# Patient Record
Sex: FEMALE | Race: WHITE | NOT HISPANIC OR LATINO | Employment: OTHER | URBAN - METROPOLITAN AREA
[De-identification: names, ages, dates, MRNs, and addresses within clinical notes are randomized per-mention and may not be internally consistent; named-entity substitution may affect disease eponyms.]

---

## 2017-01-03 ENCOUNTER — TELEPHONE (OUTPATIENT)
Dept: RHEUMATOLOGY | Facility: CLINIC | Age: 67
End: 2017-01-03

## 2017-01-03 NOTE — TELEPHONE ENCOUNTER
Spoke with Mrs. Oliver and rescheduled her appointment with Sonya Martinez PA-C for 02/1/2017 at 11:30am. Reminder letter mailed.

## 2017-01-03 NOTE — TELEPHONE ENCOUNTER
----- Message from Latonia Mckay sent at 1/3/2017 12:12 PM CST -----  Pt at [phone]//states she has appt with Dr Moreno this afternoon at 2:30pm but is needing to reschedule//she has an upset stomach and not able to come in today//would like to reschedule to a later date//please call//lona/[facility]

## 2017-02-01 ENCOUNTER — OFFICE VISIT (OUTPATIENT)
Dept: RHEUMATOLOGY | Facility: CLINIC | Age: 67
End: 2017-02-01
Payer: MEDICARE

## 2017-02-01 VITALS
HEIGHT: 63 IN | SYSTOLIC BLOOD PRESSURE: 159 MMHG | BODY MASS INDEX: 37.15 KG/M2 | WEIGHT: 209.69 LBS | HEART RATE: 65 BPM | DIASTOLIC BLOOD PRESSURE: 75 MMHG

## 2017-02-01 DIAGNOSIS — M47.816 SPONDYLOSIS OF LUMBAR REGION WITHOUT MYELOPATHY OR RADICULOPATHY: ICD-10-CM

## 2017-02-01 DIAGNOSIS — M79.7 FIBROMYALGIA: ICD-10-CM

## 2017-02-01 DIAGNOSIS — M35.00 SJOGREN'S SYNDROME: ICD-10-CM

## 2017-02-01 DIAGNOSIS — M62.830 LUMBAR PARASPINAL MUSCLE SPASM: ICD-10-CM

## 2017-02-01 DIAGNOSIS — Z51.81 MEDICATION MONITORING ENCOUNTER: ICD-10-CM

## 2017-02-01 DIAGNOSIS — M35.01 KERATOCONJUNCTIVITIS, SICCA SJOGREN'S SYNDROME: Primary | ICD-10-CM

## 2017-02-01 PROCEDURE — 99213 OFFICE O/P EST LOW 20 MIN: CPT | Mod: PBBFAC,PO | Performed by: PHYSICIAN ASSISTANT

## 2017-02-01 PROCEDURE — 99999 PR PBB SHADOW E&M-EST. PATIENT-LVL III: CPT | Mod: PBBFAC,,, | Performed by: PHYSICIAN ASSISTANT

## 2017-02-01 PROCEDURE — 99214 OFFICE O/P EST MOD 30 MIN: CPT | Mod: S$PBB,,, | Performed by: PHYSICIAN ASSISTANT

## 2017-02-01 RX ORDER — HYDROXYCHLOROQUINE SULFATE 200 MG/1
200 TABLET, FILM COATED ORAL 2 TIMES DAILY
Qty: 180 TABLET | Refills: 3 | Status: SHIPPED | OUTPATIENT
Start: 2017-02-01 | End: 2017-08-21 | Stop reason: SDUPTHER

## 2017-02-01 RX ORDER — ZOLPIDEM TARTRATE 5 MG/1
5 TABLET ORAL NIGHTLY PRN
Qty: 30 TABLET | Refills: 2 | Status: SHIPPED | OUTPATIENT
Start: 2017-02-01 | End: 2017-04-11 | Stop reason: ALTCHOICE

## 2017-02-01 NOTE — MR AVS SNAPSHOT
[facility]  [address] [facility] [address] [place] [address]  Phone: [phone]  Fax: [phone]                  Latonia Lomaxkey   2017 11:30 AM   Office Visit    Description:  Female : 1950   Provider:  Sonya Martinez PA-C   Department:  [facility] - Rheumatology           Reason for Visit     Osteopenia     Osteoarthritis     Fibromyalgia     sjogren's syndrome           Diagnoses this Visit        Comments    Keratoconjunctivitis, sicca Sjogren's syndrome    -  Primary     Fibromyalgia         Medication monitoring encounter         Spondylosis of lumbar region without myelopathy or radiculopathy         Lumbar paraspinal muscle spasm         Sjogren's syndrome                To Do List           Future Appointments        Provider Department Dept Phone    2017 1:00 PM MD Antoni[facility] Bangal - Pulmonary Services [phone]    2017 10:20 AM SPECIMEN, [facility] [phone]    2017 10:35 AM LABORATORY, [facility] [phone]    2017 10:30 AM Jorge Moreno MD [facility] [phone]      Goals (5 Years of Data)     None      Follow-Up and Disposition     Follow-up and Disposition History       These Medications        Disp Refills Start End    hydroxychloroquine (PLAQUENIL) 200 mg tablet 180 tablet 3 2017     Take 1 tablet (200 mg total) by mouth 2 (two) times daily. - Oral    Pharmacy: [facility] [address] [place], [place] [address]AUD[place] - [address]  Ph #: [phone]       zolpidem (AMBIEN) 5 MG Tab 30 tablet 2 2017    Take 1 tablet (5 mg total) by mouth nightly as needed. - Oral    Pharmacy: [facility] - [address]  Ph #: [phone]         [facility] On Call     [facility] Care Line -  Assistance  Registered nurses in the [facility] provide clinical advisement, health education, appointment booking, and other advisory  "services.  Call for this free service at [phone].             Medications           Message regarding Medications     Verify the changes and/or additions to your medication regime listed below are the same as discussed with your clinician today.  If any of these changes or additions are incorrect, please notify your healthcare provider.        START taking these NEW medications        Refills    zolpidem (AMBIEN) 5 MG Tab 2    Sig: Take 1 tablet (5 mg total) by mouth nightly as needed.    Class: Print    Route: Oral           Verify that the below list of medications is an accurate representation of the medications you are currently taking.  If none reported, the list may be blank. If incorrect, please contact your healthcare provider. Carry this list with you in case of emergency.           Current Medications     benazepril-hydrochlorthiazide (LOTENSIN HCT) 20-25 mg Tab TAKE ONE TABLET BY MOUTH ONCE DAILY    citalopram (CELEXA) 40 MG tablet TAKE ONE TABLET BY MOUTH ONCE DAILY    gabapentin (NEURONTIN) 400 MG capsule Take 1 capsules in morning and after lunch and take 2 capsules at bedtime    hydroxychloroquine (PLAQUENIL) 200 mg tablet Take 1 tablet (200 mg total) by mouth 2 (two) times daily.    methocarbamol (ROBAXIN) 750 MG Tab TAKE ONE TABLET BY MOUTH THREE TIMES DAILY    multivitamin (ONE DAILY MULTIVITAMIN) per tablet Take 1 tablet by mouth once daily.    omeprazole (PRILOSEC) 40 MG capsule TAKE ONE CAPSULE BY MOUTH ONCE DAILY    promethazine (PHENERGAN) 25 MG tablet TAKE ONE TABLET BY MOUTH EVERY 6 HOURS AS NEEDED FOR NAUSEA    simvastatin (ZOCOR) 40 MG tablet Take 1 tablet (40 mg total) by mouth once daily.    zolpidem (AMBIEN) 5 MG Tab Take 1 tablet (5 mg total) by mouth nightly as needed.           Clinical Reference Information           Vital Signs - Last Recorded  Most recent update: 2/1/2017 11:22 AM by Vanesa Haddad LPN    BP Pulse Ht Wt BMI    (!) 159/75 65 5' 3" (1.6 m) 95.1 kg (209 lb " 10.5 oz) 37.14 kg/m2      Blood Pressure          Most Recent Value    BP  (!)  159/75      Allergies as of 2/1/2017     Aspirin    Codeine    Meloxicam    Penicillins      Immunizations Administered on Date of Encounter - 2/1/2017     None      Orders Placed During Today's Visit      Normal Orders This Visit    Ambulatory Referral to Physical/Occupational Therapy     Recurring Lab Work Interval Expires    C-reactive protein   2/1/2018    CBC auto differential   2/1/2018    Comprehensive metabolic panel   2/1/2018    Sedimentation rate, manual   2/1/2018    PAUL   4/2/2018    Urinalysis   4/2/2018
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
## 2017-02-02 ENCOUNTER — PATIENT MESSAGE (OUTPATIENT)
Dept: RHEUMATOLOGY | Facility: CLINIC | Age: 67
End: 2017-02-02

## 2017-02-02 RX ORDER — GABAPENTIN 400 MG/1
CAPSULE ORAL
Qty: 120 CAPSULE | Refills: 3 | Status: SHIPPED | OUTPATIENT
Start: 2017-02-02 | End: 2017-07-03

## 2017-04-11 ENCOUNTER — OFFICE VISIT (OUTPATIENT)
Dept: PULMONOLOGY | Facility: CLINIC | Age: 67
End: 2017-04-11
Payer: MEDICARE

## 2017-04-11 VITALS
OXYGEN SATURATION: 98 % | BODY MASS INDEX: 38.09 KG/M2 | WEIGHT: 214.94 LBS | SYSTOLIC BLOOD PRESSURE: 130 MMHG | RESPIRATION RATE: 18 BRPM | HEIGHT: 63 IN | HEART RATE: 65 BPM | DIASTOLIC BLOOD PRESSURE: 83 MMHG

## 2017-04-11 DIAGNOSIS — G47.61 PERIODIC LIMB MOVEMENT SLEEP DISORDER: ICD-10-CM

## 2017-04-11 DIAGNOSIS — G47.01 INSOMNIA DUE TO MEDICAL CONDITION: ICD-10-CM

## 2017-04-11 DIAGNOSIS — G47.33 OSA ON CPAP: Primary | ICD-10-CM

## 2017-04-11 PROCEDURE — 99214 OFFICE O/P EST MOD 30 MIN: CPT | Mod: PBBFAC | Performed by: INTERNAL MEDICINE

## 2017-04-11 PROCEDURE — 99215 OFFICE O/P EST HI 40 MIN: CPT | Mod: S$PBB,,, | Performed by: INTERNAL MEDICINE

## 2017-04-11 PROCEDURE — 99999 PR PBB SHADOW E&M-EST. PATIENT-LVL IV: CPT | Mod: PBBFAC,,, | Performed by: INTERNAL MEDICINE

## 2017-04-11 RX ORDER — CLONAZEPAM 0.5 MG/1
0.5 TABLET ORAL NIGHTLY
Qty: 30 TABLET | Refills: 5 | Status: SHIPPED | OUTPATIENT
Start: 2017-04-11 | End: 2017-05-04 | Stop reason: SDUPTHER

## 2017-04-11 NOTE — PROGRESS NOTES
Subjective:       Patient ID: Latonia Oliver is a 66 y.o. female.    Chief Complaint: She       Sleep Apnea    HPI     Still with insomnia  Periodic Limb Movement of Sleep  Has peripheral neuropathy     presents for review of CPAP compliance. Information from CPAP machine downloaded and reviewed. Summary of compliance report is as follows:    Compliance Summary  3/12/2017 - 4/10/2017 (30 days)  Days with Device Usage 30 days  Days without Device Usage 0 days  Percent Days with Device Usage 100.0%  Cumulative Usage 9 days 21 hrs. 49 mins. 23 secs.  Maximum Usage (1 Day) 11 hrs. 20 mins. 26 secs.  Average Usage (All Days) 7 hrs. 55 mins. 38 secs.  Average Usage (Days Used) 7 hrs. 55 mins. 38 secs.  Minimum Usage (1 Day) 1 hrs. 23 mins. 10 secs.  Percent of Days with Usage >= 4 Hours 93.3%  Percent of Days with Usage < 4 Hours 6.7%  Date Range  Total Blower Time 10 days 41 mins. 37 secs.  Sleep Therapy Statistics ([facility])  Average Time in Large Leak Per Day 40 mins. 24 secs.  Average AHI 2.1  CPAP 12.0 cmH2O    Patient has complaints of anxiety and not enough  Patient is using CPAP as prescribed and benefiting from therapy.          Past Medical History:   Diagnosis Date    Breast cancer     Diabetes mellitus type 2, controlled     Fibromyalgia     GERD (gastroesophageal reflux disease)     HTN (hypertension)     Mixed hyperlipidemia     Osteoporosis     Sjogren - Flo's syndrome      Past Surgical History:   Procedure Laterality Date    APPENDECTOMY      BELT ABDOMINOPLASTY      BREAST LUMPECTOMY      BREAST SURGERY      breast augmentation     SECTION, CLASSIC       Social History     Social History    Marital status:      Spouse name: N/A    Number of children: 1    Years of education: N/A     Occupational History     [employer]      Social History Main Topics    Smoking status: Never Smoker    Smokeless tobacco: Never Used    Alcohol use Yes      Comment: Socially     Drug use: No    Sexual activity: Not on file     Other Topics Concern    Not on file     Social History Narrative     Review of Systems   Constitutional: Negative for fever and fatigue.   HENT: Negative for postnasal drip and rhinorrhea.    Eyes: Negative for redness and itching.   Respiratory: Positive for apnea. Negative for cough, shortness of breath, wheezing, dyspnea on extertion and Paroxysmal Nocturnal Dyspnea.    Cardiovascular: Negative for chest pain.   Genitourinary: Negative for difficulty urinating and hematuria.   Endocrine: Negative for polyphagia, cold intolerance and heat intolerance.    Musculoskeletal: Negative for arthralgias.   Skin: Negative for rash.   Gastrointestinal: Negative for nausea, vomiting, abdominal pain and abdominal distention.   Neurological: Negative for dizziness and headaches.   Hematological: Negative for adenopathy. Does not bruise/bleed easily and no excessive bruising.   Psychiatric/Behavioral: Positive for sleep disturbance. The patient is nervous/anxious.        Objective:      Physical Exam   Constitutional: She is oriented to person, place, and time. She appears well-developed and well-nourished.   HENT:   Head: Normocephalic and atraumatic.   Eyes: Conjunctivae are normal. Pupils are equal, round, and reactive to light.   Neck: Neck supple. No JVD present. No tracheal deviation present. No thyromegaly present.   Cardiovascular: Normal rate, regular rhythm and normal heart sounds.    Pulmonary/Chest: Effort normal and breath sounds normal. No respiratory distress. She has no wheezes. She has no rales. She exhibits no tenderness.   Abdominal: Soft. Bowel sounds are normal.   Musculoskeletal: Normal range of motion. She exhibits no edema.   Lymphadenopathy:     She has no cervical adenopathy.   Neurological: She is alert and oriented to person, place, and time.   Skin: Skin is warm and dry.   Nursing note and vitals reviewed.    Personal Diagnostic Review  PSG:  "significant for ROX    No flowsheet data found.      Assessment:       1. ROX on CPAP    2. Periodic limb movement sleep disorder    3. Insomnia due to medical condition        Outpatient Encounter Prescriptions as of 4/11/2017   Medication Sig Dispense Refill    benazepril-hydrochlorthiazide (LOTENSIN HCT) 20-25 mg Tab TAKE ONE TABLET BY MOUTH ONCE DAILY 30 tablet 5    citalopram (CELEXA) 40 MG tablet TAKE ONE TABLET BY MOUTH ONCE DAILY 30 tablet 5    clonazePAM (KLONOPIN) 0.5 MG tablet Take 1 tablet (0.5 mg total) by mouth every evening. 30 tablet 5    gabapentin (NEURONTIN) 400 MG capsule TAKE 1 CAPSULE BY MOUTH IN THE MORNING AND AFTER LUNCH AND TAKE 2 CAPSULES AT BEDTIME. 120 capsule 3    hydroxychloroquine (PLAQUENIL) 200 mg tablet Take 1 tablet (200 mg total) by mouth 2 (two) times daily. 180 tablet 3    methocarbamol (ROBAXIN) 750 MG Tab TAKE ONE TABLET BY MOUTH THREE TIMES DAILY 90 tablet 2    multivitamin (ONE DAILY MULTIVITAMIN) per tablet Take 1 tablet by mouth once daily.      omeprazole (PRILOSEC) 40 MG capsule TAKE ONE CAPSULE BY MOUTH ONCE DAILY 30 capsule 6    promethazine (PHENERGAN) 25 MG tablet TAKE ONE TABLET BY MOUTH EVERY 6 HOURS AS NEEDED FOR NAUSEA 30 tablet 2    simvastatin (ZOCOR) 40 MG tablet Take 1 tablet (40 mg total) by mouth once daily. 90 tablet 0    [DISCONTINUED] zolpidem (AMBIEN) 5 MG Tab Take 1 tablet (5 mg total) by mouth nightly as needed. 30 tablet 2     No facility-administered encounter medications on file as of 4/11/2017.      Orders Placed This Encounter   Procedures    CPAP FOR HOME USE     Stop renting machine. Set ramp time at zero and pressure at 13 cm     Order Specific Question:   Type:     Answer:   CPAP     Order Specific Question:   CPAP setting (cmH20):     Answer:   13     Order Specific Question:   Height:     Answer:   5' 3" (1.6 m)     Order Specific Question:   Weight:     Answer:   97.5 kg (214 lb 15.2 oz)     Order Specific Question:   Length " of need (1-99 months):     Answer:   99     Order Specific Question:   Humidification:     Answer:   Heated     Order Specific Question:   Setting (cmH2O)  (Enter Auto Pressure in comment field):     Answer:   13     Order Specific Question:   Type of mask:     Answer:   Nasal     Order Specific Question:   Tubing?     Answer:   Yes     Order Specific Question:   Humidifier chamber?     Answer:   Yes     Order Specific Question:   Chin strap?     Answer:   Yes     Order Specific Question:   Filters?     Answer:   Yes     Order Specific Question:   Vendor:     Answer:   Other (use comments)     Comments:   [facility] [place]     [place] Specific Question:   Expected Date of Delivery:     Answer:   4/11/2017    CPAP/BIPAP SUPPLIES     90 day supply, 4 refills: [facility]erred to [facility] for DURABLE MEDICAL EQUIPMENT  [facility]  Address: [address] [place] [address] [address], [place] [address]  [phone]  Fax [phone]  [facility]     Order Specific Question:   Type of mask:     Answer:   FFM     Comments:   patient prefernce     Order Specific Question:   Headgear?     Answer:   Yes     Order Specific Question:   Tubing?     Answer:   Yes     Order Specific Question:   Humidifier chamber?     Answer:   Yes     Order Specific Question:   Chin strap?     Answer:   Yes     Order Specific Question:   Filters?     Answer:   Yes     Order Specific Question:   Length of need (1-99 months):     Answer:   99     Order Specific Question:   Vendor:     Answer:   Other (use comments)     Comments:   [facility] [place]     Order Specific Question:   Expected Date of Delivery:     Answer:   4/11/2017     Plan:       Requested Prescriptions     Signed Prescriptions Disp Refills    clonazePAM (KLONOPIN) 0.5 MG tablet 30 tablet 5     Sig: Take 1 tablet (0.5 mg total) by mouth every evening.     ROX on CPAP  -     CPAP FOR HOME USE  -     CPAP/BIPAP SUPPLIES    Periodic limb movement sleep disorder  -     clonazePAM  (KLONOPIN) 0.5 MG tablet; Take 1 tablet (0.5 mg total) by mouth every evening.  Dispense: 30 tablet; Refill: 5    Insomnia due to medical condition  -     clonazePAM (KLONOPIN) 0.5 MG tablet; Take 1 tablet (0.5 mg total) by mouth every evening.  Dispense: 30 tablet; Refill: 5         Return in about 1 year (around 4/11/2018) for CPAP download.    MEDICAL DECISION MAKING: Moderate to high complexity.  Overall, the multiple problems listed are of moderate to high severity that may impact quality of life and activities of daily living. Side effects of medications, treatment plan as well as options and alternatives reviewed and discussed with patient. There was counseling of patient concerning these issues.    Total time spent in face to face counseling and coordination of care - 45  minutes over 50% of time was used in discussion of prognosis, risks, benefits of treatment, instructions and compliance with regimen . Discussion with other physicians or health care providers (DME, NP, pharmacy, respiratory therapy) occurred.

## 2017-04-11 NOTE — MR AVS SNAPSHOT
[facility]  [address] [facility] [address]  Phone: [phone]  Fax: [phone]                  Latonia Oliver   2017 1:00 PM   Office Visit    Description:  Female : 1950   Provider:  Nirav Rahman MD   Department:  [facility]           Reason for Visit     Sleep Apnea           Diagnoses this Visit        Comments    ROX on CPAP    -  Primary     Periodic limb movement sleep disorder         Insomnia due to medical condition                To Do List           Future Appointments        Provider Department Dept Phone    2017 10:20 AM SPECIMEN, [facility] [phone]    2017 10:35 AM LABORATORY, [facility] [phone]    2017 10:30 AM Jorge Moreno MD [facility] - Rheumatology [phone]    2018 1:00 PM Nirav Rahman MD [facility] [phone]      Goals (5 Years of Data)     None      Follow-Up and Disposition     Return in about 1 year (around 2018) for CPAP download.       These Medications        Disp Refills Start End    clonazePAM (KLONOPIN) 0.5 MG tablet 30 tablet 5 2017    Take 1 tablet (0.5 mg total) by mouth every evening. - Oral    Pharmacy: [facility] [address] [place] [address] PAULO  Ph #: [phone]         [facility] On Call     [facility] Nurse Care Line - 24/7 Assistance  Unless otherwise directed by your provider, please contact [facility], our nurse care line that is available for 24/7 assistance.     Registered nurses in the [facility] provide: appointment scheduling, clinical advisement, health education, and other advisory services.  Call: [phone] (toll free)               Medications           Message regarding Medications     Verify the changes and/or additions to your medication regime listed below are the same as discussed with your clinician today.   "If any of these changes or additions are incorrect, please notify your healthcare provider.        START taking these NEW medications        Refills    clonazePAM (KLONOPIN) 0.5 MG tablet 5    Sig: Take 1 tablet (0.5 mg total) by mouth every evening.    Class: Normal    Route: Oral      STOP taking these medications     zolpidem (AMBIEN) 5 MG Tab Take 1 tablet (5 mg total) by mouth nightly as needed.           Verify that the below list of medications is an accurate representation of the medications you are currently taking.  If none reported, the list may be blank. If incorrect, please contact your healthcare provider. Carry this list with you in case of emergency.           Current Medications     benazepril-hydrochlorthiazide (LOTENSIN HCT) 20-25 mg Tab TAKE ONE TABLET BY MOUTH ONCE DAILY    citalopram (CELEXA) 40 MG tablet TAKE ONE TABLET BY MOUTH ONCE DAILY    clonazePAM (KLONOPIN) 0.5 MG tablet Take 1 tablet (0.5 mg total) by mouth every evening.    gabapentin (NEURONTIN) 400 MG capsule TAKE 1 CAPSULE BY MOUTH IN THE MORNING AND AFTER LUNCH AND TAKE 2 CAPSULES AT BEDTIME.    hydroxychloroquine (PLAQUENIL) 200 mg tablet Take 1 tablet (200 mg total) by mouth 2 (two) times daily.    methocarbamol (ROBAXIN) 750 MG Tab TAKE ONE TABLET BY MOUTH THREE TIMES DAILY    multivitamin (ONE DAILY MULTIVITAMIN) per tablet Take 1 tablet by mouth once daily.    omeprazole (PRILOSEC) 40 MG capsule TAKE ONE CAPSULE BY MOUTH ONCE DAILY    promethazine (PHENERGAN) 25 MG tablet TAKE ONE TABLET BY MOUTH EVERY 6 HOURS AS NEEDED FOR NAUSEA    simvastatin (ZOCOR) 40 MG tablet Take 1 tablet (40 mg total) by mouth once daily.           Clinical Reference Information           Your Vitals Were     BP Pulse Resp Height Weight SpO2    130/83 65 18 5' 3" (1.6 m) 97.5 kg (214 lb 15.2 oz) 98%    BMI                38.08 kg/m2          Blood Pressure          Most Recent Value    BP  130/83      Allergies as of 4/11/2017     Aspirin    Codeine "    Meloxicam    Penicillins      Immunizations Administered on Date of Encounter - 4/11/2017     None      Orders Placed During Today's Visit      Normal Orders This Visit    CPAP FOR HOME USE     CPAP/BIPAP SUPPLIES       Language Assistance Services     ATTENTION: Language assistance services are available, free of charge. Please call [phone].      ATENCIÓN: Si habla giles, tiene a yusuf disposición servicios gratuitos de asistencia lingüística. Llame al [phone].     CHÚ Ý: N?u b?n nói Ti?ng Vi?t, có các d?ch v? h? tr? ngôn ng? mi?n phí dành cho b?n. G?i s? [phone].         [facility] complies with applicable Federal civil rights laws and does not discriminate on the basis of race, color, national origin, age, disability, or sex.

## 2017-04-24 RX ORDER — SIMVASTATIN 40 MG/1
TABLET, FILM COATED ORAL
Qty: 90 TABLET | Refills: 0 | Status: SHIPPED | OUTPATIENT
Start: 2017-04-24 | End: 2017-08-28 | Stop reason: SDUPTHER

## 2017-04-25 RX ORDER — OMEPRAZOLE 40 MG/1
CAPSULE, DELAYED RELEASE ORAL
Qty: 30 CAPSULE | Refills: 6 | Status: SHIPPED | OUTPATIENT
Start: 2017-04-25 | End: 2017-11-19 | Stop reason: SDUPTHER

## 2017-05-02 DIAGNOSIS — G47.01 INSOMNIA DUE TO MEDICAL CONDITION: ICD-10-CM

## 2017-05-02 DIAGNOSIS — G47.61 PERIODIC LIMB MOVEMENT SLEEP DISORDER: ICD-10-CM

## 2017-05-02 RX ORDER — CLONAZEPAM 0.5 MG/1
1 TABLET ORAL NIGHTLY
Qty: 30 TABLET | Refills: 5 | Status: CANCELLED | OUTPATIENT
Start: 2017-05-02 | End: 2018-05-02

## 2017-05-02 NOTE — TELEPHONE ENCOUNTER
----- Message from Stephany Hoffman sent at 5/2/2017  9:35 AM CDT -----  Contact: pt   Call pt regarding giving a update on how med is working.    ..[phone] (home)

## 2017-05-02 NOTE — TELEPHONE ENCOUNTER
Pt wanted to give report on Clonazepam,  states she started off taking 1/2 pill no grogginess then up to 1 pill it did nothing and not grogginess, she was told she could take 2 pills. Reports when she takes 2 pills it helps with RLS and she does not experience grogginess.  She would like Dr. Rahman to increase dose so that she will not run out before its due.  Please advise.

## 2017-05-04 DIAGNOSIS — G47.01 INSOMNIA DUE TO MEDICAL CONDITION: ICD-10-CM

## 2017-05-04 DIAGNOSIS — G47.61 PERIODIC LIMB MOVEMENT SLEEP DISORDER: ICD-10-CM

## 2017-05-04 RX ORDER — CLONAZEPAM 0.5 MG/1
1 TABLET ORAL NIGHTLY
Qty: 30 TABLET | Refills: 5 | Status: CANCELLED | OUTPATIENT
Start: 2017-05-04 | End: 2018-05-04

## 2017-05-04 RX ORDER — CLONAZEPAM 0.5 MG/1
1 TABLET ORAL NIGHTLY
Qty: 60 TABLET | Refills: 5 | Status: SHIPPED | OUTPATIENT
Start: 2017-05-04 | End: 2017-10-18 | Stop reason: SDUPTHER

## 2017-05-04 NOTE — TELEPHONE ENCOUNTER
----- Message from Lakeshia Mixon sent at 5/4/2017 12:53 PM CDT -----  Contact: Patient  Patient returned call. She stated she is at [facility] and no medication was called in for her.    [facility] - [place] - [address]  [address] [facility] [place] [address]  Phone: [phone] Fax: [phone]    She can be contacted at [phone].    Thanks,  Lakeshia

## 2017-05-05 ENCOUNTER — PATIENT OUTREACH (OUTPATIENT)
Dept: ADMINISTRATIVE | Facility: HOSPITAL | Age: 67
End: 2017-05-05
Payer: MEDICARE

## 2017-05-05 DIAGNOSIS — Z12.31 ENCOUNTER FOR SCREENING MAMMOGRAM FOR MALIGNANT NEOPLASM OF BREAST: Primary | ICD-10-CM

## 2017-05-17 ENCOUNTER — LAB VISIT (OUTPATIENT)
Dept: LAB | Facility: HOSPITAL | Age: 67
End: 2017-05-17
Attending: FAMILY MEDICINE
Payer: MEDICARE

## 2017-05-17 ENCOUNTER — OFFICE VISIT (OUTPATIENT)
Dept: INTERNAL MEDICINE | Facility: CLINIC | Age: 67
End: 2017-05-17
Payer: MEDICARE

## 2017-05-17 VITALS
HEART RATE: 69 BPM | HEIGHT: 63 IN | BODY MASS INDEX: 37.26 KG/M2 | TEMPERATURE: 97 F | DIASTOLIC BLOOD PRESSURE: 70 MMHG | SYSTOLIC BLOOD PRESSURE: 118 MMHG | WEIGHT: 210.31 LBS | OXYGEN SATURATION: 98 %

## 2017-05-17 DIAGNOSIS — C50.919 MALIGNANT NEOPLASM OF FEMALE BREAST, UNSPECIFIED LATERALITY, UNSPECIFIED SITE OF BREAST: ICD-10-CM

## 2017-05-17 DIAGNOSIS — I10 ESSENTIAL HYPERTENSION: ICD-10-CM

## 2017-05-17 DIAGNOSIS — K21.9 GASTROESOPHAGEAL REFLUX DISEASE, ESOPHAGITIS PRESENCE NOT SPECIFIED: ICD-10-CM

## 2017-05-17 DIAGNOSIS — E11.9 CONTROLLED TYPE 2 DIABETES MELLITUS WITHOUT COMPLICATION, WITHOUT LONG-TERM CURRENT USE OF INSULIN: ICD-10-CM

## 2017-05-17 DIAGNOSIS — I10 ESSENTIAL HYPERTENSION: Primary | ICD-10-CM

## 2017-05-17 LAB
CHOLEST/HDLC SERPL: 2.4 {RATIO}
HDL/CHOLESTEROL RATIO: 42.3 %
HDLC SERPL-MCNC: 149 MG/DL
HDLC SERPL-MCNC: 63 MG/DL
LDLC SERPL CALC-MCNC: 68.4 MG/DL
NONHDLC SERPL-MCNC: 86 MG/DL
TRIGL SERPL-MCNC: 88 MG/DL

## 2017-05-17 PROCEDURE — 80061 LIPID PANEL: CPT

## 2017-05-17 PROCEDURE — 83036 HEMOGLOBIN GLYCOSYLATED A1C: CPT

## 2017-05-17 PROCEDURE — 36415 COLL VENOUS BLD VENIPUNCTURE: CPT | Mod: PO

## 2017-05-17 PROCEDURE — 99214 OFFICE O/P EST MOD 30 MIN: CPT | Mod: S$PBB,,, | Performed by: FAMILY MEDICINE

## 2017-05-17 PROCEDURE — 99999 PR PBB SHADOW E&M-EST. PATIENT-LVL III: CPT | Mod: PBBFAC,,, | Performed by: FAMILY MEDICINE

## 2017-05-17 RX ORDER — ZOLPIDEM TARTRATE 5 MG/1
10 TABLET ORAL NIGHTLY PRN
COMMUNITY
Start: 2017-04-17 | End: 2018-04-02 | Stop reason: ALTCHOICE

## 2017-05-17 NOTE — PROGRESS NOTES
Subjective:       Patient ID: Latonia Oliver is a 66 y.o. female.    Chief Complaint: Follow-up    HPI Comments: Follow-up:       Pt is a 66 year old with DM-2 that is controlled. Pt HTN is well controlled. Pt has depression and anxiety. Pt is on Klonopin and Celexa 40 mg. Discussed with pt PAP, Mammogram and colonoscopy. Pt is doing well on her medication.     Review of Systems   Constitutional: Negative.    Respiratory: Negative.    Cardiovascular: Negative.    Genitourinary: Negative.    Musculoskeletal: Negative.    Neurological: Negative.    Psychiatric/Behavioral: Negative.        Objective:      Physical Exam   Constitutional: She appears well-developed and well-nourished.   Cardiovascular: Normal rate and regular rhythm.    Pulmonary/Chest: Effort normal and breath sounds normal.   Abdominal: Soft. Bowel sounds are normal.   Skin: Skin is warm and dry.       Assessment:       1. Essential hypertension    2. Controlled type 2 diabetes mellitus without complication, without long-term current use of insulin    3. Malignant neoplasm of female breast, unspecified laterality, unspecified site of breast    4. Gastroesophageal reflux disease, esophagitis presence not specified        Plan:       Essential hypertension  Comments:  Pt BP is well controlled and will continue on the benazepril  Orders:  -     Lipid panel; Future; Expected date: 5/17/17    Controlled type 2 diabetes mellitus without complication, without long-term current use of insulin  Comments:  Will do HgA1C and continue to monitor  Orders:  -     Hemoglobin A1c; Future; Expected date: 5/17/17  -     Microalbumin/creatinine urine ratio; Future; Expected date: 5/17/17    Malignant neoplasm of female breast, unspecified laterality, unspecified site of breast  Comments:  Discussed with pt follow-up mammogram    Gastroesophageal reflux disease, esophagitis presence not specified  Comments:  Will continue with the Omeprazole

## 2017-05-17 NOTE — MR AVS SNAPSHOT
[facility]  [address] [facility] [place] [address]  Phone: [phone]  Fax: [phone]                  Latonia Oliver   2017 9:00 AM   Office Visit    Description:  Female : 1950   Provider:  Elias Acevedo MD   Department:  [facility] - Internal Medicine           Reason for Visit     Follow-up           Diagnoses this Visit        Comments    Essential hypertension    -  Primary Pt BP is well controlled and will continue on the benazepril    Controlled type 2 diabetes mellitus without complication, without long-term current use of insulin     Will do HgA1C and continue to monitor    Malignant neoplasm of female breast, unspecified laterality, unspecified site of breast     Discussed with pt follow-up mammogram    Gastroesophageal reflux disease, esophagitis presence not specified     Will continue with the Omeprazole           To Do List           Future Appointments        Provider Department Dept Phone    2017 12:15 PM LAB[facility]ATORY, [facility] [phone]    2017 12:20 PM SPECIMEN, [facility] [phone]    2017 10:20 AM SPECIMEN, [facility] [phone]    2017 10:35 AM LAB[facility]ATORY, [facility] [phone]    2017 10:30 AM Jorge Moreno MD [facility] Rheumatology [phone]      Goals (5 Years of Data)     None      [facility]     [facility] Care [facility] - / Assistance  Unless otherwise directed by your provider, please contact [facility], our nurse care line that is available for  assistance.     Registered nurses in the [facility] provide: appointment scheduling, clinical advisement, health education, and other advisory services.  Call: [phone] (toll free)               Medications           Message regarding Medications     Verify the changes and/or additions to your medication regime listed below  "are the same as discussed with your clinician today.  If any of these changes or additions are incorrect, please notify your healthcare provider.             Verify that the below list of medications is an accurate representation of the medications you are currently taking.  If none reported, the list may be blank. If incorrect, please contact your healthcare provider. Carry this list with you in case of emergency.           Current Medications     benazepril-hydrochlorthiazide (LOTENSIN HCT) 20-25 mg Tab TAKE ONE TABLET BY MOUTH ONCE DAILY    citalopram (CELEXA) 40 MG tablet TAKE ONE TABLET BY MOUTH ONCE DAILY    clonazePAM (KLONOPIN) 0.5 MG tablet Take 2 tablets (1 mg total) by mouth every evening.    gabapentin (NEURONTIN) 400 MG capsule TAKE 1 CAPSULE BY MOUTH IN THE MORNING AND AFTER LUNCH AND TAKE 2 CAPSULES AT BEDTIME.    hydroxychloroquine (PLAQUENIL) 200 mg tablet Take 1 tablet (200 mg total) by mouth 2 (two) times daily.    methocarbamol (ROBAXIN) 750 MG Tab TAKE ONE TABLET BY MOUTH THREE TIMES DAILY    multivitamin (ONE DAILY MULTIVITAMIN) per tablet Take 1 tablet by mouth once daily.    omeprazole (PRILOSEC) 40 MG capsule TAKE ONE CAPSULE BY MOUTH ONCE DAILY    promethazine (PHENERGAN) 25 MG tablet TAKE ONE TABLET BY MOUTH EVERY 6 HOURS AS NEEDED FOR NAUSEA    simvastatin (ZOCOR) 40 MG tablet TAKE ONE TABLET BY MOUTH ONCE DAILY    zolpidem (AMBIEN) 5 MG Tab            Clinical Reference Information           Your Vitals Were     BP Pulse Temp Height    118/70 (BP Location: Left arm, Patient Position: Sitting, BP Method: Manual) 69 97.4 °F (36.3 °C) (Tympanic) 5' 3" (1.6 m)    Weight SpO2 BMI    95.4 kg (210 lb 5.1 oz) 98% 37.26 kg/m2      Blood Pressure          Most Recent Value    BP  118/70      Allergies as of 5/17/2017     Aspirin    Codeine    Meloxicam    Penicillins      Immunizations Administered on Date of Encounter - 5/17/2017     None      Orders Placed During Today's Visit     Future " Labs/Procedures Expected by Expires    Hemoglobin A1c  5/17/2017 7/16/2018    Lipid panel  5/17/2017 5/17/2018    Microalbumin/creatinine urine ratio  5/17/2017 7/16/2018      Language Assistance Services     ATTENTION: Language assistance services are available, free of charge. Please call [phone].      ATENCIÓN: Si habla español, tiene a yusuf disposición servicios gratuitos de asistencia lingüística. Llame al [phone].     CHÚ Ý: N?u b?n nói Ti?ng Vi?t, có các d?ch v? h? tr? ngôn ng? mi?n phí dành cho b?n. G?i s? [phone].         [facility] complies with applicable Federal civil rights laws and does not discriminate on the basis of race, color, national origin, age, disability, or sex.

## 2017-05-18 LAB
ESTIMATED AVG GLUCOSE: 140 MG/DL
HBA1C MFR BLD HPLC: 6.5 %

## 2017-05-22 RX ORDER — CITALOPRAM 40 MG/1
TABLET, FILM COATED ORAL
Qty: 90 TABLET | Refills: 1 | Status: SHIPPED | OUTPATIENT
Start: 2017-05-22 | End: 2017-11-19 | Stop reason: SDUPTHER

## 2017-06-26 ENCOUNTER — OFFICE VISIT (OUTPATIENT)
Dept: URGENT CARE | Facility: CLINIC | Age: 67
End: 2017-06-26
Payer: MEDICARE

## 2017-06-26 ENCOUNTER — HOSPITAL ENCOUNTER (OUTPATIENT)
Dept: RADIOLOGY | Facility: HOSPITAL | Age: 67
Discharge: HOME | End: 2017-06-26
Attending: NURSE PRACTITIONER
Payer: MEDICARE

## 2017-06-26 VITALS
OXYGEN SATURATION: 98 % | HEIGHT: 63 IN | BODY MASS INDEX: 37.54 KG/M2 | TEMPERATURE: 99 F | WEIGHT: 211.88 LBS | SYSTOLIC BLOOD PRESSURE: 138 MMHG | DIASTOLIC BLOOD PRESSURE: 80 MMHG | HEART RATE: 98 BPM

## 2017-06-26 DIAGNOSIS — L53.9 REDNESS OF SKIN: ICD-10-CM

## 2017-06-26 DIAGNOSIS — M25.474 SWELLING OF FOOT JOINT, RIGHT: ICD-10-CM

## 2017-06-26 DIAGNOSIS — M79.604 PAIN AND SWELLING OF LOWER EXTREMITY, RIGHT: Primary | ICD-10-CM

## 2017-06-26 DIAGNOSIS — T50.905D MEDICATION SIDE EFFECT, SUBSEQUENT ENCOUNTER: ICD-10-CM

## 2017-06-26 DIAGNOSIS — M79.89 PAIN AND SWELLING OF LOWER EXTREMITY, RIGHT: ICD-10-CM

## 2017-06-26 DIAGNOSIS — M79.604 PAIN AND SWELLING OF LOWER EXTREMITY, RIGHT: ICD-10-CM

## 2017-06-26 DIAGNOSIS — M79.89 PAIN AND SWELLING OF LOWER EXTREMITY, RIGHT: Primary | ICD-10-CM

## 2017-06-26 PROCEDURE — 73630 X-RAY EXAM OF FOOT: CPT | Mod: 26,RT,, | Performed by: RADIOLOGY

## 2017-06-26 PROCEDURE — 1159F MED LIST DOCD IN RCRD: CPT | Mod: ,,, | Performed by: NURSE PRACTITIONER

## 2017-06-26 PROCEDURE — 1125F AMNT PAIN NOTED PAIN PRSNT: CPT | Mod: ,,, | Performed by: NURSE PRACTITIONER

## 2017-06-26 PROCEDURE — 99214 OFFICE O/P EST MOD 30 MIN: CPT | Mod: S$PBB,,, | Performed by: NURSE PRACTITIONER

## 2017-06-26 PROCEDURE — 73610 X-RAY EXAM OF ANKLE: CPT | Mod: 26,RT,, | Performed by: RADIOLOGY

## 2017-06-26 PROCEDURE — 73610 X-RAY EXAM OF ANKLE: CPT | Mod: TC,PO,RT

## 2017-06-26 PROCEDURE — 99999 PR PBB SHADOW E&M-EST. PATIENT-LVL V: CPT | Mod: PBBFAC,,, | Performed by: NURSE PRACTITIONER

## 2017-06-26 PROCEDURE — 73630 X-RAY EXAM OF FOOT: CPT | Mod: TC,PO,RT

## 2017-06-26 RX ORDER — DOXYCYCLINE 100 MG/1
100 CAPSULE ORAL EVERY 12 HOURS
Qty: 20 CAPSULE | Refills: 0 | Status: SHIPPED | OUTPATIENT
Start: 2017-06-26 | End: 2017-07-06

## 2017-06-26 RX ORDER — PROMETHAZINE HYDROCHLORIDE 25 MG/1
25 TABLET ORAL EVERY 6 HOURS PRN
Qty: 30 TABLET | Refills: 0 | Status: SHIPPED | OUTPATIENT
Start: 2017-06-26 | End: 2022-08-17

## 2017-06-26 NOTE — PATIENT INSTRUCTIONS
Leg Swelling in a Single Leg  Swelling of the arms, feet, ankles, and legs is called edema. It is caused by extra fluid collecting in the tissues. Because of gravity, extra fluid in the body settles to the lowest part. That is why the legs and feet are most affected. You have swelling in a single leg.  Some of the causes for swelling in only a single leg include:  · Infection in the foot or leg  · Long-term problem with a vein not working well (venous insufficiency)  · Swollen, twisted vein in the leg (varicose veins)  · Insect bite or sting on the foot or leg  · Injury or recent surgery on the foot or leg  · Blood clot in a deep vein of the leg (deep vein thrombosis or DVT)  · Inflammation of the joints of the lower leg  Medical treatment will depend on what is causing your swelling.  Home care  Follow these guidelines when caring for yourself at home:  · Dont wear tight clothing.  · Keep your legs up while lying or sitting.  · Take any medicines as directed.  · If infection, injury, or recent surgery is the cause of your swelling, stay off your legs as much as possible until your symptoms get better.  · If you have venous insufficiency or varicose veins, dont sit or  one place for long periods of time. Take breaks and walk around every few hours. Talk with your healthcare provider about wearing support stockings to help lessen swelling during the day.  · Wear compression stockings with your doctor's approval  Follow-up care  Follow up with your healthcare provider as advised.  Call 911  Call 911 if any of these occur:  · Shortness of breath or trouble breathing  · Chest pain  · Coughing up blood  · Fainting or loss of consciousness   When to seek medical advice  Call your healthcare provider right away if any of these occur:  · Increased pain, swelling, warmth, or redness of the leg, ankle, or foot  · Fever of 100.4°F (38ºC) or higher, or as directed by your healthcare provider  · Weakness or  dizziness  · Shaking chills  · Drenching sweats  Date Last Reviewed: 4/11/2016  © 5445-0244 [employer]. [address] [place] [address], [place] [address]. All rights reserved. This information is not intended as a substitute for professional medical care. Always follow your healthcare professional's instructions.

## 2017-06-27 ENCOUNTER — HOSPITAL ENCOUNTER (OUTPATIENT)
Dept: RADIOLOGY | Facility: HOSPITAL | Age: 67
Discharge: HOME | End: 2017-06-27
Attending: NURSE PRACTITIONER
Payer: MEDICARE

## 2017-06-27 DIAGNOSIS — M79.89 PAIN AND SWELLING OF LOWER EXTREMITY, RIGHT: ICD-10-CM

## 2017-06-27 DIAGNOSIS — M79.604 PAIN AND SWELLING OF LOWER EXTREMITY, RIGHT: ICD-10-CM

## 2017-06-27 PROCEDURE — 93971 EXTREMITY STUDY: CPT | Mod: 26,,, | Performed by: RADIOLOGY

## 2017-06-27 PROCEDURE — 93971 EXTREMITY STUDY: CPT | Mod: TC,PO

## 2017-07-03 ENCOUNTER — OFFICE VISIT (OUTPATIENT)
Dept: INTERNAL MEDICINE | Facility: CLINIC | Age: 67
End: 2017-07-03
Payer: MEDICARE

## 2017-07-03 VITALS
DIASTOLIC BLOOD PRESSURE: 72 MMHG | BODY MASS INDEX: 36.95 KG/M2 | WEIGHT: 208.56 LBS | TEMPERATURE: 97 F | HEIGHT: 63 IN | HEART RATE: 74 BPM | SYSTOLIC BLOOD PRESSURE: 122 MMHG

## 2017-07-03 DIAGNOSIS — L03.115 CELLULITIS OF FOOT, RIGHT: Primary | ICD-10-CM

## 2017-07-03 PROCEDURE — 99214 OFFICE O/P EST MOD 30 MIN: CPT | Mod: PBBFAC,PO | Performed by: NURSE PRACTITIONER

## 2017-07-03 PROCEDURE — 1125F AMNT PAIN NOTED PAIN PRSNT: CPT | Mod: ,,, | Performed by: NURSE PRACTITIONER

## 2017-07-03 PROCEDURE — 99213 OFFICE O/P EST LOW 20 MIN: CPT | Mod: S$PBB,,, | Performed by: NURSE PRACTITIONER

## 2017-07-03 PROCEDURE — 1159F MED LIST DOCD IN RCRD: CPT | Mod: ,,, | Performed by: NURSE PRACTITIONER

## 2017-07-03 PROCEDURE — 99999 PR PBB SHADOW E&M-EST. PATIENT-LVL IV: CPT | Mod: PBBFAC,,, | Performed by: NURSE PRACTITIONER

## 2017-07-03 NOTE — PROGRESS NOTES
"Subjective:       Patient ID: Latonia Oliver is a 66 y.o. female.    Chief Complaint: Follow-up    Pt seen in  for right foot cellulitis     She had US, xray- normal  Given doxy x 10 days- 3 days remain   She has not been elevating the foot  The redness has improved, but swelling remains and feels "tight" when she walks  She denies any systemic symptoms       Review of Systems   Constitutional: Negative for activity change, chills, fatigue, fever and unexpected weight change.   HENT: Negative for congestion, ear pain, hearing loss, postnasal drip, rhinorrhea, sinus pressure, sneezing, sore throat and trouble swallowing.    Eyes: Negative for photophobia, pain, discharge and visual disturbance.   Respiratory: Negative for cough, chest tightness, shortness of breath and wheezing.    Cardiovascular: Positive for leg swelling (right foot). Negative for chest pain and palpitations.   Gastrointestinal: Negative for abdominal pain, blood in stool, constipation, diarrhea, nausea and vomiting.   Endocrine: Negative for polydipsia and polyuria.   Genitourinary: Negative for difficulty urinating, dysuria, frequency, hematuria and menstrual problem.   Musculoskeletal: Negative for arthralgias and neck pain.   Skin: Negative.    Neurological: Negative for dizziness, weakness, light-headedness and headaches.   Psychiatric/Behavioral: Negative for confusion, dysphoric mood and sleep disturbance.       Objective:      Physical Exam   Constitutional: She is oriented to person, place, and time. She appears well-developed and well-nourished.   HENT:   Head: Normocephalic and atraumatic.   Neck: Normal range of motion. Neck supple.   Cardiovascular: Intact distal pulses.    Musculoskeletal: Normal range of motion.        Right ankle: She exhibits swelling.        Right foot: There is swelling.   Neurological: She is alert and oriented to person, place, and time.   Skin: Skin is warm and dry.   Psychiatric: She has a normal mood and " affect.       Assessment:       1. Cellulitis of foot, right        Plan:   Cellulitis of foot, right  -     COMPRESSION STOCKINGS      Cellulitis appears to be resolved- swelling remains  Elevate leg above heart   Support stocking for right leg- wear for 6-8 hours a day  Avoiding excessive salt intake   Take naproxen (take 2) twice a day with meals for 5-7 days   Continue doxycycline until complete   F/u with PCP if symptoms worsen or fail to improve

## 2017-07-03 NOTE — PATIENT INSTRUCTIONS
Elevate leg above heart   Support stocking for right leg- wear for 6-8 hours a day  Avoiding excessive salt intake   Take naproxen (take 2) twice a day with meals for 5-7 days   Continue doxycycline until complete

## 2017-08-16 ENCOUNTER — LAB VISIT (OUTPATIENT)
Dept: LAB | Facility: HOSPITAL | Age: 67
End: 2017-08-16
Attending: INTERNAL MEDICINE
Payer: MEDICARE

## 2017-08-16 DIAGNOSIS — M35.01 SJOGREN'S SYNDROME WITH KERATOCONJUNCTIVITIS SICCA: ICD-10-CM

## 2017-08-16 DIAGNOSIS — Z51.81 MEDICATION MONITORING ENCOUNTER: ICD-10-CM

## 2017-08-16 DIAGNOSIS — M35.00 SJOGREN'S SYNDROME: ICD-10-CM

## 2017-08-16 LAB
ALBUMIN SERPL BCP-MCNC: 3.9 G/DL
ALP SERPL-CCNC: 81 U/L
ALT SERPL W/O P-5'-P-CCNC: 28 U/L
ANION GAP SERPL CALC-SCNC: 8 MMOL/L
AST SERPL-CCNC: 27 U/L
BASOPHILS # BLD AUTO: 0.04 K/UL
BASOPHILS NFR BLD: 0.9 %
BILIRUB SERPL-MCNC: 0.4 MG/DL
BUN SERPL-MCNC: 13 MG/DL
CALCIUM SERPL-MCNC: 9 MG/DL
CHLORIDE SERPL-SCNC: 106 MMOL/L
CO2 SERPL-SCNC: 27 MMOL/L
CREAT SERPL-MCNC: 0.8 MG/DL
CRP SERPL-MCNC: 5.9 MG/L
DIFFERENTIAL METHOD: NORMAL
EOSINOPHIL # BLD AUTO: 0.1 K/UL
EOSINOPHIL NFR BLD: 2.6 %
ERYTHROCYTE [DISTWIDTH] IN BLOOD BY AUTOMATED COUNT: 14.4 %
ERYTHROCYTE [SEDIMENTATION RATE] IN BLOOD BY WESTERGREN METHOD: 15 MM/HR
EST. GFR  (AFRICAN AMERICAN): >60 ML/MIN/1.73 M^2
EST. GFR  (NON AFRICAN AMERICAN): >60 ML/MIN/1.73 M^2
GLUCOSE SERPL-MCNC: 97 MG/DL
HCT VFR BLD AUTO: 37.3 %
HGB BLD-MCNC: 12.1 G/DL
LYMPHOCYTES # BLD AUTO: 1.2 K/UL
LYMPHOCYTES NFR BLD: 26.2 %
MCH RBC QN AUTO: 30.1 PG
MCHC RBC AUTO-ENTMCNC: 32.4 G/DL
MCV RBC AUTO: 93 FL
MONOCYTES # BLD AUTO: 0.4 K/UL
MONOCYTES NFR BLD: 9.3 %
NEUTROPHILS # BLD AUTO: 2.8 K/UL
NEUTROPHILS NFR BLD: 61 %
PLATELET # BLD AUTO: 164 K/UL
PMV BLD AUTO: 10.1 FL
POTASSIUM SERPL-SCNC: 4.4 MMOL/L
PROT SERPL-MCNC: 7.1 G/DL
RBC # BLD AUTO: 4.02 M/UL
SODIUM SERPL-SCNC: 141 MMOL/L
WBC # BLD AUTO: 4.54 K/UL

## 2017-08-16 PROCEDURE — 80053 COMPREHEN METABOLIC PANEL: CPT | Mod: PO

## 2017-08-16 PROCEDURE — 86235 NUCLEAR ANTIGEN ANTIBODY: CPT | Mod: 59

## 2017-08-16 PROCEDURE — 86039 ANTINUCLEAR ANTIBODIES (ANA): CPT

## 2017-08-16 PROCEDURE — 86140 C-REACTIVE PROTEIN: CPT

## 2017-08-16 PROCEDURE — 86038 ANTINUCLEAR ANTIBODIES: CPT

## 2017-08-16 PROCEDURE — 85651 RBC SED RATE NONAUTOMATED: CPT | Mod: PO

## 2017-08-16 PROCEDURE — 36415 COLL VENOUS BLD VENIPUNCTURE: CPT | Mod: PO

## 2017-08-16 PROCEDURE — 85025 COMPLETE CBC W/AUTO DIFF WBC: CPT | Mod: PO

## 2017-08-17 LAB
ANA SER QL IF: POSITIVE
ANA TITR SER IF: NORMAL {TITER}

## 2017-08-18 LAB
ANTI SM ANTIBODY: 1.12 EU
ANTI SM/RNP ANTIBODY: 1.67 EU
ANTI-SM INTERPRETATION: NEGATIVE
ANTI-SM/RNP INTERPRETATION: NEGATIVE
ANTI-SSA ANTIBODY: 185.4 EU
ANTI-SSA INTERPRETATION: POSITIVE
ANTI-SSB ANTIBODY: 61.95 EU
ANTI-SSB INTERPRETATION: POSITIVE
DSDNA AB SER-ACNC: ABNORMAL [IU]/ML

## 2017-08-21 ENCOUNTER — OFFICE VISIT (OUTPATIENT)
Dept: RHEUMATOLOGY | Facility: CLINIC | Age: 67
End: 2017-08-21
Payer: MEDICARE

## 2017-08-21 VITALS
BODY MASS INDEX: 37.65 KG/M2 | DIASTOLIC BLOOD PRESSURE: 81 MMHG | HEIGHT: 63 IN | WEIGHT: 212.5 LBS | HEART RATE: 66 BPM | SYSTOLIC BLOOD PRESSURE: 137 MMHG

## 2017-08-21 DIAGNOSIS — M35.01 SJOGREN'S SYNDROME WITH KERATOCONJUNCTIVITIS SICCA: Primary | ICD-10-CM

## 2017-08-21 DIAGNOSIS — M89.9 DISORDER OF BONE AND CARTILAGE: ICD-10-CM

## 2017-08-21 DIAGNOSIS — M94.9 DISORDER OF BONE AND CARTILAGE: ICD-10-CM

## 2017-08-21 DIAGNOSIS — M47.816 LUMBAR SPONDYLOSIS: ICD-10-CM

## 2017-08-21 DIAGNOSIS — M85.80 OSTEOPENIA, UNSPECIFIED LOCATION: ICD-10-CM

## 2017-08-21 DIAGNOSIS — M79.7 FIBROMYALGIA: ICD-10-CM

## 2017-08-21 DIAGNOSIS — G47.33 OSA ON CPAP: ICD-10-CM

## 2017-08-21 DIAGNOSIS — M62.830 LUMBAR PARASPINAL MUSCLE SPASM: ICD-10-CM

## 2017-08-21 PROCEDURE — 99999 PR PBB SHADOW E&M-EST. PATIENT-LVL III: CPT | Mod: PBBFAC,,, | Performed by: INTERNAL MEDICINE

## 2017-08-21 PROCEDURE — 3075F SYST BP GE 130 - 139MM HG: CPT | Mod: ,,, | Performed by: INTERNAL MEDICINE

## 2017-08-21 PROCEDURE — 3079F DIAST BP 80-89 MM HG: CPT | Mod: ,,, | Performed by: INTERNAL MEDICINE

## 2017-08-21 PROCEDURE — 1126F AMNT PAIN NOTED NONE PRSNT: CPT | Mod: ,,, | Performed by: INTERNAL MEDICINE

## 2017-08-21 PROCEDURE — 1159F MED LIST DOCD IN RCRD: CPT | Mod: ,,, | Performed by: INTERNAL MEDICINE

## 2017-08-21 PROCEDURE — 99214 OFFICE O/P EST MOD 30 MIN: CPT | Mod: S$PBB,,, | Performed by: INTERNAL MEDICINE

## 2017-08-21 PROCEDURE — 99213 OFFICE O/P EST LOW 20 MIN: CPT | Mod: PBBFAC,PO | Performed by: INTERNAL MEDICINE

## 2017-08-21 RX ORDER — BENAZEPRIL/HYDROCHLOROTHIAZIDE 20 MG-25MG
TABLET ORAL
Qty: 30 TABLET | Refills: 3 | Status: SHIPPED | OUTPATIENT
Start: 2017-08-21 | End: 2017-12-17 | Stop reason: SDUPTHER

## 2017-08-21 RX ORDER — HYDROXYCHLOROQUINE SULFATE 200 MG/1
200 TABLET, FILM COATED ORAL 2 TIMES DAILY
Qty: 180 TABLET | Refills: 3 | Status: SHIPPED | OUTPATIENT
Start: 2017-08-21 | End: 2018-05-17 | Stop reason: SDUPTHER
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
## 2017-08-28 RX ORDER — SIMVASTATIN 40 MG/1
TABLET, FILM COATED ORAL
Qty: 90 TABLET | Refills: 0 | Status: SHIPPED | OUTPATIENT
Start: 2017-08-28 | End: 2018-04-22 | Stop reason: SDUPTHER

## 2017-10-18 DIAGNOSIS — G47.61 PERIODIC LIMB MOVEMENT SLEEP DISORDER: ICD-10-CM

## 2017-10-18 DIAGNOSIS — G47.01 INSOMNIA DUE TO MEDICAL CONDITION: ICD-10-CM

## 2017-10-19 RX ORDER — CLONAZEPAM 0.5 MG/1
TABLET ORAL
Qty: 60 TABLET | Refills: 5 | Status: SHIPPED | OUTPATIENT
Start: 2017-10-19 | End: 2018-04-02 | Stop reason: SDUPTHER

## 2017-11-17 ENCOUNTER — OFFICE VISIT (OUTPATIENT)
Dept: INTERNAL MEDICINE | Facility: CLINIC | Age: 67
End: 2017-11-17
Payer: MEDICARE

## 2017-11-17 VITALS
TEMPERATURE: 98 F | HEIGHT: 63 IN | BODY MASS INDEX: 35.66 KG/M2 | DIASTOLIC BLOOD PRESSURE: 82 MMHG | SYSTOLIC BLOOD PRESSURE: 136 MMHG | HEART RATE: 74 BPM | WEIGHT: 201.25 LBS

## 2017-11-17 DIAGNOSIS — J32.1 FRONTAL SINUSITIS, UNSPECIFIED CHRONICITY: Primary | ICD-10-CM

## 2017-11-17 DIAGNOSIS — Z12.12 ENCOUNTER FOR COLORECTAL CANCER SCREENING: ICD-10-CM

## 2017-11-17 DIAGNOSIS — Z12.31 VISIT FOR SCREENING MAMMOGRAM: ICD-10-CM

## 2017-11-17 DIAGNOSIS — E11.9 CONTROLLED TYPE 2 DIABETES MELLITUS WITHOUT COMPLICATION, WITHOUT LONG-TERM CURRENT USE OF INSULIN: ICD-10-CM

## 2017-11-17 DIAGNOSIS — Z12.11 ENCOUNTER FOR COLORECTAL CANCER SCREENING: ICD-10-CM

## 2017-11-17 PROCEDURE — 99213 OFFICE O/P EST LOW 20 MIN: CPT | Mod: PBBFAC,PO | Performed by: FAMILY MEDICINE

## 2017-11-17 PROCEDURE — 99214 OFFICE O/P EST MOD 30 MIN: CPT | Mod: S$PBB,,, | Performed by: FAMILY MEDICINE

## 2017-11-17 PROCEDURE — 99999 PR PBB SHADOW E&M-EST. PATIENT-LVL III: CPT | Mod: PBBFAC,,, | Performed by: FAMILY MEDICINE

## 2017-11-17 RX ORDER — METHOCARBAMOL 750 MG/1
TABLET, FILM COATED ORAL
Qty: 90 TABLET | Refills: 2 | Status: SHIPPED | OUTPATIENT
Start: 2017-11-17 | End: 2018-09-21 | Stop reason: SDUPTHER

## 2017-11-17 RX ORDER — PROMETHAZINE HYDROCHLORIDE AND DEXTROMETHORPHAN HYDROBROMIDE 6.25; 15 MG/5ML; MG/5ML
5 SYRUP ORAL NIGHTLY PRN
Qty: 50 ML | Refills: 0 | Status: SHIPPED | OUTPATIENT
Start: 2017-11-17 | End: 2017-11-27

## 2017-11-17 RX ORDER — AZITHROMYCIN 250 MG/1
TABLET, FILM COATED ORAL
Qty: 6 TABLET | Refills: 0 | Status: SHIPPED | OUTPATIENT
Start: 2017-11-17 | End: 2017-11-22

## 2017-11-17 RX ORDER — METHYLPREDNISOLONE 4 MG/1
TABLET ORAL
Qty: 1 PACKAGE | Refills: 0 | Status: SHIPPED | OUTPATIENT
Start: 2017-11-17 | End: 2017-12-08

## 2017-11-17 NOTE — PROGRESS NOTES
Subjective:       Patient ID: Latonia Oliver is a 66 y.o. female.    Chief Complaint: A1C check and Cough    Cough   This is a new problem. The current episode started in the past 7 days. The problem has been unchanged. The problem occurs constantly. The cough is non-productive. Pertinent negatives include no chest pain, headaches, myalgias, nasal congestion, postnasal drip, sweats or weight loss. Nothing aggravates the symptoms. The treatment provided moderate relief. There is no history of COPD or emphysema.   Diabetes   She presents for her follow-up diabetic visit. She has type 2 diabetes mellitus. No MedicAlert identification noted. The initial diagnosis of diabetes was made 3 years ago. Hypoglycemia symptoms include hunger. Pertinent negatives for hypoglycemia include no confusion, dizziness, headaches, mood changes, nervousness/anxiousness, pallor, seizures, sleepiness, speech difficulty, sweats or tremors. Associated symptoms include fatigue and polyphagia. Pertinent negatives for diabetes include no blurred vision, no chest pain, no foot paresthesias, no foot ulcerations, no polydipsia, no polyuria, no visual change, no weakness and no weight loss. Pertinent negatives for hypoglycemia complications include no blackouts, no hospitalization, no nocturnal hypoglycemia, no required assistance and no required glucagon injection. Symptoms are stable. Pertinent negatives for diabetic complications include no autonomic neuropathy, CVA, heart disease, impotence, nephropathy, peripheral neuropathy, PVD or retinopathy. Risk factors for coronary artery disease include dyslipidemia, family history, hypertension, obesity, post-menopausal, sedentary lifestyle and diabetes mellitus. When asked about current treatments, none were reported. She is compliant with treatment most of the time. Her weight is increasing steadily. She is following a high fat/cholesterol diet. When asked about meal planning, she reported none. She has  not had a previous visit with a dietitian. There is no compliance with monitoring of blood glucose. There is no change in her home blood glucose trend. She does not see a podiatrist.Eye exam is current.     Review of Systems   Constitutional: Positive for fatigue. Negative for weight loss.   HENT: Negative for postnasal drip.    Eyes: Negative for blurred vision.   Respiratory: Positive for cough.    Cardiovascular: Negative for chest pain.   Endocrine: Positive for polyphagia. Negative for polydipsia and polyuria.   Genitourinary: Negative for impotence.   Musculoskeletal: Negative for myalgias.   Skin: Negative for pallor.   Neurological: Negative for dizziness, tremors, seizures, speech difficulty, weakness and headaches.   Psychiatric/Behavioral: Negative for confusion. The patient is not nervous/anxious.        Objective:      Physical Exam   Constitutional: She is oriented to person, place, and time. She appears well-developed and well-nourished.   HENT:   Right Ear: Tympanic membrane is erythematous. A middle ear effusion is present.   Left Ear: Tympanic membrane is erythematous. A middle ear effusion is present.   Nose: Mucosal edema and rhinorrhea present. Right sinus exhibits maxillary sinus tenderness. Left sinus exhibits maxillary sinus tenderness.   Mouth/Throat: Posterior oropharyngeal erythema present.   Cardiovascular: Normal rate and regular rhythm.    Pulmonary/Chest: Effort normal and breath sounds normal.   Abdominal: Soft. Bowel sounds are normal.   Neurological: She is alert and oriented to person, place, and time.   Skin: Skin is warm and dry.   Psychiatric: She has a normal mood and affect. Her behavior is normal.       Assessment:       1. Frontal sinusitis, unspecified chronicity    2. Controlled type 2 diabetes mellitus without complication, without long-term current use of insulin    3. Visit for screening mammogram    4. Encounter for colorectal cancer screening        Plan:        Frontal sinusitis, unspecified chronicity  Comments:  Will do medrol dose pack. Azithromycin if not better in 2 days    Controlled type 2 diabetes mellitus without complication, without long-term current use of insulin  Comments:  Will check Hga1C  Orders:  -     Hemoglobin A1c; Future; Expected date: 11/17/2017    Visit for screening mammogram  Comments:  Will schedule for external mammogram  Orders:  -     Mammo Digital Screening Bilateral With CAD; Future; Expected date: 11/17/2017    Encounter for colorectal cancer screening  Comments:  Will do Ifobt   Orders:  -     Fecal Immunochemical Test (iFOBT); Future; Expected date: 12/01/2017    Other orders  -     methylPREDNISolone (MEDROL DOSEPACK) 4 mg tablet; use as directed  Dispense: 1 Package; Refill: 0  -     azithromycin (Z-BRI) 250 MG tablet; Take 2 tablets by mouth on day 1; Take 1 tablet by mouth on days 2-5  Dispense: 6 tablet; Refill: 0  -     promethazine-dextromethorphan (PROMETHAZINE-DM) 6.25-15 mg/5 mL Syrp; Take 5 mLs by mouth nightly as needed.  Dispense: 50 mL; Refill: 0

## 2017-11-20 RX ORDER — OMEPRAZOLE 40 MG/1
CAPSULE, DELAYED RELEASE ORAL
Qty: 30 CAPSULE | Refills: 11 | Status: SHIPPED | OUTPATIENT
Start: 2017-11-20 | End: 2018-11-08 | Stop reason: SDUPTHER

## 2017-11-20 RX ORDER — CITALOPRAM 40 MG/1
TABLET, FILM COATED ORAL
Qty: 90 TABLET | Refills: 1 | Status: SHIPPED | OUTPATIENT
Start: 2017-11-20 | End: 2018-05-21 | Stop reason: SDUPTHER

## 2017-12-07 ENCOUNTER — PATIENT OUTREACH (OUTPATIENT)
Dept: ADMINISTRATIVE | Facility: HOSPITAL | Age: 67
End: 2017-12-07

## 2017-12-07 NOTE — PROGRESS NOTES
Pt was informed once sample is collected it can be brought to an [facility] lab, pt voiced understanding

## 2017-12-17 RX ORDER — BENAZEPRIL/HYDROCHLOROTHIAZIDE 20 MG-25MG
TABLET ORAL
Qty: 90 TABLET | Refills: 3 | Status: SHIPPED | OUTPATIENT
Start: 2017-12-17 | End: 2018-11-08 | Stop reason: SDUPTHER

## 2018-04-02 ENCOUNTER — OFFICE VISIT (OUTPATIENT)
Dept: PULMONOLOGY | Facility: CLINIC | Age: 68
End: 2018-04-02
Payer: MEDICARE

## 2018-04-02 VITALS
DIASTOLIC BLOOD PRESSURE: 84 MMHG | HEART RATE: 68 BPM | WEIGHT: 207.25 LBS | HEIGHT: 63 IN | SYSTOLIC BLOOD PRESSURE: 130 MMHG | OXYGEN SATURATION: 96 % | BODY MASS INDEX: 36.72 KG/M2 | RESPIRATION RATE: 18 BRPM

## 2018-04-02 DIAGNOSIS — G47.61 PERIODIC LIMB MOVEMENT SLEEP DISORDER: ICD-10-CM

## 2018-04-02 DIAGNOSIS — G47.33 OSA ON CPAP: Primary | ICD-10-CM

## 2018-04-02 DIAGNOSIS — G47.01 INSOMNIA DUE TO MEDICAL CONDITION: ICD-10-CM

## 2018-04-02 PROCEDURE — 99999 PR PBB SHADOW E&M-EST. PATIENT-LVL III: CPT | Mod: PBBFAC,,, | Performed by: INTERNAL MEDICINE

## 2018-04-02 PROCEDURE — 99213 OFFICE O/P EST LOW 20 MIN: CPT | Mod: PBBFAC | Performed by: INTERNAL MEDICINE

## 2018-04-02 PROCEDURE — 99214 OFFICE O/P EST MOD 30 MIN: CPT | Mod: S$PBB,,, | Performed by: INTERNAL MEDICINE

## 2018-04-02 RX ORDER — CLONAZEPAM 0.5 MG/1
0.5 TABLET ORAL NIGHTLY
Qty: 60 TABLET | Refills: 5 | Status: SHIPPED | OUTPATIENT
Start: 2018-04-02 | End: 2018-04-12 | Stop reason: SDUPTHER

## 2018-04-02 NOTE — PATIENT INSTRUCTIONS
Debbie View Full Face CPAP Mask with Headgear - Fit Pack    [employer]' Debbie View Full Face CPAP Mask with Headgear - Fit Pack is a less invasive full face mask constructed to seal under the nose and around the mouth. The traditional forehead support has been eliminated from the design to provide comfort. Enjoy a wide open view, read a book, or watch television while wearing the Debbie View. All cushion sizes are included with the Fit Pack to help the user obtain the optimal fit.    Manufactured by [employer].      [email or web address]

## 2018-04-02 NOTE — PROGRESS NOTES
Subjective:       Patient ID: Latonia Oliver is a 67 y.o. female.    Chief Complaint: She       crow on cpap    HPI   Still with insomnia  Periodic Limb Movement of Sleep  Has peripheral neuropathy     Has not used Continuous Positive Airway Pressure for a number of months due to problems with eyeswelling  Makes eyes swell     presents for review of CPAP compliance. Information from CPAP machine downloaded and reviewed. Summary of lastcompliance report is as follows    Compliance Summary  3/12/2017 - 4/10/2017 (30 days)  Days with Device Usage 30 days  Days without Device Usage 0 days  Percent Days with Device Usage 100.0%  Cumulative Usage 9 days 21 hrs. 49 mins. 23 secs.  Maximum Usage (1 Day) 11 hrs. 20 mins. 26 secs.  Average Usage (All Days) 7 hrs. 55 mins. 38 secs.  Average Usage (Days Used) 7 hrs. 55 mins. 38 secs.  Minimum Usage (1 Day) 1 hrs. 23 mins. 10 secs.  Percent of Days with Usage >= 4 Hours 93.3%  Percent of Days with Usage < 4 Hours 6.7%  Date Range  Total Blower Time 10 days 41 mins. 37 secs.  Sleep Therapy Statistics ([facility])  Average Time in Large Leak Per Day 40 mins. 24 secs.  Average AHI 2.1  CPAP 12.0 cmH2O  Printed    Past Medical History:   Diagnosis Date    Breast cancer     Diabetes mellitus type 2, controlled     Fibromyalgia     GERD (gastroesophageal reflux disease)     HTN (hypertension)     Mixed hyperlipidemia     Osteoporosis     Sjogren - Flo's syndrome      Past Surgical History:   Procedure Laterality Date    APPENDECTOMY      BELT ABDOMINOPLASTY      BREAST LUMPECTOMY      BREAST SURGERY      breast augmentation     SECTION, CLASSIC       Social History     Social History    Marital status:      Spouse name: N/A    Number of children: 1    Years of education: N/A     Occupational History     [employer]      Social History Main Topics    Smoking status: Never Smoker    Smokeless tobacco: Never Used    Alcohol use Yes      Comment:  Socially    Drug use: No    Sexual activity: Not on file     Other Topics Concern    Not on file     Social History Narrative    No narrative on file     Review of Systems   Constitutional: Negative for fever and fatigue.   HENT: Negative for postnasal drip and rhinorrhea.    Eyes: Negative for redness and itching.   Respiratory: Positive for dyspnea on extertion. Negative for cough, shortness of breath, wheezing and Paroxysmal Nocturnal Dyspnea.    Cardiovascular: Negative for chest pain.   Genitourinary: Negative for difficulty urinating and hematuria.   Endocrine: Negative for polyphagia, cold intolerance and heat intolerance.    Musculoskeletal: Positive for arthralgias and myalgias.   Skin: Negative for rash.   Gastrointestinal: Negative for nausea, vomiting, abdominal pain and abdominal distention.   Neurological: Negative for dizziness and headaches.   Hematological: Negative for adenopathy. Does not bruise/bleed easily and no excessive bruising.   Psychiatric/Behavioral: Positive for sleep disturbance. The patient is not nervous/anxious.        Objective:      Physical Exam   Constitutional: She is oriented to person, place, and time. She appears well-developed and well-nourished.   HENT:   Head: Normocephalic and atraumatic.   Eyes: Conjunctivae are normal. Pupils are equal, round, and reactive to light.   Neck: Neck supple. No JVD present. No tracheal deviation present. No thyromegaly present.   Cardiovascular: Normal rate, regular rhythm and normal heart sounds.    Pulmonary/Chest: Effort normal and breath sounds normal. No respiratory distress. She has no wheezes. She has no rales. She exhibits no tenderness.   Abdominal: Soft. Bowel sounds are normal.   Musculoskeletal: Normal range of motion. She exhibits no edema.   Lymphadenopathy:     She has no cervical adenopathy.   Neurological: She is alert and oriented to person, place, and time.   Skin: Skin is warm and dry.   Nursing note and vitals  reviewed.    Personal Diagnostic Review  PSG: significant for ROX  Office Spirometry Results:     No flowsheet data found.  Pulmonary Studies Review 4/2/2018   SpO2 96   Height 63.000   Weight 3315.72   BMI (Calculated) 36.8   Predicted Distance 251.93   Predicted Distance Meters (Calculated) 396.3         Assessment:       1. ROX on CPAP    2. Periodic limb movement sleep disorder    3. Insomnia due to medical condition        Outpatient Encounter Prescriptions as of 4/2/2018   Medication Sig Dispense Refill    benazepril-hydrochlorthiazide (LOTENSIN HCT) 20-25 mg Tab TAKE ONE TABLET BY MOUTH ONCE DAILY 90 tablet 3    citalopram (CELEXA) 40 MG tablet TAKE ONE TABLET BY MOUTH ONCE DAILY 90 tablet 1    clonazePAM (KLONOPIN) 0.5 MG tablet Take 1 tablet (0.5 mg total) by mouth every evening. 60 tablet 5    hydroxychloroquine (PLAQUENIL) 200 mg tablet Take 1 tablet (200 mg total) by mouth 2 (two) times daily. 180 tablet 3    methocarbamol (ROBAXIN) 750 MG Tab TAKE ONE TABLET BY MOUTH THREE TIMES DAILY 90 tablet 2    multivitamin (ONE DAILY MULTIVITAMIN) per tablet Take 1 tablet by mouth once daily.      omeprazole (PRILOSEC) 40 MG capsule TAKE ONE CAPSULE BY MOUTH ONCE DAILY 30 capsule 11    promethazine (PHENERGAN) 25 MG tablet Take 1 tablet (25 mg total) by mouth every 6 (six) hours as needed. 30 tablet 0    simvastatin (ZOCOR) 40 MG tablet TAKE ONE TABLET BY MOUTH ONCE DAILY 90 tablet 0    [DISCONTINUED] clonazePAM (KLONOPIN) 0.5 MG tablet TAKE TWO TABLETS BY MOUTH ONCE DAILY IN THE EVENING 60 tablet 5    [DISCONTINUED] zolpidem (AMBIEN) 5 MG Tab 10 mg nightly as needed.        No facility-administered encounter medications on file as of 4/2/2018.      Orders Placed This Encounter   Procedures    CPAP/BIPAP SUPPLIES     [facility].  Customer Service: [phone]  Call: [phone]  Fax: [phone]  Billing Inquiries: [phone] or [phone]       Order  "Specific Question:   Type of mask:     Answer:   FFM     Comments:   patient prefernce     Order Specific Question:   Headgear?     Answer:   Yes     Order Specific Question:   Tubing?     Answer:   Yes     Order Specific Question:   Humidifier chamber?     Answer:   Yes     Order Specific Question:   Chin strap?     Answer:   Yes     Order Specific Question:   Filters?     Answer:   Yes     Order Specific Question:   Length of need (1-99 months):     Answer:   99     Order Specific Question:   Vendor:     Answer:   Other (use comments)     Order Specific Question:   Expected Date of Delivery:     Answer:   4/2/2018    CPAP FOR HOME USE     Order Specific Question:   Type:     Answer:   CPAP     Order Specific Question:   CPAP setting (cmH20):     Answer:   12     Order Specific Question:   Height:     Answer:   5' 3" (1.6 m)     Order Specific Question:   Weight:     Answer:   94 kg (207 lb 3.7 oz)     Order Specific Question:   Length of need (1-99 months):     Answer:   99     Order Specific Question:   Humidification:     Answer:   Heated     Order Specific Question:   Setting (cmH2O)  (Enter Auto Pressure in comment field):     Answer:   13     Order Specific Question:   Type of mask:     Answer:   FFM     Order Specific Question:   Tubing?     Answer:   Yes     Order Specific Question:   Humidifier chamber?     Answer:   Yes     Order Specific Question:   Chin strap?     Answer:   Yes     Order Specific Question:   Filters?     Answer:   Yes     Order Specific Question:   Vendor:     Answer:   Other (use comments)     Order Specific Question:   Expected Date of Delivery:     Answer:   4/3/2018     Comments:   HMS     Plan:       Requested Prescriptions     Signed Prescriptions Disp Refills    clonazePAM (KLONOPIN) 0.5 MG tablet 60 tablet 5     Sig: Take 1 tablet (0.5 mg total) by mouth every evening.     ROX on CPAP  -     CPAP/BIPAP SUPPLIES  -     CPAP FOR HOME USE    Periodic limb movement sleep " disorder  -     clonazePAM (KLONOPIN) 0.5 MG tablet; Take 1 tablet (0.5 mg total) by mouth every evening.  Dispense: 60 tablet; Refill: 5    Insomnia due to medical condition  -     clonazePAM (KLONOPIN) 0.5 MG tablet; Take 1 tablet (0.5 mg total) by mouth every evening.  Dispense: 60 tablet; Refill: 5           Follow-up in about 6 weeks (around 5/14/2018) for CPAP downlaod.    MEDICAL DECISION MAKING: Moderate to high complexity.  Overall, the multiple problems listed are of moderate to high severity that may impact quality of life and activities of daily living. Side effects of medications, treatment plan as well as options and alternatives reviewed and discussed with patient. There was counseling of patient concerning these issues.    Total time spent in face to face counseling and coordination of care - 30  minutes over 50% of time was used in discussion of prognosis, risks, benefits of treatment, instructions and compliance with regimen . Discussion with other physicians or health care providers (DME, NP, pharmacy, respiratory therapy) occurred.

## 2018-04-12 DIAGNOSIS — G47.01 INSOMNIA DUE TO MEDICAL CONDITION: ICD-10-CM

## 2018-04-12 DIAGNOSIS — G47.61 PERIODIC LIMB MOVEMENT SLEEP DISORDER: ICD-10-CM

## 2018-04-12 RX ORDER — CLONAZEPAM 0.5 MG/1
1 TABLET ORAL NIGHTLY
Qty: 60 TABLET | Refills: 5 | Status: SHIPPED | OUTPATIENT
Start: 2018-04-12 | End: 2018-10-22 | Stop reason: SDUPTHER

## 2018-04-22 RX ORDER — SIMVASTATIN 40 MG/1
TABLET, FILM COATED ORAL
Qty: 90 TABLET | Refills: 0 | Status: SHIPPED | OUTPATIENT
Start: 2018-04-22 | End: 2018-12-02 | Stop reason: SDUPTHER

## 2018-05-03 ENCOUNTER — PATIENT OUTREACH (OUTPATIENT)
Dept: ADMINISTRATIVE | Facility: HOSPITAL | Age: 68
End: 2018-05-03

## 2018-05-08 ENCOUNTER — PATIENT MESSAGE (OUTPATIENT)
Dept: PULMONOLOGY | Facility: CLINIC | Age: 68
End: 2018-05-08

## 2018-05-17 ENCOUNTER — LAB VISIT (OUTPATIENT)
Dept: LAB | Facility: HOSPITAL | Age: 68
End: 2018-05-17
Attending: FAMILY MEDICINE
Payer: MEDICARE

## 2018-05-17 ENCOUNTER — OFFICE VISIT (OUTPATIENT)
Dept: INTERNAL MEDICINE | Facility: CLINIC | Age: 68
End: 2018-05-17
Payer: MEDICARE

## 2018-05-17 VITALS
SYSTOLIC BLOOD PRESSURE: 118 MMHG | WEIGHT: 202.81 LBS | HEART RATE: 62 BPM | DIASTOLIC BLOOD PRESSURE: 74 MMHG | TEMPERATURE: 98 F | BODY MASS INDEX: 37.32 KG/M2 | HEIGHT: 62 IN

## 2018-05-17 DIAGNOSIS — E78.2 MIXED HYPERLIPIDEMIA: ICD-10-CM

## 2018-05-17 DIAGNOSIS — C50.919 MALIGNANT NEOPLASM OF FEMALE BREAST, UNSPECIFIED ESTROGEN RECEPTOR STATUS, UNSPECIFIED LATERALITY, UNSPECIFIED SITE OF BREAST: ICD-10-CM

## 2018-05-17 DIAGNOSIS — I10 ESSENTIAL HYPERTENSION: ICD-10-CM

## 2018-05-17 DIAGNOSIS — M35.01 SJOGREN'S SYNDROME WITH KERATOCONJUNCTIVITIS SICCA: ICD-10-CM

## 2018-05-17 DIAGNOSIS — E11.9 CONTROLLED TYPE 2 DIABETES MELLITUS WITHOUT COMPLICATION, WITHOUT LONG-TERM CURRENT USE OF INSULIN: ICD-10-CM

## 2018-05-17 DIAGNOSIS — E11.9 CONTROLLED TYPE 2 DIABETES MELLITUS WITHOUT COMPLICATION, WITHOUT LONG-TERM CURRENT USE OF INSULIN: Primary | ICD-10-CM

## 2018-05-17 DIAGNOSIS — Z12.31 VISIT FOR SCREENING MAMMOGRAM: ICD-10-CM

## 2018-05-17 LAB
CREAT UR-MCNC: 181 MG/DL
MICROALBUMIN UR DL<=1MG/L-MCNC: 16 UG/ML
MICROALBUMIN/CREATININE RATIO: 8.8 UG/MG

## 2018-05-17 PROCEDURE — 99999 PR PBB SHADOW E&M-EST. PATIENT-LVL III: CPT | Mod: PBBFAC,,, | Performed by: FAMILY MEDICINE

## 2018-05-17 PROCEDURE — 99214 OFFICE O/P EST MOD 30 MIN: CPT | Mod: S$PBB,,, | Performed by: FAMILY MEDICINE

## 2018-05-17 PROCEDURE — 82043 UR ALBUMIN QUANTITATIVE: CPT

## 2018-05-17 PROCEDURE — 99213 OFFICE O/P EST LOW 20 MIN: CPT | Mod: PBBFAC,PO | Performed by: FAMILY MEDICINE

## 2018-05-17 RX ORDER — HYDROXYCHLOROQUINE SULFATE 200 MG/1
200 TABLET, FILM COATED ORAL 2 TIMES DAILY
Qty: 180 TABLET | Refills: 3 | Status: SHIPPED | OUTPATIENT
Start: 2018-05-17 | End: 2018-11-19 | Stop reason: SDUPTHER

## 2018-05-17 NOTE — PROGRESS NOTES
Subjective:       Patient ID: Latonia Oliver is a 67 y.o. female.    Chief Complaint: Follow-up    F/U:       Pt is a 67 year old who is in generally good health. Pt A1C has been well controlled.       Review of Systems   Constitutional: Negative.    Respiratory: Negative.    Cardiovascular: Negative.    Genitourinary: Negative.    Musculoskeletal: Negative.    Neurological: Negative.    Hematological: Negative.    Psychiatric/Behavioral: Negative.        Objective:      Physical Exam   Constitutional: She is oriented to person, place, and time. She appears well-developed and well-nourished.   Cardiovascular: Normal rate and regular rhythm.    Pulmonary/Chest: Effort normal and breath sounds normal.   Abdominal: Soft. Bowel sounds are normal.   Neurological: She is alert and oriented to person, place, and time.   Skin: Skin is warm and dry.   Psychiatric: She has a normal mood and affect. Her behavior is normal.       Assessment:       1. Controlled type 2 diabetes mellitus without complication, without long-term current use of insulin    2. Essential hypertension    3. Mixed hyperlipidemia    4. Malignant neoplasm of female breast, unspecified estrogen receptor status, unspecified laterality, unspecified site of breast    5. Sjogren's syndrome with keratoconjunctivitis sicca    6. Visit for screening mammogram        Plan:       Controlled type 2 diabetes mellitus without complication, without long-term current use of insulin  Comments:  Will do HgA1C and microalbumin  Orders:  -     Hemoglobin A1c; Future; Expected date: 05/17/2018  -     Microalbumin/creatinine urine ratio; Future; Expected date: 05/17/2018    Essential hypertension  Comments:  Will continue to follow-up   Orders:  -     Comprehensive metabolic panel; Future; Expected date: 05/17/2018    Mixed hyperlipidemia  Comments:  Will do cholesterol  Orders:  -     Lipid panel; Future; Expected date: 05/17/2018    Malignant neoplasm of female breast,  unspecified estrogen receptor status, unspecified laterality, unspecified site of breast  Comments:  Will do mammogram    Sjogren's syndrome with keratoconjunctivitis sicca  Comments:  Pt continues to follow-up with Rheumatology    Visit for screening mammogram    Other orders  -     Cancel: Case request GI: COLONOSCOPY

## 2018-05-17 NOTE — TELEPHONE ENCOUNTER
Dr MEZA. I have sat this script to print so I can give it to the pt. Sonya always gives her a 1 year refill because pt mails it to the pharmacy

## 2018-05-22 RX ORDER — CITALOPRAM 40 MG/1
TABLET, FILM COATED ORAL
Qty: 90 TABLET | Refills: 1 | Status: SHIPPED | OUTPATIENT
Start: 2018-05-22 | End: 2018-11-08 | Stop reason: SDUPTHER

## 2018-06-28 ENCOUNTER — APPOINTMENT (OUTPATIENT)
Dept: RADIOLOGY | Facility: CLINIC | Age: 68
End: 2018-06-28
Attending: INTERNAL MEDICINE
Payer: MEDICARE

## 2018-06-28 DIAGNOSIS — M89.9 DISORDER OF BONE AND CARTILAGE: ICD-10-CM

## 2018-06-28 DIAGNOSIS — M94.9 DISORDER OF BONE AND CARTILAGE: ICD-10-CM

## 2018-06-28 PROCEDURE — 77080 DXA BONE DENSITY AXIAL: CPT | Mod: 26,,, | Performed by: RADIOLOGY

## 2018-06-28 PROCEDURE — 77080 DXA BONE DENSITY AXIAL: CPT | Mod: TC,PO

## 2018-09-22 RX ORDER — METHOCARBAMOL 750 MG/1
TABLET, FILM COATED ORAL
Qty: 90 TABLET | Refills: 2 | Status: SHIPPED | OUTPATIENT
Start: 2018-09-22 | End: 2019-01-09 | Stop reason: SDUPTHER

## 2018-10-17 DIAGNOSIS — G47.01 INSOMNIA DUE TO MEDICAL CONDITION: ICD-10-CM

## 2018-10-17 DIAGNOSIS — G47.61 PERIODIC LIMB MOVEMENT SLEEP DISORDER: ICD-10-CM

## 2018-10-22 ENCOUNTER — PATIENT MESSAGE (OUTPATIENT)
Dept: RHEUMATOLOGY | Facility: CLINIC | Age: 68
End: 2018-10-22

## 2018-10-22 ENCOUNTER — PATIENT MESSAGE (OUTPATIENT)
Dept: ADMINISTRATIVE | Facility: OTHER | Age: 68
End: 2018-10-22

## 2018-10-22 ENCOUNTER — PATIENT MESSAGE (OUTPATIENT)
Dept: PULMONOLOGY | Facility: CLINIC | Age: 68
End: 2018-10-22

## 2018-10-22 DIAGNOSIS — G47.01 INSOMNIA DUE TO MEDICAL CONDITION: ICD-10-CM

## 2018-10-22 DIAGNOSIS — G47.61 PERIODIC LIMB MOVEMENT SLEEP DISORDER: ICD-10-CM

## 2018-10-22 RX ORDER — CLONAZEPAM 0.5 MG/1
1 TABLET ORAL NIGHTLY
Qty: 30 TABLET | Refills: 0 | Status: SHIPPED | OUTPATIENT
Start: 2018-10-22 | End: 2018-11-19 | Stop reason: SDUPTHER

## 2018-10-22 RX ORDER — CLONAZEPAM 0.5 MG/1
TABLET ORAL
Qty: 60 TABLET | Refills: 5 | OUTPATIENT
Start: 2018-10-22

## 2018-10-22 NOTE — TELEPHONE ENCOUNTER
Please place lab orders for patient. Appointment scheduled for 11/12/2018. Patient notified via [employer] about lab appointment and appointment with Dr. Moreno for 11/19/2018.

## 2018-11-07 DIAGNOSIS — G47.01 INSOMNIA DUE TO MEDICAL CONDITION: ICD-10-CM

## 2018-11-07 DIAGNOSIS — G47.61 PERIODIC LIMB MOVEMENT SLEEP DISORDER: ICD-10-CM

## 2018-11-08 ENCOUNTER — PATIENT MESSAGE (OUTPATIENT)
Dept: RHEUMATOLOGY | Facility: CLINIC | Age: 68
End: 2018-11-08

## 2018-11-08 ENCOUNTER — PATIENT MESSAGE (OUTPATIENT)
Dept: PULMONOLOGY | Facility: CLINIC | Age: 68
End: 2018-11-08

## 2018-11-08 DIAGNOSIS — G47.01 INSOMNIA DUE TO MEDICAL CONDITION: ICD-10-CM

## 2018-11-08 DIAGNOSIS — G47.61 PERIODIC LIMB MOVEMENT SLEEP DISORDER: ICD-10-CM

## 2018-11-08 RX ORDER — BENAZEPRIL/HYDROCHLOROTHIAZIDE 20 MG-25MG
TABLET ORAL
Qty: 90 TABLET | Refills: 1 | Status: SHIPPED | OUTPATIENT
Start: 2018-11-08 | End: 2019-05-01 | Stop reason: SDUPTHER

## 2018-11-08 RX ORDER — CITALOPRAM 40 MG/1
TABLET, FILM COATED ORAL
Qty: 90 TABLET | Refills: 1 | Status: SHIPPED | OUTPATIENT
Start: 2018-11-08 | End: 2019-05-01 | Stop reason: SDUPTHER

## 2018-11-08 RX ORDER — CLONAZEPAM 0.5 MG/1
TABLET ORAL
Qty: 60 TABLET | Refills: 5 | Status: SHIPPED | OUTPATIENT
Start: 2018-11-08 | End: 2019-05-01 | Stop reason: SDUPTHER

## 2018-11-08 RX ORDER — OMEPRAZOLE 40 MG/1
CAPSULE, DELAYED RELEASE ORAL
Qty: 30 CAPSULE | Refills: 11 | Status: SHIPPED | OUTPATIENT
Start: 2018-11-08 | End: 2019-10-10 | Stop reason: SDUPTHER

## 2018-11-12 ENCOUNTER — LAB VISIT (OUTPATIENT)
Dept: LAB | Facility: HOSPITAL | Age: 68
End: 2018-11-12
Attending: INTERNAL MEDICINE
Payer: MEDICARE

## 2018-11-12 DIAGNOSIS — M35.01 SJOGREN'S SYNDROME WITH KERATOCONJUNCTIVITIS SICCA: ICD-10-CM

## 2018-11-12 DIAGNOSIS — M35.01 SJOGREN'S SYNDROME WITH KERATOCONJUNCTIVITIS SICCA: Primary | ICD-10-CM

## 2018-11-12 LAB
ALBUMIN SERPL BCP-MCNC: 4 G/DL
ALP SERPL-CCNC: 60 U/L
ALT SERPL W/O P-5'-P-CCNC: 28 U/L
ANION GAP SERPL CALC-SCNC: 10 MMOL/L
AST SERPL-CCNC: 29 U/L
BASOPHILS # BLD AUTO: 0.08 K/UL
BASOPHILS NFR BLD: 1.6 %
BILIRUB SERPL-MCNC: 0.6 MG/DL
BUN SERPL-MCNC: 14 MG/DL
CALCIUM SERPL-MCNC: 9.6 MG/DL
CHLORIDE SERPL-SCNC: 103 MMOL/L
CO2 SERPL-SCNC: 28 MMOL/L
CREAT SERPL-MCNC: 0.7 MG/DL
CRP SERPL-MCNC: 4 MG/L
DIFFERENTIAL METHOD: ABNORMAL
EOSINOPHIL # BLD AUTO: 0.1 K/UL
EOSINOPHIL NFR BLD: 2.8 %
ERYTHROCYTE [DISTWIDTH] IN BLOOD BY AUTOMATED COUNT: 13.4 %
EST. GFR  (AFRICAN AMERICAN): >60 ML/MIN/1.73 M^2
EST. GFR  (NON AFRICAN AMERICAN): >60 ML/MIN/1.73 M^2
GLUCOSE SERPL-MCNC: 109 MG/DL
HCT VFR BLD AUTO: 40 %
HGB BLD-MCNC: 12.7 G/DL
IMM GRANULOCYTES # BLD AUTO: 0.01 K/UL
IMM GRANULOCYTES NFR BLD AUTO: 0.2 %
LYMPHOCYTES # BLD AUTO: 1.3 K/UL
LYMPHOCYTES NFR BLD: 25.3 %
MCH RBC QN AUTO: 31.4 PG
MCHC RBC AUTO-ENTMCNC: 31.8 G/DL
MCV RBC AUTO: 99 FL
MONOCYTES # BLD AUTO: 0.5 K/UL
MONOCYTES NFR BLD: 9.3 %
NEUTROPHILS # BLD AUTO: 3.1 K/UL
NEUTROPHILS NFR BLD: 60.8 %
NRBC BLD-RTO: 0 /100 WBC
PLATELET # BLD AUTO: 178 K/UL
PMV BLD AUTO: 10.4 FL
POTASSIUM SERPL-SCNC: 4 MMOL/L
PROT SERPL-MCNC: 7.4 G/DL
RBC # BLD AUTO: 4.04 M/UL
SODIUM SERPL-SCNC: 141 MMOL/L
WBC # BLD AUTO: 5.05 K/UL

## 2018-11-12 PROCEDURE — 80053 COMPREHEN METABOLIC PANEL: CPT

## 2018-11-12 PROCEDURE — 86140 C-REACTIVE PROTEIN: CPT

## 2018-11-12 PROCEDURE — 36415 COLL VENOUS BLD VENIPUNCTURE: CPT

## 2018-11-12 PROCEDURE — 85025 COMPLETE CBC W/AUTO DIFF WBC: CPT

## 2018-11-12 RX ORDER — CLONAZEPAM 0.5 MG/1
1 TABLET, ORALLY DISINTEGRATING ORAL NIGHTLY
Qty: 60 TABLET | Refills: 5 | Status: SHIPPED | OUTPATIENT
Start: 2018-11-12 | End: 2018-11-19 | Stop reason: SDUPTHER

## 2018-11-19 ENCOUNTER — OFFICE VISIT (OUTPATIENT)
Dept: RHEUMATOLOGY | Facility: CLINIC | Age: 68
End: 2018-11-19
Payer: MEDICARE

## 2018-11-19 VITALS
DIASTOLIC BLOOD PRESSURE: 82 MMHG | WEIGHT: 217.13 LBS | SYSTOLIC BLOOD PRESSURE: 167 MMHG | HEIGHT: 62 IN | BODY MASS INDEX: 39.96 KG/M2 | HEART RATE: 64 BPM

## 2018-11-19 DIAGNOSIS — G25.81 RESTLESS LEG SYNDROME: Chronic | ICD-10-CM

## 2018-11-19 DIAGNOSIS — M79.7 FIBROMYALGIA: Primary | ICD-10-CM

## 2018-11-19 DIAGNOSIS — G47.01 INSOMNIA DUE TO MEDICAL CONDITION: ICD-10-CM

## 2018-11-19 DIAGNOSIS — M35.01 SJOGREN'S SYNDROME WITH KERATOCONJUNCTIVITIS SICCA: ICD-10-CM

## 2018-11-19 DIAGNOSIS — G47.33 OSA ON CPAP: ICD-10-CM

## 2018-11-19 PROCEDURE — 99999 PR PBB SHADOW E&M-EST. PATIENT-LVL III: CPT | Mod: PBBFAC,,, | Performed by: INTERNAL MEDICINE

## 2018-11-19 PROCEDURE — 99213 OFFICE O/P EST LOW 20 MIN: CPT | Mod: PBBFAC,PO | Performed by: INTERNAL MEDICINE

## 2018-11-19 PROCEDURE — 99214 OFFICE O/P EST MOD 30 MIN: CPT | Mod: S$PBB,,, | Performed by: INTERNAL MEDICINE

## 2018-11-19 RX ORDER — HYDROXYCHLOROQUINE SULFATE 200 MG/1
200 TABLET, FILM COATED ORAL 2 TIMES DAILY
Qty: 180 TABLET | Refills: 3 | Status: SHIPPED | OUTPATIENT
Start: 2018-11-19 | End: 2019-11-04 | Stop reason: SDUPTHER

## 2018-11-19 NOTE — PATIENT INSTRUCTIONS
Oasis products for dryness    Xylimelts    Systane eye drops- night     Continue plaquenil- Eye checks yearly    Try and cut sown on Klonopin some since better restless legs    Ok for weighted blanket    Exercise helpful      Add B vitamin- like all b or just b12/folate

## 2018-11-19 NOTE — PROGRESS NOTES
CHIEF COMPLAINT:  Fibromyalgia, Sjogren's syndrome, and osteopenia.    PERTINENT HISTORY:  Latonia is here for a regular follow-up regarding all the   conditions listed above.  She was last seen by myself in August of 2017.  We maintained her Plaquenil 200 mg twice daily.  She was doing   fair at that time, but still had done some symptoms of dryness.  She has had a   history of having osteopenia.  She had a DEXA done in June 2016.  She had been   placed on a holiday.  Last bone density was improved.  She has been seeing the   pulmonologist regarding some of her insomnia issues.  She was found to have a   variant of obstructive sleep apnea.  In the treatment besides CPAP, she was   given Klonopin.  That dose was increased to 1 mg in the evening.  With that, she   has had a dramatic improvement in all of her symptoms.  She really has no pain   anymore.  She rates her pain today is 0/10  She has no signs of significant anxiety or depression now.  She says   her fibro symptoms are all better.  She does take Celexa 40 mg a day as well.    She is trying a weighted blanket as well-20 lb  to help her symptoms at night for restless legs    REVIEW OF SYSTEMS:  GENERAL:  Her weight stayed stable, may have put on a couple of pounds since   February.  No fevers or infections.  No chills, fatigability, change in   appetite, weight loss.  SKIN:  No rashes, itching or changes in color or texture of skin, no Raynaud's,   no malar rash.  HEAD:  No headache or recent head trauma.  EYES:  There was no change in her dry eyes issues.  Visual acuity fine.  No   ocular pain or redness.  EARS:  Denies ear pain, discharge or vertigo.  NOSE:  No loss of smell.  No epistaxis or postnasal drip.  MOUTH AND THROAT:  No hoarseness or change in voice or oral ulcers.  No   difficulty swallowing or dryness.  NODES:  Denies swollen glands.  CHEST:  Lungs, see HPI.   Seeing Dr. Pemberton.  Insomnia and ROX is noted.    Markedly improved with Klonopin and  CPAP.  Denies shortness of breath, DECKER,   cyanosis, wheezing, cough and sputum production.  CARDIOVASCULAR:  Cardiac, history of hypertension.  She is on Lotensin.  Note,   she is on Zocor.  Denies chest pain, PND, orthopnea or reduced exercise   tolerance.  ABDOMEN:  Diabetes is present by history.  Reflux has been present in the past.    She is not on any particular medicines for that presently other than Prilosec.    No weight loss.  Denies nausea, vomiting, diarrhea, abdominal pain, hematemesis   or blood in stool.  URINARY:  No flank pain, dysuria or hematuria.  PERIPHERAL VASCULAR:  No claudication or cyanosis.  NEUROLOGIC:  No numbness, weakness or tingling.  MUSCULOSKELETAL:  Joints including fibromyalgia, degenerative disease of her   spine, OA at the knee have been present, though those are all pretty   asymptomatic presently.  Low bone strength noted in the past with her last   density showing worse score -2.2 at the spine, -1.9 at the hip and those were   improved.     Past Medical History:   Diagnosis Date    Breast cancer     Diabetes mellitus type 2, controlled     Fibromyalgia     GERD (gastroesophageal reflux disease)     HTN (hypertension)     Mixed hyperlipidemia     Osteoporosis     Sjogren - Flo's syndrome      Past Surgical History:   Procedure Laterality Date    APPENDECTOMY      BELT ABDOMINOPLASTY      BREAST LUMPECTOMY      BREAST SURGERY      breast augmentation     SECTION, CLASSIC       No family history on file.  Social History     Socioeconomic History    Marital status:      Spouse name: Not on file    Number of children: 1    Years of education: Not on file    Highest education level: Not on file   Social Needs    Financial resource strain: Not on file    Food insecurity - worry: Not on file    Food insecurity - inability: Not on file    Transportation needs - medical: Not on file    Transportation needs - non-medical: Not on file    Occupational History     Employer: [employer]    Tobacco Use    Smoking status: Never Smoker    Smokeless tobacco: Never Used   Substance and Sexual Activity    Alcohol use: Yes     Comment: Socially    Drug use: No    Sexual activity: Not on file   Other Topics Concern    Not on file   Social History Narrative    Not on file     Review of patient's allergies indicates:   Allergen Reactions    Penicillins      Other reaction(s): Rash  Other reaction(s): Difficulty breathing     Results for orders placed or performed in visit on 11/12/18   CBC W/ AUTO DIFFERENTIAL   Result Value Ref Range    WBC 5.05 3.90 - 12.70 K/uL    RBC 4.04 4.00 - 5.40 M/uL    Hemoglobin 12.7 12.0 - 16.0 g/dL    Hematocrit 40.0 37.0 - 48.5 %    MCV 99 (H) 82 - 98 fL    MCH 31.4 (H) 27.0 - 31.0 pg    MCHC 31.8 (L) 32.0 - 36.0 g/dL    RDW 13.4 11.5 - 14.5 %    Platelets 178 150 - 350 K/uL    MPV 10.4 9.2 - 12.9 fL    Immature Granulocytes 0.2 0.0 - 0.5 %    Gran # (ANC) 3.1 1.8 - 7.7 K/uL    Immature Grans (Abs) 0.01 0.00 - 0.04 K/uL    Lymph # 1.3 1.0 - 4.8 K/uL    Mono # 0.5 0.3 - 1.0 K/uL    Eos # 0.1 0.0 - 0.5 K/uL    Baso # 0.08 0.00 - 0.20 K/uL    nRBC 0 0 /100 WBC    Gran% 60.8 38.0 - 73.0 %    Lymph% 25.3 18.0 - 48.0 %    Mono% 9.3 4.0 - 15.0 %    Eosinophil% 2.8 0.0 - 8.0 %    Basophil% 1.6 0.0 - 1.9 %    Differential Method Automated    COMPREHENSIVE METABOLIC PANEL   Result Value Ref Range    Sodium 141 136 - 145 mmol/L    Potassium 4.0 3.5 - 5.1 mmol/L    Chloride 103 95 - 110 mmol/L    CO2 28 23 - 29 mmol/L    Glucose 109 70 - 110 mg/dL    BUN, Bld 14 8 - 23 mg/dL    Creatinine 0.7 0.5 - 1.4 mg/dL    Calcium 9.6 8.7 - 10.5 mg/dL    Total Protein 7.4 6.0 - 8.4 g/dL    Albumin 4.0 3.5 - 5.2 g/dL    Total Bilirubin 0.6 0.1 - 1.0 mg/dL    Alkaline Phosphatase 60 55 - 135 U/L    AST 29 10 - 40 U/L    ALT 28 10 - 44 U/L    Anion Gap 10 8 - 16 mmol/L    eGFR if African American >60.0 >60 mL/min/1.73 m^2    eGFR if non African  American >60.0 >60 mL/min/1.73 m^2   C-REACTIVE PROTEIN   Result Value Ref Range    CRP 4.0 0.0 - 8.2 mg/L         The L1 to L4 vertebral bone mineral density is equal to 1.028 g/cm squared with a T score of -1.3.  There has been a 2.6% increase relative to the prior study.    The left femoral neck bone mineral density is equal to 0.866 g/cm squared with a T score of -1.2.  There has been  a 1.1% increase relative to the prior study.    There is a 8.3% risk of a major osteoporotic fracture and a 0.8% risk of hip fracture in the next 10 years (FRAX).        IMPRESSIONS:  1.  Sjogren's syndrome with positive PAUL and SSA minimally symptomatic.  No   signs of extra glandular spread.  2.  Osteopenia, stable osteopenia, is on   drug holiday now, DXA June 2018 shows low FRAX level and stable BMD femoral neck T-score -1.2  3.  Fibromyalgia, restless leg syndrome - markedly improved with the addition of   Klonopin and the Celexa.    4.  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  Mild symptoms in the knees at times  5.  Macrocytosis-mild    PLAN:  1.  Continue with vitamin D 1000 units a day.    2.  We would maintain Plaquenil 200 mg twice daily presently-continue regular ophthalmology checks    3.Blaine products for dryness    Xylimelts    Systane eye drops- night     Try and cut sown on Klonopin some since better restless legs    Ok for weighted blanket    Exercise helpful    Add B vitamin- like all b or just b12/folate-note mild changes with her CBC-MCV level    Prolonged visit: Over35 min spent in patient care and evaluation. Ove 20 min of time used in reviewing recent labs and symptoms, discussing diagnostic possibilities, counseling on medication options, reviewing side effects of therapies, and coordination of care with  Family Dr   Patient's questions were all addressed and she understands our plans and risks.    REGINA  dd: 08/21/2017 11:47:21 (CDT)  td: 08/21/2017 16:59:44 (CDT)  Doc ID   #[id number]  Job ID  #[id number]    CC: Nirav Acevedo M.D.

## 2018-12-03 RX ORDER — SIMVASTATIN 40 MG/1
TABLET, FILM COATED ORAL
Qty: 90 TABLET | Refills: 0 | Status: SHIPPED | OUTPATIENT
Start: 2018-12-03 | End: 2019-03-30 | Stop reason: SDUPTHER

## 2019-01-09 RX ORDER — METHOCARBAMOL 750 MG/1
TABLET, FILM COATED ORAL
Qty: 90 TABLET | Refills: 2 | Status: SHIPPED | OUTPATIENT
Start: 2019-01-09 | End: 2019-01-11 | Stop reason: SDUPTHER

## 2019-01-14 RX ORDER — METHOCARBAMOL 750 MG/1
750 TABLET, FILM COATED ORAL 3 TIMES DAILY
Qty: 90 TABLET | Refills: 2 | Status: SHIPPED | OUTPATIENT
Start: 2019-01-14 | End: 2022-03-02

## 2019-01-25 DIAGNOSIS — E11.9 TYPE 2 DIABETES MELLITUS WITHOUT COMPLICATION: ICD-10-CM

## 2019-03-31 RX ORDER — SIMVASTATIN 40 MG/1
TABLET, FILM COATED ORAL
Qty: 90 TABLET | Refills: 1 | Status: SHIPPED | OUTPATIENT
Start: 2019-03-31 | End: 2019-09-21 | Stop reason: SDUPTHER

## 2019-04-02 ENCOUNTER — TELEPHONE (OUTPATIENT)
Dept: RHEUMATOLOGY | Facility: CLINIC | Age: 69
End: 2019-04-02

## 2019-04-05 DIAGNOSIS — Z12.11 COLON CANCER SCREENING: ICD-10-CM

## 2019-05-01 DIAGNOSIS — G47.01 INSOMNIA DUE TO MEDICAL CONDITION: ICD-10-CM

## 2019-05-01 DIAGNOSIS — G47.61 PERIODIC LIMB MOVEMENT SLEEP DISORDER: ICD-10-CM

## 2019-05-02 ENCOUNTER — PATIENT MESSAGE (OUTPATIENT)
Dept: PULMONOLOGY | Facility: CLINIC | Age: 69
End: 2019-05-02

## 2019-05-02 RX ORDER — CLONAZEPAM 0.5 MG/1
TABLET ORAL
Qty: 60 TABLET | Refills: 5 | Status: SHIPPED | OUTPATIENT
Start: 2019-05-02 | End: 2019-05-28 | Stop reason: SDUPTHER

## 2019-05-02 RX ORDER — BENAZEPRIL/HYDROCHLOROTHIAZIDE 20 MG-25MG
TABLET ORAL
Qty: 90 TABLET | Refills: 0 | Status: SHIPPED | OUTPATIENT
Start: 2019-05-02 | End: 2019-08-20 | Stop reason: SDUPTHER

## 2019-05-02 RX ORDER — CITALOPRAM 40 MG/1
TABLET, FILM COATED ORAL
Qty: 90 TABLET | Refills: 0 | Status: SHIPPED | OUTPATIENT
Start: 2019-05-02 | End: 2020-06-10 | Stop reason: SDUPTHER

## 2019-05-07 DIAGNOSIS — Z12.11 COLON CANCER SCREENING: ICD-10-CM

## 2019-05-07 RX ORDER — CITALOPRAM 40 MG/1
TABLET, FILM COATED ORAL
Qty: 90 TABLET | Refills: 0 | Status: SHIPPED | OUTPATIENT
Start: 2019-05-07 | End: 2019-05-28 | Stop reason: SDUPTHER

## 2019-05-28 ENCOUNTER — OFFICE VISIT (OUTPATIENT)
Dept: PULMONOLOGY | Facility: CLINIC | Age: 69
End: 2019-05-28
Payer: MEDICARE

## 2019-05-28 VITALS
RESPIRATION RATE: 20 BRPM | SYSTOLIC BLOOD PRESSURE: 110 MMHG | HEIGHT: 62 IN | BODY MASS INDEX: 40.83 KG/M2 | WEIGHT: 221.88 LBS | DIASTOLIC BLOOD PRESSURE: 72 MMHG

## 2019-05-28 DIAGNOSIS — G47.01 INSOMNIA DUE TO MEDICAL CONDITION: ICD-10-CM

## 2019-05-28 DIAGNOSIS — G47.61 PERIODIC LIMB MOVEMENT SLEEP DISORDER: ICD-10-CM

## 2019-05-28 DIAGNOSIS — G47.33 OSA ON CPAP: Primary | ICD-10-CM

## 2019-05-28 PROCEDURE — 99214 OFFICE O/P EST MOD 30 MIN: CPT | Mod: S$PBB,,, | Performed by: INTERNAL MEDICINE

## 2019-05-28 PROCEDURE — 99214 PR OFFICE/OUTPT VISIT, EST, LEVL IV, 30-39 MIN: ICD-10-PCS | Mod: S$PBB,,, | Performed by: INTERNAL MEDICINE

## 2019-05-28 PROCEDURE — 99999 PR PBB SHADOW E&M-EST. PATIENT-LVL III: CPT | Mod: PBBFAC,,, | Performed by: INTERNAL MEDICINE

## 2019-05-28 PROCEDURE — 99999 PR PBB SHADOW E&M-EST. PATIENT-LVL III: ICD-10-PCS | Mod: PBBFAC,,, | Performed by: INTERNAL MEDICINE

## 2019-05-28 PROCEDURE — 99213 OFFICE O/P EST LOW 20 MIN: CPT | Mod: PBBFAC | Performed by: INTERNAL MEDICINE

## 2019-05-28 RX ORDER — CLONAZEPAM 1 MG/1
1 TABLET ORAL NIGHTLY
Qty: 30 TABLET | Refills: 5 | Status: SHIPPED | OUTPATIENT
Start: 2019-05-28 | End: 2019-11-24 | Stop reason: SDUPTHER

## 2019-05-28 NOTE — PROGRESS NOTES
Subjective:       Patient ID: Latonia Oliver is a 68 y.o. female.    Chief Complaint: Sleep Apnea and Restless leg sydrome    HPI   Latonia Oliver presents for review of CPAP compliance. Information from CPAP machine downloaded and reviewed. Summary of compliance report is as follows:  Doing well  Percent days with usage: 100 %    Patient has complaints of none  Patient is using CPAP as prescribed and benefiting from therapy.    Latonia Oliver is a 68 y.o. female who complains of insomnia. Onset was several years ago. Patient describes symptoms as difficulty falling asleep. Patient has found complete relief with prescription sleep aid, klolnazepam. Associated symptoms include: anxiety. Patient denies depression. Symptoms have been well-controlled.      Past Medical History:   Diagnosis Date    Breast cancer     Diabetes mellitus type 2, controlled     Fibromyalgia     GERD (gastroesophageal reflux disease)     HTN (hypertension)     Mixed hyperlipidemia     Osteoporosis     Sjogren - Flo's syndrome      Past Surgical History:   Procedure Laterality Date    APPENDECTOMY      BELT ABDOMINOPLASTY      BREAST LUMPECTOMY      BREAST SURGERY      breast augmentation     SECTION, CLASSIC       Review of Systems   Constitutional: Positive for fatigue.   HENT: Negative.    Respiratory: Positive for apnea and asthma nighttime symptoms.    Cardiovascular: Negative.    Genitourinary: Negative.    Endocrine: endocrine negative   Musculoskeletal: Negative.    Skin: Negative.    Gastrointestinal: Negative.    Neurological: Negative.    Psychiatric/Behavioral: Positive for sleep disturbance. The patient is nervous/anxious.          Objective:      Physical Exam   Constitutional: She is oriented to person, place, and time. She appears well-developed and well-nourished.   HENT:   Head: Normocephalic and atraumatic.   Eyes: Pupils are equal, round, and reactive to light. Conjunctivae are normal.   Neck: Neck  supple. No JVD present. No tracheal deviation present. No thyromegaly present.   Cardiovascular: Normal rate, regular rhythm and normal heart sounds.   Pulmonary/Chest: Effort normal and breath sounds normal. No respiratory distress. She has no wheezes. She has no rales. She exhibits no tenderness.   Abdominal: Soft. Bowel sounds are normal.   Musculoskeletal: Normal range of motion. She exhibits no edema.   Lymphadenopathy:     She has no cervical adenopathy.   Neurological: She is alert and oriented to person, place, and time.   Skin: Skin is warm and dry.   Nursing note and vitals reviewed.    Personal Diagnostic Review  PSG: significant for ROX  No flowsheet data found.      Assessment:       1. ROX on CPAP    2. Periodic limb movement sleep disorder    3. Insomnia due to medical condition        Outpatient Encounter Medications as of 5/28/2019   Medication Sig Dispense Refill    benazepril-hydrochlorthiazide (LOTENSIN HCT) 20-25 mg Tab TAKE 1 TABLET BY MOUTH ONCE DAILY 90 tablet 0    citalopram (CELEXA) 40 MG tablet TAKE 1 TABLET BY MOUTH ONCE DAILY 90 tablet 0    clonazePAM (KLONOPIN) 1 MG tablet Take 1 tablet (1 mg total) by mouth every evening. 30 tablet 5    hydroxychloroquine (PLAQUENIL) 200 mg tablet Take 1 tablet (200 mg total) by mouth 2 (two) times daily. 180 tablet 3    methocarbamol (ROBAXIN) 750 MG Tab Take 1 tablet (750 mg total) by mouth 3 (three) times daily. 90 tablet 2    multivitamin (ONE DAILY MULTIVITAMIN) per tablet Take 1 tablet by mouth once daily.      omeprazole (PRILOSEC) 40 MG capsule TAKE ONE CAPSULE BY MOUTH ONCE DAILY 30 capsule 11    promethazine (PHENERGAN) 25 MG tablet Take 1 tablet (25 mg total) by mouth every 6 (six) hours as needed. 30 tablet 0    simvastatin (ZOCOR) 40 MG tablet TAKE 1 TABLET BY MOUTH ONCE DAILY 90 tablet 1    [DISCONTINUED] clonazePAM (KLONOPIN) 0.5 MG tablet TAKE 2 TABLETS BY MOUTH ONCE DAILY IN THE EVENING 60 tablet 5    [DISCONTINUED]  citalopram (CELEXA) 40 MG tablet TAKE 1 TABLET BY MOUTH ONCE DAILY 90 tablet 0     No facility-administered encounter medications on file as of 5/28/2019.      Orders Placed This Encounter   Procedures    CPAP/BIPAP SUPPLIES     Referred to [facility] for DURABLE MEDICAL EQUIPMENT  [facility]  Address: [address], [address], [place] [address]  [phone]  Fax [phone]  [facility]     Order Specific Question:   Type of mask:     Answer:   Nasal     Order Specific Question:   Headgear?     Answer:   Yes     Order Specific Question:   Tubing?     Answer:   Yes     Order Specific Question:   Humidifier chamber?     Answer:   Yes     Order Specific Question:   Chin strap?     Answer:   Yes     Order Specific Question:   Filters?     Answer:   Yes     Order Specific Question:   Cushions?     Answer:   Yes     Order Specific Question:   Length of need (1-99 months):     Answer:   99     Plan:     Follow-up in 1 years, sooner should new symptoms or problems arise.    Follow up in about 1 year (around 5/28/2020).

## 2019-07-19 ENCOUNTER — PATIENT OUTREACH (OUTPATIENT)
Dept: ADMINISTRATIVE | Facility: HOSPITAL | Age: 69
End: 2019-07-19

## 2019-07-19 ENCOUNTER — TELEPHONE (OUTPATIENT)
Dept: INTERNAL MEDICINE | Facility: CLINIC | Age: 69
End: 2019-07-19

## 2019-07-19 NOTE — TELEPHONE ENCOUNTER
----- Message from Bette Ramsey MA sent at 7/19/2019  2:01 PM CDT -----  Pt would like her labs sent to [facility] for Monday morning so she can do her labs early, does not want to wait all day to eat... Please let her know through My Chart....thanks

## 2019-07-22 ENCOUNTER — TELEPHONE (OUTPATIENT)
Dept: RHEUMATOLOGY | Facility: CLINIC | Age: 69
End: 2019-07-22

## 2019-07-22 ENCOUNTER — HOSPITAL ENCOUNTER (OUTPATIENT)
Dept: RADIOLOGY | Facility: HOSPITAL | Age: 69
Discharge: HOME | End: 2019-07-22
Attending: FAMILY MEDICINE
Payer: MEDICARE

## 2019-07-22 ENCOUNTER — OFFICE VISIT (OUTPATIENT)
Dept: INTERNAL MEDICINE | Facility: CLINIC | Age: 69
End: 2019-07-22
Payer: MEDICARE

## 2019-07-22 ENCOUNTER — TELEPHONE (OUTPATIENT)
Dept: INTERNAL MEDICINE | Facility: CLINIC | Age: 69
End: 2019-07-22

## 2019-07-22 ENCOUNTER — TELEPHONE (OUTPATIENT)
Dept: ORTHOPEDICS | Facility: CLINIC | Age: 69
End: 2019-07-22

## 2019-07-22 VITALS
HEART RATE: 68 BPM | WEIGHT: 218.5 LBS | DIASTOLIC BLOOD PRESSURE: 76 MMHG | OXYGEN SATURATION: 97 % | HEIGHT: 62 IN | TEMPERATURE: 98 F | BODY MASS INDEX: 40.21 KG/M2 | SYSTOLIC BLOOD PRESSURE: 138 MMHG

## 2019-07-22 VITALS
OXYGEN SATURATION: 95 % | BODY MASS INDEX: 40.25 KG/M2 | HEIGHT: 62 IN | HEART RATE: 64 BPM | WEIGHT: 218.69 LBS | SYSTOLIC BLOOD PRESSURE: 138 MMHG | TEMPERATURE: 98 F | DIASTOLIC BLOOD PRESSURE: 74 MMHG

## 2019-07-22 DIAGNOSIS — I10 ESSENTIAL HYPERTENSION: ICD-10-CM

## 2019-07-22 DIAGNOSIS — M25.571 ACUTE RIGHT ANKLE PAIN: ICD-10-CM

## 2019-07-22 DIAGNOSIS — M54.50 CHRONIC LEFT-SIDED LOW BACK PAIN WITHOUT SCIATICA: ICD-10-CM

## 2019-07-22 DIAGNOSIS — E78.2 MIXED HYPERLIPIDEMIA: Primary | ICD-10-CM

## 2019-07-22 DIAGNOSIS — I10 ESSENTIAL HYPERTENSION: Primary | ICD-10-CM

## 2019-07-22 DIAGNOSIS — M47.816 LUMBAR SPONDYLOSIS: ICD-10-CM

## 2019-07-22 DIAGNOSIS — F32.4 MAJOR DEPRESSIVE DISORDER IN PARTIAL REMISSION, UNSPECIFIED WHETHER RECURRENT: ICD-10-CM

## 2019-07-22 DIAGNOSIS — M17.11 PRIMARY OSTEOARTHRITIS OF RIGHT KNEE: ICD-10-CM

## 2019-07-22 DIAGNOSIS — E11.9 CONTROLLED TYPE 2 DIABETES MELLITUS WITHOUT COMPLICATION, WITHOUT LONG-TERM CURRENT USE OF INSULIN: ICD-10-CM

## 2019-07-22 DIAGNOSIS — G25.81 RESTLESS LEG SYNDROME: Chronic | ICD-10-CM

## 2019-07-22 DIAGNOSIS — G47.33 OSA ON CPAP: ICD-10-CM

## 2019-07-22 DIAGNOSIS — M35.01 SJOGREN'S SYNDROME WITH KERATOCONJUNCTIVITIS SICCA: ICD-10-CM

## 2019-07-22 DIAGNOSIS — Z85.3 PERSONAL HISTORY OF BREAST CANCER: ICD-10-CM

## 2019-07-22 DIAGNOSIS — C50.919 MALIGNANT NEOPLASM OF FEMALE BREAST, UNSPECIFIED ESTROGEN RECEPTOR STATUS, UNSPECIFIED LATERALITY, UNSPECIFIED SITE OF BREAST: ICD-10-CM

## 2019-07-22 DIAGNOSIS — M62.830 LUMBAR PARASPINAL MUSCLE SPASM: ICD-10-CM

## 2019-07-22 DIAGNOSIS — M79.7 FIBROMYALGIA: ICD-10-CM

## 2019-07-22 DIAGNOSIS — S82.892A CLOSED FRACTURE OF MALLEOLUS OF LEFT ANKLE, INITIAL ENCOUNTER: Primary | ICD-10-CM

## 2019-07-22 DIAGNOSIS — E78.2 MIXED HYPERLIPIDEMIA: ICD-10-CM

## 2019-07-22 DIAGNOSIS — G89.29 CHRONIC LEFT-SIDED LOW BACK PAIN WITHOUT SCIATICA: ICD-10-CM

## 2019-07-22 DIAGNOSIS — Z51.81 MEDICATION MONITORING ENCOUNTER: ICD-10-CM

## 2019-07-22 DIAGNOSIS — K21.9 GASTROESOPHAGEAL REFLUX DISEASE, ESOPHAGITIS PRESENCE NOT SPECIFIED: ICD-10-CM

## 2019-07-22 DIAGNOSIS — R53.82 CHRONIC FATIGUE: ICD-10-CM

## 2019-07-22 DIAGNOSIS — M25.50 PAIN IN JOINT, MULTIPLE SITES: ICD-10-CM

## 2019-07-22 DIAGNOSIS — Q87.19 SJOGREN-LARSSON SYNDROME: ICD-10-CM

## 2019-07-22 DIAGNOSIS — Z00.00 ENCOUNTER FOR PREVENTIVE HEALTH EXAMINATION: Primary | ICD-10-CM

## 2019-07-22 DIAGNOSIS — G47.01 INSOMNIA DUE TO MEDICAL CONDITION: ICD-10-CM

## 2019-07-22 DIAGNOSIS — M85.89 OSTEOPENIA OF MULTIPLE SITES: ICD-10-CM

## 2019-07-22 PROBLEM — J32.1 FRONTAL SINUSITIS: Status: RESOLVED | Noted: 2017-11-17 | Resolved: 2019-07-22

## 2019-07-22 PROCEDURE — 99214 PR OFFICE/OUTPT VISIT, EST, LEVL IV, 30-39 MIN: ICD-10-PCS | Mod: S$PBB,,, | Performed by: FAMILY MEDICINE

## 2019-07-22 PROCEDURE — G0439 PR MEDICARE ANNUAL WELLNESS SUBSEQUENT VISIT: ICD-10-PCS | Mod: ,,, | Performed by: PHYSICIAN ASSISTANT

## 2019-07-22 PROCEDURE — 99999 PR PBB SHADOW E&M-EST. PATIENT-LVL IV: CPT | Mod: PBBFAC,,, | Performed by: PHYSICIAN ASSISTANT

## 2019-07-22 PROCEDURE — 96372 THER/PROPH/DIAG INJ SC/IM: CPT | Mod: PBBFAC

## 2019-07-22 PROCEDURE — 99214 OFFICE O/P EST MOD 30 MIN: CPT | Mod: PBBFAC,25 | Performed by: FAMILY MEDICINE

## 2019-07-22 PROCEDURE — 73610 X-RAY EXAM OF ANKLE: CPT | Mod: 26,RT,, | Performed by: RADIOLOGY

## 2019-07-22 PROCEDURE — 99999 PR PBB SHADOW E&M-EST. PATIENT-LVL IV: ICD-10-PCS | Mod: PBBFAC,,, | Performed by: FAMILY MEDICINE

## 2019-07-22 PROCEDURE — G0439 PPPS, SUBSEQ VISIT: HCPCS | Mod: ,,, | Performed by: PHYSICIAN ASSISTANT

## 2019-07-22 PROCEDURE — 73610 X-RAY EXAM OF ANKLE: CPT | Mod: TC,RT

## 2019-07-22 PROCEDURE — 99214 OFFICE O/P EST MOD 30 MIN: CPT | Mod: S$PBB,,, | Performed by: FAMILY MEDICINE

## 2019-07-22 PROCEDURE — 99999 PR PBB SHADOW E&M-EST. PATIENT-LVL IV: CPT | Mod: PBBFAC,,, | Performed by: FAMILY MEDICINE

## 2019-07-22 PROCEDURE — 99999 PR PBB SHADOW E&M-EST. PATIENT-LVL IV: ICD-10-PCS | Mod: PBBFAC,,, | Performed by: PHYSICIAN ASSISTANT

## 2019-07-22 PROCEDURE — 99214 OFFICE O/P EST MOD 30 MIN: CPT | Mod: PBBFAC,25,27 | Performed by: PHYSICIAN ASSISTANT

## 2019-07-22 PROCEDURE — 73610 XR ANKLE COMPLETE 3 VIEW RIGHT: ICD-10-PCS | Mod: 26,RT,, | Performed by: RADIOLOGY

## 2019-07-22 RX ORDER — TRIAMCINOLONE ACETONIDE 40 MG/ML
40 INJECTION, SUSPENSION INTRA-ARTICULAR; INTRAMUSCULAR ONCE
Status: COMPLETED | OUTPATIENT
Start: 2019-07-22 | End: 2019-07-22

## 2019-07-22 RX ADMIN — TRIAMCINOLONE ACETONIDE 40 MG: 40 INJECTION, SUSPENSION INTRA-ARTICULAR; INTRAMUSCULAR at 02:07

## 2019-07-22 NOTE — TELEPHONE ENCOUNTER
I called pt to inform her that Dr. Acevedo added lipid panel to her lab, I informed pt I s/w the lab at [facility] and they stated that there was not enough blood drawn to fulfill the order. Pt states she did not want to do it, because she doesn't want to be stuck again. I thanked her for taking my call and ended call. //BJ

## 2019-07-22 NOTE — TELEPHONE ENCOUNTER
----- Message from Elias Acevedo MD sent at 7/22/2019  3:52 AM CDT -----   Lab are placed for pt  ----- Message -----  From: Bette Ramsey MA  Sent: 7/19/2019   2:01 PM  To: Elias Acevedo MD, [facility] Staff    Pt would like her labs sent to [facility] [place] [facility] for Monday morning so she can do her labs early, does not want to wait all day to eat... Please let her know through My Chart....thanks

## 2019-07-22 NOTE — PROGRESS NOTES
Subjective:       Patient ID: Latonia Oliver is a 68 y.o. female.    Chief Complaint: Annual Exam    F/U:      Pt is a 68 year old who is here for follow-up. Pt has controlled DM. Pt has anxiety that is well controlled. Pt also rolled on her right ankle about 3 weeks ago and is still hurting    Review of Systems   Constitutional: Negative for activity change and unexpected weight change.   HENT: Negative for hearing loss, rhinorrhea and trouble swallowing.    Eyes: Negative for discharge and visual disturbance.   Respiratory: Negative for chest tightness and wheezing.    Cardiovascular: Negative for chest pain and palpitations.   Gastrointestinal: Negative for blood in stool, constipation, diarrhea and vomiting.   Endocrine: Negative for polydipsia and polyuria.   Genitourinary: Negative for difficulty urinating, dysuria, hematuria and menstrual problem.   Musculoskeletal: Positive for arthralgias and joint swelling. Negative for neck pain.   Neurological: Negative for weakness and headaches.   Psychiatric/Behavioral: Negative for confusion and dysphoric mood.       Objective:      Physical Exam   Constitutional: She is oriented to person, place, and time. She appears well-developed and well-nourished.   Cardiovascular: Normal rate and regular rhythm. Exam reveals no friction rub.   No murmur heard.  Pulmonary/Chest: Effort normal and breath sounds normal. No stridor. She has no wheezes.   Abdominal: Soft. Bowel sounds are normal. She exhibits no mass. There is no tenderness.   Feet:   Right Foot:   Protective Sensation: 10 sites tested. 9 sites sensed.   Skin Integrity: Negative for callus or dry skin.   Left Foot:   Protective Sensation: 10 sites tested. 9 sites sensed.   Skin Integrity: Negative for warmth, callus or dry skin.   Neurological: She is alert and oriented to person, place, and time.   Skin: Skin is warm and dry.   Psychiatric: She has a normal mood and affect. Her behavior is normal.       Assessment:        1. Essential hypertension    2. Mixed hyperlipidemia    3. Acute right ankle pain    4. Controlled type 2 diabetes mellitus without complication, without long-term current use of insulin    5. Malignant neoplasm of female breast, unspecified estrogen receptor status, unspecified laterality, unspecified site of breast    6. Sjogren-Flo syndrome    7. Sjogren's syndrome with keratoconjunctivitis sicca        Plan:       Essential hypertension  Comments:  Pt BP is well controlled    Mixed hyperlipidemia  Comments:  Will get lipid panel next follow-up    Acute right ankle pain  Comments:  will do xray  Orders:  -     X-Ray Ankle 2 View Right; Future; Expected date: 07/22/2019  -     triamcinolone acetonide injection 40 mg    Controlled type 2 diabetes mellitus without complication, without long-term current use of insulin  Comments:  Will do hga1c    Malignant neoplasm of female breast, unspecified estrogen receptor status, unspecified laterality, unspecified site of breast  Comments:  stable    Sjogren-Flo syndrome  Comments:  Will continue with rheumatology    Sjogren's syndrome with keratoconjunctivitis sicca  Comments:  will continue to see rheumatology

## 2019-07-22 NOTE — PROGRESS NOTES
"Latonia Oliver presented for a  Medicare AWV and comprehensive Health Risk Assessment today. The following components were reviewed and updated:    · Medical history  · Family History  · Social history  · Allergies and Current Medications  · Health Risk Assessment  · Health Maintenance  · Care Team     ** See Completed Assessments for Annual Wellness Visit within the encounter summary.**       The following assessments were completed:  · Living Situation  · CAGE  · Depression Screening  · Timed Get Up and Go  · Whisper Test  · Cognitive Function Screening  · Nutrition Screening  · ADL Screening  · PAQ Screening    Vitals:    07/22/19 1441   BP: 138/76   BP Location: Left arm   Patient Position: Sitting   BP Method: Medium (Manual)   Pulse: 68   Temp: 97.6 °F (36.4 °C)   TempSrc: Tympanic   SpO2: 97%   Weight: 99.1 kg (218 lb 7.6 oz)   Height: 5' 2" (1.575 m)     Body mass index is 39.96 kg/m².  Physical Exam   Constitutional: She is oriented to person, place, and time. She appears well-developed and well-nourished. No distress.   HENT:   Head: Normocephalic and atraumatic.   Cardiovascular: Normal rate, regular rhythm, normal heart sounds and intact distal pulses. Exam reveals no gallop and no friction rub.   No murmur heard.  Pulmonary/Chest: Effort normal and breath sounds normal. No respiratory distress. She has no wheezes. She has no rales.   Musculoskeletal: Normal range of motion.   Neurological: She is alert and oriented to person, place, and time.   Skin: Skin is warm. Capillary refill takes less than 2 seconds. No rash noted. She is not diaphoretic.   Psychiatric: She has a normal mood and affect. Her behavior is normal. Judgment and thought content normal.   Nursing note and vitals reviewed.        Diagnoses and health risks identified today and associated recommendations/orders:    1. Encounter for preventive health examination  -completed today.   -Due for eye exam and mammogram. See's breast specialist at [facility] and prefers to have her mammograms done at [facility].     2. Restless leg syndrome  --Stable and controlled on klonopin. Continue current treatment plan as previously prescribed with your PCP and pulmonologist.     3. Sjogren-Flo syndrome  -Stable and controlled on plaquenil. Continue current treatment plan as previously prescribed with your rheumatology.    4. Personal history of breast cancer  -Stable and controlled. Continue current treatment plan as previously prescribed with your PCP.   -Will schedule mammogram at [facility]. Due.     5. Pain in joint, multiple sites  -Due to her Sjogrens. Stable and controlled. Improved with the klonopin (prescribed for her restless leg).  Continue current treatment plan as previously prescribed with your PCP.     6. Sjogren's syndrome with keratoconjunctivitis sicca  -Stable and controlled on plaquenil. Continue current treatment plan as previously prescribed with your rheumatology.    7. Fibromyalgia  -Stable. Most of her symptoms improved with the klonopin. Continue current treatment plan as previously prescribed with your rheumatologist.    8. Controlled type 2 diabetes mellitus without complication, without long-term current use of insulin  Lab Results   Component Value Date    HGBA1C 6.4 (H) 07/22/2019   -Stable and controlled with diet. Continue current treatment plan as previously prescribed with your PCP.     9. Essential hypertension  -Stable and controlled on lotensin HCT. Continue current treatment plan as previously prescribed with your PCP.     10. Mixed hyperlipidemia  Lab Results   Component Value Date    CHOL 184 05/17/2018    CHOL 149 05/17/2017    CHOL 174 06/27/2016     Lab Results   Component Value Date    HDL 73 05/17/2018    HDL 63 05/17/2017    HDL 51 06/27/2016     Lab Results   Component Value Date    LDLCALC 96.8 05/17/2018    LDLCALC 68.4 05/17/2017    LDLCALC 104.0 06/27/2016     Lab Results   Component Value Date    TRIG 71  05/17/2018    TRIG 88 05/17/2017    TRIG 95 06/27/2016     Lab Results   Component Value Date    CHOLHDL 39.7 05/17/2018    CHOLHDL 42.3 05/17/2017    CHOLHDL 29.3 06/27/2016   -Stable and controlled on simvastatin. Continue current treatment plan as previously prescribed with your PCP.     11. Gastroesophageal reflux disease, esophagitis presence not specified  -Stable and controlled on omeprazole. Continue current treatment plan as previously prescribed with your PCP.     12. Insomnia due to medical condition  -Stable and controlled on klonopin. Continue current treatment plan as previously prescribed with your PCP.     13. Primary osteoarthritis of right knee  -Stable and controlled. Continue current treatment plan as previously prescribed with your PCP.     14. Chronic left-sided low back pain without sciatica  -Stable and controlled. Continue current treatment plan as previously prescribed with your PCP.     15. Lumbar spondylosis  -Stable and controlled. Continue current treatment plan as previously prescribed with your PCP.   -MRI lumbar spine 2/16/2016    16. ROX on CPAP  -uses CPAP nightly  -Stable and controlled. Continue current treatment plan as previously prescribed with your pulmonologist.  -sleep study 2/2016    17. Osteopenia of multiple sites  -on calcium and vitamin D    18. Major depressive disorder in partial remission, unspecified whether recurrent  -Stable and controlled. Continue current treatment plan as previously prescribed with your PCP.     19. Chronic fatigue  -Stable and controlled. Continue current treatment plan as previously prescribed with your rheumatologist.    20. Lumbar paraspinal muscle spasm  -Stable and controlled on aleve as needed. Uses sciatica belt, which helps. Continue current treatment plan as previously prescribed with your PCP.     21. Acute right ankle pain  -had x ray done today. Awaiting results.       Provided Latonia with a 5-10 year written screening schedule and  personal prevention plan. Recommendations were developed using the USPSTF age appropriate recommendations. Education, counseling, and referrals were provided as needed. After Visit Summary printed and given to patient which includes a list of additional screenings\tests needed.    Follow up if symptoms worsen or fail to improve.    Trini Jane PA-C  I offered to discuss end of life issues, including information on how to make advance directives that the patient could use to name someone who would make medical decisions on their behalf if they became too ill to make themselves.    ___Patient declined  _X_Patient is interested, I provided paper work and offered to discuss.

## 2019-07-22 NOTE — TELEPHONE ENCOUNTER
I am off every other Friday so 7/19 I wasout of clinic  I just reviewed this message and saw pt already checked in and had labs at 7:10 am    I left her a message to call me back or send portal message so we can arrange labs    In the future please send to my pool as they will intercept and either call me or have another provider order the labs     Neli Hassan

## 2019-07-22 NOTE — PATIENT INSTRUCTIONS
Counseling and Referral of Other Preventative  (Italic type indicates deductible and co-insurance are waived)    Patient Name: Latonia Oliver  Today's Date: 7/22/2019    Health Maintenance       Date Due Completion Date    Eye Exam 11/29/1960 ---    Shingles Vaccine (2 of 3) 04/13/2009 2/16/2009    Mammogram 03/25/2015 3/25/2013    Hemoglobin A1c 11/17/2018 5/17/2018    Lipid Panel 05/17/2019 5/17/2018    Influenza Vaccine 08/01/2019 10/23/2018    Foot Exam 07/22/2020 7/22/2019 (Done)    Override on 7/22/2019: Done    Override on 5/17/2017: Done    DEXA SCAN 06/28/2021 6/28/2018    TETANUS VACCINE 04/25/2027 4/25/2017    Colonoscopy 05/17/2028 5/17/2018 (Declined)    Override on 5/17/2018: Declined        No orders of the defined types were placed in this encounter.    The following information is provided to all patients.  This information is to help you find resources for any of the problems found today that may be affecting your health:                Living healthy guide: www.[facility].[place].gov      Understanding Diabetes: www.diabetes.org      Eating healthy: www.cdc.gov/healthyweight      CDC home safety checklist: www.cdc.gov/steadi/patient.html      Agency on Aging: www.goea.[place].[facility]      Alcoholics anonymous (AA): www.aa.org      Physical Activity: www.presley.nih.gov/dw5pkha      Tobacco use: www.quitwithusla.org

## 2019-07-22 NOTE — TELEPHONE ENCOUNTER
----- Message from Bette Ramsey MA sent at 7/19/2019  2:04 PM CDT -----  Pt would like to schedule blood work early Monday morning on [facility] and to notify her through My Chart...thanks

## 2019-07-23 ENCOUNTER — TELEPHONE (OUTPATIENT)
Dept: ORTHOPEDICS | Facility: CLINIC | Age: 69
End: 2019-07-23

## 2019-07-25 ENCOUNTER — OFFICE VISIT (OUTPATIENT)
Dept: PODIATRY | Facility: CLINIC | Age: 69
End: 2019-07-25
Payer: MEDICARE

## 2019-07-25 VITALS
DIASTOLIC BLOOD PRESSURE: 77 MMHG | SYSTOLIC BLOOD PRESSURE: 136 MMHG | HEIGHT: 62 IN | HEART RATE: 59 BPM | BODY MASS INDEX: 40.05 KG/M2 | WEIGHT: 217.63 LBS

## 2019-07-25 DIAGNOSIS — R60.0 LOCALIZED EDEMA: ICD-10-CM

## 2019-07-25 DIAGNOSIS — S82.61XA DISPLACED FRACTURE OF LATERAL MALLEOLUS OF RIGHT FIBULA, INITIAL ENCOUNTER FOR CLOSED FRACTURE: Primary | ICD-10-CM

## 2019-07-25 DIAGNOSIS — I87.2 VENOUS INSUFFICIENCY OF BOTH LOWER EXTREMITIES: ICD-10-CM

## 2019-07-25 DIAGNOSIS — M25.571 PAIN IN RIGHT ANKLE AND JOINTS OF RIGHT FOOT: ICD-10-CM

## 2019-07-25 PROCEDURE — 99203 OFFICE O/P NEW LOW 30 MIN: CPT | Mod: S$PBB,,, | Performed by: PODIATRIST

## 2019-07-25 PROCEDURE — 99203 PR OFFICE/OUTPT VISIT, NEW, LEVL III, 30-44 MIN: ICD-10-PCS | Mod: S$PBB,,, | Performed by: PODIATRIST

## 2019-07-25 PROCEDURE — 99213 OFFICE O/P EST LOW 20 MIN: CPT | Mod: PBBFAC | Performed by: PODIATRIST

## 2019-07-25 PROCEDURE — 99999 PR PBB SHADOW E&M-EST. PATIENT-LVL III: ICD-10-PCS | Mod: PBBFAC,,, | Performed by: PODIATRIST

## 2019-07-25 PROCEDURE — 99999 PR PBB SHADOW E&M-EST. PATIENT-LVL III: CPT | Mod: PBBFAC,,, | Performed by: PODIATRIST

## 2019-07-25 NOTE — PROGRESS NOTES
Subjective:       Patient ID: Latonia Oliver is a 68 y.o. female.    Chief Complaint: Ankle Pain (fx of the right ankle, pain 8/10 when walking, diabetic, wearing slippers, PCP Dr Acevedo)      HPI: Latonia Oliver presents to the clinic today with the complaint of moderate pains to the right ankle, lateral aspect. Patient does state recent trauma, approximately 4 weeks ago. Pains are rated at approx. 8/10. Pains are described as sharp and achy in nature. Walking, standing and prolonged weight bearing activities do exacerbate the symptoms. Patient states prior radiographic evaluation. The patient has been evaluated prior by a medical profession. NSAIDs have not been taken for the symptomology. Pains have been present several weeks. Patient's Primary Care Provider is Elias Acevedo MD.  Patient states that approximately 4 weeks ago, she did fall and roll her right ankle joint. Patient thought nothing of the pain until it was persistent for duration of 3 weeks.  She did present to her primary care provider several days ago where x-ray did reveal avulsion fracture of the fibula.  Patient is a well controlled diabetic.      Hemoglobin A1C   Date Value Ref Range Status   07/22/2019 6.4 (H) 4.0 - 5.6 % Final     Comment:     ADA Screening Guidelines:  5.7-6.4%  Consistent with prediabetes  >or=6.5%  Consistent with diabetes  High levels of fetal hemoglobin interfere with the HbA1C  assay. Heterozygous hemoglobin variants (HbS, HgC, etc)do  not significantly interfere with this assay.   However, presence of multiple variants may affect accuracy.     05/17/2018 5.8 (H) 4.0 - 5.6 % Final     Comment:     According to ADA guidelines, hemoglobin A1c <7.0% represents  optimal control in non-pregnant diabetic patients. Different  metrics may apply to specific patient populations.   Standards of Medical Care in Diabetes-2016.  For the purpose of screening for the presence of diabetes:  <5.7%     Consistent with the absence of  diabetes  5.7-6.4%  Consistent with increasing risk for diabetes   (prediabetes)  >or=6.5%  Consistent with diabetes  Currently, no consensus exists for use of hemoglobin A1c  for diagnosis of diabetes for children.  This Hemoglobin A1c assay has significant interference with fetal   hemoglobin   (HbF). The results are invalid for patients with abnormal amounts of   HbF,   including those with known Hereditary Persistence   of Fetal Hemoglobin. Heterozygous hemoglobin variants (HbAS, HbAC,   HbAD, HbAE, HbA2) do not significantly interfere with this assay;   however, presence of multiple variants in a sample may impact the %   interference.     05/17/2017 6.5 (H) 4.5 - 6.2 % Final     Comment:     According to ADA guidelines, hemoglobin A1C <7.0% represents  optimal control in non-pregnant diabetic patients.  Different  metrics may apply to specific populations.   Standards of Medical Care in Diabetes - 2016.  For the purpose of screening for the presence of diabetes:  <5.7%     Consistent with the absence of diabetes  5.7-6.4%  Consistent with increasing risk for diabetes   (prediabetes)  >or=6.5%  Consistent with diabetes  Currently no consensus exists for use of hemoglobin A1C  for diagnosis of diabetes for children.           Review of patient's allergies indicates:   Allergen Reactions    Penicillins      Other reaction(s): Rash  Other reaction(s): Difficulty breathing       Past Medical History:   Diagnosis Date    Anxiety     Arthritis     Back pain     Basal cell carcinoma (BCC)     Breast cancer     Breast cancer     Depression     Diabetes mellitus type 2, controlled     Enthesopathy of hip 12/11/2014    Fibromyalgia     GERD (gastroesophageal reflux disease)     HTN (hypertension)     Mixed hyperlipidemia     Obesity     Osteoporosis     Rash and nonspecific skin eruption 6/15/2015    Sjogren - Flo's syndrome     Sleep apnea     Trouble in sleeping     Type 2 diabetes mellitus         Family History   Problem Relation Age of Onset    Heart disease Mother     Hypertension Mother     COPD Mother     Diabetes Father     Stroke Father     Kidney disease Father        Social History     Socioeconomic History    Marital status:      Spouse name: Not on file    Number of children: 1    Years of education: Not on file    Highest education level: Not on file   Occupational History     Employer: [employer]    Social Needs    Financial resource strain: Not very hard    Food insecurity:     Worry: Never true     Inability: Never true    Transportation needs:     Medical: No     Non-medical: No   Tobacco Use    Smoking status: Never Smoker    Smokeless tobacco: Never Used   Substance and Sexual Activity    Alcohol use: Yes     Frequency: 4 or more times a week     Drinks per session: 3 or 4     Binge frequency: Never     Comment: Socially    Drug use: No    Sexual activity: Not on file   Lifestyle    Physical activity:     Days per week: 0 days     Minutes per session: Not on file    Stress: Only a little   Relationships    Social connections:     Talks on phone: More than three times a week     Gets together: Once a week     Attends Confucianism service: Not on file     Active member of club or organization: No     Attends meetings of clubs or organizations: Never     Relationship status:    Other Topics Concern    Not on file   Social History Narrative    Not on file       Past Surgical History:   Procedure Laterality Date    APPENDECTOMY      BELT ABDOMINOPLASTY      BREAST LUMPECTOMY      BREAST SURGERY      breast augmentation     SECTION, CLASSIC         Review of Systems   Constitutional: Negative for chills, fatigue and fever.   HENT: Negative for hearing loss.    Eyes: Negative for photophobia and visual disturbance.   Respiratory: Negative for cough, chest tightness, shortness of breath and wheezing.    Cardiovascular: Positive for leg swelling.  "Negative for chest pain and palpitations.   Gastrointestinal: Negative for constipation, diarrhea, nausea and vomiting.   Endocrine: Negative for cold intolerance and heat intolerance.   Genitourinary: Negative for flank pain.   Musculoskeletal: Positive for gait problem. Negative for neck pain and neck stiffness.   Skin: Positive for wound.   Neurological: Negative for light-headedness and headaches.   Psychiatric/Behavioral: Negative for sleep disturbance.         Objective:   /77 (BP Location: Right arm, Patient Position: Sitting, BP Method: Large (Automatic))   Pulse (!) 59   Ht 5' 2" (1.575 m)   Wt 98.7 kg (217 lb 9.5 oz)   BMI 39.80 kg/m²     X-Ray Ankle Complete 3 View Right  Narrative: EXAMINATION:  XR ANKLE COMPLETE 3 VIEW RIGHT    CLINICAL HISTORY:  Pain in right ankle and joints of right foot    TECHNIQUE:  AP, lateral, and oblique images of the right ankle were performed.    COMPARISON:  06/26/2017    FINDINGS:  There is a minimally distracted obliquely oriented fracture involving the distal tip of the lateral malleolus which may be mildly comminuted.  Fracture plane does not appear to extend to the articular surface.  There is no abnormal joint space widening demonstrated at the ankle mortise.  Plantar surface calcaneal enthesophyte noted.  Impression: Fracture of the lateral malleolus as above.    Electronically signed by: Alfredo Begum MD  Date:    07/22/2019  Time:    14:26      LOWER EXTREMITY PHYSICAL EXAMINATION    DERMATOLOGY: Skin is supple, dry and intact. No ecchymosis is noted. No hypertrophic skin formation. No erythema or cellulitis is noted.     ORTHOPEDIC:  Discomfort to palpation of the distal aspect the lateral malleolus.  Slight discomfort to palpation of the anterolateral ankle gutter.  Mild discomfort along the course of the peroneal tendon. Manual muscle testing with forced eversion is painful, but is 5/5.  Gait pattern is slightly antalgic.    NEUROLOGY: Sensation to " light touch is intact. Proprioception is intact. Sensation to pin prick is intact.     VASCULAR: On the right foot, the dorsalis pedis pulse is 1/4 and the posterior tibial pulse is 1/4. Capillary refill time is less than 3 seconds. Hair growth is absent on the dorsum of the foot and at the digits. Proximal to distal temperature is warm to warm. Edema is noted, 1+ pitting, bilateral.    Assessment:     1. Displaced fracture of lateral malleolus of right fibula, initial encounter for closed fracture    2. Pain in right ankle and joints of right foot    3. Localized edema    4. Venous insufficiency of both lower extremities          Plan:     Displaced fracture of lateral malleolus of right fibula, initial encounter for closed fracture  Pain in right ankle and joints of right foot  Localized edema  Thorough discussion is had with the patient today, concerning the diagnosis, its etiology, and the treatment algorithm at present.  XRAYS are reviewed in detail with the patient. All questions and concerns regarding findings and its/their implications are outlined and discussed.  Medially displaced fracture of the distal aspect of the right lateral malleolus; avulsion variant.  Patient will need immobilization therapy with ASO brace.  ASO Brace is dispensed to the patient. The ASO Brace is appropriately fitted and customized to the patient's lower extremity physique by the LPN/MA. Patient to ambulate with the ASO Brace at all times. The patient should not sleep with the device or shower with the device. The patient should exercise caution when driving with the device, if dispensed for right ankle/foot pathology. Please ambulate with the ASO brace for duration 3 weeks, then wean to tolerance.    Venous insufficiency of both lower extremities  Did discuss possible initiation and/or continuation of lower extremity compression therapy (stockings).  Recommend continue w/ limb elevation.  Consider initiation oral diuretic if no  overt contraindication.  I encouraged the patient to follow-up and discuss with his/her PCP.           Future Appointments   Date Time Provider Department Center   1/22/2020  1:40 PM Elias Acevedo MD [facility] [place]   5/27/2020 11:00 AM Nirav Rahman MD [facility] [place]

## 2019-07-25 NOTE — LETTER
July 25, 2019      Elias Acevedo MD  [address]  [place] [address]           [facility]  [address] [place] [id number]  Phone: [phone]  Fax: [phone]          Patient: Latonia Oliver   MR Number: [id number]   YOB: 1950   Date of Visit: 7/25/2019       Dear Dr. Elias Acevedo:    Thank you for referring Latonia Oliver to me for evaluation. Attached you will find relevant portions of my assessment and plan of care.    If you have questions, please do not hesitate to call me. I look forward to following Latonia Oliver along with you.    Sincerely,    Lamberto Franklin DPM    Enclosure  CC:  No Recipients    If you would like to receive this communication electronically, please contact [email or web address] or [phone] to request more information on [facility] access.    For providers and/or their staff who would like to refer a patient to [facility], please contact us through our one-stop-shop provider referral line, [facility], at [phone].    If you feel you have received this communication in error or would no longer like to receive these types of communications, please e-mail [email or web address]

## 2019-08-09 ENCOUNTER — PATIENT OUTREACH (OUTPATIENT)
Dept: ADMINISTRATIVE | Facility: HOSPITAL | Age: 69
End: 2019-08-09

## 2019-08-20 RX ORDER — BENAZEPRIL/HYDROCHLOROTHIAZIDE 20 MG-25MG
TABLET ORAL
Qty: 90 TABLET | Refills: 0 | Status: SHIPPED | OUTPATIENT
Start: 2019-08-20 | End: 2019-11-23 | Stop reason: SDUPTHER

## 2019-08-29 ENCOUNTER — PATIENT OUTREACH (OUTPATIENT)
Dept: ADMINISTRATIVE | Facility: HOSPITAL | Age: 69
End: 2019-08-29

## 2019-09-23 RX ORDER — SIMVASTATIN 40 MG/1
TABLET, FILM COATED ORAL
Qty: 90 TABLET | Refills: 1 | Status: SHIPPED | OUTPATIENT
Start: 2019-09-23 | End: 2020-03-16

## 2019-09-29 RX ORDER — OMEPRAZOLE 40 MG/1
40 CAPSULE, DELAYED RELEASE ORAL DAILY
Qty: 30 CAPSULE | Refills: 11 | OUTPATIENT
Start: 2019-09-29

## 2019-10-08 ENCOUNTER — PATIENT OUTREACH (OUTPATIENT)
Dept: ADMINISTRATIVE | Facility: HOSPITAL | Age: 69
End: 2019-10-08

## 2019-10-08 NOTE — PROGRESS NOTES
I have contacted patient to schedule dm eye exam. patient stated that she will be scheduling at [facility] in the next few weeks.

## 2019-10-11 RX ORDER — OMEPRAZOLE 40 MG/1
CAPSULE, DELAYED RELEASE ORAL
Qty: 90 CAPSULE | Refills: 0 | Status: SHIPPED | OUTPATIENT
Start: 2019-10-11 | End: 2019-11-23 | Stop reason: SDUPTHER

## 2019-10-11 NOTE — TELEPHONE ENCOUNTER
Pt last seen 1 yr ago with dr thrasher or she can est with one of the other MDs     Requesting med refilled    Please schedule her a follow up apt with dr thrasher     She can get her labs done same day as his visit  Reg 4     Will give 30 day supply of her refill

## 2019-10-22 RX ORDER — CITALOPRAM 40 MG/1
TABLET, FILM COATED ORAL
Qty: 90 TABLET | Refills: 1 | Status: SHIPPED | OUTPATIENT
Start: 2019-10-22 | End: 2020-02-17

## 2019-10-28 ENCOUNTER — LAB VISIT (OUTPATIENT)
Dept: LAB | Facility: HOSPITAL | Age: 69
End: 2019-10-28
Attending: FAMILY MEDICINE
Payer: MEDICARE

## 2019-10-28 DIAGNOSIS — M35.01 SJOGREN'S SYNDROME WITH KERATOCONJUNCTIVITIS SICCA: ICD-10-CM

## 2019-10-28 LAB
ALBUMIN SERPL BCP-MCNC: 4 G/DL (ref 3.5–5.2)
ALP SERPL-CCNC: 62 U/L (ref 55–135)
ALT SERPL W/O P-5'-P-CCNC: 26 U/L (ref 10–44)
ANION GAP SERPL CALC-SCNC: 11 MMOL/L (ref 8–16)
AST SERPL-CCNC: 24 U/L (ref 10–40)
BASOPHILS # BLD AUTO: 0.09 K/UL (ref 0–0.2)
BASOPHILS NFR BLD: 1.6 % (ref 0–1.9)
BILIRUB SERPL-MCNC: 0.6 MG/DL (ref 0.1–1)
BUN SERPL-MCNC: 16 MG/DL (ref 8–23)
CALCIUM SERPL-MCNC: 10 MG/DL (ref 8.7–10.5)
CHLORIDE SERPL-SCNC: 101 MMOL/L (ref 95–110)
CO2 SERPL-SCNC: 28 MMOL/L (ref 23–29)
CREAT SERPL-MCNC: 0.8 MG/DL (ref 0.5–1.4)
CRP SERPL-MCNC: 4.9 MG/L (ref 0–8.2)
DIFFERENTIAL METHOD: ABNORMAL
EOSINOPHIL # BLD AUTO: 0.1 K/UL (ref 0–0.5)
EOSINOPHIL NFR BLD: 2.4 % (ref 0–8)
ERYTHROCYTE [DISTWIDTH] IN BLOOD BY AUTOMATED COUNT: 14 % (ref 11.5–14.5)
ERYTHROCYTE [SEDIMENTATION RATE] IN BLOOD BY WESTERGREN METHOD: 14 MM/HR (ref 0–20)
EST. GFR  (AFRICAN AMERICAN): >60 ML/MIN/1.73 M^2
EST. GFR  (NON AFRICAN AMERICAN): >60 ML/MIN/1.73 M^2
GLUCOSE SERPL-MCNC: 127 MG/DL (ref 70–110)
HCT VFR BLD AUTO: 40.5 % (ref 37–48.5)
HGB BLD-MCNC: 13.1 G/DL (ref 12–16)
IMM GRANULOCYTES # BLD AUTO: 0.01 K/UL (ref 0–0.04)
IMM GRANULOCYTES NFR BLD AUTO: 0.2 % (ref 0–0.5)
LYMPHOCYTES # BLD AUTO: 1.3 K/UL (ref 1–4.8)
LYMPHOCYTES NFR BLD: 23 % (ref 18–48)
MCH RBC QN AUTO: 32.1 PG (ref 27–31)
MCHC RBC AUTO-ENTMCNC: 32.3 G/DL (ref 32–36)
MCV RBC AUTO: 99 FL (ref 82–98)
MONOCYTES # BLD AUTO: 0.5 K/UL (ref 0.3–1)
MONOCYTES NFR BLD: 8.5 % (ref 4–15)
NEUTROPHILS # BLD AUTO: 3.7 K/UL (ref 1.8–7.7)
NEUTROPHILS NFR BLD: 64.3 % (ref 38–73)
NRBC BLD-RTO: 0 /100 WBC
PLATELET # BLD AUTO: 187 K/UL (ref 150–350)
PMV BLD AUTO: 10 FL (ref 9.2–12.9)
POTASSIUM SERPL-SCNC: 4 MMOL/L (ref 3.5–5.1)
PROT SERPL-MCNC: 7.2 G/DL (ref 6–8.4)
RBC # BLD AUTO: 4.08 M/UL (ref 4–5.4)
SODIUM SERPL-SCNC: 140 MMOL/L (ref 136–145)
WBC # BLD AUTO: 5.79 K/UL (ref 3.9–12.7)

## 2019-10-28 PROCEDURE — 36415 COLL VENOUS BLD VENIPUNCTURE: CPT

## 2019-10-28 PROCEDURE — 85025 COMPLETE CBC W/AUTO DIFF WBC: CPT

## 2019-10-28 PROCEDURE — 85651 RBC SED RATE NONAUTOMATED: CPT

## 2019-10-28 PROCEDURE — 80053 COMPREHEN METABOLIC PANEL: CPT

## 2019-10-28 PROCEDURE — 86140 C-REACTIVE PROTEIN: CPT

## 2019-11-04 ENCOUNTER — OFFICE VISIT (OUTPATIENT)
Dept: RHEUMATOLOGY | Facility: CLINIC | Age: 69
End: 2019-11-04
Payer: MEDICARE

## 2019-11-04 VITALS
DIASTOLIC BLOOD PRESSURE: 74 MMHG | HEART RATE: 71 BPM | SYSTOLIC BLOOD PRESSURE: 128 MMHG | BODY MASS INDEX: 39.92 KG/M2 | WEIGHT: 216.94 LBS | HEIGHT: 62 IN

## 2019-11-04 DIAGNOSIS — M89.9 DISORDER OF BONE AND CARTILAGE: Primary | ICD-10-CM

## 2019-11-04 DIAGNOSIS — Z51.81 MEDICATION MONITORING ENCOUNTER: ICD-10-CM

## 2019-11-04 DIAGNOSIS — M35.01 SJOGREN'S SYNDROME WITH KERATOCONJUNCTIVITIS SICCA: ICD-10-CM

## 2019-11-04 DIAGNOSIS — M94.9 DISORDER OF BONE AND CARTILAGE: Primary | ICD-10-CM

## 2019-11-04 PROCEDURE — 99213 OFFICE O/P EST LOW 20 MIN: CPT | Mod: PBBFAC | Performed by: INTERNAL MEDICINE

## 2019-11-04 PROCEDURE — 99999 PR PBB SHADOW E&M-EST. PATIENT-LVL III: ICD-10-PCS | Mod: PBBFAC,,, | Performed by: INTERNAL MEDICINE

## 2019-11-04 PROCEDURE — 99214 OFFICE O/P EST MOD 30 MIN: CPT | Mod: S$PBB,,, | Performed by: INTERNAL MEDICINE

## 2019-11-04 PROCEDURE — 99214 PR OFFICE/OUTPT VISIT, EST, LEVL IV, 30-39 MIN: ICD-10-PCS | Mod: S$PBB,,, | Performed by: INTERNAL MEDICINE

## 2019-11-04 PROCEDURE — 99999 PR PBB SHADOW E&M-EST. PATIENT-LVL III: CPT | Mod: PBBFAC,,, | Performed by: INTERNAL MEDICINE

## 2019-11-04 RX ORDER — HYDROXYCHLOROQUINE SULFATE 200 MG/1
200 TABLET, FILM COATED ORAL 2 TIMES DAILY
Qty: 180 TABLET | Refills: 3 | Status: SHIPPED | OUTPATIENT
Start: 2019-11-04 | End: 2020-07-28 | Stop reason: SDUPTHER

## 2019-11-04 NOTE — PATIENT INSTRUCTIONS
Maintain Plaquenil    Get Eyes checked yearly    Add Vit D3- 1000 units in winter months    Omega 3 fatty acid daily will help Sjogren's diet- anti inflam. Diet- Mediterranean diet     Exercises - Core type    Get Shingrix vaccine    Preventing Falls  Each year about one-third of all persons over age 65 will fall. Many of these falls result in broken bones. Some common causes of falls include outdoor and indoor hazards. Certain lifestyle behaviors can also increase your chances of falling.  Outdoor Safety Tips  Try the following tips to help prevent falls when you are outside:  Wear low-heeled shoes with rubber soles for more solid footing (traction), and wear warm boots in winter.   Use hand rails as you go up and down steps and on escalators.   If sidewalks look slippery, walk in the grass for more solid footing.   In winter, carry a small bag of rock salt or kosher salt in your pocket or car. You can then sprinkle the salt or jasper litter on sidewalks or streets that are slippery.   Look carefully at floor surfaces in public buildings. Floors made of highly polished marble or tile can be very slippery. When these surfaces are wet, they may become dangerous. When floors have plastic or carpet runners in place, stay on them whenever possible.   Keep your porch, deck, walkways and driveway free of leaves, snow, trash or clutter. Also keep them in good repair. Cover porch steps with a gritty, weather-proof paint and install handrails on both sides.   Turn on the light outside your front door before leaving your home in the early evening so that you have outdoor light when you return after dark.   Use a shoulder bag, shira pack or a backpack purse to leave your hands free.   Use a walker or cane as needed.   Find out about community services that can provide help, such as 24-hour pharmacies and grocery stores that take orders by phone or internet and deliver, especially in poor weather.   Stop at curbs and check the  height before stepping up or down. Be careful at curbs that have been cut away to allow access for bikes or wheelchairs. The incline may lead to a fall.   Consider wearing hip protectors or hip pads for added protection should you fall.   Indoor Safety Tips: Fall-Proofing Your Home  Try the following tips to help prevent falls when you are inside your home:  Around the House  Place items you use most often within easy reach. This keeps you from having to do a lot of bending and stooping.   Use assistive devices to help avoid strain or injury. For example, use a long-handled grasping device to  items without bending or reaching. Use a pushcart to move heavy or hot items from the stove or countertop to the table.   If you must use a stepstool, use a sturdy one with a handrail and wide steps.   If you live alone, consider wearing a personal emergency response system (PERS). Also consider having a cordless telephone or cell phone to take from room to room so you can call for help if you fall.   Floors  Remove all loose wires, cords and throw rugs.   Keep floors free of clutter.   Be sure all carpets and area rugs have skid-proof backing or are tacked to the floor.   Do not use slippery wax on bare floors.   Keep furniture in its usual place.   Bathrooms  Install grab bars on the bathroom walls beside the tub, shower and toilet.   Use a non-skid rubber mat in the shower or tub.   If you are unsteady on your feet, you may want to use a plastic chair with a back and non-skid legs in the shower or tub and use a handheld showerhead to bathe.   Kitchen  Use non-skid mats or rugs on the floor near the stove and sink.   Clean up spills as soon as they happen (in the kitchen and anywhere in the home).   Bedroom  Place light switches within reach of your bed and a night light between the bedroom and bathroom.   Get up slowly from sitting or lying down since this may cause dizziness.   Keep a flashlight with fresh batteries  beside your bed.   Stairs  Keep stairwells well lit, with light switches at the top and the bottom.   Install sturdy handrails on both sides.   Mason the top and bottom steps with bright tape.   Make sure carpeting is secure.   In addition to indoor and outdoor hazards, certain lifestyle behaviors can make a person more likely to fall. Here are some lifestyle tips to help you:   Be careful about drinking alcohol. Alcohol slows reflexes and may cause confusion, dizziness or disorientation. Too much alcohol can also cause bone loss.   If you are in a hurry, slow down. Accidents are more likely to happen when you rush.   Stay alert and focused when in public places.   Remember to wear appropriate shoes both indoors and out.   Exercise and eat healthy at every age. A healthy diet includes having a well-balanced diet that contains the recommended amounts of calcium and vitamin D.   If you have osteoporosis, you can take steps inside and outside your home and in your daily routine to prevent falls. Taking these steps can help you enjoy an active and healthy life.

## 2019-11-04 NOTE — PROGRESS NOTES
CHIEF COMPLAINT:  Fibromyalgia, Sjogren's syndrome, and osteopenia-disease management.    PERTINENT HISTORY:  Latonia is here for a regular follow-up regarding all the   conditions listed above.  She was last seen by myself in November 2019.  She was continued on Plaquenil 200 b.i.d. for her Sjogren's syndrome and that is states stable as for the dryness goes.  She has had no weight loss fevers or signs of systemic involvement.  There has been no parotid or submandibular swelling or lymphadenopathy.  She is using her regular eye drops.  She rates her pain at 2/10 with improvement in her general condition since she has been on Klonopin 1 mg at night.  This helps her rest a lot and dimishes her restless legs.  She feels much better the next day with energy and much less overall pain.  She still gets some pain at times from the back into the right buttocks.  I reviewed her chart- she does have degenerative disc disease at L3-4 - 5.  She has seen Pain Management 2016 with plus-minus help.  She has seen physical therapy with some needling techniques -again not much help.  She occasionally gets sharp pain in her left ribcage with a deep breath or certain movements-she thinks she may have injured her ribs in the past and certainly could be the case.  This pain is not persistent though.  She is not broken any bones.  She had less DEXA in June 2018 with FRAX levels at 0.8% at the hip and 8 percent at the major sites.  She has not taken vitamin-D recently.  He has had 2 falls in the last 8 months; she did break her right ankle-avulsion fracture.  She also fell in the bathroom but did not break anything though she did hit her head.  She has been seeing the   pulmonologist regarding some of her insomnia issues.  She was found to have a   variant of obstructive sleep apnea.  In the treatment besides CPAP, she was   given Klonopin.  That dose was increased to 1 mg in the evening.  With that, she   has had a dramatic improvement in  all of her symptoms.  She has no signs of significant anxiety or depression now.  She says   her fibro symptoms are all better.  She does take Celexa 40 mg a day as well.    She continues using a weighted blanket as well-20 lb  to help her symptoms at night for restless legs as well    REVIEW OF SYSTEMS:  GENERAL:  Her weight stayed stable.  No fevers or infections.  No chills, fatigability, change in   appetite, weight loss.  SKIN:  No rashes, itching or changes in color or texture of skin, no Raynaud's,   no malar rash.  HEAD:  No headache or recent head trauma.  EYES:  There was no change in her dry eyes issues.  Visual acuity fine.  No   ocular pain or redness.  Uses Systane drops regularly  EARS:  Denies ear pain, discharge or vertigo.  NOSE:  No loss of smell.  No epistaxis or postnasal drip.  MOUTH AND THROAT:  No hoarseness or change in voice or oral ulcers.  No   difficulty swallowing or dryness.  NODES:  Denies swollen glands.  No parotid swelling  CHEST:  Lungs, see HPI.   Seeing Dr. Pemberton.  Insomnia and ROX is noted.    Markedly improved with Klonopin and CPAP.  Denies shortness of breath, DECKER,   cyanosis, wheezing, cough and sputum production.  CARDIOVASCULAR:  Cardiac, history of hypertension.  She is on Lotensin.  Note,   she is on Zocor.  Denies chest pain, PND, orthopnea or reduced exercise   tolerance.  ABDOMEN:  Diabetes is present by history.  Reflux has been present in the past.    She is not on any particular medicines for that presently other than Prilosec.    No weight loss.  Denies nausea, vomiting, diarrhea, abdominal pain, hematemesis   or blood in stool.  URINARY:  No flank pain, dysuria or hematuria.  PERIPHERAL VASCULAR:  No claudication or cyanosis.  NEUROLOGIC:  No numbness, weakness or tingling.  MUSCULOSKELETAL:  Joints including fibromyalgia, degenerative disease of her   spine, OA at the knee have been present, though those are all pretty   asymptomatic presently.  Low bone  strength noted in the past with her last   density showing worse score -2.2 at the spine, -1.9 at the hip and those were   improved.  FRAX scores were low risk     Past Medical History:   Diagnosis Date    Anxiety     Arthritis     Back pain     Basal cell carcinoma (BCC)     Breast cancer     Breast cancer     Depression     Diabetes mellitus type 2, controlled     Enthesopathy of hip 2014    Fibromyalgia     GERD (gastroesophageal reflux disease)     HTN (hypertension)     Mixed hyperlipidemia     Obesity     Osteoporosis     Rash and nonspecific skin eruption 6/15/2015    Sjogren - Flo's syndrome     Sleep apnea     Trouble in sleeping     Type 2 diabetes mellitus      Past Surgical History:   Procedure Laterality Date    APPENDECTOMY      BELT ABDOMINOPLASTY      BREAST LUMPECTOMY      BREAST SURGERY      breast augmentation     SECTION, CLASSIC       Family History   Problem Relation Age of Onset    Heart disease Mother     Hypertension Mother     COPD Mother     Diabetes Father     Stroke Father     Kidney disease Father      Social History     Socioeconomic History    Marital status:      Spouse name: Not on file    Number of children: 1    Years of education: Not on file    Highest education level: Not on file   Occupational History     Employer: [employer]    Social Needs    Financial resource strain: Not very hard    Food insecurity:     Worry: Never true     Inability: Never true    Transportation needs:     Medical: No     Non-medical: No   Tobacco Use    Smoking status: Never Smoker    Smokeless tobacco: Never Used   Substance and Sexual Activity    Alcohol use: Yes     Frequency: 4 or more times a week     Drinks per session: 3 or 4     Binge frequency: Never     Comment: Socially    Drug use: No    Sexual activity: Not on file   Lifestyle    Physical activity:     Days per week: 0 days     Minutes per session: Not on file    Stress:  "Only a little   Relationships    Social connections:     Talks on phone: More than three times a week     Gets together: Once a week     Attends [employer]ism service: Not on file     Active member of club or organization: No     Attends meetings of clubs or organizations: Never     Relationship status:    Other Topics Concern    Not on file   Social History Narrative    Not on file     Review of patient's allergies indicates:   Allergen Reactions    Penicillins      Other reaction(s): Rash  Other reaction(s): Difficulty breathing       PHYSICAL EXAM  /74 (BP Location: Left arm, Patient Position: Sitting, BP Method: Large (Automatic))   Pulse 71   Ht 5' 2" (1.575 m)   Wt 98.4 kg (216 lb 14.9 oz)   BMI 39.68 kg/m²    -GENERAL-well-developed well-nourished and no distress  HEENT-mild mouth dryness and eye dryness without eye redness.  No adenopathy and no parotid swelling  CHEST-CLEAR TO AUSCULTATION PERCUSSION WITHOUT WHEEZING  Cardiac-NORMAL SINUS RHYTHM WITHOUT GALLOPS OR MURMURS  GI-no abdominal pain or tenderness, no rebound  MUSCULOSKELETAL-mild tenderness in the lower back and right gluteal area.  Normal hip motion.  Minimal tenderness in the trochanteric bursa on the right.  Lumbar flexion slightly decreased.  X-rays reviewed and degenerative disc disease at L3-4 145 particularly and showed the patient.  Knees have mild crepitus but good motion not tender.  Hands with normal range of motion PIP is MCPs and wrist without synovitis.  Elbows and shoulders move well without tenderness.  C rotation is mildly limited but not tender.  Ankles with minimal swelling but not tender and no MTP tenderness  Skin-no malar rashes discoid lesions or other types of cutaneous abnormalities  VASCULAR-NO RAYNAUD'S.  PERIPHERAL PULSES GOOD  NEUROLOGIC NORMAL MUSCLE STRENGTH WITH NO ATROPHY; NEGATIVE STRAIGHT LEG RAISE    Results for orders placed or performed in visit on 10/28/19   CBC auto differential   Result " Value Ref Range    WBC 5.79 3.90 - 12.70 K/uL    RBC 4.08 4.00 - 5.40 M/uL    Hemoglobin 13.1 12.0 - 16.0 g/dL    Hematocrit 40.5 37.0 - 48.5 %    Mean Corpuscular Volume 99 (H) 82 - 98 fL    Mean Corpuscular Hemoglobin 32.1 (H) 27.0 - 31.0 pg    Mean Corpuscular Hemoglobin Conc 32.3 32.0 - 36.0 g/dL    RDW 14.0 11.5 - 14.5 %    Platelets 187 150 - 350 K/uL    MPV 10.0 9.2 - 12.9 fL    Immature Granulocytes 0.2 0.0 - 0.5 %    Gran # (ANC) 3.7 1.8 - 7.7 K/uL    Immature Grans (Abs) 0.01 0.00 - 0.04 K/uL    Lymph # 1.3 1.0 - 4.8 K/uL    Mono # 0.5 0.3 - 1.0 K/uL    Eos # 0.1 0.0 - 0.5 K/uL    Baso # 0.09 0.00 - 0.20 K/uL    nRBC 0 0 /100 WBC    Gran% 64.3 38.0 - 73.0 %    Lymph% 23.0 18.0 - 48.0 %    Mono% 8.5 4.0 - 15.0 %    Eosinophil% 2.4 0.0 - 8.0 %    Basophil% 1.6 0.0 - 1.9 %    Differential Method Automated    Comprehensive metabolic panel   Result Value Ref Range    Sodium 140 136 - 145 mmol/L    Potassium 4.0 3.5 - 5.1 mmol/L    Chloride 101 95 - 110 mmol/L    CO2 28 23 - 29 mmol/L    Glucose 127 (H) 70 - 110 mg/dL    BUN, Bld 16 8 - 23 mg/dL    Creatinine 0.8 0.5 - 1.4 mg/dL    Calcium 10.0 8.7 - 10.5 mg/dL    Total Protein 7.2 6.0 - 8.4 g/dL    Albumin 4.0 3.5 - 5.2 g/dL    Total Bilirubin 0.6 0.1 - 1.0 mg/dL    Alkaline Phosphatase 62 55 - 135 U/L    AST 24 10 - 40 U/L    ALT 26 10 - 44 U/L    Anion Gap 11 8 - 16 mmol/L    eGFR if African American >60 >60 mL/min/1.73 m^2    eGFR if non African American >60 >60 mL/min/1.73 m^2   C-reactive protein   Result Value Ref Range    CRP 4.9 0.0 - 8.2 mg/L   Sedimentation rate   Result Value Ref Range    Sed Rate 14 0 - 20 mm/Hr       DXA- JUNE 2018   The L1 to L4 vertebral bone mineral density is equal to 1.028 g/cm squared with a T score of -1.3.  There has been a 2.6% increase relative to the prior study.    The left femoral neck bone mineral density is equal to 0.866 g/cm squared with a T score of -1.2.  There has been  a 1.1% increase relative to the prior  study.    There is a 8.3% risk of a major osteoporotic fracture and a 0.8% risk of hip fracture in the next 10 years (FRAX).        IMPRESSIONS:  1.  Sjogren's syndrome with positive PAUL and SSA minimally symptomatic.  No   signs of extra glandular spread.   2.  Osteopenia, stable osteopenia, is on   drug holiday now, DXA June 2018 shows low FRAX level and stable BMD femoral neck T-score -1.2-OFF VITAMIN-D PRESENTLY  3.  Fibromyalgia, restless leg syndrome - markedly improved with the addition of   Klonopin and the Celexa.    4.  Degenerative disease of lumbar sacral spine with intermittent pain referral to the right buttocks.  No evidence of hip disease by her exam  5.  Osteoarthritis-knees-stable  6.  Vaccination status-not up-to-date    PLAN:    Maintain Plaquenil -200 mg twice daily-appropriate for her weight    Get Eyes checked yearly    Add Vit D3- 1000 units in winter months    Omega 3 fatty acid daily will help Sjogren's diet- anti inflam. Diet- Mediterranean diet     Exercises - Core type for her back    Get Shingrix vaccine    Xylimelts-p.r.n.    Systane eye drops- night -try to use every day    Reviewed her Klonopin use again-encouraged her to try lower doses at times.  Worry little bit about its affect on her at night if she gets up to go to the bathroom and note she did fall recently doing that     Exercise will help her fibromyalgia component    Continue B vitamins-MCV is stable    Prolonged visit: Over35 min spent in patient care and evaluation. Ove 20 min of time used in reviewing current and past labs and ongoing symptoms, particularly the new symptoms in her back and right hip, discussing diagnostic possibilities, counseling on vaccination, exercise, vitamins, supplements, and medication options, reviewing side effects of therapies, and coordination of care with  Family Dr & pulmonology  Patient's questions were all addressed and she understands our plans and risks.    JAILENE/RUPERTO  dd: 08/21/2017  11:47:21 (CDT)  td: 08/21/2017 16:59:44 (CDT)  Doc ID   #[id number]  Job ID #[id number]    CC: Nirav Acevedo M.D.

## 2019-11-13 ENCOUNTER — PATIENT OUTREACH (OUTPATIENT)
Dept: ADMINISTRATIVE | Facility: HOSPITAL | Age: 69
End: 2019-11-13

## 2019-11-24 DIAGNOSIS — G47.01 INSOMNIA DUE TO MEDICAL CONDITION: ICD-10-CM

## 2019-11-24 DIAGNOSIS — G47.61 PERIODIC LIMB MOVEMENT SLEEP DISORDER: ICD-10-CM

## 2019-11-24 RX ORDER — BENAZEPRIL/HYDROCHLOROTHIAZIDE 20 MG-25MG
TABLET ORAL
Qty: 90 TABLET | Refills: 0 | Status: SHIPPED | OUTPATIENT
Start: 2019-11-24 | End: 2020-03-31

## 2019-11-24 RX ORDER — BENAZEPRIL/HYDROCHLOROTHIAZIDE 20 MG-25MG
TABLET ORAL
Qty: 90 TABLET | Refills: 0 | Status: SHIPPED | OUTPATIENT
Start: 2019-11-24 | End: 2020-06-10 | Stop reason: SDUPTHER

## 2019-11-25 RX ORDER — OMEPRAZOLE 40 MG/1
CAPSULE, DELAYED RELEASE ORAL
Qty: 90 CAPSULE | Refills: 0 | Status: SHIPPED | OUTPATIENT
Start: 2019-11-25 | End: 2020-04-06

## 2019-11-26 RX ORDER — CLONAZEPAM 1 MG/1
TABLET ORAL
Qty: 30 TABLET | Refills: 5 | Status: SHIPPED | OUTPATIENT
Start: 2019-11-26 | End: 2020-05-12

## 2020-01-15 ENCOUNTER — PATIENT OUTREACH (OUTPATIENT)
Dept: ADMINISTRATIVE | Facility: HOSPITAL | Age: 70
End: 2020-01-15

## 2020-01-22 ENCOUNTER — OFFICE VISIT (OUTPATIENT)
Dept: INTERNAL MEDICINE | Facility: CLINIC | Age: 70
End: 2020-01-22
Payer: MEDICARE

## 2020-01-22 ENCOUNTER — LAB VISIT (OUTPATIENT)
Dept: LAB | Facility: HOSPITAL | Age: 70
End: 2020-01-22
Attending: FAMILY MEDICINE
Payer: MEDICARE

## 2020-01-22 VITALS
HEART RATE: 64 BPM | OXYGEN SATURATION: 97 % | HEIGHT: 62 IN | SYSTOLIC BLOOD PRESSURE: 132 MMHG | BODY MASS INDEX: 39.11 KG/M2 | DIASTOLIC BLOOD PRESSURE: 82 MMHG | WEIGHT: 212.5 LBS | TEMPERATURE: 98 F

## 2020-01-22 DIAGNOSIS — M54.50 LUMBAR BACK PAIN: Primary | ICD-10-CM

## 2020-01-22 DIAGNOSIS — E11.9 TYPE 2 DIABETES MELLITUS WITHOUT COMPLICATION, WITHOUT LONG-TERM CURRENT USE OF INSULIN: ICD-10-CM

## 2020-01-22 DIAGNOSIS — F32.4 MAJOR DEPRESSIVE DISORDER IN PARTIAL REMISSION, UNSPECIFIED WHETHER RECURRENT: ICD-10-CM

## 2020-01-22 DIAGNOSIS — M54.50 LOW BACK PAIN, NON-SPECIFIC: ICD-10-CM

## 2020-01-22 DIAGNOSIS — M35.01 SJOGREN'S SYNDROME WITH KERATOCONJUNCTIVITIS SICCA: ICD-10-CM

## 2020-01-22 LAB
ESTIMATED AVG GLUCOSE: 120 MG/DL (ref 68–131)
HBA1C MFR BLD HPLC: 5.8 % (ref 4–5.6)

## 2020-01-22 PROCEDURE — 36415 COLL VENOUS BLD VENIPUNCTURE: CPT

## 2020-01-22 PROCEDURE — 1125F AMNT PAIN NOTED PAIN PRSNT: CPT | Mod: ,,, | Performed by: FAMILY MEDICINE

## 2020-01-22 PROCEDURE — 1159F MED LIST DOCD IN RCRD: CPT | Mod: ,,, | Performed by: FAMILY MEDICINE

## 2020-01-22 PROCEDURE — 1159F PR MEDICATION LIST DOCUMENTED IN MEDICAL RECORD: ICD-10-PCS | Mod: ,,, | Performed by: FAMILY MEDICINE

## 2020-01-22 PROCEDURE — 96372 THER/PROPH/DIAG INJ SC/IM: CPT | Mod: PBBFAC

## 2020-01-22 PROCEDURE — 99999 PR PBB SHADOW E&M-EST. PATIENT-LVL IV: CPT | Mod: PBBFAC,,, | Performed by: FAMILY MEDICINE

## 2020-01-22 PROCEDURE — 1125F PR PAIN SEVERITY QUANTIFIED, PAIN PRESENT: ICD-10-PCS | Mod: ,,, | Performed by: FAMILY MEDICINE

## 2020-01-22 PROCEDURE — 99214 OFFICE O/P EST MOD 30 MIN: CPT | Mod: S$PBB,,, | Performed by: FAMILY MEDICINE

## 2020-01-22 PROCEDURE — 99999 PR PBB SHADOW E&M-EST. PATIENT-LVL IV: ICD-10-PCS | Mod: PBBFAC,,, | Performed by: FAMILY MEDICINE

## 2020-01-22 PROCEDURE — 83036 HEMOGLOBIN GLYCOSYLATED A1C: CPT

## 2020-01-22 PROCEDURE — 99214 PR OFFICE/OUTPT VISIT, EST, LEVL IV, 30-39 MIN: ICD-10-PCS | Mod: S$PBB,,, | Performed by: FAMILY MEDICINE

## 2020-01-22 PROCEDURE — 99214 OFFICE O/P EST MOD 30 MIN: CPT | Mod: PBBFAC | Performed by: FAMILY MEDICINE

## 2020-01-22 RX ORDER — TRIAMCINOLONE ACETONIDE 40 MG/ML
40 INJECTION, SUSPENSION INTRA-ARTICULAR; INTRAMUSCULAR
Status: COMPLETED | OUTPATIENT
Start: 2020-01-22 | End: 2020-01-22

## 2020-01-22 RX ADMIN — TRIAMCINOLONE ACETONIDE 40 MG: 40 INJECTION, SUSPENSION INTRA-ARTICULAR; INTRAMUSCULAR at 02:01

## 2020-01-22 NOTE — PROGRESS NOTES
Subjective:       Patient ID: Latonia Oliver is a 69 y.o. female.    Chief Complaint: Follow-up (6 months)    Pt is a 69 year old who is here for 6 month follow-up. Pt is having some sciatic back pain. MRI have shown disc bulge with no narrowing 4 years ago. Pt DM is well controlled.    Review of Systems   Constitutional: Negative.    Respiratory: Negative.    Cardiovascular: Negative.    Genitourinary: Negative.        Objective:      Physical Exam   Constitutional: She appears well-developed and well-nourished.   Cardiovascular: Normal rate and regular rhythm. Exam reveals no friction rub.   No murmur heard.  Pulmonary/Chest: Effort normal and breath sounds normal. No stridor. She has no wheezes.   Abdominal: Soft. Bowel sounds are normal. She exhibits no distension. There is no tenderness.   Skin: Skin is warm and dry.       Assessment:       1. Lumbar back pain    2. Type 2 diabetes mellitus without complication, without long-term current use of insulin    3. Sjogren's syndrome with keratoconjunctivitis sicca    4. Major depressive disorder in partial remission, unspecified whether recurrent    5. Low back pain, non-specific        Plan:       Lumbar back pain  Comments:  Pt has disk bulge L5 S1  Orders:  -     triamcinolone acetonide injection 40 mg    Type 2 diabetes mellitus without complication, without long-term current use of insulin  Comments:  Pt DM is well controlled. Will repeat hga1c and micro  Orders:  -     Urinalysis; Future; Expected date: 01/22/2020  -     Microalbumin/creatinine urine ratio; Future; Expected date: 01/22/2020  -     Hemoglobin A1c; Future; Expected date: 01/22/2020    Sjogren's syndrome with keratoconjunctivitis sicca  Comments:  Pt continue to see rheumatology    Major depressive disorder in partial remission, unspecified whether recurrent  Comments:  Pt is still on celexa 40 mg    Low back pain, non-specific  Comments:  Will do MRI  Orders:  -     MRI Lumbar Spine Without  Contrast; Future; Expected date: 01/22/2020

## 2020-01-27 ENCOUNTER — PATIENT MESSAGE (OUTPATIENT)
Dept: INTERNAL MEDICINE | Facility: CLINIC | Age: 70
End: 2020-01-27

## 2020-02-04 ENCOUNTER — TELEPHONE (OUTPATIENT)
Dept: RADIOLOGY | Facility: HOSPITAL | Age: 70
End: 2020-02-04

## 2020-02-05 ENCOUNTER — HOSPITAL ENCOUNTER (OUTPATIENT)
Dept: RADIOLOGY | Facility: HOSPITAL | Age: 70
Discharge: HOME | End: 2020-02-05
Attending: FAMILY MEDICINE
Payer: MEDICARE

## 2020-02-05 DIAGNOSIS — M54.50 LOW BACK PAIN, NON-SPECIFIC: ICD-10-CM

## 2020-02-05 PROCEDURE — 72148 MRI LUMBAR SPINE WITHOUT CONTRAST: ICD-10-PCS | Mod: 26,,, | Performed by: RADIOLOGY

## 2020-02-05 PROCEDURE — 72148 MRI LUMBAR SPINE W/O DYE: CPT | Mod: TC

## 2020-02-05 PROCEDURE — 72148 MRI LUMBAR SPINE W/O DYE: CPT | Mod: 26,,, | Performed by: RADIOLOGY

## 2020-02-17 RX ORDER — CITALOPRAM 40 MG/1
TABLET, FILM COATED ORAL
Qty: 90 TABLET | Refills: 1 | Status: SHIPPED | OUTPATIENT
Start: 2020-02-17 | End: 2020-10-02

## 2020-03-05 ENCOUNTER — TELEPHONE (OUTPATIENT)
Dept: RHEUMATOLOGY | Facility: CLINIC | Age: 70
End: 2020-03-05

## 2020-03-05 DIAGNOSIS — Z23 NEED FOR SHINGLES VACCINE: Primary | ICD-10-CM

## 2020-03-05 NOTE — TELEPHONE ENCOUNTER
----- Message from Marnie Ibrahim sent at 3/5/2020  2:39 PM CST -----  Contact: [facility][phone]  Type:  Pharmacy Calling to Clarify an RX    Name of Caller:Rere  [facility]  Prescription Name:Shingrix  What do they need to clarify?:diagnosis code  Best Call Back Number:[phone]  Additional Information:

## 2020-03-05 NOTE — TELEPHONE ENCOUNTER
Spoke with [facility] and in order for them to administer the shingrix vaccine they need a new RX with the ICD 10 written on it.     Please Advise while Dr. Moreno is out.

## 2020-03-06 RX ORDER — ZOSTER VACCINE RECOMBINANT, ADJUVANTED 50 MCG/0.5
0.5 KIT INTRAMUSCULAR ONCE
Qty: 0.5 ML | Refills: 1 | Status: SHIPPED | OUTPATIENT
Start: 2020-03-06 | End: 2020-03-06

## 2020-03-16 RX ORDER — SIMVASTATIN 40 MG/1
TABLET, FILM COATED ORAL
Qty: 90 TABLET | Refills: 1 | Status: SHIPPED | OUTPATIENT
Start: 2020-03-16 | End: 2020-09-08

## 2020-03-31 RX ORDER — BENAZEPRIL/HYDROCHLOROTHIAZIDE 20 MG-25MG
TABLET ORAL
Qty: 90 TABLET | Refills: 1 | Status: SHIPPED | OUTPATIENT
Start: 2020-03-31 | End: 2020-09-21

## 2020-04-06 DIAGNOSIS — K21.00 REFLUX ESOPHAGITIS: Primary | ICD-10-CM

## 2020-04-06 RX ORDER — OMEPRAZOLE 40 MG/1
CAPSULE, DELAYED RELEASE ORAL
Qty: 90 CAPSULE | Refills: 3 | Status: SHIPPED | OUTPATIENT
Start: 2020-04-06 | End: 2021-03-24

## 2020-04-06 NOTE — TELEPHONE ENCOUNTER
Please review your chart and instructions    There is no follow up scheduled and noting in your note/pt instructions on next apt, labs etc    Please send to staff to schedule

## 2020-05-05 ENCOUNTER — TELEPHONE (OUTPATIENT)
Dept: PULMONOLOGY | Facility: CLINIC | Age: 70
End: 2020-05-05

## 2020-05-05 ENCOUNTER — PATIENT MESSAGE (OUTPATIENT)
Dept: ADMINISTRATIVE | Facility: OTHER | Age: 70
End: 2020-05-05

## 2020-05-09 DIAGNOSIS — G47.01 INSOMNIA DUE TO MEDICAL CONDITION: ICD-10-CM

## 2020-05-09 DIAGNOSIS — G47.61 PERIODIC LIMB MOVEMENT SLEEP DISORDER: ICD-10-CM

## 2020-05-12 RX ORDER — CLONAZEPAM 1 MG/1
TABLET ORAL
Qty: 30 TABLET | Refills: 5 | Status: SHIPPED | OUTPATIENT
Start: 2020-05-12 | End: 2020-10-22

## 2020-05-20 ENCOUNTER — PATIENT OUTREACH (OUTPATIENT)
Dept: OTHER | Facility: OTHER | Age: 70
End: 2020-05-20

## 2020-05-29 ENCOUNTER — PATIENT MESSAGE (OUTPATIENT)
Dept: ADMINISTRATIVE | Facility: OTHER | Age: 70
End: 2020-05-29

## 2020-05-29 NOTE — PROGRESS NOTES
Digital Medicine: Health  Introduction    Introduced Latonia Oliver to Digital Medicine. Discussed health  role and recommended lifestyle modifications.    Patient has been doing Keto - talked to PCP about her doing it and got permission.   Patient reports losing 26lbs out of her 50lb goal and appears determined to hit her goal. Discussed healthy weight loss rate of 1-2lbs/week along with sustainable weight loss and agreed to discuss ways to keep weight off once she hits her weight loss goal and transitions out of Keto.   Patient does not eat red meat, only chicken and fish.    The history is provided by the patient.     HYPERTENSION  Our goal is to get BP to consistently below 130/80mmHg and make the process convenient so patient can avoid extra trips to the office. Getting your blood pressure below 130/80mmHg (definition of control) will reduce your risk for heart attack, kidney failure, stroke and death (as well as kidney failure, eye disease, & dementia)      Reviewed that the Digital Medicine care team - consisting of a clinician and a health  - will follow the most current evidence-based national guidelines for treating your condition.  The health  will focus on lifestyle modifications and motivation while the clinician will focus on medication therapy.  The care team will review all data on a regular basis and reach out as needed.      Explained that one of the key parts of the program is communication with the care team.  Asked patient to respond to outreach attempts and complete questionnaires.  Stressed importance of medication adherence.    Reviewed non-pharmacologic therapies and impact on BP.      Explained that we expect patient to obtain several blood pressures per week at random times of day.  Instructed patient not to allow anyone else to use phone and monitoring device.  Confirmed appropriate BP monitoring technique.      Explained to patient that the digital medicine team is not  available for emergencies.  Patient will call [facility] on-call ([phone] or [phone]) or [phone] if needed.      Patient's BP goal is 130/80.Patient's BP average is 142/77 mmHg, which is above goal, per 2017 ACC/AHA Hypertension Guidelines.          Last 5 Patient Entered Readings                                      Current 30 Day Average: 142/77     Recent Readings 5/28/2020 5/27/2020 5/25/2020 5/24/2020 5/22/2020    SBP (mmHg) 138 137 138 152 135    DBP (mmHg) 77 72 80 74 72    Pulse 58 62 73 68 65            Topic    Eye Exam     Lipid (Cholesterol) Test        Reviewed the importance of self-monitoring, medication adherence, and that the health  can be used as a resource for lifestyle modifications to help reduce or maintain a healthy lifestyle.    Sent link to [facility] webpages and my contact information via [employer] for future questions. Follow up scheduled.

## 2020-06-01 DIAGNOSIS — E11.9 TYPE 2 DIABETES MELLITUS: ICD-10-CM

## 2020-06-04 ENCOUNTER — PATIENT OUTREACH (OUTPATIENT)
Dept: OTHER | Facility: OTHER | Age: 70
End: 2020-06-04

## 2020-06-04 DIAGNOSIS — I10 ESSENTIAL HYPERTENSION: Primary | ICD-10-CM

## 2020-06-10 ENCOUNTER — TELEPHONE (OUTPATIENT)
Dept: INTERNAL MEDICINE | Facility: CLINIC | Age: 70
End: 2020-06-10

## 2020-06-10 RX ORDER — AMLODIPINE BESYLATE 2.5 MG/1
2.5 TABLET ORAL DAILY
Qty: 90 TABLET | Refills: 1 | Status: SHIPPED | OUTPATIENT
Start: 2020-06-10 | End: 2020-07-27 | Stop reason: DRUGHIGH

## 2020-06-10 NOTE — PROGRESS NOTES
Digital Medicine: Clinician Introduction    Latonia Oliver is a 69 y.o. female who is newly enrolled in the Digital Medicine Clinic.    The following information was reviewed and updated:  Preferred pharmacy   [id number] - [place] - [address]  [address]  [place] [address]  Phone: [phone] Fax: [phone]      Patient prefers a 90 days supply.     Review of patient's allergies indicates:   Allergen Reactions    Penicillins      Other reaction(s): Rash  Other reaction(s): Difficulty breathing       Called patient to follow up. Patient endorses adherence to medication regimen. Patient denies hypotensive s/sx (lightheadedness, dizziness, nausea, fatigue); patient denies hypertensive s/sx (SOB, CP, severe headaches, changes in vision). Instructed patient to seek medical care if BP > 180/110 and is accompanied by hypertensive s/sx associated, patient confirms understanding.     Pt was pleasant and very willing to get BP under control. We discussed physical activity, she is somewhat limited d/t arthritis and activity mostly consists of cleaning house and gardening. She is currently on the keto diet since Jan, she has lost 26 lbs and has future goals of 30 lbs and then 50 lbs. I commended her for making positive changes. She does not eat red meat, mostly chicken or fish. She does cook with olive oil and follow keto recommendations for increasing fats in her diet.     Reviewed BP monitoring technique, pt verbalized understanding. She takes her meds in the AM. Of note, she does use a CPAP machine and finds this gives her more energy. She has less issues with sleep apnea, but she had shallow breathing at night which is now controlled.    Reviewed past BP meds, she previously was taking current regimen + additional HCTZ 25 mg and found her BP was more controlled. However, this did leave her more dehydrated and she was unable to consistently get enough water intake.    Pt would like to  schedule an annual visit and labs with PCP.      Patient has my contact information and knows to call with any concerns or clinical changes.        The history is provided by the patient. No  was used.     HYPERTENSION  Our goal is to get BP to consistently below 130/80mmHg and make the process convenient so patient can avoid extra trips to the office. Getting your blood pressure below 130/80mmHg (definition of control) will reduce your risk for heart attack, kidney failure, stroke and death (as well as kidney failure, eye disease, & dementia)      Reviewed non-pharmacologic therapies and impact on BP      Explained that we expect patient to obtain several blood pressures per week at random times of day.  Instructed patient not to allow anyone else to use phone and monitoring device.  Confirmed appropriate BP monitoring technique.      Explained to patient that the digital medicine team is not available for emergencies.  Patient will call [facility] on-call ([phone] or [phone]) or [phone] if needed.    Patient's BP goal is 130/80. Patients BP average is 143/77 mmHg, which is above goal, per 2017 ACC/AHA Hypertension Guidelines.      Medication Change: new medications      Assessment:  Reviewed recent readings. Per 2017 ACC/ AHA HTN guidelines (goal of BP < 130/80), current 30-day average needs to be addressed more thoroughly today. CMP is needed before increasing current therapy, in addition to past dehydration which would not make an increase in ACEi/thiazide therapy the most appropriate choice. Low dose CCB is appropriate at this time.      Med Review complete.    Allergies reviewed.      Last 5 Patient Entered Readings                                      Current 30 Day Average: 143/77     Recent Readings 6/10/2020 6/9/2020 6/4/2020 6/3/2020 6/2/2020    SBP (mmHg) 146 137 137 155 148    DBP (mmHg) 83 68 73 82 79    Pulse 64 60 57 58 63            INTERVENTION(S)  reviewed appropriate  dose schedule, reviewed monitoring technique, recommended med change, encouragement/support and goal setting    PLAN  patient verbalizes understanding, patient amenable to changes and continue monitoring    Initiate amlodipine 2.5 mg QD, continue other HTN meds as prescribed. Pt aware this is low dose and may need to be titrated. I will continue to monitor regularly and will follow-up in 2 to 3 weeks, sooner if blood pressure begins to trend upward or downward. Will also msg PCP office to set up appt.          Topic    Eye Exam     Lipid (Cholesterol) Test        Current Medication Regimen:  Hypertension Medications             benazepril-hydrochlorthiazide (LOTENSIN HCT) 20-25 mg Tab TAKE 1 TABLET BY MOUTH ONCE DAILY    benazepril-hydrochlorthiazide (LOTENSIN HCT) 20-25 mg Tab Take 1 tablet by mouth once daily            Reviewed the importance of self-monitoring, medication adherence, and that the health  can be used as a resource for lifestyle modifications to help reduce or maintain a healthy lifestyle.    Sent link to [facility] webpages and my contact information via [employer] for future questions. Follow up scheduled.                     Medication Adherence Screening   She did not miss a dose this month.  Patient knows purpose of medications.      Patient identified the following reasons for non-compliance: None

## 2020-06-10 NOTE — TELEPHONE ENCOUNTER
I s/w pt in regards to scheduling an appointment for an annual. Pt schedule appointment for July 22nd, pt stated that she would also get labs done on that day. Pharmacist with digital medicine requesting CMP and lipid panel. Pt is also due for A1C. I informed pt we would get those labs in once she come in for appointment. //BJ

## 2020-06-10 NOTE — TELEPHONE ENCOUNTER
----- Message from Marie Miles PharmD sent at 6/10/2020  1:39 PM CDT -----  Patient would like to set up annual visit, as well as lab work (CMP and lipid panel).    Marie Miles, Anastasiya  Digital Medicine Clinical Pharmacist  [phone]

## 2020-06-29 PROCEDURE — 99454 REM MNTR PHYSIOL PARAM 16-30: CPT | Mod: PBBFAC | Performed by: FAMILY MEDICINE

## 2020-07-01 ENCOUNTER — PATIENT OUTREACH (OUTPATIENT)
Dept: OTHER | Facility: OTHER | Age: 70
End: 2020-07-01

## 2020-07-01 NOTE — PROGRESS NOTES
Digital Medicine: Health  Follow-Up    Called to introduce Ms. Lincoln to [facility] program. Patient admits to drinking coffee while taking BG/BP readings. Discussed fasting BG reading and why it is important along w/ the need for resting BP readings - She now confirms understanding.         INTERVENTION(S)  recommended diet modifications, reviewed monitoring technique, encouragement/support and goal setting    PLAN  patient verbalizes understanding, Clinician follow-up and continue monitoring    Patient plan  -Work on taking fasting BG readings and resting BP readings  -Continue low CHO dietary choices / fruits+veggies      Last 5 Patient Entered Readings                                      Current 30 Day Average: 141/77     Recent Readings 7/1/2020 6/30/2020 6/29/2020 6/26/2020 6/26/2020    SBP (mmHg) 146 135 148 136 156    DBP (mmHg) 80 74 78 77 84    Pulse 69 57 72 66 63        Last 6 Patient Entered Readings                                          Most Recent A1c:      Recent Readings 7/1/2020 6/30/2020 6/29/2020    Blood Glucose (mg/dL) 139 161 130             Diet Screening   Patient reports eating or drinking the following: caffeine    Still working on weight loss - states she has very few CHO in her diet. Eats mainly from her garden. Discussed how dinner choices influence morning/fasting BG readings.     Medication Adherence Screening       Reports Pharm, D recently adjusted Amlodipine 2.5mg - does not feel like it has helped and is wondering if she needs a higher dose. Discussed that it can take up to 6 wks to see full effect but will send message to Pharm, D per patient request. Patient also wrote down Pharm, D's phone number in case she has further questions.

## 2020-07-02 DIAGNOSIS — E11.9 TYPE 2 DIABETES MELLITUS: ICD-10-CM

## 2020-07-06 ENCOUNTER — PATIENT OUTREACH (OUTPATIENT)
Dept: OTHER | Facility: OTHER | Age: 70
End: 2020-07-06

## 2020-07-06 DIAGNOSIS — I10 ESSENTIAL HYPERTENSION: ICD-10-CM

## 2020-07-06 DIAGNOSIS — E11.9 TYPE 2 DIABETES MELLITUS WITHOUT COMPLICATION, WITHOUT LONG-TERM CURRENT USE OF INSULIN: Primary | ICD-10-CM

## 2020-07-06 RX ORDER — AMLODIPINE BESYLATE 2.5 MG/1
5 TABLET ORAL DAILY
Qty: 60 TABLET | Refills: 0
Start: 2020-07-06 | End: 2020-07-27

## 2020-07-06 NOTE — PROGRESS NOTES
Digital Medicine: Clinician Follow-Up    Called patient to follow up. Patient endorses adherence to medication regimen. Patient denies hypotensive s/sx (lightheadedness, dizziness, nausea, fatigue); patient denies hypertensive s/sx (SOB, CP, severe headaches, changes in vision).    Pt doing well but concerned that meds are not working. I explained dose titration and how we can increase meds as appropriate. Pt verbalized understanding. Pt is tolerating amlodipine. She reports that she takes BP prior to taking coffee. I also encouraged pt to take BP reading at least 1 hour after taking medicine, pt verbalized understanding.     Patient denies having questions or concerns. Patient has my contact information and knows to call with any concerns or clinical changes.        The history is provided by the patient. No  was used.   Follow Up  Follow-up reason(s): reading review, medication change and medication change follow-up      Medication Change: dose increase    Patient started new medication.    Is patient tolerating med change?:  Yes    Assessment:  Reviewed recent readings. Per 2017 ACC/ AHA HTN guidelines (goal of BP < 130/80), current 30-day average needs to be addressed more thoroughly today. Can increase CCB dose for additional therapy.        INTERVENTION(S)  reviewed appropriate dose schedule, reviewed monitoring technique, recommended med change, encouragement/support and goal setting    PLAN  patient verbalizes understanding, patient amenable to changes and continue monitoring    Increase amlodipine to 5 mg QD, pt to use home supply at this time. Continue other meds as prescribed. Med list updated in [facility] to reflect changes. I will continue to monitor regularly and will follow-up in 2 to 3 weeks, sooner if blood pressure begins to trend upward or downward.             Topic    Eye Exam     Lipid (Cholesterol) Test        Last 5 Patient Entered Readings                                       Current 30 Day Average: 140/78     Recent Readings 7/4/2020 7/2/2020 7/1/2020 6/30/2020 6/29/2020    SBP (mmHg) 139 144 146 135 148    DBP (mmHg) 80 79 80 74 78    Pulse 68 67 69 57 72        Last 6 Patient Entered Readings                                          Most Recent A1c: 5.8% on 1/22/2020  (Goal: 7%)     Recent Readings 7/6/2020 7/4/2020 7/3/2020 7/2/2020 7/1/2020    Blood Glucose (mg/dL) 141 158 127 123 139           Hypertension Medications             amLODIPine (NORVASC) 2.5 MG tablet Take 1 tablet (2.5 mg total) by mouth once daily.    benazepril-hydrochlorthiazide (LOTENSIN HCT) 20-25 mg Tab Take 1 tablet by mouth once daily                 Screenings

## 2020-07-08 ENCOUNTER — PATIENT OUTREACH (OUTPATIENT)
Dept: ADMINISTRATIVE | Facility: HOSPITAL | Age: 70
End: 2020-07-08

## 2020-07-08 DIAGNOSIS — E11.9 TYPE 2 DIABETES MELLITUS WITHOUT COMPLICATION, WITHOUT LONG-TERM CURRENT USE OF INSULIN: Primary | ICD-10-CM

## 2020-07-08 DIAGNOSIS — E78.2 MIXED HYPERLIPIDEMIA: ICD-10-CM

## 2020-07-08 NOTE — PROGRESS NOTES
Pre Visit Chart Audit Complete: Spoke with pt regarding overdue HM. Pt declined Mammo & fitkit. She stated she had her last eye exam with [facility].     Health Maintenance: Updated  Immunizations:Reconciled  Care Everywhere: Abstracted  [facility] Search:Pt Not Found    Health Maintenance Due   Topic    Eye Exam -RAPHAEL sent    Mammogram Declined    Colorectal Cancer Screening -Declined Fitkit    Lipid Panel -Scheduled    Shingles Vaccine (3 of 3)    Foot Exam -DUE      RAPHAEL: Sent to [facility]    Care Team: Updated   Digital Medicine: Enrolled in HTN & DM   OPCM: Not indicated   AWV: Declined   DM Labs linked and rescheduled for [facility] as requested

## 2020-07-08 NOTE — LETTER
AUTHORIZATION FOR RELEASE OF   CONFIDENTIAL INFORMATION    Dear [facility],    We are seeing Latonia Oliver, date of birth 1950, in the clinic at [facility]. Elias Acevedo MD is the patient's PCP. Latonia Oliver has an outstanding lab/procedure at the time we reviewed her chart. In order to help keep her health information updated, she has authorized us to request the following medical record(s):                                                (x)Eye Exam: Most Recent     Please fax records to [facility], [facility] T Duke, MD, Attn: Madelaine Daley LPN        Fax: [phone]     If you have any questions, please contact Madelaine Daley, Phone: [phone].           Patient Name: Latonia Oliver  : 1950  Patient Phone #: [phone]

## 2020-07-10 ENCOUNTER — LAB VISIT (OUTPATIENT)
Dept: LAB | Facility: HOSPITAL | Age: 70
End: 2020-07-10
Payer: MEDICARE

## 2020-07-10 DIAGNOSIS — E11.9 TYPE 2 DIABETES MELLITUS WITHOUT COMPLICATION, WITHOUT LONG-TERM CURRENT USE OF INSULIN: ICD-10-CM

## 2020-07-10 DIAGNOSIS — Z51.81 MEDICATION MONITORING ENCOUNTER: ICD-10-CM

## 2020-07-10 DIAGNOSIS — M35.01 SJOGREN'S SYNDROME WITH KERATOCONJUNCTIVITIS SICCA: ICD-10-CM

## 2020-07-10 LAB
BACTERIA #/AREA URNS HPF: ABNORMAL /HPF
BILIRUB UR QL STRIP: NEGATIVE
CAOX CRY URNS QL MICRO: ABNORMAL
CLARITY UR: CLEAR
COLOR UR: YELLOW
GLUCOSE UR QL STRIP: NEGATIVE
HGB UR QL STRIP: NEGATIVE
KETONES UR QL STRIP: ABNORMAL
LEUKOCYTE ESTERASE UR QL STRIP: ABNORMAL
MICROSCOPIC COMMENT: ABNORMAL
NITRITE UR QL STRIP: NEGATIVE
NON-SQ EPI CELLS #/AREA URNS HPF: 4 /HPF
PH UR STRIP: 5 [PH] (ref 5–8)
PROT UR QL STRIP: NEGATIVE
RBC #/AREA URNS HPF: 3 /HPF (ref 0–4)
SP GR UR STRIP: >1.03 (ref 1–1.03)
SQUAMOUS #/AREA URNS HPF: 4 /HPF
URN SPEC COLLECT METH UR: ABNORMAL
WBC #/AREA URNS HPF: 15 /HPF (ref 0–5)

## 2020-07-10 PROCEDURE — 81000 URINALYSIS NONAUTO W/SCOPE: CPT

## 2020-07-10 PROCEDURE — 82043 UR ALBUMIN QUANTITATIVE: CPT

## 2020-07-11 LAB
ALBUMIN/CREAT UR: 7.9 UG/MG (ref 0–30)
CREAT UR-MCNC: 101 MG/DL (ref 15–325)
MICROALBUMIN UR DL<=1MG/L-MCNC: 8 UG/ML

## 2020-07-13 ENCOUNTER — TELEPHONE (OUTPATIENT)
Dept: RHEUMATOLOGY | Facility: CLINIC | Age: 70
End: 2020-07-13

## 2020-07-13 NOTE — TELEPHONE ENCOUNTER
----- Message from Jorge Moreno MD sent at 7/13/2020  4:16 PM CDT -----  Tell her urine has borderline infection- drink more water and check with primary if any UTI sympts

## 2020-07-14 ENCOUNTER — TELEPHONE (OUTPATIENT)
Dept: RHEUMATOLOGY | Facility: CLINIC | Age: 70
End: 2020-07-14

## 2020-07-22 ENCOUNTER — APPOINTMENT (OUTPATIENT)
Dept: RADIOLOGY | Facility: HOSPITAL | Age: 70
End: 2020-07-22
Attending: INTERNAL MEDICINE
Payer: MEDICARE

## 2020-07-22 ENCOUNTER — OFFICE VISIT (OUTPATIENT)
Dept: INTERNAL MEDICINE | Facility: CLINIC | Age: 70
End: 2020-07-22
Payer: MEDICARE

## 2020-07-22 VITALS
DIASTOLIC BLOOD PRESSURE: 74 MMHG | WEIGHT: 191.13 LBS | OXYGEN SATURATION: 98 % | SYSTOLIC BLOOD PRESSURE: 122 MMHG | HEART RATE: 65 BPM | HEIGHT: 62 IN | TEMPERATURE: 98 F | BODY MASS INDEX: 35.17 KG/M2

## 2020-07-22 DIAGNOSIS — M94.9 DISORDER OF BONE AND CARTILAGE: ICD-10-CM

## 2020-07-22 DIAGNOSIS — M54.50 LUMBAR BACK PAIN: ICD-10-CM

## 2020-07-22 DIAGNOSIS — M89.9 DISORDER OF BONE AND CARTILAGE: ICD-10-CM

## 2020-07-22 DIAGNOSIS — Z00.00 ANNUAL PHYSICAL EXAM: Primary | ICD-10-CM

## 2020-07-22 PROCEDURE — 77080 DEXA BONE DENSITY SPINE HIP: ICD-10-PCS | Mod: 26,,, | Performed by: RADIOLOGY

## 2020-07-22 PROCEDURE — 99397 PER PM REEVAL EST PAT 65+ YR: CPT | Mod: S$PBB,,, | Performed by: FAMILY MEDICINE

## 2020-07-22 PROCEDURE — 99214 OFFICE O/P EST MOD 30 MIN: CPT | Mod: PBBFAC,25 | Performed by: FAMILY MEDICINE

## 2020-07-22 PROCEDURE — 99397 PR PREVENTIVE VISIT,EST,65 & OVER: ICD-10-PCS | Mod: S$PBB,,, | Performed by: FAMILY MEDICINE

## 2020-07-22 PROCEDURE — 77080 DXA BONE DENSITY AXIAL: CPT | Mod: 26,,, | Performed by: RADIOLOGY

## 2020-07-22 PROCEDURE — 77080 DXA BONE DENSITY AXIAL: CPT | Mod: TC

## 2020-07-22 PROCEDURE — 99999 PR PBB SHADOW E&M-EST. PATIENT-LVL IV: CPT | Mod: PBBFAC,,, | Performed by: FAMILY MEDICINE

## 2020-07-22 PROCEDURE — 99999 PR PBB SHADOW E&M-EST. PATIENT-LVL IV: ICD-10-PCS | Mod: PBBFAC,,, | Performed by: FAMILY MEDICINE

## 2020-07-22 NOTE — PROGRESS NOTES
Subjective:       Patient ID: Latonia Oliver is a 69 y.o. female.    Chief Complaint: Annual Exam    Pt is a 69 year old who is here for annual exam. Pt is over all healthy.    Review of Systems   Constitutional: Negative.    Respiratory: Negative.    Cardiovascular: Negative.    Genitourinary: Negative.    Neurological: Negative.    Psychiatric/Behavioral: Negative.          Objective:      Physical Exam  Constitutional:       Appearance: Normal appearance.   Neck:      Musculoskeletal: Normal range of motion and neck supple.   Cardiovascular:      Rate and Rhythm: Normal rate and regular rhythm.      Pulses: Normal pulses.      Heart sounds: Normal heart sounds.   Pulmonary:      Effort: Pulmonary effort is normal.      Breath sounds: Normal breath sounds.   Neurological:      General: No focal deficit present.      Mental Status: She is alert and oriented to person, place, and time.         Assessment:       1. Annual physical exam    2. Lumbar back pain        Plan:       Annual physical exam  Comments:  pt is up-to-date on wellness    Lumbar back pain  Comments:  Pt got a kenalog 40 mg IV.

## 2020-07-27 ENCOUNTER — PATIENT OUTREACH (OUTPATIENT)
Dept: OTHER | Facility: OTHER | Age: 70
End: 2020-07-27

## 2020-07-27 DIAGNOSIS — I10 ESSENTIAL HYPERTENSION: ICD-10-CM

## 2020-07-27 RX ORDER — AMLODIPINE BESYLATE 5 MG/1
5 TABLET ORAL DAILY
Qty: 90 TABLET | Refills: 1 | Status: SHIPPED | OUTPATIENT
Start: 2020-07-27 | End: 2020-12-14

## 2020-07-27 NOTE — PROGRESS NOTES
Digital Medicine: Clinician Follow-Up    Pt is still concerned medication is not working since readings are still elevated, despite recent at goal reading in clinic. Pt has a rheumatology appt tomorrow and will bring digital cuff in for a comparison. Denies hypertensive sx. Otherwise feeling fine.     She is requesting new rx of amlodipine.     The history is provided by the patient.   Follow-up reason(s): routine follow up.     Patient readings are stable     Patient is not experiencing signs/symptoms of hypotension.  Patient is not experiencing signs/symptoms of hypertension.    Additional Follow-up details: I educated pt that her readings have been trending down overall. I encouraged her to charge cuff and maintain appropriate BP monitoring technique.       Last 5 Patient Entered Readings                                      Current 30 Day Average: 139/74     Recent Readings 7/26/2020 7/25/2020 7/23/2020 7/20/2020 7/19/2020    SBP (mmHg) 138 137 140 145 137    DBP (mmHg) 71 73 77 75 77    Pulse 63 68 65 62 60        Last 6 Patient Entered Readings                                          Most Recent A1c: 5.7% on 7/10/2020  (Goal: 7%)     Recent Readings 7/26/2020 7/25/2020 7/23/2020 7/20/2020 7/19/2020    Blood Glucose (mg/dL) 118 129 93 140 124             Depression Screening  Did not address depression screening.    Sleep Apnea Screening    Did not address sleep apnea screening.     Medication Affordability Screening  Did not address medication affordability screening.     Medication Adherence-Medication adherence was assessed.          ASSESSMENT(S)  Patients BP average is 139/74 mmHg, which is above goal. Patient's BP goal is less than or equal to 130/80 per 2017 ACC/AHA Hypertension Guidelines.   HTN uncontrolled, but this may be d/t cuff error. Further assessment needed.     PLAN  Additional monitoring needed: Pt to take digital cuff into office tomorrow for comparison.   Continue current therapy:  Continue amlodipine and benazepril-HCTZ as prescribed. Sent new rx for amlodipine 5 mg to preferred pharmacy. f/u in 3 weeks.   Provided patient education: BP monitoring technique     Patient verbalizes understanding. Patient did not express questions or concerns and patient has contact information if needed.          There are no preventive care reminders to display for this patient.      Hypertension Medications             amLODIPine (NORVASC) 2.5 MG tablet Take 2 tablets (5 mg total) by mouth once daily.    benazepril-hydrochlorthiazide (LOTENSIN HCT) 20-25 mg Tab Take 1 tablet by mouth once daily

## 2020-07-28 ENCOUNTER — OFFICE VISIT (OUTPATIENT)
Dept: RHEUMATOLOGY | Facility: CLINIC | Age: 70
End: 2020-07-28
Payer: MEDICARE

## 2020-07-28 VITALS
BODY MASS INDEX: 35.04 KG/M2 | HEART RATE: 69 BPM | SYSTOLIC BLOOD PRESSURE: 132 MMHG | WEIGHT: 191.56 LBS | DIASTOLIC BLOOD PRESSURE: 74 MMHG

## 2020-07-28 DIAGNOSIS — M79.7 FIBROMYALGIA: ICD-10-CM

## 2020-07-28 DIAGNOSIS — M47.816 LUMBAR SPONDYLOSIS: ICD-10-CM

## 2020-07-28 DIAGNOSIS — Z51.81 MEDICATION MONITORING ENCOUNTER: ICD-10-CM

## 2020-07-28 DIAGNOSIS — R26.89 IMBALANCE: ICD-10-CM

## 2020-07-28 DIAGNOSIS — M35.01 SJOGREN'S SYNDROME WITH KERATOCONJUNCTIVITIS SICCA: Primary | ICD-10-CM

## 2020-07-28 DIAGNOSIS — M17.11 PRIMARY OSTEOARTHRITIS OF RIGHT KNEE: ICD-10-CM

## 2020-07-28 PROCEDURE — 99999 PR PBB SHADOW E&M-EST. PATIENT-LVL IV: ICD-10-PCS | Mod: PBBFAC,,, | Performed by: INTERNAL MEDICINE

## 2020-07-28 PROCEDURE — 99214 PR OFFICE/OUTPT VISIT, EST, LEVL IV, 30-39 MIN: ICD-10-PCS | Mod: S$PBB,,, | Performed by: INTERNAL MEDICINE

## 2020-07-28 PROCEDURE — 99214 OFFICE O/P EST MOD 30 MIN: CPT | Mod: S$PBB,,, | Performed by: INTERNAL MEDICINE

## 2020-07-28 PROCEDURE — 99214 OFFICE O/P EST MOD 30 MIN: CPT | Mod: PBBFAC | Performed by: INTERNAL MEDICINE

## 2020-07-28 PROCEDURE — 99999 PR PBB SHADOW E&M-EST. PATIENT-LVL IV: CPT | Mod: PBBFAC,,, | Performed by: INTERNAL MEDICINE

## 2020-07-28 RX ORDER — HYDROXYCHLOROQUINE SULFATE 200 MG/1
200 TABLET, FILM COATED ORAL 2 TIMES DAILY
Qty: 180 TABLET | Refills: 3 | Status: SHIPPED | OUTPATIENT
Start: 2020-07-28 | End: 2021-02-02

## 2020-07-28 RX ORDER — ZOSTER VACCINE RECOMBINANT, ADJUVANTED 50 MCG/0.5
0.5 KIT INTRAMUSCULAR ONCE
Qty: 0.5 ML | Refills: 0 | OUTPATIENT
Start: 2020-07-28 | End: 2020-07-28 | Stop reason: SDUPTHER

## 2020-07-28 RX ORDER — HYDROXYCHLOROQUINE SULFATE 200 MG/1
200 TABLET, FILM COATED ORAL 2 TIMES DAILY
Qty: 180 TABLET | Refills: 3 | OUTPATIENT
Start: 2020-07-28 | End: 2020-07-28 | Stop reason: SDUPTHER

## 2020-07-28 RX ORDER — ZOSTER VACCINE RECOMBINANT, ADJUVANTED 50 MCG/0.5
0.5 KIT INTRAMUSCULAR ONCE
Qty: 0.5 ML | Refills: 0 | Status: SHIPPED | OUTPATIENT
Start: 2020-07-28 | End: 2020-07-28

## 2020-07-28 NOTE — PROGRESS NOTES
CHIEF COMPLAINT:  Fibromyalgia, Sjogren's syndrome, and osteopenia-disease management.    PERTINENT HISTORY:  Latonia is here for a regular follow-up regarding chronic Sjogren's syndrome and degenerative arthritis.  .  She has had no weight loss fevers or signs of systemic involvement.  There has been no parotid or submandibular swelling or lymphadenopathy.  She is using her regular eye drops.  She rates her pain at 0/10 with improvement in her general condition since being on a keto diet and has lost 31 lbs .  She still has some skin dryness.  She does use CPAP at night and does increase her mouth dryness.  .  Her cholesterol is improved.  With the weight loss she feels in general much better.  She still gets occasional pain in her knees.  She does have degenerative arthritis in lower spine and had an MRI done in the past year.  She was given shot steroids in the area  ofabnormality and it has helped the last several months.  She describes good control of her diabetes.  She never did get Shingrix done-not approved by her insurance.  An eye exam did not show any Plaquenil toxicity.  She is off vitamin-D presently.  She remains on Klonopin at night in mid definitely helps her sleep.  she does feel she has poor balance.  She did try to walk some last year but broke a bone in her foot.   She still sees Pain Management .  She has seen physical therapy with some needling techniques -again not much help.  She occasionally gets sharp pain in her left ribcage with a deep breath or certain movements-she thinks she may have injured her ribs in the past and certainly could be the case.  This pain is not persistent though.  She has had a stress fracture in her foot.  She had moves recent DEXA in June 2018 with FRAX levels at 0.8% at the hip and 8 percent at the major sites.  .  He has had 2 falls in the last year; she did break her right ankle-avulsion fracture.  She also fell in the bathroom but did not break anything though she  did hit her head.  She has been seeing the pulmonologist regarding some of her insomnia issues.  She was found to have a   variant of obstructive sleep apnea.  In the treatment besides CPAP, she was   given Klonopin.  That dose was increased to 1 mg in the evening.  With that, she   has had a dramatic improvement in all of her symptoms.  She has no signs of significant anxiety or depression now.  She says   her fibro symptoms are all better.  She continues to take Celexa 40 mg a day as well.        REVIEW OF SYSTEMS:  GENERAL:  Her weight improved see above.  No fevers or infections.  No chills, fatigability, change in   appetite, weight loss.  SKIN:  No rashes, itching or changes in color or texture of skin, no Raynaud's,   no malar rash.  HEAD:  No headache or recent head trauma.  EYES:  There was no change in her dry eyes issues.  Visual acuity fine.  No   ocular pain or redness.  Uses Systane drops regularly  EARS:  Denies ear pain, discharge or vertigo.  NOSE:  No loss of smell.  No epistaxis or postnasal drip.  MOUTH AND THROAT:  No hoarseness or change in voice or oral ulcers.  No   difficulty swallowing or dryness.  Still gets dry mouth-aggravated by CPAP  NODES:  Denies swollen glands.  No parotid swelling  CHEST:  Lungs, see HPI.   Seeing Dr. Pemberton.  Insomnia and ROX is noted.    Markedly improved with Klonopin and CPAP.  Denies shortness of breath, DECKER,   cyanosis, wheezing, cough and sputum production.  CARDIOVASCULAR:  Cardiac, history of hypertension.  She is on Lotensin.  Note,   she is on Zocor.  Denies chest pain, PND, orthopnea or reduced exercise   tolerance.  ABDOMEN:  Diabetes is present by history-improved with weight loss.  Reflux has been present in the past.    She is not on any particular medicines for that presently other than Prilosec.    Denies nausea, vomiting, diarrhea, abdominal pain, hematemesis   or blood in stool.  URINARY:  No flank pain, dysuria or hematuria.  PERIPHERAL  VASCULAR:  No claudication or cyanosis.  NEUROLOGIC:  No numbness, weakness or tingling.  MUSCULOSKELETAL:  Joints including fibromyalgia, degenerative disease of her   spine, OA at the knee have been present, though those are all pretty   asymptomatic presently.  Low bone strength noted in the past with her last   density showing worse score -2.2 at the spine, -1.9 at the hip and those were   improved.  FRAX scores were low risk     Past Medical History:   Diagnosis Date    Anxiety     Arthritis     Back pain     Basal cell carcinoma (BCC)     Breast cancer     Breast cancer     Depression     Diabetes mellitus type 2, controlled     Enthesopathy of hip 2014    Fibromyalgia     GERD (gastroesophageal reflux disease)     HTN (hypertension)     Mixed hyperlipidemia     Obesity     Osteoporosis     Rash and nonspecific skin eruption 6/15/2015    Sjogren - Flo's syndrome     Sleep apnea     Trouble in sleeping     Type 2 diabetes mellitus      Past Surgical History:   Procedure Laterality Date    APPENDECTOMY      BELT ABDOMINOPLASTY      BREAST LUMPECTOMY      BREAST SURGERY      breast augmentation     SECTION, CLASSIC       Family History   Problem Relation Age of Onset    Heart disease Mother     Hypertension Mother     COPD Mother     Diabetes Father     Stroke Father     Kidney disease Father      Social History     Socioeconomic History    Marital status:      Spouse name: Not on file    Number of children: 1    Years of education: Not on file    Highest education level: Not on file   Occupational History     Employer: [employer]    Social Needs    Financial resource strain: Not very hard    Food insecurity     Worry: Never true     Inability: Never true    Transportation needs     Medical: No     Non-medical: No   Tobacco Use    Smoking status: Never Smoker    Smokeless tobacco: Never Used   Substance and Sexual Activity    Alcohol use: Yes      Frequency: 4 or more times a week     Drinks per session: 3 or 4     Binge frequency: Never     Comment: Socially    Drug use: No    Sexual activity: Not Currently     Partners: Male   Lifestyle    Physical activity     Days per week: 0 days     Minutes per session: Not on file    Stress: Only a little   Relationships    Social connections     Talks on phone: More than three times a week     Gets together: Once a week     Attends Anabaptist service: Never     Active member of club or organization: No     Attends meetings of clubs or organizations: Never     Relationship status:    Other Topics Concern    Not on file   Social History Narrative    Not on file     Review of patient's allergies indicates:   Allergen Reactions    Penicillins      Other reaction(s): Rash  Other reaction(s): Difficulty breathing       PHYSICAL EXAM  /74   Pulse 69   Wt 86.9 kg (191 lb 9.3 oz)   BMI 35.04 kg/m²    -GENERAL-well-developed well-nourished and no distress  HEENT-mild mouth dryness and eye dryness without eye redness.  No adenopathy and no parotid swelling  CHEST-CLEAR TO AUSCULTATION PERCUSSION WITHOUT WHEEZING  Cardiac-NORMAL SINUS RHYTHM WITHOUT GALLOPS OR MURMURS  GI-no abdominal pain or tenderness, no rebound  MUSCULOSKELETAL-hands with no significant swelling or tenderness in the PIP MCP or D IP joints.  Wrist elbows shoulders have good mobility and not painful.  Neck rotation minimally limited.  There is mild tenderness in the paraspinous muscles mid and lower back  Normal hip motion.  Minimal tenderness in the trochanteric bursa on the right.  Lumbar flexion slightly decreased.  X-rays reviewed and degenerative disc disease at L3-4 145 particularly and showed the patient.  Knees have mild crepitus but good motion not tender.    Ankles with minimal swelling but not tender and no MTP tenderness  Skin-no malar rashes discoid lesions or other types of cutaneous abnormalities  VASCULAR-NO RAYNAUD'S.   PERIPHERAL PULSES GOOD  NEUROLOGIC NORMAL MUSCLE STRENGTH WITH NO ATROPHY; NEGATIVE STRAIGHT LEG RAISE  Results for VINAY SANTIZO (MRN [id number]) as of 8/3/2020 21:22   Ref. Range 7/10/2020 08:21   WBC Latest Ref Range: 3.90 - 12.70 K/uL 4.35   RBC Latest Ref Range: 4.00 - 5.40 M/uL 3.89 (L)   Hemoglobin Latest Ref Range: 12.0 - 16.0 g/dL 12.8   Hematocrit Latest Ref Range: 37.0 - 48.5 % 38.6   MCV Latest Ref Range: 82 - 98 fL 99 (H)   MCH Latest Ref Range: 27.0 - 31.0 pg 32.9 (H)   MCHC Latest Ref Range: 32.0 - 36.0 g/dL 33.2   RDW Latest Ref Range: 11.5 - 14.5 % 13.2   Platelets Latest Ref Range: 150 - 350 K/uL 178   MPV Latest Ref Range: 9.2 - 12.9 fL 9.7   Gran% Latest Ref Range: 38.0 - 73.0 % 64.2   Gran # (ANC) Latest Ref Range: 1.8 - 7.7 K/uL 2.8   Lymph% Latest Ref Range: 18.0 - 48.0 % 22.1   Lymph # Latest Ref Range: 1.0 - 4.8 K/uL 1.0   Mono% Latest Ref Range: 4.0 - 15.0 % 8.5   Mono # Latest Ref Range: 0.3 - 1.0 K/uL 0.4   Eosinophil% Latest Ref Range: 0.0 - 8.0 % 3.0   Eos # Latest Ref Range: 0.0 - 0.5 K/uL 0.1   Basophil% Latest Ref Range: 0.0 - 1.9 % 1.1   Results for VINAY SANTIZO (MRN [id number]) as of 8/3/2020 21:22   Ref. Range 7/10/2020 08:21   Sodium Latest Ref Range: 136 - 145 mmol/L 143   Potassium Latest Ref Range: 3.5 - 5.1 mmol/L 4.2   Chloride Latest Ref Range: 95 - 110 mmol/L 103   CO2 Latest Ref Range: 23 - 29 mmol/L 27   Anion Gap Latest Ref Range: 8 - 16 mmol/L 13   BUN, Bld Latest Ref Range: 8 - 23 mg/dL 21   Creatinine Latest Ref Range: 0.5 - 1.4 mg/dL 0.7   eGFR if non African American Latest Ref Range: >60 mL/min/1.73 m^2 >60   eGFR if  Latest Ref Range: >60 mL/min/1.73 m^2 >60   Glucose Latest Ref Range: 70 - 110 mg/dL 119 (H)   Calcium Latest Ref Range: 8.7 - 10.5 mg/dL 9.9   Alkaline Phosphatase Latest Ref Range: 55 - 135 U/L 58   PROTEIN TOTAL Latest Ref Range: 6.0 - 8.4 g/dL 7.0   Albumin Latest Ref Range: 3.5 - 5.2 g/dL 4.0   BILIRUBIN TOTAL Latest Ref Range: 0.1 -  1.0 mg/dL 0.2   AST Latest Ref Range: 10 - 40 U/L 28   ALT Latest Ref Range: 10 - 44 U/L 29   CRP Latest Ref Range: 0.0 - 8.2 mg/L 7.1   Triglycerides Latest Ref Range: 30 - 150 mg/dL 64   Cholesterol Latest Ref Range: 120 - 199 mg/dL 188   HDL Latest Ref Range: 40 - 75 mg/dL 84 (H)   Hdl/Cholesterol Ratio Latest Ref Range: 20.0 - 50.0 % 44.7   LDL Cholesterol External Latest Ref Range: 63.0 - 159.0 mg/dL 91.2   Non-HDL Cholesterol Latest Units: mg/dL 104   Total Cholesterol/HDL Ratio Latest Ref Range: 2.0 - 5.0  2.2   Results for VINAY SANTIZO (MRN [id number]) as of 8/3/2020 21:22   Ref. Range 12/27/2016 10:04   PAUL Screen Latest Ref Range: Negative <1:160  Positive (A)   PAUL HEP-2 Titer Unknown Positive 1:1280 Speckled   ds DNA Ab Latest Ref Range: Negative 1:10  Negative 1:10   Anti-SSA Antibody Latest Ref Range: 0.00 - 19.99 .50 (H)   Anti-SSA Interpretation Latest Ref Range: Negative  Positive (A)   Anti-SSB Antibody Latest Ref Range: 0.00 - 19.99 EU 69.02 (H)   Anti-SSB Interpretation Latest Ref Range: Negative  Positive (A)   Anti Sm Antibody Latest Ref Range: 0.00 - 19.99 EU 1.77   Anti-Sm Interpretation Latest Ref Range: Negative  Negative   Anti Sm/RNP Antibody Latest Ref Range: 0.00 - 19.99 EU 1.87   Anti-Sm/RNP Interpretation Latest Ref Range: Negative  Negative     Results for orders placed or performed in visit on 07/10/20   Urinalysis   Result Value Ref Range    Specimen UA Urine, Clean Catch     Color, UA Yellow Yellow, Straw, Cleo    Appearance, UA Clear Clear    pH, UA 5.0 5.0 - 8.0    Specific Gravity, UA >1.030 (A) 1.005 - 1.030    Protein, UA Negative Negative    Glucose, UA Negative Negative    Ketones, UA Trace (A) Negative    Bilirubin (UA) Negative Negative    Occult Blood UA Negative Negative    Nitrite, UA Negative Negative    Leukocytes, UA 1+ (A) Negative   Microalbumin/creatinine urine ratio   Result Value Ref Range    Microalbum.,U,Random 8.0 ug/mL    Creatinine, Random Ur 101.0  15.0 - 325.0 mg/dL    Microalb Creat Ratio 7.9 0.0 - 30.0 ug/mg   Urinalysis Microscopic   Result Value Ref Range    RBC, UA 3 0 - 4 /hpf    WBC, UA 15 (H) 0 - 5 /hpf    Bacteria Few (A) None-Occ /hpf    Squam Epithel, UA 4 /hpf    Non-Squam Epith 4 (A) <1/hpf /hpf    Ca Oxalate Leti, UA Few None-Moderate    Microscopic Comment SEE COMMENT      Details    Reading Physician Reading Date Result Priority   Huan Valderrama MD  [phone] 7/22/2020 Routine      Narrative & Impression     EXAMINATION:  DEXA BONE DENSITY SPINE HIP     CLINICAL HISTORY:  OP; Disorder of bone, unspecified     TECHNIQUE:  DXA scanning was performed over the left hip and lumbar spine.  Review of the images confirms satisfactory positioning and technique.     COMPARISON:  06/28/2018     FINDINGS:  The L1 to L4 vertebral bone mineral density is equal to 1.03 g/cm squared with a T score of -1.3.  There has been a 0.2% increase relative to the prior study.     The left femoral neck bone mineral density is equal to 0.962 g/cm squared with a T score of -0.5.  There has been  an 11.1% increase relative to the prior study.     There is a 12% risk of a major osteoporotic fracture and a 0.9% risk of hip fracture in the next 10 years (FRAX).     Impression:     Osteopenia       DXA- JUNE 2018   The L1 to L4 vertebral bone mineral density is equal to 1.028 g/cm squared with a T score of -1.3.  There has been a 2.6% increase relative to the prior study.    The left femoral neck bone mineral density is equal to 0.866 g/cm squared with a T score of -1.2.  There has been  a 1.1% increase relative to the prior study.    There is a 8.3% risk of a major osteoporotic fracture and a 0.8% risk of hip fracture in the next 10 years (FRAX).        IMPRESSIONS:  1.  Sjogren's syndrome with positive PAUL and high titer SSA- minimally symptomatic.  No   signs of extra glandular spread.  No signs of lymphoma  2.  Osteopenia, stable osteopenia, is on   drug holiday now, DXA  June 2018 shows low FRAX level and stable BMD femoral neck T-score -1.2-OFF VITAMIN-D PRESENTLY  3.  Fibromyalgia, restless leg syndrome - markedly improved with the addition of   Klonopin and the Celexa.    4.  Degenerative disease of lumbar sacral spine with intermittent pain referral to the right buttocks.  No evidence of hip disease by her exam  5.  Osteoarthritis-knees-stable  6.  Vaccination status-not up-to-date  7.  Weight loss-occurring with the use of her keto diet and markedly improving her overall symptoms    PLAN:  Maintain Plaquenil 200 mg twice daily-script given    Reviewed all labs with her-good results    Yearly eye exams recommended    Use vitamin-D 1000 units a day in the winter    Continue on the Fosamax holiday-bone density is stable    Continue your exercise and diet program.    Retry get the Shingrix vaccine-script given-try [facility]    Try stay hydrated    Will send  to physical therapy to teach  a balance program    Omega 3 fatty acid daily will help Sjogren's diet- anti inflam. Diet- Mediterranean diet     Exercises - Core type for her back    Xylimelts-p.r.n.  And stay hydrated    Systane eye drops- night -try to use every day    Reviewed her Klonopin use again-encouraged her to try lower dose at times to half a tablet.  Worry little bit about its affect on her at night if she gets up to go to the bathroom and note she did fall recently doing that     Back in 6 months with regular lab 1 week pre Visit    Prolonged visit: Over40 min spent in patient care and evaluation. Ove 20 min of time used in reviewing current and past labs and ongoing symptoms, looking carefully for any signs of extra glandular spread, discussing diagnostic possibilities, especially her stable osteopenia, counseling on vaccination, exercise, vitamins, supplements, and medication options, reviewing side effects of therapies, and coordination of care with  Family Dr & pulmonology  Patient's questions were all addressed and  she understands our plans and risks.    JAILENE/RUPERTO  dd: 08/21/2017 11:47:21 (CDT)  td: 08/21/2017 16:59:44 (CDT)  Doc ID   #[id number]  Job ID #[id number]    CC: Nirav Acevedo M.D.

## 2020-07-28 NOTE — PATIENT INSTRUCTIONS
Maintain Plaquenil 200 mg twice daily    Use vitamin-D 1000 units a day in the winter    Continue on the Fosamax holiday-bone density is stable    Continue your exercise and diet program.    Retry get the Shingrix vaccine    Try stay hydrated    Will send  to physical therapy to teach to a balance program    Back in 6 months with regular lab 1 week pre Visit

## 2020-08-03 ENCOUNTER — CLINICAL SUPPORT (OUTPATIENT)
Dept: REHABILITATION | Facility: HOSPITAL | Age: 70
End: 2020-08-03
Attending: INTERNAL MEDICINE
Payer: MEDICARE

## 2020-08-03 DIAGNOSIS — R26.89 IMBALANCE: ICD-10-CM

## 2020-08-03 DIAGNOSIS — R53.1 WEAKNESS: ICD-10-CM

## 2020-08-03 DIAGNOSIS — R26.89 BALANCE PROBLEM: ICD-10-CM

## 2020-08-03 PROCEDURE — 97161 PT EVAL LOW COMPLEX 20 MIN: CPT

## 2020-08-03 PROCEDURE — 97110 THERAPEUTIC EXERCISES: CPT

## 2020-08-03 NOTE — PLAN OF CARE
[facility]  Physical Therapy Initial Evaluation    Name: Latonia Oliver  Clinic Number: [id number]    Therapy Diagnosis:   Encounter Diagnoses   Name Primary?    Imbalance     Weakness     Balance problem      Physician: Jorge Moreno MD    Physician Orders: PT Eval and Treat   Medical Diagnosis from Referral: R26.89 (ICD-10-CM) - Imbalance  Evaluation Date: 8/3/2020  Authorization Period Expiration: 7/28/2021  Plan of Care Expiration: 9/3/2020  Visit # / Visits authorized: 1/ 20    Time In: 9:50  Time Out: 10:45  Total Billable Time: 55 minutes    Precautions: Standard and Diabetes    Subjective   Date of onset: 7/28/2020  History of current condition - Latonia reports: seeing her rheumatoid doctor for osteopenia. She reports falling 2x within the past year, once when she fell off the edge of side walk in which she fractured her ankle. Her ankle is doing fine now. She also reports falling out of her raised garden, she fell on her back and had a concussion. Has degenerative disc disease and sciatica in which has been relieved from an anti-inflammatory shot in January.     Pain:  Current 0/10, worst 0/10, best 0/10   Location: bilateral balance   Description: no pain  Aggravating Factors: Standing, Bending and Walking  Easing Factors: nothing    Prior Therapy: for her back  Social History: lives alone  Occupation: retired  Prior Level of Function: sedentary life due to low back/hip pain   Current Level of Function: able to do all activities without pain; limited due to loss of balance and fear of falling    Imaging, MRI studies: Discogenic degenerative changes     Medical History:   Past Medical History:   Diagnosis Date    Anxiety     Arthritis     Back pain     Basal cell carcinoma (BCC)     Breast cancer     Breast cancer     Depression     Diabetes mellitus type 2, controlled     Enthesopathy of hip 12/11/2014    Fibromyalgia     GERD (gastroesophageal reflux disease)      HTN (hypertension)     Mixed hyperlipidemia     Obesity     Osteoporosis     Rash and nonspecific skin eruption 6/15/2015    Sjogren - Flo's syndrome     Sleep apnea     Trouble in sleeping     Type 2 diabetes mellitus        Surgical History:   Latonia Oliver  has a past surgical history that includes Appendectomy;  section, classic; Belt abdominoplasty; Breast lumpectomy; and Breast surgery.    Medications:   Latonia has a current medication list which includes the following prescription(s): amlodipine, benazepril-hydrochlorthiazide, citalopram, clonazepam, fluzone high-dose 2019-20 (pf), hydroxychloroquine, methocarbamol, multivitamin, omeprazole, promethazine, and simvastatin.    Allergies:   Review of patient's allergies indicates:   Allergen Reactions    Penicillins      Other reaction(s): Rash  Other reaction(s): Difficulty breathing     Pts goals: get to camping and traveling     Objective       CMS Impairment/Limitation/Restriction for FOTO Lumbar Survey    Therapist reviewed FOTO scores for Latonia Oliver on 8/3/2020.   FOTO documents entered into [employer] ([employer]) - see Media section.    Limitation Score: 38%       Posture/Structure: Pt presents with FHP, rounded sh's, mild cervical lordosis, mild thoracic kyphosis, and increased LB lordosis.  No curvature of the spine noted.    Gait: WNL    Neuro/Sensation:      Sensation: minimal decreased sensation along right ankle medial and lateral    Balance: SL R=5 sec, L=6 sec.    Strength:     R L     Hip flexors  4+/5 4/5  Quadriceps  4+/5 4+/5      Hamstrings  4-/5 4-/5     PF   5/5 5/5     DF   4+/5 5/5     IR   3+/5 4-/5     ER   3+/5 3+/5     Gluteus Medius 4/5 5/5     Gluteus Grady 4-/5 4-/5       Special Test:    Dynamic Gait Index = 21/24  Tinetti Balance = 27/28  El Balance = 54/56    TREATMENT   Treatment Time In: 10:30  Treatment Time Out: 10:45  Total Treatment time separate from Evaluation: 15 minutes    Latonia received therapeutic  exercises to develop strength, endurance, flexibility, posture and core stabilization for 15 minutes including:  SL hip abduction  Clamshells with RTB  Bridges   Tandem stance  Step ups  meñosjohn Lincoln participated in neuromuscular re-education activities to improve: Balance, Coordination and Proprioception for 0 minutes. The following activities were included:    Home Exercises and Patient Education Provided    Education provided:   -Education on condition, HEP, and benefits of PT    Written Home Exercises Provided: yes.  Exercises were reviewed and Latonia was able to demonstrate them prior to the end of the session.  Latonia demonstrated good  understanding of the education provided.     See EMR under Patient Instructions for exercises provided 8/3/2020.    Assessment   Latonia is a 69 y.o. female referred to outpatient Physical Therapy with a medical diagnosis of balance impairments. The patient presents with signs and symptoms consistent with diagnosis along with impairments which include decreased ROM, decreased strength, decreased muscle length, impaired coordination, impaired balance and gait abnormalities.  These impairments are limiting patient's ability to perform recreational and community activities.     Pt prognosis is Good.   Pt will benefit from skilled outpatient Physical Therapy to address the deficits stated above and in the chart below, provide pt/family education, and to maximize pt's level of independence.     Plan of care discussed with patient: Yes  Pt's spiritual, cultural and educational needs considered and patient is agreeable to the plan of care and goals as stated below:     Anticipated Barriers for therapy: none    Medical Necessity is demonstrated by the following  History  Co-morbidities and personal factors that may impact the plan of care Co-morbidities:   anxiety, depression, high BMI, history of cancer and HTN    Personal Factors:   no deficits     high   Examination  Body  Structures and Functions, activity limitations and participation restrictions that may impact the plan of care Body Regions:   back  lower extremities    Body Systems:    strength  gross coordinated movement  balance  gait  transfers  motor control    Participation Restrictions:   none    Activity limitations:   Learning and applying knowledge  no deficits    General Tasks and Commands  no deficits    Communication  none    Mobility  lifting and carrying objects  walking    Self care  no deficits    Domestic Life  no deficits    Interactions/Relationships  no deficits    Life Areas  no deficits    Community and Social Life  community life  recreation and leisure         high   Clinical Presentation stable and uncomplicated low   Decision Making/ Complexity Score: low     Goals:  Short Term Goals: In 3 weeks   1.I with HEP  2.Pt to increase BLE strength by 1/2 grade to improve functional tasks.   3.Pt to improve score on the FOTO by 10%.  4. Patient to improve SLS to 10 seconds  5. Patient to improve balance outcome measures by 1-2 points.  7. Pt to be educated on postural/body mechanics awareness.    Long Term Goals: In 6 weeks  1.Patient to improve score on the FOTO by 20%.  2. Patient to demo increase in LE strength to 4+/5 to improve functional tasks.  3. Patient to perform SLS for 20 seconds  4. Patient to demonstrate no deficits on balance outcome measures.  5. Patient to perform daily activities including gardening, stair climbing and walking with obstacles without limitation.      Plan   Plan of care Certification: 8/3/2020 to 9/3/2020.    Outpatient Physical Therapy 2 times weekly for 6 weeks to include the following interventions: Cervical/Lumbar Traction, Electrical Stimulation IFC, Gait Training, Manual Therapy, Moist Heat/ Ice, Neuromuscular Re-ed, Patient Education, Self Care and Therapeutic Exercise.     Kathrine Frank, PT    Thank you for this referral.    These services are reasonable and necessary for  the conditions set forth above while under my care.

## 2020-08-05 ENCOUNTER — CLINICAL SUPPORT (OUTPATIENT)
Dept: REHABILITATION | Facility: HOSPITAL | Age: 70
End: 2020-08-05
Attending: INTERNAL MEDICINE
Payer: MEDICARE

## 2020-08-05 DIAGNOSIS — R53.1 WEAKNESS: ICD-10-CM

## 2020-08-05 DIAGNOSIS — R26.89 BALANCE PROBLEM: ICD-10-CM

## 2020-08-05 PROCEDURE — 97112 NEUROMUSCULAR REEDUCATION: CPT

## 2020-08-05 PROCEDURE — 97110 THERAPEUTIC EXERCISES: CPT

## 2020-08-05 NOTE — PROGRESS NOTES
Physical Therapy Treatment Note     Name: Latonia Oliver  Clinic Number: [id number]    Therapy Diagnosis:   Encounter Diagnoses   Name Primary?    Weakness     Balance problem      Physician: Jorge Moreno MD    Visit Date: 8/5/2020    Physician Orders: PT Eval and Treat   Medical Diagnosis from Referral: R26.89 (ICD-10-CM) - Imbalance  Evaluation Date: 8/3/2020  Authorization Period Expiration: 7/28/2021  Plan of Care Expiration: 9/3/2020  Visit # / Visits authorized: 2/ 20    Time In: 12:15  Time Out: 1:05  Total Billable Time: 45 minutes    Precautions: Standard and Diabetes    Subjective     Pt reports: no complaints today.  She was compliant with home exercise program.  Response to previous treatment: no change  Functional change: no significant change    Pain: 0/10  Location: bilateral balance      Objective   Latonia received therapeutic exercises to develop strength, endurance, flexibility, posture and core stabilization for 35 minutes including:  T-mill walking 5 min (encourage no UE support)  SL hip abduction  Clamshells with RTB  Bridges   Tandem stance  Step ups  minisquats  Shuttle Squats 5B 2 min     Latonia participated in neuromuscular re-education activities to improve: Balance, Coordination and Proprioception for 10 minutes. The following activities were included:  Trampoline marching 1 min x2  SLS modified 30 sec x 3  Tandem walking blue foam     Home Exercises Provided and Patient Education Provided     Education provided:   - HEP and correct exercise technique    Written Home Exercises Provided: Patient instructed to cont prior HEP.  Exercises were reviewed and Latonia was able to demonstrate them prior to the end of the session.  Latonia demonstrated good  understanding of the education provided.     See EMR under Patient Instructions for exercises provided prior visit.    Assessment     Patient was able to tolerate all therex well. Required supervision with no UE support with T-mill walking.  Increased muscle fatigue noted during hip strengthening. Able to complete < 30 sec in tandem stance without UE support, however required frequent intermittent BUE support and contralateral LE support with SLS. Patient demonstrated understanding of HEP and will continue to benefit from further progression of balance and strengthening.    Latonia is progressing well towards her goals.   Pt prognosis is Good.     Pt will continue to benefit from skilled outpatient physical therapy to address the deficits listed in the problem list box on initial evaluation, provide pt/family education and to maximize pt's level of independence in the home and community environment.     Pt's spiritual, cultural and educational needs considered and pt agreeable to plan of care and goals.     Anticipated barriers to physical therapy: none    Goals:  Short Term Goals: In 3 weeks   1.I with HEP  2.Pt to increase BLE strength by 1/2 grade to improve functional tasks.   3.Pt to improve score on the FOTO by 10%.  4. Patient to improve SLS to 10 seconds  5. Patient to improve balance outcome measures by 1-2 points.  7. Pt to be educated on postural/body mechanics awareness.     Long Term Goals: In 6 weeks  1.Patient to improve score on the FOTO by 20%.  2. Patient to demo increase in LE strength to 4+/5 to improve functional tasks.  3. Patient to perform SLS for 20 seconds  4. Patient to demonstrate no deficits on balance outcome measures.  5. Patient to perform daily activities including gardening, stair climbing and walking with obstacles without limitation.      Plan     Monitor response to tx and progress with PT POC as indicated. Continue to progress balance and strengthening.      Kathrine Frank, PT

## 2020-08-10 ENCOUNTER — CLINICAL SUPPORT (OUTPATIENT)
Dept: REHABILITATION | Facility: HOSPITAL | Age: 70
End: 2020-08-10
Attending: INTERNAL MEDICINE
Payer: MEDICARE

## 2020-08-10 DIAGNOSIS — R26.89 BALANCE PROBLEM: ICD-10-CM

## 2020-08-10 DIAGNOSIS — R53.1 WEAKNESS: ICD-10-CM

## 2020-08-10 PROCEDURE — 97112 NEUROMUSCULAR REEDUCATION: CPT

## 2020-08-10 PROCEDURE — 97110 THERAPEUTIC EXERCISES: CPT

## 2020-08-10 NOTE — PROGRESS NOTES
Physical Therapy Treatment Note     Name: Latonia Oliver  Clinic Number: [id number]    Therapy Diagnosis:   Encounter Diagnoses   Name Primary?    Weakness     Balance problem      Physician: Jorge Moreno MD    Visit Date: 8/10/2020    Physician Orders: PT Eval and Treat   Medical Diagnosis from Referral: R26.89 (ICD-10-CM) - Imbalance  Evaluation Date: 8/3/2020  Authorization Period Expiration: 7/28/2021  Plan of Care Expiration: 9/3/2020  Visit # / Visits authorized: 3/ 20    Time In: 3:25  Time Out: 4:20  Total Billable Time: 55 minutes    Precautions: Standard and Diabetes    Subjective     Pt reports: some soreness noted over the weekend. Reports not being able to do HEP due to being out of town.  She was compliant with home exercise program.  Response to previous treatment: increased soreness  Functional change: no significant change    Pain: 0/10  Location: bilateral balance      Objective   Latonia received therapeutic exercises to develop strength, endurance, flexibility, posture and core stabilization for 30 minutes including:  T-mill walking 5 min (encourage no UE support)  Step downs  Hip 3 way YTB (flexion, abduction, extension) R/L  Stairs 16   Shuttle Squats 6B 2 min  Floor transfers with chair (side sit <> tall knee <> 1/2 kneel <> stand)     Latonia participated in neuromuscular re-education activities to improve: Balance, Coordination and Proprioception for 25 minutes. The following activities were included:  Trampoline marching 1 min x2 3#  Tandem stance blue foam 30 sec x3  SLS modified 30 sec x 3  Tandem walking blue foam   Bosu balance 30 sec x3    Home Exercises Provided and Patient Education Provided     Education provided:   - HEP and correct exercise technique    Written Home Exercises Provided: Patient instructed to cont prior HEP.  Exercises were reviewed and Latonia was able to demonstrate them prior to the end of the session.  Latonia demonstrated good  understanding of the education  provided.     See EMR under Patient Instructions for exercises provided prior visit.    Assessment     Patient was able to tolerate all therex well. Required supervision with no UE support with T-mill walking. Increased muscle fatigue noted during hip strengthening. She required frequent intermittent UE support with balance activities, however she was able to progress to higher level uneven surface. Latonia also demonstrated good understanding of floor transfers today - improvement noted with repetition. Patient will continue to benefit from further progression of balance and strengthening.    Latonia is progressing well towards her goals.   Pt prognosis is Good.     Pt will continue to benefit from skilled outpatient physical therapy to address the deficits listed in the problem list box on initial evaluation, provide pt/family education and to maximize pt's level of independence in the home and community environment.     Pt's spiritual, cultural and educational needs considered and pt agreeable to plan of care and goals.     Anticipated barriers to physical therapy: none    Goals:  Short Term Goals: In 3 weeks   1.I with HEP MET  2.Pt to increase BLE strength by 1/2 grade to improve functional tasks.   3.Pt to improve score on the FOTO by 10%.  4. Patient to improve SLS to 10 seconds  5. Patient to improve balance outcome measures by 1-2 points.  7. Pt to be educated on postural/body mechanics awareness.     Long Term Goals: In 6 weeks  1.Patient to improve score on the FOTO by 20%.  2. Patient to demo increase in LE strength to 4+/5 to improve functional tasks.  3. Patient to perform SLS for 20 seconds  4. Patient to demonstrate no deficits on balance outcome measures.  5. Patient to perform daily activities including gardening, stair climbing and walking with obstacles without limitation.      Plan     Monitor response to tx and progress with PT POC as indicated. Continue to progress balance and  strengthening.      Kathrine Frank, PT

## 2020-08-17 ENCOUNTER — CLINICAL SUPPORT (OUTPATIENT)
Dept: REHABILITATION | Facility: HOSPITAL | Age: 70
End: 2020-08-17
Attending: INTERNAL MEDICINE
Payer: MEDICARE

## 2020-08-17 DIAGNOSIS — R26.89 BALANCE PROBLEM: ICD-10-CM

## 2020-08-17 DIAGNOSIS — R53.1 WEAKNESS: ICD-10-CM

## 2020-08-17 PROCEDURE — 97110 THERAPEUTIC EXERCISES: CPT

## 2020-08-17 PROCEDURE — 97112 NEUROMUSCULAR REEDUCATION: CPT

## 2020-08-17 NOTE — PROGRESS NOTES
"  Physical Therapy Treatment Note     Name: Latonia Oliver  Clinic Number: [id number]    Therapy Diagnosis:   Encounter Diagnoses   Name Primary?    Weakness     Balance problem      Physician: Jorge Moreno MD    Visit Date: 8/17/2020    Physician Orders: PT Eval and Treat   Medical Diagnosis from Referral: R26.89 (ICD-10-CM) - Imbalance  Evaluation Date: 8/3/2020  Authorization Period Expiration: 7/28/2021  Plan of Care Expiration: 9/3/2020  Visit # / Visits authorized: 4/ 20    Time In: 11:00  Time Out: 11:45  Total Billable Time: 45 minutes    Precautions: Standard and Diabetes    Subjective     Pt reports: feeling good today, however reports her legs feeling like "noodles" throughout treatment session.   She was compliant with home exercise program.  Response to previous treatment: increased soreness  Functional change: no significant change    Pain: 0/10  Location: bilateral balance      Objective   Latonia received therapeutic exercises to develop strength, endurance, flexibility, posture and core stabilization for 30 minutes including:  T-mill walking 7min (encourage no UE support)  Step downs  lunges  Hip 3 way YTB (flexion, abduction, extension) R/L  Stairs 16   Shuttle Squats 6B 2 min  Floor transfers with chair and without (side sit <> tall knee <> 1/2 kneel <> stand) R/L     Latonia participated in neuromuscular re-education activities to improve: Balance, Coordination and Proprioception for 15 minutes. The following activities were included:  Trampoline marching 1 min x2 3# - not today  Tandem stance blue foam 30 sec x3 - not today  SLS modified 30 sec x 3  Tandem walking blue foam   Bosu balance 30 sec x3    Home Exercises Provided and Patient Education Provided     Education provided:   - HEP and correct exercise technique    Written Home Exercises Provided: Patient instructed to cont prior HEP.  Exercises were reviewed and Latonia was able to demonstrate them prior to the end of the session.  Latonia " demonstrated good  understanding of the education provided.     See EMR under Patient Instructions for exercises provided prior visit.    Assessment     Patient was able to tolerate all therex well. Improved safety and tolerance with no UE support with T-mill walking. Continues to have increased muscle fatigue noted during hip strengthening. She also continues to require frequent intermittent UE support with balance activities.  Latonia also demonstrated good carryover with floor to standing transfer with chair. Challenged patient to progress to floor transfer without chair. She required minimal assistance with half kneel to standing on RLE and moderate to max assist for LLE.  Patient will continue to benefit from further progression of balance and strengthening.    Latonia is progressing well towards her goals.   Pt prognosis is Good.     Pt will continue to benefit from skilled outpatient physical therapy to address the deficits listed in the problem list box on initial evaluation, provide pt/family education and to maximize pt's level of independence in the home and community environment.     Pt's spiritual, cultural and educational needs considered and pt agreeable to plan of care and goals.     Anticipated barriers to physical therapy: none    Goals:  Short Term Goals: In 3 weeks   1.I with HEP MET  2.Pt to increase BLE strength by 1/2 grade to improve functional tasks.   3.Pt to improve score on the FOTO by 10%.  4. Patient to improve SLS to 10 seconds  5. Patient to improve balance outcome measures by 1-2 points.  7. Pt to be educated on postural/body mechanics awareness.     Long Term Goals: In 6 weeks  1.Patient to improve score on the FOTO by 20%.  2. Patient to demo increase in LE strength to 4+/5 to improve functional tasks.  3. Patient to perform SLS for 20 seconds  4. Patient to demonstrate no deficits on balance outcome measures.  5. Patient to perform daily activities including gardening, stair climbing  and walking with obstacles without limitation.      Plan     Monitor response to tx and progress with PT POC as indicated. Continue to progress balance and strengthening.      Kathrine Frank, PT

## 2020-08-19 ENCOUNTER — CLINICAL SUPPORT (OUTPATIENT)
Dept: REHABILITATION | Facility: HOSPITAL | Age: 70
End: 2020-08-19
Attending: INTERNAL MEDICINE
Payer: MEDICARE

## 2020-08-19 DIAGNOSIS — R53.1 WEAKNESS: ICD-10-CM

## 2020-08-19 DIAGNOSIS — R26.89 BALANCE PROBLEM: ICD-10-CM

## 2020-08-19 PROCEDURE — 97112 NEUROMUSCULAR REEDUCATION: CPT

## 2020-08-19 PROCEDURE — 97110 THERAPEUTIC EXERCISES: CPT

## 2020-08-19 NOTE — PROGRESS NOTES
Physical Therapy Treatment Note     Name: Latonia Oliver  Clinic Number: [id number]    Therapy Diagnosis:   Encounter Diagnoses   Name Primary?    Weakness     Balance problem      Physician: Jorge Moreno MD    Visit Date: 8/19/2020    Physician Orders: PT Eval and Treat   Medical Diagnosis from Referral: R26.89 (ICD-10-CM) - Imbalance  Evaluation Date: 8/3/2020  Authorization Period Expiration: 7/28/2021  Plan of Care Expiration: 9/3/2020  Visit # / Visits authorized: 5/ 20    Time In: 1:45  Time Out: 2:30  Total Billable Time: 45 minutes    Precautions: Standard and Diabetes    Subjective     Pt reports: feeling really sore on the front of her legs today   She was compliant with home exercise program.  Response to previous treatment: increased soreness  Functional change: no significant change    Pain: 0/10  Location: bilateral balance      Objective   Latonia received therapeutic exercises to develop strength, endurance, flexibility, posture and core stabilization for 30 minutes including:  T-mill walking 10min (encourage no UE support)  lunges  Hip 3 way YTB (flexion, abduction, extension) R/L  Stairs 20  Shuttle Squats 6B 2 min  Tall kneeling bridges 10#  Floor transfers with chair and without (side sit <> tall knee <> 1/2 kneel <> stand) R/L  Prone quad stretch     Not today:  Step downs  Latonia participated in neuromuscular re-education activities to improve: Balance, Coordination and Proprioception for 15 minutes. The following activities were included:  Trampoline marching 1 min x2 3# - not today  Tandem stance blue foam 30 sec x3 - not today  SLS modified 30 sec x 3  Tandem walking blue foam   Bosu balance 30 sec x3    Home Exercises Provided and Patient Education Provided     Education provided:   - HEP and correct exercise technique    Written Home Exercises Provided: Patient instructed to cont prior HEP.  Exercises were reviewed and Latonia was able to demonstrate them prior to the end of the session.   Latonia demonstrated good  understanding of the education provided.     See EMR under Patient Instructions for exercises provided prior visit.    Assessment     Patient presents with significant soreness of BLE in which limited her somewhat during treatment session today. She was able to tolerate all therex well, however required decreased repetition of lunges and 3 way hip strengthening. Continues to demonstrate full safety and tolerance with no UE support with T-mill walking with increased duration. She require less frequent intermittent UE support with balance activities today. Challenged patient to progress to floor transfer without chair. She required minimal assistance with half kneel to standing on RLE and moderate to max assist for LLE or use of chair.  Integrated therex to promote proper strength for floor transfers. Educated patient on stretches to perform at home to eliminate some of the soreness. Patient will continue to benefit from further progression of balance and strengthening.    Latonia is progressing well towards her goals.   Pt prognosis is Good.     Pt will continue to benefit from skilled outpatient physical therapy to address the deficits listed in the problem list box on initial evaluation, provide pt/family education and to maximize pt's level of independence in the home and community environment.     Pt's spiritual, cultural and educational needs considered and pt agreeable to plan of care and goals.     Anticipated barriers to physical therapy: none    Goals:  Short Term Goals: In 3 weeks   1.I with HEP MET  2.Pt to increase BLE strength by 1/2 grade to improve functional tasks.   3.Pt to improve score on the FOTO by 10%.  4. Patient to improve SLS to 10 seconds  5. Patient to improve balance outcome measures by 1-2 points.  7. Pt to be educated on postural/body mechanics awareness.     Long Term Goals: In 6 weeks  1.Patient to improve score on the FOTO by 20%.  2. Patient to demo increase  in LE strength to 4+/5 to improve functional tasks.  3. Patient to perform SLS for 20 seconds  4. Patient to demonstrate no deficits on balance outcome measures.  5. Patient to perform daily activities including gardening, stair climbing and walking with obstacles without limitation.      Plan     Monitor response to tx and progress with PT POC as indicated. Continue to progress balance and strengthening.      Kathrine Frank, PT

## 2020-08-20 ENCOUNTER — PATIENT OUTREACH (OUTPATIENT)
Dept: OTHER | Facility: OTHER | Age: 70
End: 2020-08-20

## 2020-08-20 NOTE — PROGRESS NOTES
Digital Medicine: Clinician Follow-Up    Patient returned call. She is doing well and is very pleased with her recent lab results. She has noticed that BP is trending down. She did a direct cuff comparison with a nurse, and confirmed BP monitoring technique.     She is doing mostly keto (no carbs, high fat and protein), and has lost some weight. She plans to continue this diet.     The history is provided by the patient.     Hypertension    Readings are trending down due to lifestyle change and medication adherence.           Last 5 Patient Entered Readings                                      Current 30 Day Average: 138/73     Recent Readings 8/20/2020 8/19/2020 8/17/2020 8/16/2020 8/15/2020    SBP (mmHg) 120 139 145 138 128    DBP (mmHg) 66 73 75 71 68    Pulse 59 70 70 69 67        Last 6 Patient Entered Readings                                          Most Recent A1c: 5.7% on 7/10/2020  (Goal: 7%)     Recent Readings 8/20/2020 8/19/2020 8/17/2020 8/16/2020 8/15/2020    Blood Glucose (mg/dL) 114 145 122 118 126           Screenings    ASSESSMENT(S)  Patients BP average is 138/73 mmHg, which is above goal. Patient's BP goal is less than or equal to 130/80 per 2017 ACC/AHA Hypertension Guidelines.     Average BP uncontrolled but BP trending down nicely.       Hypertension Plan  Continue current therapy. Amlodipine and benazepril-HCTZ as prescribed  Continue current diet/physical activity routine. Low sodium, can continue with keto diet.   F/u in 3 months.     Addressed any questions or concerns and patient has my contact information if needed prior to next outreach. Patient verbalizes understanding.      Explained the importance of self-monitoring and medication adherence. Encouraged the patient to communicate with their health  for lifestyle modifications to help improve or maintain a healthy lifestyle.              There are no preventive care reminders to display for this patient.      Hypertension  Medications             amLODIPine (NORVASC) 5 MG tablet Take 1 tablet (5 mg total) by mouth once daily.    benazepril-hydrochlorthiazide (LOTENSIN HCT) 20-25 mg Tab Take 1 tablet by mouth once daily

## 2020-08-24 ENCOUNTER — CLINICAL SUPPORT (OUTPATIENT)
Dept: REHABILITATION | Facility: HOSPITAL | Age: 70
End: 2020-08-24
Attending: INTERNAL MEDICINE
Payer: MEDICARE

## 2020-08-24 ENCOUNTER — PATIENT OUTREACH (OUTPATIENT)
Dept: OTHER | Facility: OTHER | Age: 70
End: 2020-08-24

## 2020-08-24 DIAGNOSIS — R26.89 BALANCE PROBLEM: ICD-10-CM

## 2020-08-24 DIAGNOSIS — R53.1 WEAKNESS: ICD-10-CM

## 2020-08-24 PROCEDURE — 97112 NEUROMUSCULAR REEDUCATION: CPT

## 2020-08-24 PROCEDURE — 97110 THERAPEUTIC EXERCISES: CPT

## 2020-08-24 NOTE — PROGRESS NOTES
Digital Medicine: Health  Follow-Up    Patient left  requesting assistance refilling BG testing strips. Provided HME's phone number and suggested setting up reoccurring shipments. Patient has my number and knows to call w/ further problems/questions/concerns.     The history is provided by the patient.               There are no preventive care reminders to display for this patient.    Last 5 Patient Entered Readings                                      Current 30 Day Average: 136/72     Recent Readings 8/22/2020 8/21/2020 8/20/2020 8/19/2020 8/17/2020    SBP (mmHg) 135 117 120 139 145    DBP (mmHg) 68 66 66 73 75    Pulse 71 61 59 70 70        Last 6 Patient Entered Readings                                          Most Recent A1c: 5.7% on 7/10/2020  (Goal: 7%)     Recent Readings 8/22/2020 8/21/2020 8/20/2020 8/19/2020 8/17/2020    Blood Glucose (mg/dL) 113 121 114 145 122

## 2020-08-24 NOTE — PROGRESS NOTES
Physical Therapy Treatment Note     Name: Latonia Oliver  Clinic Number: [id number]    Therapy Diagnosis:   Encounter Diagnoses   Name Primary?    Weakness     Balance problem      Physician: Jorge Moreno MD    Visit Date: 8/24/2020    Physician Orders: PT Eval and Treat   Medical Diagnosis from Referral: R26.89 (ICD-10-CM) - Imbalance  Evaluation Date: 8/3/2020  Authorization Period Expiration: 7/28/2021  Plan of Care Expiration: 9/3/2020  Visit # / Visits authorized: 6/ 20    Time In: 11:00  Time Out: 11:55  Total Billable Time: 55 minutes    Precautions: Standard and Diabetes    Subjective     Pt reports: not feeling sore today and was able to walk a mile each day of the weekend; she also reports minimal pain due to increased activity   She was compliant with home exercise program.  Response to previous treatment: increased soreness  Functional change: able to walk a mile around the block     Pain: 0/10  Location: bilateral balance      Objective   Latonia received therapeutic exercises to develop strength, endurance, flexibility, posture and core stabilization for 45 minutes including:  T-mill walking 10min (encourage no UE support)  lunges  Hip 3 way YTB (flexion, abduction, extension) R/L  Stairs 20  Shuttle Squats 7B 2 min  Tall kneeling bridges 10#  Floor transfers with chair and without (side sit <> tall knee <> 1/2 kneel <> stand) R/L  Step downs     Not today:  Prone quad stretch    Latonia participated in neuromuscular re-education activities to improve: Balance, Coordination and Proprioception for 10 minutes. The following activities were included:  Trampoline marching 1 min x2 3# - not today  Tandem stance blue foam 30 sec x3 - not today  SLS 30 sec x 3  Tandem walking blue foam   Bosu balance 30 sec x3    Home Exercises Provided and Patient Education Provided     Education provided:   - HEP and correct exercise technique    Written Home Exercises Provided: Patient instructed to cont prior  HEP.  Exercises were reviewed and Latonia was able to demonstrate them prior to the end of the session.  Latonia demonstrated good  understanding of the education provided.     See EMR under Patient Instructions for exercises provided prior visit.    Assessment     Patient presents with no soreness of BLE. She was able to tolerate all therex well.  She require less frequent intermittent UE support with balance activities today.  Integrated therex to promote proper strength for floor transfers. She demonstrated improved strength as evidence by her ability to increase resistance with shuttle squats. Patient demonstrated independent floor transfer today modified with inch worm technique to standing. Patient will continue to benefit from further progression of balance and strengthening.    Latonia is progressing well towards her goals.   Pt prognosis is Good.     Pt will continue to benefit from skilled outpatient physical therapy to address the deficits listed in the problem list box on initial evaluation, provide pt/family education and to maximize pt's level of independence in the home and community environment.     Pt's spiritual, cultural and educational needs considered and pt agreeable to plan of care and goals.     Anticipated barriers to physical therapy: none    Goals:  Short Term Goals: In 3 weeks   1.I with HEP MET  2.Pt to increase BLE strength by 1/2 grade to improve functional tasks.   3.Pt to improve score on the FOTO by 10%.  4. Patient to improve SLS to 10 seconds  5. Patient to improve balance outcome measures by 1-2 points.  7. Pt to be educated on postural/body mechanics awareness.     Long Term Goals: In 6 weeks  1.Patient to improve score on the FOTO by 20%.  2. Patient to demo increase in LE strength to 4+/5 to improve functional tasks.  3. Patient to perform SLS for 20 seconds  4. Patient to demonstrate no deficits on balance outcome measures.  5. Patient to perform daily activities including  gardening, stair climbing and walking with obstacles without limitation.      Plan     Monitor response to tx and progress with PT POC as indicated. Continue to progress balance and strengthening.      Kathrine Frank, PT

## 2020-08-26 ENCOUNTER — CLINICAL SUPPORT (OUTPATIENT)
Dept: REHABILITATION | Facility: HOSPITAL | Age: 70
End: 2020-08-26
Attending: INTERNAL MEDICINE
Payer: MEDICARE

## 2020-08-26 DIAGNOSIS — R53.1 WEAKNESS: ICD-10-CM

## 2020-08-26 DIAGNOSIS — R26.89 BALANCE PROBLEM: ICD-10-CM

## 2020-08-26 PROCEDURE — 97110 THERAPEUTIC EXERCISES: CPT

## 2020-08-26 PROCEDURE — 97112 NEUROMUSCULAR REEDUCATION: CPT

## 2020-08-26 NOTE — PROGRESS NOTES
Physical Therapy Treatment Note     Name: Latonia Oliver  Clinic Number: [id number]    Therapy Diagnosis:   Encounter Diagnoses   Name Primary?    Weakness     Balance problem      Physician: Jorge Moreno MD    Visit Date: 8/26/2020    Physician Orders: PT Eval and Treat   Medical Diagnosis from Referral: R26.89 (ICD-10-CM) - Imbalance  Evaluation Date: 8/3/2020  Authorization Period Expiration: 7/28/2021  Plan of Care Expiration: 9/3/2020  Visit # / Visits authorized: 7/ 20    Time In: 11:18  Time Out: 12:05  Total Billable Time: 45 minutes    Precautions: Standard and Diabetes    Subjective     Pt reports: continuing to feel more comfortable doing more activities that she was more fearful of before starting PT.   She was compliant with home exercise program.  Response to previous treatment: increased soreness  Functional change: able to walk a mile around the block     Pain: 0/10  Location: bilateral balance      Objective   Latonia received therapeutic exercises to develop strength, endurance, flexibility, posture and core stabilization for 20 minutes including:  T-mill walking 10min (encourage no UE support)  lunges  Stairs 26  Shuttle Squats 7B 2 min     Not today:  Prone quad stretch  Hip 3 way YTB (flexion, abduction, extension) R/L  Tall kneeling bridges 10#  Floor transfers with chair and without (side sit <> tall knee <> 1/2 kneel <> stand) R/L  Step downs    Latonia participated in neuromuscular re-education activities to improve: Balance, Coordination and Proprioception for 25 minutes. The following activities were included:  Bosu marching 1 min x2 3# - not today  Tandem stance blue foam 30 sec x3   SLS 30 sec x 3 blue foam  Bosu balance 30 sec x3 with ball toss    Home Exercises Provided and Patient Education Provided     Education provided:   - HEP and correct exercise technique    Written Home Exercises Provided: Patient instructed to cont prior HEP.  Exercises were reviewed and Latonia was able to  demonstrate them prior to the end of the session.  Latonia demonstrated good  understanding of the education provided.     See EMR under Patient Instructions for exercises provided prior visit.    Assessment     Patient was able to tolerate progression of balance activities with dynamic marching on uneven air surface. Less frequent UE support balancing on uneven surface as evidence by her ability to perform with ball tossing. She demonstrated increased sway and some fatigue noted in her ankles due to increased use of ankle strategy. SBA required with few periods of minimal assistance. Improved functional strength and endurance as she was able to to increase amount of stairs climbed today.  Patient will continue to benefit from further progression of balance and strengthening.    Latonia is progressing well towards her goals.   Pt prognosis is Good.     Pt will continue to benefit from skilled outpatient physical therapy to address the deficits listed in the problem list box on initial evaluation, provide pt/family education and to maximize pt's level of independence in the home and community environment.     Pt's spiritual, cultural and educational needs considered and pt agreeable to plan of care and goals.     Anticipated barriers to physical therapy: none    Goals:  Short Term Goals: In 3 weeks   1.I with HEP MET  2.Pt to increase BLE strength by 1/2 grade to improve functional tasks.   3.Pt to improve score on the FOTO by 10%.  4. Patient to improve SLS to 10 seconds  5. Patient to improve balance outcome measures by 1-2 points.  7. Pt to be educated on postural/body mechanics awareness.     Long Term Goals: In 6 weeks  1.Patient to improve score on the FOTO by 20%.  2. Patient to demo increase in LE strength to 4+/5 to improve functional tasks.  3. Patient to perform SLS for 20 seconds  4. Patient to demonstrate no deficits on balance outcome measures.  5. Patient to perform daily activities including gardening,  stair climbing and walking with obstacles without limitation.      Plan     Monitor response to tx and progress with PT POC as indicated. Continue to progress balance and strengthening.      Kathrine Frank, PT

## 2020-08-31 PROCEDURE — 99454 REM MNTR PHYSIOL PARAM 16-30: CPT | Mod: PBBFAC | Performed by: FAMILY MEDICINE

## 2020-09-01 ENCOUNTER — CLINICAL SUPPORT (OUTPATIENT)
Dept: REHABILITATION | Facility: HOSPITAL | Age: 70
End: 2020-09-01
Attending: INTERNAL MEDICINE
Payer: MEDICARE

## 2020-09-01 DIAGNOSIS — R26.89 BALANCE PROBLEM: ICD-10-CM

## 2020-09-01 DIAGNOSIS — R53.1 WEAKNESS: ICD-10-CM

## 2020-09-01 PROCEDURE — 97112 NEUROMUSCULAR REEDUCATION: CPT

## 2020-09-01 PROCEDURE — 97110 THERAPEUTIC EXERCISES: CPT

## 2020-09-01 NOTE — PLAN OF CARE
Outpatient Therapy Discharge Summary     Name: Latonia Oliver  Clinic Number: [id number]    Therapy Diagnosis:   Encounter Diagnoses   Name Primary?    Weakness     Balance problem      Physician: Jorge Moreno MD    Physician Orders: PT Eval and Treat   Medical Diagnosis from Referral: R26.89 (ICD-10-CM) - Imbalance  Evaluation Date: 8/3/2020    Date of Last visit: 9/1/2020  Total Visits Received: 8  Cancelled Visits: 0  No Show Visits: 0      Subjective     Update: Patient feels like she has made so much improvement and feels confident about her balance. She is ready for discharge    Objective     Update:   CMS Impairment/Limitation/Restriction for FOTO Lumbar Survey     Therapist reviewed FOTO scores for Latonia Oliver on 9/1/2020.   FOTO documents entered into [facility] - see Media section.     Limitation Score: 38% on eval; 6% today         Posture/Structure: Pt presents with FHP, rounded sh's, mild cervical lordosis, mild thoracic kyphosis, and increased LB lordosis.  No curvature of the spine noted.     Gait: WNL     Neuro/Sensation:        Sensation: minimal decreased sensation along right ankle medial and lateral     Balance: SL R=20 sec, L= 25 sec.     Strength:                                                        R          L                                      Hip flexors                   4+/5     4+/5  Quadriceps                  5/5     5/5                                           Hamstrings                  4+/5      4+/5                                      PF                                5/5       5/5                                      DF                               5/5     5/5                                      IR                                 4+/5     4+/5                                      ER                               4/5      4/5                                      Gluteus Medius           5/5       5/5                                      Gluteus Grady        4+/5       5/5                                        Special Test:    Dynamic Gait Index = 24/24  Tinetti Balance = 28/28  El Balance = 56/56       Assessment     Update: Significant improvements with BLE strength and balance. She made perfect scores on balance outcome measures and has met all short and long term goals.     Goals:  Short Term Goals: In 3 weeks   1.I with HEP MET  2.Pt to increase BLE strength by 1/2 grade to improve functional tasks. MET  3.Pt to improve score on the FOTO by 10%. MET  4. Patient to improve SLS to 10 seconds MET  5. Patient to improve balance outcome measures by 1-2 points. MET  6. Pt to be educated on postural/body mechanics awareness. MET     Long Term Goals: In 6 weeks  1.Patient to improve score on the FOTO by 20%.   2. Patient to demo increase in LE strength to 4+/5 to improve functional tasks. MET  3. Patient to perform SLS for 20 seconds MET  4. Patient to demonstrate no deficits on balance outcome measures. MET  5. Patient to perform daily activities including gardening, stair climbing and walking with obstacles without limitation. MET    Discharge reason: Patient has met all of his/her goals and Patient requested discharge    Plan   This patient is discharged from Physical Therapy

## 2020-09-01 NOTE — PROGRESS NOTES
Physical Therapy Treatment Note     Name: Latonia Oliver  Clinic Number: [id number]    Therapy Diagnosis:   Encounter Diagnoses   Name Primary?    Weakness     Balance problem      Physician: Jorge Moreno MD    Visit Date: 9/1/2020    Physician Orders: PT Eval and Treat   Medical Diagnosis from Referral: R26.89 (ICD-10-CM) - Imbalance  Evaluation Date: 8/3/2020  Authorization Period Expiration: 7/28/2021  Plan of Care Expiration: 9/3/2020  Visit # / Visits authorized: 8/ 20    Time In: 10:00  Time Out: 11:00  Total Billable Time: 55 minutes    Precautions: Standard and Diabetes    Subjective     Pt reports: feeling happy, strong and independent with all recreational activities. She is ready for discharge today.   She was compliant with home exercise program.  Response to previous treatment: increased soreness  Functional change: able to walk a mile around the block     Pain: 0/10  Location: bilateral balance      Objective   Latonia received therapeutic exercises to develop strength, endurance, flexibility, posture and core stabilization for 45 minutes including:  T-mill walking 10min (encourage no UE support)  Hip 3 way YTB (flexion, abduction, extension) R/L  Stairs 26  Shuttle Squats 7B 2 min  Reassessment and extended HEP to transition to self care     Not today:  lunges  Prone quad stretch  Tall kneeling bridges 10#  Floor transfers with chair and without (side sit <> tall knee <> 1/2 kneel <> stand) R/L  Step downs    Latonia participated in neuromuscular re-education activities to improve: Balance, Coordination and Proprioception for 10 minutes. The following activities were included:  Bosu marching 1 min x2 3# - not today  Tandem stance blue foam 30 sec x3 - not today  SLS 30 sec x 3 blue foam  Bosu balance 30 sec x3 with ball toss - not today  Balance outcome measures  Walking with head turns    Home Exercises Provided and Patient Education Provided     Education provided:   - extended HEP     Written  Home Exercises Provided: Patient instructed to cont prior HEP.  Exercises were reviewed and Latonia was able to demonstrate them prior to the end of the session.  Latonia demonstrated good  understanding of the education provided.     See EMR under Patient Instructions for exercises provided prior visit.    Assessment     Patient has met all short and long term goals. She has demonstrated understanding of extended HEP and is ready for transition to self care. Patient will be discharged from PT.    Latonia is progressing well towards her goals.   Pt prognosis is Good.     Pt will continue to benefit from skilled outpatient physical therapy to address the deficits listed in the problem list box on initial evaluation, provide pt/family education and to maximize pt's level of independence in the home and community environment.     Pt's spiritual, cultural and educational needs considered and pt agreeable to plan of care and goals.     Anticipated barriers to physical therapy: none    Goals:  Short Term Goals: In 3 weeks   1.I with HEP MET  2.Pt to increase BLE strength by 1/2 grade to improve functional tasks.  MET  3.Pt to improve score on the FOTO by 10%. MET  4. Patient to improve SLS to 10 seconds MET  5. Patient to improve balance outcome measures by 1-2 points. MET  7. Pt to be educated on postural/body mechanics awareness. MET     Long Term Goals: In 6 weeks  1.Patient to improve score on the FOTO by 20%. MET  2. Patient to demo increase in LE strength to 4+/5 to improve functional tasks. MET  3. Patient to perform SLS for 20 seconds MET  4. Patient to demonstrate no deficits on balance outcome measures. MET  5. Patient to perform daily activities including gardening, stair climbing and walking with obstacles without limitation. MET      Plan     Patient is discharged from PT      Kathrine Frank PT

## 2020-09-28 ENCOUNTER — PATIENT OUTREACH (OUTPATIENT)
Dept: OTHER | Facility: OTHER | Age: 70
End: 2020-09-28

## 2020-09-30 ENCOUNTER — PATIENT OUTREACH (OUTPATIENT)
Dept: ADMINISTRATIVE | Facility: HOSPITAL | Age: 70
End: 2020-09-30

## 2020-10-02 RX ORDER — CITALOPRAM 40 MG/1
TABLET, FILM COATED ORAL
Qty: 90 TABLET | Refills: 0 | Status: SHIPPED | OUTPATIENT
Start: 2020-10-02 | End: 2021-03-04 | Stop reason: SDUPTHER

## 2020-10-14 ENCOUNTER — PATIENT OUTREACH (OUTPATIENT)
Dept: ADMINISTRATIVE | Facility: HOSPITAL | Age: 70
End: 2020-10-14

## 2020-10-14 NOTE — PROGRESS NOTES
[facility]: Called pt to f/u & verify if had Mammogram yet & name of MD to update Teams. Pt did not answer, LVM.

## 2020-10-21 NOTE — PROGRESS NOTES
Patient had no records at [facility] for mammogram forward to [facility].

## 2020-10-26 ENCOUNTER — PATIENT OUTREACH (OUTPATIENT)
Dept: ADMINISTRATIVE | Facility: HOSPITAL | Age: 70
End: 2020-10-26

## 2020-10-26 NOTE — PROGRESS NOTES
I  have attempted to contacted patient to schedule awv. Left voice mail     
muscle strength/gross motor/fine motor/gait, locomotion, and balance/cognitive impairment

## 2020-11-16 ENCOUNTER — PATIENT OUTREACH (OUTPATIENT)
Dept: OTHER | Facility: OTHER | Age: 70
End: 2020-11-16

## 2020-11-16 DIAGNOSIS — I10 ESSENTIAL HYPERTENSION: Primary | ICD-10-CM

## 2020-11-30 ENCOUNTER — PES CALL (OUTPATIENT)
Dept: ADMINISTRATIVE | Facility: CLINIC | Age: 70
End: 2020-11-30

## 2020-12-14 RX ORDER — AMLODIPINE BESYLATE 5 MG/1
10 TABLET ORAL DAILY
Qty: 60 TABLET | Refills: 0
Start: 2020-12-14 | End: 2020-12-28

## 2020-12-14 NOTE — PROGRESS NOTES
Digital Medicine: Clinician Follow-Up    Patient is concerned about her elevated BP, phill in the morning. Her sister had a stroke 2 years ago, and they are similar in age. She is amenable to med changes at this time.     The history is provided by the patient.   Follow-up reason(s): routine follow up.   Patient is not experiencing signs/symptoms of hypertension. Does note some headache in the morning, but she also has sinus issues.             Last 5 Patient Entered Readings                                      Current 30 Day Average: 139/78     Recent Readings 12/12/2020 12/10/2020 12/8/2020 12/7/2020 12/7/2020    SBP (mmHg) 140 136 132 149 -    DBP (mmHg) 77 76 73 85 -    Pulse 71 65 66 65 58        Last 6 Patient Entered Readings                                          Most Recent A1c: 5.7% on 7/10/2020  (Goal: 7%)     Recent Readings 12/12/2020 12/10/2020 12/8/2020 12/7/2020 12/4/2020    Blood Glucose (mg/dL) 177 129 143 133 148               Depression Screening  Did not address depression screening.    Sleep Apnea Screening    Did not address sleep apnea screening.     Medication Affordability Screening  Did not address medication affordability screening.     Medication Adherence-Medication adherence was assessed.        Takes both meds in the AM.       ASSESSMENT(S)  Patients BP average is 139/78 mmHg, which is above goal. Patient's BP goal is less than or equal to 130/80.     Patient would like to adjust meds, most appropriate to increase CCB at this time.       Hypertension Plan  Hypertension Medication Change. Increase amlodipine to 10 mg QD.   Continue current therapy. Benazepril-HCTZ as prescribed.   Continue current diet/physical activity routine.  Provided patient education. Patient and I discussed why BP readings should be taken after meds, in addition to duration of action and peak concentration times of medications.   F/u in 2 weeks.      Addressed patient questions and patient has my contact  information if needed prior to next outreach. Patient verbalizes understanding.             There are no preventive care reminders to display for this patient.  There are no preventive care reminders to display for this patient.      Hypertension Medications             amLODIPine (NORVASC) 5 MG tablet Take 1 tablet (5 mg total) by mouth once daily.    benazepril-hydrochlorthiazide (LOTENSIN HCT) 20-25 mg Tab Take 1 tablet by mouth once daily

## 2020-12-16 ENCOUNTER — PATIENT MESSAGE (OUTPATIENT)
Dept: ADMINISTRATIVE | Facility: OTHER | Age: 70
End: 2020-12-16

## 2020-12-24 PROCEDURE — 99454 REM MNTR PHYSIOL PARAM 16-30: CPT | Mod: PBBFAC | Performed by: FAMILY MEDICINE

## 2020-12-28 ENCOUNTER — PATIENT OUTREACH (OUTPATIENT)
Dept: OTHER | Facility: OTHER | Age: 70
End: 2020-12-28

## 2020-12-28 DIAGNOSIS — I10 ESSENTIAL HYPERTENSION: ICD-10-CM

## 2020-12-28 RX ORDER — AMLODIPINE BESYLATE 10 MG/1
10 TABLET ORAL DAILY
Qty: 90 TABLET | Refills: 0 | Status: SHIPPED | OUTPATIENT
Start: 2020-12-28 | End: 2021-03-26

## 2020-12-28 NOTE — PROGRESS NOTES
"Digital Medicine: Clinician Follow-Up    Patient reported she has noticed a difference in her blood pressure or how she feels, but did acknowledge several readings at goal since increasing amlodipine. She is still concerned that her readings are higher in the evening.    She reports that after Jan 1 she can get a "digital A1C" which she believes is through her insurance and does not require an appointment.     The history is provided by the patient.   Follow-up reason(s): medication change follow-up.     Hypertension    Readings are trending down due to medication adherence.       Diabetes    Patient's blood glucose is stable.   Additional Follow-up details: She has a rheumatology appt on 1/28 and will be asking for a new PCP recommendation at that time.     Patient did make medication change.    Is patient tolerating med change? yes            Last 5 Patient Entered Readings                                      Current 30 Day Average: 140/77     Recent Readings 12/27/2020 12/27/2020 12/27/2020 12/24/2020 12/23/2020    SBP (mmHg) 141 - 134 146 153    DBP (mmHg) 75 - 75 80 85    Pulse 74 62 65 66 64        Last 6 Patient Entered Readings                                          Most Recent A1c: 5.7% on 7/10/2020  (Goal: 7%)     Recent Readings 12/27/2020 12/23/2020 12/22/2020 12/17/2020 12/15/2020    Blood Glucose (mg/dL) 147 99 121 116 121               Depression Screening  Did not address depression screening.    Sleep Apnea Screening    Did not address sleep apnea screening.     Medication Affordability Screening  Did not address medication affordability screening.     Medication Adherence-Medication adherence was assessed.          ASSESSMENT(S)  Patients BP average is 140/77 mmHg, which is above goal. Patient's BP goal is less than or equal to 130/80.     HTN - BP still elevated but trending down. Patient has had several readings at goal, can continue to emphasize proper technique at this time.     DM - " controlled, no med changes recommended.       Hypertension Plan  Continue current therapy. Continue amlodipine 10 mg, benazepril-HCTZ as prescribed  Continue current diet/physical activity routine.  Provided patient education.  I encouraged patient to test when she is appropriately rested and take a 2nd reading if the first is elevated. I sent an updated rx to her preferred pharmacy. Patient to inform me when she gets a new PCP. F/u in 6 weeks.   Diabetes Plan  Continue current therapy.  Continue current diet/physical activity routine.  Patient will send in new lab work when she gets her updated A1C.      Addressed patient questions and patient has my contact information if needed prior to next outreach. Patient verbalizes understanding.             There are no preventive care reminders to display for this patient.  There are no preventive care reminders to display for this patient.      Hypertension Medications             amLODIPine (NORVASC) 5 MG tablet Take 2 tablets (10 mg total) by mouth once daily.    benazepril-hydrochlorthiazide (LOTENSIN HCT) 20-25 mg Tab Take 1 tablet by mouth once daily

## 2020-12-31 ENCOUNTER — PATIENT OUTREACH (OUTPATIENT)
Dept: OTHER | Facility: OTHER | Age: 70
End: 2020-12-31

## 2020-12-31 ENCOUNTER — PES CALL (OUTPATIENT)
Dept: ADMINISTRATIVE | Facility: CLINIC | Age: 70
End: 2020-12-31

## 2021-01-06 ENCOUNTER — LAB VISIT (OUTPATIENT)
Dept: LAB | Facility: HOSPITAL | Age: 71
End: 2021-01-06
Attending: FAMILY MEDICINE
Payer: MEDICARE

## 2021-01-06 DIAGNOSIS — E11.9 TYPE 2 DIABETES MELLITUS WITHOUT COMPLICATION, WITHOUT LONG-TERM CURRENT USE OF INSULIN: ICD-10-CM

## 2021-01-06 LAB
ESTIMATED AVG GLUCOSE: 114 MG/DL (ref 68–131)
HBA1C MFR BLD HPLC: 5.6 % (ref 4–5.6)

## 2021-01-06 PROCEDURE — 36415 COLL VENOUS BLD VENIPUNCTURE: CPT

## 2021-01-06 PROCEDURE — 83036 HEMOGLOBIN GLYCOSYLATED A1C: CPT

## 2021-01-26 ENCOUNTER — PATIENT MESSAGE (OUTPATIENT)
Dept: OTHER | Facility: OTHER | Age: 71
End: 2021-01-26

## 2021-01-26 ENCOUNTER — PATIENT OUTREACH (OUTPATIENT)
Dept: ADMINISTRATIVE | Facility: OTHER | Age: 71
End: 2021-01-26

## 2021-01-26 DIAGNOSIS — Z12.31 ENCOUNTER FOR SCREENING MAMMOGRAM FOR MALIGNANT NEOPLASM OF BREAST: Primary | ICD-10-CM

## 2021-01-26 DIAGNOSIS — Z12.11 ENCOUNTER FOR FIT (FECAL IMMUNOCHEMICAL TEST) SCREENING: ICD-10-CM

## 2021-01-27 ENCOUNTER — LAB VISIT (OUTPATIENT)
Dept: LAB | Facility: HOSPITAL | Age: 71
End: 2021-01-27
Attending: FAMILY MEDICINE
Payer: MEDICARE

## 2021-01-27 DIAGNOSIS — M35.01 SJOGREN'S SYNDROME WITH KERATOCONJUNCTIVITIS SICCA: ICD-10-CM

## 2021-01-27 DIAGNOSIS — Z51.81 MEDICATION MONITORING ENCOUNTER: ICD-10-CM

## 2021-01-27 LAB
ALBUMIN SERPL BCP-MCNC: 4.3 G/DL (ref 3.5–5.2)
ALP SERPL-CCNC: 57 U/L (ref 55–135)
ALT SERPL W/O P-5'-P-CCNC: 36 U/L (ref 10–44)
ANION GAP SERPL CALC-SCNC: 11 MMOL/L (ref 8–16)
AST SERPL-CCNC: 34 U/L (ref 10–40)
BASOPHILS # BLD AUTO: 0.05 K/UL (ref 0–0.2)
BASOPHILS NFR BLD: 1.3 % (ref 0–1.9)
BILIRUB SERPL-MCNC: 0.6 MG/DL (ref 0.1–1)
BUN SERPL-MCNC: 14 MG/DL (ref 8–23)
CALCIUM SERPL-MCNC: 8.9 MG/DL (ref 8.7–10.5)
CHLORIDE SERPL-SCNC: 101 MMOL/L (ref 95–110)
CO2 SERPL-SCNC: 29 MMOL/L (ref 23–29)
CREAT SERPL-MCNC: 0.7 MG/DL (ref 0.5–1.4)
CRP SERPL-MCNC: 2 MG/L (ref 0–8.2)
DIFFERENTIAL METHOD: ABNORMAL
EOSINOPHIL # BLD AUTO: 0.1 K/UL (ref 0–0.5)
EOSINOPHIL NFR BLD: 2.5 % (ref 0–8)
ERYTHROCYTE [DISTWIDTH] IN BLOOD BY AUTOMATED COUNT: 13 % (ref 11.5–14.5)
ERYTHROCYTE [SEDIMENTATION RATE] IN BLOOD BY WESTERGREN METHOD: 14 MM/HR (ref 0–20)
EST. GFR  (AFRICAN AMERICAN): >60 ML/MIN/1.73 M^2
EST. GFR  (NON AFRICAN AMERICAN): >60 ML/MIN/1.73 M^2
GLUCOSE SERPL-MCNC: 112 MG/DL (ref 70–110)
HCT VFR BLD AUTO: 38.5 % (ref 37–48.5)
HGB BLD-MCNC: 12.8 G/DL (ref 12–16)
IMM GRANULOCYTES # BLD AUTO: 0 K/UL (ref 0–0.04)
IMM GRANULOCYTES NFR BLD AUTO: 0 % (ref 0–0.5)
LYMPHOCYTES # BLD AUTO: 1.1 K/UL (ref 1–4.8)
LYMPHOCYTES NFR BLD: 27.8 % (ref 18–48)
MCH RBC QN AUTO: 32.2 PG (ref 27–31)
MCHC RBC AUTO-ENTMCNC: 33.2 G/DL (ref 32–36)
MCV RBC AUTO: 97 FL (ref 82–98)
MONOCYTES # BLD AUTO: 0.4 K/UL (ref 0.3–1)
MONOCYTES NFR BLD: 11.1 % (ref 4–15)
NEUTROPHILS # BLD AUTO: 2.3 K/UL (ref 1.8–7.7)
NEUTROPHILS NFR BLD: 57.3 % (ref 38–73)
NRBC BLD-RTO: 0 /100 WBC
PLATELET # BLD AUTO: 165 K/UL (ref 150–350)
PMV BLD AUTO: 9.3 FL (ref 9.2–12.9)
POTASSIUM SERPL-SCNC: 4.2 MMOL/L (ref 3.5–5.1)
PROT SERPL-MCNC: 7.3 G/DL (ref 6–8.4)
RBC # BLD AUTO: 3.98 M/UL (ref 4–5.4)
SODIUM SERPL-SCNC: 141 MMOL/L (ref 136–145)
WBC # BLD AUTO: 3.95 K/UL (ref 3.9–12.7)

## 2021-01-27 PROCEDURE — 85025 COMPLETE CBC W/AUTO DIFF WBC: CPT

## 2021-01-27 PROCEDURE — 80053 COMPREHEN METABOLIC PANEL: CPT

## 2021-01-27 PROCEDURE — 36415 COLL VENOUS BLD VENIPUNCTURE: CPT

## 2021-01-27 PROCEDURE — 86140 C-REACTIVE PROTEIN: CPT

## 2021-01-27 PROCEDURE — 85651 RBC SED RATE NONAUTOMATED: CPT

## 2021-01-28 ENCOUNTER — OFFICE VISIT (OUTPATIENT)
Dept: RHEUMATOLOGY | Facility: CLINIC | Age: 71
End: 2021-01-28
Payer: MEDICARE

## 2021-01-28 ENCOUNTER — HOSPITAL ENCOUNTER (OUTPATIENT)
Dept: RADIOLOGY | Facility: HOSPITAL | Age: 71
Discharge: HOME | End: 2021-01-28
Attending: PHYSICIAN ASSISTANT
Payer: MEDICARE

## 2021-01-28 VITALS
BODY MASS INDEX: 35.91 KG/M2 | SYSTOLIC BLOOD PRESSURE: 129 MMHG | HEART RATE: 75 BPM | WEIGHT: 195.13 LBS | DIASTOLIC BLOOD PRESSURE: 78 MMHG | HEIGHT: 62 IN

## 2021-01-28 DIAGNOSIS — Z51.81 MEDICATION MONITORING ENCOUNTER: ICD-10-CM

## 2021-01-28 DIAGNOSIS — M85.89 OSTEOPENIA OF MULTIPLE SITES: ICD-10-CM

## 2021-01-28 DIAGNOSIS — M54.9 MID BACK PAIN ON RIGHT SIDE: ICD-10-CM

## 2021-01-28 DIAGNOSIS — M79.7 FIBROMYALGIA: ICD-10-CM

## 2021-01-28 DIAGNOSIS — M17.11 PRIMARY OSTEOARTHRITIS OF RIGHT KNEE: ICD-10-CM

## 2021-01-28 DIAGNOSIS — M35.01 SJOGREN'S SYNDROME WITH KERATOCONJUNCTIVITIS SICCA: Primary | ICD-10-CM

## 2021-01-28 PROCEDURE — 99214 OFFICE O/P EST MOD 30 MIN: CPT | Mod: PBBFAC,25 | Performed by: PHYSICIAN ASSISTANT

## 2021-01-28 PROCEDURE — 96372 THER/PROPH/DIAG INJ SC/IM: CPT | Mod: PBBFAC

## 2021-01-28 PROCEDURE — 72070 XR THORACIC SPINE AP LATERAL: ICD-10-PCS | Mod: 26,,, | Performed by: RADIOLOGY

## 2021-01-28 PROCEDURE — 99999 PR PBB SHADOW E&M-EST. PATIENT-LVL IV: CPT | Mod: PBBFAC,,, | Performed by: PHYSICIAN ASSISTANT

## 2021-01-28 PROCEDURE — 99215 PR OFFICE/OUTPT VISIT, EST, LEVL V, 40-54 MIN: ICD-10-PCS | Mod: S$PBB,,, | Performed by: PHYSICIAN ASSISTANT

## 2021-01-28 PROCEDURE — 72070 X-RAY EXAM THORAC SPINE 2VWS: CPT | Mod: TC

## 2021-01-28 PROCEDURE — 99999 PR PBB SHADOW E&M-EST. PATIENT-LVL IV: ICD-10-PCS | Mod: PBBFAC,,, | Performed by: PHYSICIAN ASSISTANT

## 2021-01-28 PROCEDURE — 99215 OFFICE O/P EST HI 40 MIN: CPT | Mod: S$PBB,,, | Performed by: PHYSICIAN ASSISTANT

## 2021-01-28 PROCEDURE — 72070 X-RAY EXAM THORAC SPINE 2VWS: CPT | Mod: 26,,, | Performed by: RADIOLOGY

## 2021-01-28 RX ORDER — ZINC GLUCONATE 50 MG
50 TABLET ORAL DAILY
COMMUNITY
End: 2022-03-02

## 2021-01-28 RX ORDER — TRIAMCINOLONE ACETONIDE 40 MG/ML
40 INJECTION, SUSPENSION INTRA-ARTICULAR; INTRAMUSCULAR
Status: COMPLETED | OUTPATIENT
Start: 2021-01-28 | End: 2021-01-28

## 2021-01-28 RX ORDER — LANOLIN ALCOHOL/MO/W.PET/CERES
100 CREAM (GRAM) TOPICAL DAILY
COMMUNITY
End: 2023-10-25

## 2021-01-28 RX ADMIN — TRIAMCINOLONE ACETONIDE 40 MG: 40 INJECTION, SUSPENSION INTRA-ARTICULAR; INTRAMUSCULAR at 10:01

## 2021-02-01 ENCOUNTER — PATIENT MESSAGE (OUTPATIENT)
Dept: RHEUMATOLOGY | Facility: CLINIC | Age: 71
End: 2021-02-01

## 2021-02-02 ENCOUNTER — PATIENT MESSAGE (OUTPATIENT)
Dept: RHEUMATOLOGY | Facility: CLINIC | Age: 71
End: 2021-02-02

## 2021-02-02 DIAGNOSIS — M35.01 SJOGREN'S SYNDROME WITH KERATOCONJUNCTIVITIS SICCA: ICD-10-CM

## 2021-02-02 RX ORDER — HYDROXYCHLOROQUINE SULFATE 200 MG/1
200 TABLET, FILM COATED ORAL 2 TIMES DAILY
Qty: 180 TABLET | Refills: 3 | Status: SHIPPED | OUTPATIENT
Start: 2021-02-02 | End: 2022-01-03 | Stop reason: SDUPTHER

## 2021-02-15 ENCOUNTER — PATIENT MESSAGE (OUTPATIENT)
Dept: RHEUMATOLOGY | Facility: CLINIC | Age: 71
End: 2021-02-15

## 2021-03-04 ENCOUNTER — PATIENT MESSAGE (OUTPATIENT)
Dept: RHEUMATOLOGY | Facility: CLINIC | Age: 71
End: 2021-03-04

## 2021-03-04 RX ORDER — CITALOPRAM 40 MG/1
TABLET, FILM COATED ORAL
Qty: 90 TABLET | Refills: 3 | Status: SHIPPED | OUTPATIENT
Start: 2021-03-04 | End: 2021-12-09 | Stop reason: SDUPTHER

## 2021-03-05 RX ORDER — CITALOPRAM 40 MG/1
40 TABLET, FILM COATED ORAL DAILY
Qty: 90 TABLET | Refills: 0 | OUTPATIENT
Start: 2021-03-05

## 2021-03-28 ENCOUNTER — PATIENT MESSAGE (OUTPATIENT)
Dept: RHEUMATOLOGY | Facility: CLINIC | Age: 71
End: 2021-03-28

## 2021-03-29 ENCOUNTER — PATIENT MESSAGE (OUTPATIENT)
Dept: PULMONOLOGY | Facility: CLINIC | Age: 71
End: 2021-03-29

## 2021-03-29 RX ORDER — BENAZEPRIL/HYDROCHLOROTHIAZIDE 20 MG-25MG
1 TABLET ORAL DAILY
Qty: 90 TABLET | Refills: 1 | Status: SHIPPED | OUTPATIENT
Start: 2021-03-29 | End: 2021-07-09 | Stop reason: DRUGHIGH

## 2021-04-30 ENCOUNTER — OFFICE VISIT (OUTPATIENT)
Dept: INTERNAL MEDICINE | Facility: CLINIC | Age: 71
End: 2021-04-30
Payer: MEDICARE

## 2021-04-30 VITALS
DIASTOLIC BLOOD PRESSURE: 60 MMHG | OXYGEN SATURATION: 97 % | BODY MASS INDEX: 34.69 KG/M2 | TEMPERATURE: 97 F | WEIGHT: 188.5 LBS | HEIGHT: 62 IN | HEART RATE: 79 BPM | SYSTOLIC BLOOD PRESSURE: 116 MMHG

## 2021-04-30 DIAGNOSIS — M79.7 FIBROMYALGIA: ICD-10-CM

## 2021-04-30 DIAGNOSIS — E11.9 TYPE 2 DIABETES MELLITUS WITHOUT COMPLICATION, WITHOUT LONG-TERM CURRENT USE OF INSULIN: ICD-10-CM

## 2021-04-30 DIAGNOSIS — I10 HYPERTENSION, UNSPECIFIED TYPE: ICD-10-CM

## 2021-04-30 DIAGNOSIS — Q87.19 SJOGREN-LARSSON SYNDROME: ICD-10-CM

## 2021-04-30 DIAGNOSIS — Z00.00 ENCOUNTER FOR MEDICAL EXAMINATION TO ESTABLISH CARE: Primary | ICD-10-CM

## 2021-04-30 DIAGNOSIS — M85.80 OSTEOPENIA, UNSPECIFIED LOCATION: ICD-10-CM

## 2021-04-30 DIAGNOSIS — E11.9 CONTROLLED TYPE 2 DIABETES MELLITUS WITHOUT COMPLICATION, WITHOUT LONG-TERM CURRENT USE OF INSULIN: ICD-10-CM

## 2021-04-30 DIAGNOSIS — Z79.899 HIGH RISK MEDICATION USE: ICD-10-CM

## 2021-04-30 PROCEDURE — 99999 PR PBB SHADOW E&M-EST. PATIENT-LVL III: ICD-10-PCS | Mod: PBBFAC,,, | Performed by: FAMILY MEDICINE

## 2021-04-30 PROCEDURE — 99213 OFFICE O/P EST LOW 20 MIN: CPT | Mod: PBBFAC | Performed by: FAMILY MEDICINE

## 2021-04-30 PROCEDURE — 99999 PR PBB SHADOW E&M-EST. PATIENT-LVL III: CPT | Mod: PBBFAC,,, | Performed by: FAMILY MEDICINE

## 2021-04-30 PROCEDURE — 99214 PR OFFICE/OUTPT VISIT, EST, LEVL IV, 30-39 MIN: ICD-10-PCS | Mod: S$PBB,,, | Performed by: FAMILY MEDICINE

## 2021-04-30 PROCEDURE — 99214 OFFICE O/P EST MOD 30 MIN: CPT | Mod: S$PBB,,, | Performed by: FAMILY MEDICINE

## 2021-05-07 ENCOUNTER — PATIENT OUTREACH (OUTPATIENT)
Dept: ADMINISTRATIVE | Facility: HOSPITAL | Age: 71
End: 2021-05-07

## 2021-05-17 ENCOUNTER — PATIENT MESSAGE (OUTPATIENT)
Dept: PULMONOLOGY | Facility: CLINIC | Age: 71
End: 2021-05-17

## 2021-05-17 DIAGNOSIS — G47.61 PERIODIC LIMB MOVEMENT SLEEP DISORDER: ICD-10-CM

## 2021-05-17 DIAGNOSIS — G47.01 INSOMNIA DUE TO MEDICAL CONDITION: ICD-10-CM

## 2021-05-18 ENCOUNTER — PATIENT MESSAGE (OUTPATIENT)
Dept: PULMONOLOGY | Facility: CLINIC | Age: 71
End: 2021-05-18

## 2021-05-18 RX ORDER — CLONAZEPAM 1 MG/1
1 TABLET ORAL NIGHTLY
Qty: 30 TABLET | Refills: 5 | OUTPATIENT
Start: 2021-05-18

## 2021-05-24 ENCOUNTER — TELEPHONE (OUTPATIENT)
Dept: ADMINISTRATIVE | Facility: HOSPITAL | Age: 71
End: 2021-05-24

## 2021-05-25 ENCOUNTER — PATIENT MESSAGE (OUTPATIENT)
Dept: ADMINISTRATIVE | Facility: OTHER | Age: 71
End: 2021-05-25

## 2021-05-25 ENCOUNTER — PATIENT OUTREACH (OUTPATIENT)
Dept: ADMINISTRATIVE | Facility: OTHER | Age: 71
End: 2021-05-25

## 2021-05-25 ENCOUNTER — TELEPHONE (OUTPATIENT)
Dept: PULMONOLOGY | Facility: CLINIC | Age: 71
End: 2021-05-25

## 2021-05-26 ENCOUNTER — OFFICE VISIT (OUTPATIENT)
Dept: PULMONOLOGY | Facility: CLINIC | Age: 71
End: 2021-05-26
Payer: MEDICARE

## 2021-05-26 VITALS
RESPIRATION RATE: 16 BRPM | DIASTOLIC BLOOD PRESSURE: 90 MMHG | HEIGHT: 62 IN | OXYGEN SATURATION: 95 % | SYSTOLIC BLOOD PRESSURE: 140 MMHG | WEIGHT: 192.25 LBS | HEART RATE: 79 BPM | BODY MASS INDEX: 35.38 KG/M2

## 2021-05-26 DIAGNOSIS — G47.61 PERIODIC LIMB MOVEMENT SLEEP DISORDER: ICD-10-CM

## 2021-05-26 DIAGNOSIS — G47.33 OSA ON CPAP: Primary | ICD-10-CM

## 2021-05-26 DIAGNOSIS — G47.01 INSOMNIA DUE TO MEDICAL CONDITION: ICD-10-CM

## 2021-05-26 PROCEDURE — 99999 PR PBB SHADOW E&M-EST. PATIENT-LVL IV: CPT | Mod: PBBFAC,,, | Performed by: INTERNAL MEDICINE

## 2021-05-26 PROCEDURE — 99214 OFFICE O/P EST MOD 30 MIN: CPT | Mod: PBBFAC | Performed by: INTERNAL MEDICINE

## 2021-05-26 PROCEDURE — 99214 PR OFFICE/OUTPT VISIT, EST, LEVL IV, 30-39 MIN: ICD-10-PCS | Mod: S$PBB,,, | Performed by: INTERNAL MEDICINE

## 2021-05-26 PROCEDURE — 99214 OFFICE O/P EST MOD 30 MIN: CPT | Mod: S$PBB,,, | Performed by: INTERNAL MEDICINE

## 2021-05-26 PROCEDURE — 99999 PR PBB SHADOW E&M-EST. PATIENT-LVL IV: ICD-10-PCS | Mod: PBBFAC,,, | Performed by: INTERNAL MEDICINE

## 2021-05-26 RX ORDER — CLONAZEPAM 1 MG/1
1 TABLET ORAL NIGHTLY
Qty: 30 TABLET | Refills: 5 | Status: SHIPPED | OUTPATIENT
Start: 2021-05-26 | End: 2021-12-03 | Stop reason: SDUPTHER

## 2021-05-26 RX ORDER — ZOLPIDEM TARTRATE 5 MG/1
5 TABLET ORAL NIGHTLY PRN
Qty: 30 TABLET | Refills: 5 | Status: SHIPPED | OUTPATIENT
Start: 2021-05-26 | End: 2022-05-26 | Stop reason: ALTCHOICE

## 2021-07-02 ENCOUNTER — PATIENT OUTREACH (OUTPATIENT)
Dept: ADMINISTRATIVE | Facility: HOSPITAL | Age: 71
End: 2021-07-02

## 2021-07-15 ENCOUNTER — TELEPHONE (OUTPATIENT)
Dept: RHEUMATOLOGY | Facility: CLINIC | Age: 71
End: 2021-07-15

## 2021-07-29 PROCEDURE — 99454 REM MNTR PHYSIOL PARAM 16-30: CPT | Mod: PBBFAC | Performed by: FAMILY MEDICINE

## 2021-07-30 ENCOUNTER — TELEPHONE (OUTPATIENT)
Dept: INTERNAL MEDICINE | Facility: CLINIC | Age: 71
End: 2021-07-30

## 2021-07-30 ENCOUNTER — OFFICE VISIT (OUTPATIENT)
Dept: INTERNAL MEDICINE | Facility: CLINIC | Age: 71
End: 2021-07-30
Payer: MEDICARE

## 2021-07-30 VITALS
WEIGHT: 198.19 LBS | OXYGEN SATURATION: 96 % | BODY MASS INDEX: 36.47 KG/M2 | SYSTOLIC BLOOD PRESSURE: 114 MMHG | TEMPERATURE: 97 F | HEIGHT: 62 IN | DIASTOLIC BLOOD PRESSURE: 60 MMHG | HEART RATE: 66 BPM

## 2021-07-30 DIAGNOSIS — G47.33 OSA ON CPAP: ICD-10-CM

## 2021-07-30 DIAGNOSIS — Z87.09 HISTORY OF SINUSITIS: ICD-10-CM

## 2021-07-30 DIAGNOSIS — Z79.899 ENCOUNTER FOR LONG-TERM (CURRENT) USE OF MEDICATIONS: ICD-10-CM

## 2021-07-30 DIAGNOSIS — M17.11 PRIMARY OSTEOARTHRITIS OF RIGHT KNEE: ICD-10-CM

## 2021-07-30 DIAGNOSIS — Z86.39 HISTORY OF DIABETES MELLITUS, TYPE II: Primary | ICD-10-CM

## 2021-07-30 PROCEDURE — 99999 PR PBB SHADOW E&M-EST. PATIENT-LVL IV: CPT | Mod: PBBFAC,,, | Performed by: FAMILY MEDICINE

## 2021-07-30 PROCEDURE — 99213 PR OFFICE/OUTPT VISIT, EST, LEVL III, 20-29 MIN: ICD-10-PCS | Mod: S$PBB,,, | Performed by: FAMILY MEDICINE

## 2021-07-30 PROCEDURE — 99214 OFFICE O/P EST MOD 30 MIN: CPT | Mod: PBBFAC | Performed by: FAMILY MEDICINE

## 2021-07-30 PROCEDURE — 99213 OFFICE O/P EST LOW 20 MIN: CPT | Mod: S$PBB,,, | Performed by: FAMILY MEDICINE

## 2021-07-30 PROCEDURE — 99999 PR PBB SHADOW E&M-EST. PATIENT-LVL IV: ICD-10-PCS | Mod: PBBFAC,,, | Performed by: FAMILY MEDICINE

## 2021-08-03 ENCOUNTER — LAB VISIT (OUTPATIENT)
Dept: LAB | Facility: HOSPITAL | Age: 71
End: 2021-08-03
Attending: FAMILY MEDICINE
Payer: MEDICARE

## 2021-08-03 ENCOUNTER — OFFICE VISIT (OUTPATIENT)
Dept: OTOLARYNGOLOGY | Facility: CLINIC | Age: 71
End: 2021-08-03
Payer: MEDICARE

## 2021-08-03 ENCOUNTER — PATIENT OUTREACH (OUTPATIENT)
Dept: ADMINISTRATIVE | Facility: HOSPITAL | Age: 71
End: 2021-08-03

## 2021-08-03 ENCOUNTER — PATIENT OUTREACH (OUTPATIENT)
Dept: ADMINISTRATIVE | Facility: OTHER | Age: 71
End: 2021-08-03

## 2021-08-03 VITALS — WEIGHT: 201.94 LBS | BODY MASS INDEX: 35.78 KG/M2 | HEIGHT: 63 IN

## 2021-08-03 DIAGNOSIS — Z79.899 ENCOUNTER FOR LONG-TERM (CURRENT) USE OF MEDICATIONS: ICD-10-CM

## 2021-08-03 DIAGNOSIS — Z87.09 HISTORY OF SINUSITIS: ICD-10-CM

## 2021-08-03 DIAGNOSIS — G47.33 OSA ON CPAP: ICD-10-CM

## 2021-08-03 DIAGNOSIS — J34.2 NASAL SEPTAL DEVIATION: Primary | ICD-10-CM

## 2021-08-03 DIAGNOSIS — E11.9 TYPE 2 DIABETES MELLITUS WITHOUT COMPLICATION, WITHOUT LONG-TERM CURRENT USE OF INSULIN: ICD-10-CM

## 2021-08-03 DIAGNOSIS — Z86.39 HISTORY OF DIABETES MELLITUS, TYPE II: ICD-10-CM

## 2021-08-03 DIAGNOSIS — T48.5X5A RHINITIS MEDICAMENTOSA: ICD-10-CM

## 2021-08-03 DIAGNOSIS — J31.0 RHINITIS MEDICAMENTOSA: ICD-10-CM

## 2021-08-03 DIAGNOSIS — M95.0 NASAL VALVE COLLAPSE: ICD-10-CM

## 2021-08-03 DIAGNOSIS — E11.9 TYPE 2 DIABETES MELLITUS WITHOUT COMPLICATION, WITHOUT LONG-TERM CURRENT USE OF INSULIN: Primary | ICD-10-CM

## 2021-08-03 DIAGNOSIS — J34.3 HYPERTROPHY OF INFERIOR NASAL TURBINATE: ICD-10-CM

## 2021-08-03 LAB
ALBUMIN SERPL BCP-MCNC: 4.1 G/DL (ref 3.5–5.2)
ALBUMIN/CREAT UR: 15.4 UG/MG (ref 0–30)
ALP SERPL-CCNC: 50 U/L (ref 55–135)
ALT SERPL W/O P-5'-P-CCNC: 26 U/L (ref 10–44)
ANION GAP SERPL CALC-SCNC: 4 MMOL/L (ref 8–16)
AST SERPL-CCNC: 26 U/L (ref 10–40)
BILIRUB SERPL-MCNC: 0.5 MG/DL (ref 0.1–1)
BUN SERPL-MCNC: 18 MG/DL (ref 8–23)
CALCIUM SERPL-MCNC: 9.2 MG/DL (ref 8.7–10.5)
CHLORIDE SERPL-SCNC: 103 MMOL/L (ref 95–110)
CHOLEST SERPL-MCNC: 194 MG/DL (ref 120–199)
CHOLEST/HDLC SERPL: 2.3 {RATIO} (ref 2–5)
CO2 SERPL-SCNC: 32 MMOL/L (ref 23–29)
CREAT SERPL-MCNC: 0.7 MG/DL (ref 0.5–1.4)
CREAT UR-MCNC: 91 MG/DL (ref 15–325)
EST. GFR  (AFRICAN AMERICAN): >60 ML/MIN/1.73 M^2
EST. GFR  (NON AFRICAN AMERICAN): >60 ML/MIN/1.73 M^2
ESTIMATED AVG GLUCOSE: 117 MG/DL (ref 68–131)
GLUCOSE SERPL-MCNC: 113 MG/DL (ref 70–110)
HBA1C MFR BLD: 5.7 % (ref 4–5.6)
HDLC SERPL-MCNC: 85 MG/DL (ref 40–75)
HDLC SERPL: 43.8 % (ref 20–50)
LDLC SERPL CALC-MCNC: 96.4 MG/DL (ref 63–159)
MICROALBUMIN UR DL<=1MG/L-MCNC: 14 UG/ML
NONHDLC SERPL-MCNC: 109 MG/DL
POTASSIUM SERPL-SCNC: 3.8 MMOL/L (ref 3.5–5.1)
PROT SERPL-MCNC: 7 G/DL (ref 6–8.4)
SODIUM SERPL-SCNC: 139 MMOL/L (ref 136–145)
TRIGL SERPL-MCNC: 63 MG/DL (ref 30–150)

## 2021-08-03 PROCEDURE — 80061 LIPID PANEL: CPT | Performed by: FAMILY MEDICINE

## 2021-08-03 PROCEDURE — 80053 COMPREHEN METABOLIC PANEL: CPT | Performed by: FAMILY MEDICINE

## 2021-08-03 PROCEDURE — 99999 PR PBB SHADOW E&M-EST. PATIENT-LVL IV: CPT | Mod: PBBFAC,,, | Performed by: OTOLARYNGOLOGY

## 2021-08-03 PROCEDURE — 99999 PR PBB SHADOW E&M-EST. PATIENT-LVL IV: ICD-10-PCS | Mod: PBBFAC,,, | Performed by: OTOLARYNGOLOGY

## 2021-08-03 PROCEDURE — 82043 UR ALBUMIN QUANTITATIVE: CPT | Performed by: FAMILY MEDICINE

## 2021-08-03 PROCEDURE — 99214 OFFICE O/P EST MOD 30 MIN: CPT | Mod: PBBFAC | Performed by: OTOLARYNGOLOGY

## 2021-08-03 PROCEDURE — 82570 ASSAY OF URINE CREATININE: CPT | Performed by: FAMILY MEDICINE

## 2021-08-03 PROCEDURE — 99203 OFFICE O/P NEW LOW 30 MIN: CPT | Mod: S$PBB,,, | Performed by: OTOLARYNGOLOGY

## 2021-08-03 PROCEDURE — 83036 HEMOGLOBIN GLYCOSYLATED A1C: CPT | Performed by: FAMILY MEDICINE

## 2021-08-03 PROCEDURE — 99203 PR OFFICE/OUTPT VISIT, NEW, LEVL III, 30-44 MIN: ICD-10-PCS | Mod: S$PBB,,, | Performed by: OTOLARYNGOLOGY

## 2021-08-03 PROCEDURE — 36415 COLL VENOUS BLD VENIPUNCTURE: CPT | Performed by: FAMILY MEDICINE

## 2021-08-03 RX ORDER — FLUTICASONE PROPIONATE 50 MCG
2 SPRAY, SUSPENSION (ML) NASAL DAILY
Qty: 15.8 ML | Refills: 5 | Status: SHIPPED | OUTPATIENT
Start: 2021-08-03

## 2021-08-04 DIAGNOSIS — I10 ESSENTIAL HYPERTENSION: ICD-10-CM

## 2021-08-06 ENCOUNTER — PATIENT MESSAGE (OUTPATIENT)
Dept: RHEUMATOLOGY | Facility: CLINIC | Age: 71
End: 2021-08-06

## 2021-08-06 ENCOUNTER — TELEPHONE (OUTPATIENT)
Dept: RHEUMATOLOGY | Facility: CLINIC | Age: 71
End: 2021-08-06

## 2021-08-09 RX ORDER — BENAZEPRIL HYDROCHLORIDE 40 MG/1
TABLET ORAL
Qty: 30 TABLET | Refills: 0 | OUTPATIENT
Start: 2021-08-09

## 2021-08-09 RX ORDER — HYDROCHLOROTHIAZIDE 25 MG/1
TABLET ORAL
Qty: 30 TABLET | Refills: 0 | OUTPATIENT
Start: 2021-08-09

## 2021-08-10 ENCOUNTER — PATIENT MESSAGE (OUTPATIENT)
Dept: ADMINISTRATIVE | Facility: HOSPITAL | Age: 71
End: 2021-08-10

## 2021-08-17 DIAGNOSIS — M25.561 RIGHT KNEE PAIN, UNSPECIFIED CHRONICITY: Primary | ICD-10-CM

## 2021-08-18 ENCOUNTER — OFFICE VISIT (OUTPATIENT)
Dept: ORTHOPEDICS | Facility: CLINIC | Age: 71
End: 2021-08-18
Payer: MEDICARE

## 2021-08-18 ENCOUNTER — HOSPITAL ENCOUNTER (OUTPATIENT)
Dept: RADIOLOGY | Facility: HOSPITAL | Age: 71
Discharge: HOME | End: 2021-08-18
Attending: PHYSICIAN ASSISTANT
Payer: MEDICARE

## 2021-08-18 VITALS
BODY MASS INDEX: 35.83 KG/M2 | DIASTOLIC BLOOD PRESSURE: 73 MMHG | SYSTOLIC BLOOD PRESSURE: 132 MMHG | HEART RATE: 64 BPM | WEIGHT: 202.25 LBS

## 2021-08-18 DIAGNOSIS — M25.561 CHRONIC PAIN OF RIGHT KNEE: Primary | ICD-10-CM

## 2021-08-18 DIAGNOSIS — G89.29 CHRONIC PAIN OF RIGHT KNEE: Primary | ICD-10-CM

## 2021-08-18 DIAGNOSIS — M25.561 RIGHT KNEE PAIN, UNSPECIFIED CHRONICITY: ICD-10-CM

## 2021-08-18 DIAGNOSIS — M17.11 PRIMARY OSTEOARTHRITIS OF RIGHT KNEE: ICD-10-CM

## 2021-08-18 PROCEDURE — 20610 DRAIN/INJ JOINT/BURSA W/O US: CPT | Mod: PBBFAC | Performed by: PHYSICIAN ASSISTANT

## 2021-08-18 PROCEDURE — 99999 PR PBB SHADOW E&M-EST. PATIENT-LVL IV: ICD-10-PCS | Mod: PBBFAC,,, | Performed by: PHYSICIAN ASSISTANT

## 2021-08-18 PROCEDURE — 99214 OFFICE O/P EST MOD 30 MIN: CPT | Mod: 25,S$PBB,, | Performed by: PHYSICIAN ASSISTANT

## 2021-08-18 PROCEDURE — 20610 DRAIN/INJ JOINT/BURSA W/O US: CPT | Mod: S$PBB,RT,, | Performed by: PHYSICIAN ASSISTANT

## 2021-08-18 PROCEDURE — 99999 PR PBB SHADOW E&M-EST. PATIENT-LVL IV: CPT | Mod: PBBFAC,,, | Performed by: PHYSICIAN ASSISTANT

## 2021-08-18 PROCEDURE — 73562 X-RAY EXAM OF KNEE 3: CPT | Mod: 26,LT,, | Performed by: RADIOLOGY

## 2021-08-18 PROCEDURE — 73562 XR KNEE ORTHO RIGHT WITH FLEXION: ICD-10-PCS | Mod: 26,LT,, | Performed by: RADIOLOGY

## 2021-08-18 PROCEDURE — 73564 XR KNEE ORTHO RIGHT WITH FLEXION: ICD-10-PCS | Mod: 26,RT,, | Performed by: RADIOLOGY

## 2021-08-18 PROCEDURE — 73564 X-RAY EXAM KNEE 4 OR MORE: CPT | Mod: TC,RT

## 2021-08-18 PROCEDURE — 99214 OFFICE O/P EST MOD 30 MIN: CPT | Mod: PBBFAC,25 | Performed by: PHYSICIAN ASSISTANT

## 2021-08-18 PROCEDURE — 20610 LARGE JOINT ASPIRATION/INJECTION: R KNEE: ICD-10-PCS | Mod: S$PBB,RT,, | Performed by: PHYSICIAN ASSISTANT

## 2021-08-18 PROCEDURE — 73564 X-RAY EXAM KNEE 4 OR MORE: CPT | Mod: 26,RT,, | Performed by: RADIOLOGY

## 2021-08-18 PROCEDURE — 99214 PR OFFICE/OUTPT VISIT, EST, LEVL IV, 30-39 MIN: ICD-10-PCS | Mod: 25,S$PBB,, | Performed by: PHYSICIAN ASSISTANT

## 2021-08-18 RX ORDER — METHYLPREDNISOLONE ACETATE 80 MG/ML
80 INJECTION, SUSPENSION INTRA-ARTICULAR; INTRALESIONAL; INTRAMUSCULAR; SOFT TISSUE
Status: DISCONTINUED | OUTPATIENT
Start: 2021-08-18 | End: 2021-08-18 | Stop reason: HOSPADM

## 2021-08-18 RX ADMIN — METHYLPREDNISOLONE ACETATE 80 MG: 80 INJECTION, SUSPENSION INTRALESIONAL; INTRAMUSCULAR; INTRASYNOVIAL; SOFT TISSUE at 02:08

## 2021-08-25 DIAGNOSIS — I10 ESSENTIAL HYPERTENSION: ICD-10-CM

## 2021-08-26 RX ORDER — HYDROCHLOROTHIAZIDE 25 MG/1
25 TABLET ORAL DAILY
Qty: 90 TABLET | Refills: 1 | Status: SHIPPED | OUTPATIENT
Start: 2021-08-26 | End: 2022-03-02

## 2021-09-12 ENCOUNTER — PATIENT OUTREACH (OUTPATIENT)
Dept: ADMINISTRATIVE | Facility: OTHER | Age: 71
End: 2021-09-12

## 2021-09-14 ENCOUNTER — HOSPITAL ENCOUNTER (OUTPATIENT)
Dept: RADIOLOGY | Facility: HOSPITAL | Age: 71
Discharge: HOME | End: 2021-09-14
Attending: OTOLARYNGOLOGY
Payer: MEDICARE

## 2021-09-14 ENCOUNTER — OFFICE VISIT (OUTPATIENT)
Dept: OTOLARYNGOLOGY | Facility: CLINIC | Age: 71
End: 2021-09-14
Payer: MEDICARE

## 2021-09-14 VITALS — BODY MASS INDEX: 37 KG/M2 | HEIGHT: 62 IN | WEIGHT: 201.06 LBS

## 2021-09-14 DIAGNOSIS — J34.3 HYPERTROPHY OF INFERIOR NASAL TURBINATE: ICD-10-CM

## 2021-09-14 DIAGNOSIS — J31.0 RHINITIS MEDICAMENTOSA: ICD-10-CM

## 2021-09-14 DIAGNOSIS — J34.2 NASAL SEPTAL DEVIATION: Primary | ICD-10-CM

## 2021-09-14 DIAGNOSIS — J34.2 NASAL SEPTAL DEVIATION: ICD-10-CM

## 2021-09-14 DIAGNOSIS — M95.0 NASAL VALVE COLLAPSE: ICD-10-CM

## 2021-09-14 DIAGNOSIS — T48.5X5A RHINITIS MEDICAMENTOSA: ICD-10-CM

## 2021-09-14 PROCEDURE — 99213 PR OFFICE/OUTPT VISIT, EST, LEVL III, 20-29 MIN: ICD-10-PCS | Mod: S$PBB,,, | Performed by: OTOLARYNGOLOGY

## 2021-09-14 PROCEDURE — 71045 XR CHEST 1 VIEW: ICD-10-PCS | Mod: 26,,, | Performed by: RADIOLOGY

## 2021-09-14 PROCEDURE — 71045 X-RAY EXAM CHEST 1 VIEW: CPT | Mod: 26,,, | Performed by: RADIOLOGY

## 2021-09-14 PROCEDURE — 99999 PR PBB SHADOW E&M-EST. PATIENT-LVL V: ICD-10-PCS | Mod: PBBFAC,,, | Performed by: OTOLARYNGOLOGY

## 2021-09-14 PROCEDURE — 99213 OFFICE O/P EST LOW 20 MIN: CPT | Mod: S$PBB,,, | Performed by: OTOLARYNGOLOGY

## 2021-09-14 PROCEDURE — 99999 PR PBB SHADOW E&M-EST. PATIENT-LVL V: CPT | Mod: PBBFAC,,, | Performed by: OTOLARYNGOLOGY

## 2021-09-14 PROCEDURE — 99215 OFFICE O/P EST HI 40 MIN: CPT | Mod: PBBFAC | Performed by: OTOLARYNGOLOGY

## 2021-09-14 PROCEDURE — 71045 X-RAY EXAM CHEST 1 VIEW: CPT | Mod: TC

## 2021-09-15 ENCOUNTER — OFFICE VISIT (OUTPATIENT)
Dept: ORTHOPEDICS | Facility: CLINIC | Age: 71
End: 2021-09-15
Payer: MEDICARE

## 2021-09-15 VITALS — DIASTOLIC BLOOD PRESSURE: 74 MMHG | SYSTOLIC BLOOD PRESSURE: 133 MMHG | HEART RATE: 58 BPM

## 2021-09-15 DIAGNOSIS — M17.11 PRIMARY OSTEOARTHRITIS OF RIGHT KNEE: Primary | ICD-10-CM

## 2021-09-15 DIAGNOSIS — M25.561 CHRONIC PAIN OF RIGHT KNEE: ICD-10-CM

## 2021-09-15 DIAGNOSIS — G89.29 CHRONIC PAIN OF RIGHT KNEE: ICD-10-CM

## 2021-09-15 PROCEDURE — 20610 DRAIN/INJ JOINT/BURSA W/O US: CPT | Mod: PBBFAC,RT | Performed by: PHYSICIAN ASSISTANT

## 2021-09-15 PROCEDURE — 20610 DRAIN/INJ JOINT/BURSA W/O US: CPT | Mod: S$PBB,RT,, | Performed by: PHYSICIAN ASSISTANT

## 2021-09-15 PROCEDURE — 99999 PR PBB SHADOW E&M-EST. PATIENT-LVL III: ICD-10-PCS | Mod: PBBFAC,,, | Performed by: PHYSICIAN ASSISTANT

## 2021-09-15 PROCEDURE — 99499 NO LOS: ICD-10-PCS | Mod: S$PBB,,, | Performed by: PHYSICIAN ASSISTANT

## 2021-09-15 PROCEDURE — 20610 LARGE JOINT ASPIRATION/INJECTION: R KNEE: ICD-10-PCS | Mod: S$PBB,RT,, | Performed by: PHYSICIAN ASSISTANT

## 2021-09-15 PROCEDURE — 99499 UNLISTED E&M SERVICE: CPT | Mod: S$PBB,,, | Performed by: PHYSICIAN ASSISTANT

## 2021-09-15 PROCEDURE — 99999 PR PBB SHADOW E&M-EST. PATIENT-LVL III: CPT | Mod: PBBFAC,,, | Performed by: PHYSICIAN ASSISTANT

## 2021-09-15 PROCEDURE — 99213 OFFICE O/P EST LOW 20 MIN: CPT | Mod: PBBFAC,25 | Performed by: PHYSICIAN ASSISTANT

## 2021-09-15 RX ORDER — DICLOFENAC SODIUM 10 MG/G
2 GEL TOPICAL 3 TIMES DAILY PRN
Qty: 2 TUBE | Refills: 6 | Status: SHIPPED | OUTPATIENT
Start: 2021-09-15 | End: 2022-08-17

## 2021-09-15 RX ADMIN — Medication 48 MG: at 01:09

## 2021-09-17 RX ORDER — SIMVASTATIN 40 MG/1
40 TABLET, FILM COATED ORAL DAILY
Qty: 90 TABLET | Refills: 0 | Status: SHIPPED | OUTPATIENT
Start: 2021-09-17 | End: 2022-01-02

## 2021-10-18 ENCOUNTER — TELEPHONE (OUTPATIENT)
Dept: PREADMISSION TESTING | Facility: HOSPITAL | Age: 71
End: 2021-10-18

## 2021-10-18 DIAGNOSIS — Z01.818 PRE-OP TESTING: Primary | ICD-10-CM

## 2021-10-20 ENCOUNTER — HOSPITAL ENCOUNTER (OUTPATIENT)
Dept: CARDIOLOGY | Facility: HOSPITAL | Age: 71
Discharge: HOME | End: 2021-10-20
Attending: OTOLARYNGOLOGY
Payer: MEDICARE

## 2021-10-20 ENCOUNTER — LAB VISIT (OUTPATIENT)
Dept: PRIMARY CARE CLINIC | Facility: CLINIC | Age: 71
End: 2021-10-20
Payer: MEDICARE

## 2021-10-20 ENCOUNTER — ANESTHESIA EVENT (OUTPATIENT)
Dept: SURGERY | Facility: HOSPITAL | Age: 71
End: 2021-10-20
Payer: MEDICARE

## 2021-10-20 DIAGNOSIS — Z01.818 PRE-OP TESTING: ICD-10-CM

## 2021-10-20 DIAGNOSIS — M95.0 NASAL VALVE COLLAPSE: ICD-10-CM

## 2021-10-20 DIAGNOSIS — J34.3 HYPERTROPHY OF INFERIOR NASAL TURBINATE: ICD-10-CM

## 2021-10-20 DIAGNOSIS — J31.0 RHINITIS MEDICAMENTOSA: ICD-10-CM

## 2021-10-20 DIAGNOSIS — J34.2 NASAL SEPTAL DEVIATION: ICD-10-CM

## 2021-10-20 DIAGNOSIS — T48.5X5A RHINITIS MEDICAMENTOSA: ICD-10-CM

## 2021-10-20 LAB
SARS-COV-2 RNA RESP QL NAA+PROBE: NOT DETECTED
SARS-COV-2- CYCLE NUMBER: NORMAL

## 2021-10-20 PROCEDURE — 93005 ELECTROCARDIOGRAM TRACING: CPT

## 2021-10-20 PROCEDURE — 93010 EKG 12-LEAD: ICD-10-PCS | Mod: ,,, | Performed by: INTERNAL MEDICINE

## 2021-10-20 PROCEDURE — 93010 ELECTROCARDIOGRAM REPORT: CPT | Mod: ,,, | Performed by: INTERNAL MEDICINE

## 2021-10-20 PROCEDURE — U0003 INFECTIOUS AGENT DETECTION BY NUCLEIC ACID (DNA OR RNA); SEVERE ACUTE RESPIRATORY SYNDROME CORONAVIRUS 2 (SARS-COV-2) (CORONAVIRUS DISEASE [COVID-19]), AMPLIFIED PROBE TECHNIQUE, MAKING USE OF HIGH THROUGHPUT TECHNOLOGIES AS DESCRIBED BY CMS-2020-01-R: HCPCS | Performed by: OTOLARYNGOLOGY

## 2021-10-20 PROCEDURE — U0005 INFEC AGEN DETEC AMPLI PROBE: HCPCS | Performed by: OTOLARYNGOLOGY

## 2021-10-21 ENCOUNTER — TELEPHONE (OUTPATIENT)
Dept: PREADMISSION TESTING | Facility: HOSPITAL | Age: 71
End: 2021-10-21

## 2021-10-22 ENCOUNTER — HOSPITAL ENCOUNTER (OUTPATIENT)
Facility: HOSPITAL | Age: 71
Discharge: HOME | End: 2021-10-22
Attending: OTOLARYNGOLOGY | Admitting: OTOLARYNGOLOGY
Payer: MEDICARE

## 2021-10-22 ENCOUNTER — ANESTHESIA (OUTPATIENT)
Dept: SURGERY | Facility: HOSPITAL | Age: 71
End: 2021-10-22
Payer: MEDICARE

## 2021-10-22 VITALS
HEIGHT: 63 IN | TEMPERATURE: 98 F | SYSTOLIC BLOOD PRESSURE: 157 MMHG | BODY MASS INDEX: 36.38 KG/M2 | DIASTOLIC BLOOD PRESSURE: 60 MMHG | OXYGEN SATURATION: 94 % | WEIGHT: 205.31 LBS | HEART RATE: 68 BPM | RESPIRATION RATE: 20 BRPM

## 2021-10-22 DIAGNOSIS — J34.2 NASAL SEPTAL DEVIATION: ICD-10-CM

## 2021-10-22 DIAGNOSIS — J34.2 NASAL SEPTAL DEVIATION: Primary | ICD-10-CM

## 2021-10-22 PROCEDURE — 30999 PR INSERTION OF LATERA IMPLANT FOR NASAL VALVE: ICD-10-PCS | Mod: ,,, | Performed by: OTOLARYNGOLOGY

## 2021-10-22 PROCEDURE — 37000009 HC ANESTHESIA EA ADD 15 MINS: Performed by: OTOLARYNGOLOGY

## 2021-10-22 PROCEDURE — 25000003 PHARM REV CODE 250: Performed by: OTOLARYNGOLOGY

## 2021-10-22 PROCEDURE — 27800903 OPTIME MED/SURG SUP & DEVICES OTHER IMPLANTS: Performed by: OTOLARYNGOLOGY

## 2021-10-22 PROCEDURE — 71000033 HC RECOVERY, INTIAL HOUR: Performed by: OTOLARYNGOLOGY

## 2021-10-22 PROCEDURE — 25000003 PHARM REV CODE 250: Performed by: STUDENT IN AN ORGANIZED HEALTH CARE EDUCATION/TRAINING PROGRAM

## 2021-10-22 PROCEDURE — D9220A PRA ANESTHESIA: ICD-10-PCS | Mod: ANES,,, | Performed by: STUDENT IN AN ORGANIZED HEALTH CARE EDUCATION/TRAINING PROGRAM

## 2021-10-22 PROCEDURE — 30140 PR EXCISION TURBINATE,SUBMUCOUS: ICD-10-PCS | Mod: 50,51,, | Performed by: OTOLARYNGOLOGY

## 2021-10-22 PROCEDURE — 71000015 HC POSTOP RECOV 1ST HR: Performed by: OTOLARYNGOLOGY

## 2021-10-22 PROCEDURE — 63600175 PHARM REV CODE 636 W HCPCS: Performed by: ANESTHESIOLOGY

## 2021-10-22 PROCEDURE — 25000003 PHARM REV CODE 250: Performed by: NURSE ANESTHETIST, CERTIFIED REGISTERED

## 2021-10-22 PROCEDURE — 30140 RESECT INFERIOR TURBINATE: CPT | Mod: 50,51,, | Performed by: OTOLARYNGOLOGY

## 2021-10-22 PROCEDURE — D9220A PRA ANESTHESIA: Mod: CRNA,,, | Performed by: NURSE ANESTHETIST, CERTIFIED REGISTERED

## 2021-10-22 PROCEDURE — 36000707: Performed by: OTOLARYNGOLOGY

## 2021-10-22 PROCEDURE — 30520 PR REPAIR, NASAL SEPTUM: ICD-10-PCS | Mod: ,,, | Performed by: OTOLARYNGOLOGY

## 2021-10-22 PROCEDURE — D9220A PRA ANESTHESIA: ICD-10-PCS | Mod: CRNA,,, | Performed by: NURSE ANESTHETIST, CERTIFIED REGISTERED

## 2021-10-22 PROCEDURE — D9220A PRA ANESTHESIA: Mod: ANES,,, | Performed by: STUDENT IN AN ORGANIZED HEALTH CARE EDUCATION/TRAINING PROGRAM

## 2021-10-22 PROCEDURE — 30520 REPAIR OF NASAL SEPTUM: CPT | Mod: ,,, | Performed by: OTOLARYNGOLOGY

## 2021-10-22 PROCEDURE — 36000706: Performed by: OTOLARYNGOLOGY

## 2021-10-22 PROCEDURE — 30999 UNLISTED PROCEDURE NOSE: CPT | Mod: ,,, | Performed by: OTOLARYNGOLOGY

## 2021-10-22 PROCEDURE — 27201423 OPTIME MED/SURG SUP & DEVICES STERILE SUPPLY: Performed by: OTOLARYNGOLOGY

## 2021-10-22 PROCEDURE — 37000008 HC ANESTHESIA 1ST 15 MINUTES: Performed by: OTOLARYNGOLOGY

## 2021-10-22 PROCEDURE — 63600175 PHARM REV CODE 636 W HCPCS: Performed by: NURSE ANESTHETIST, CERTIFIED REGISTERED

## 2021-10-22 DEVICE — IMPLANTABLE DEVICE: Type: IMPLANTABLE DEVICE | Site: NOSE | Status: FUNCTIONAL

## 2021-10-22 RX ORDER — BACITRACIN ZINC 500 UNIT/G
OINTMENT (GRAM) TOPICAL
Status: DISCONTINUED | OUTPATIENT
Start: 2021-10-22 | End: 2021-10-22 | Stop reason: HOSPADM

## 2021-10-22 RX ORDER — NEOSTIGMINE METHYLSULFATE 1 MG/ML
INJECTION, SOLUTION INTRAVENOUS
Status: DISCONTINUED | OUTPATIENT
Start: 2021-10-22 | End: 2021-10-22

## 2021-10-22 RX ORDER — OXYMETAZOLINE HCL 0.05 %
SPRAY, NON-AEROSOL (ML) NASAL
Status: DISCONTINUED
Start: 2021-10-22 | End: 2021-10-22 | Stop reason: HOSPADM

## 2021-10-22 RX ORDER — FENTANYL CITRATE 50 UG/ML
INJECTION, SOLUTION INTRAMUSCULAR; INTRAVENOUS
Status: DISCONTINUED | OUTPATIENT
Start: 2021-10-22 | End: 2021-10-22

## 2021-10-22 RX ORDER — FENTANYL CITRATE 50 UG/ML
25 INJECTION, SOLUTION INTRAMUSCULAR; INTRAVENOUS EVERY 5 MIN PRN
Status: DISCONTINUED | OUTPATIENT
Start: 2021-10-22 | End: 2021-10-22 | Stop reason: HOSPADM

## 2021-10-22 RX ORDER — SODIUM CHLORIDE 0.9 % (FLUSH) 0.9 %
10 SYRINGE (ML) INJECTION
Status: DISCONTINUED | OUTPATIENT
Start: 2021-10-22 | End: 2021-10-22 | Stop reason: HOSPADM

## 2021-10-22 RX ORDER — ONDANSETRON 4 MG/1
4 TABLET, ORALLY DISINTEGRATING ORAL EVERY 8 HOURS PRN
Qty: 20 TABLET | Refills: 0 | Status: SHIPPED | OUTPATIENT
Start: 2021-10-22 | End: 2022-03-02

## 2021-10-22 RX ORDER — SODIUM CHLORIDE, SODIUM LACTATE, POTASSIUM CHLORIDE, CALCIUM CHLORIDE 600; 310; 30; 20 MG/100ML; MG/100ML; MG/100ML; MG/100ML
INJECTION, SOLUTION INTRAVENOUS CONTINUOUS
Status: DISCONTINUED | OUTPATIENT
Start: 2021-10-22 | End: 2021-10-22 | Stop reason: HOSPADM

## 2021-10-22 RX ORDER — ROCURONIUM BROMIDE 10 MG/ML
INJECTION, SOLUTION INTRAVENOUS
Status: DISCONTINUED | OUTPATIENT
Start: 2021-10-22 | End: 2021-10-22

## 2021-10-22 RX ORDER — SCOLOPAMINE TRANSDERMAL SYSTEM 1 MG/1
1 PATCH, EXTENDED RELEASE TRANSDERMAL
Status: DISCONTINUED | OUTPATIENT
Start: 2021-10-22 | End: 2021-10-22 | Stop reason: HOSPADM

## 2021-10-22 RX ORDER — CLINDAMYCIN PHOSPHATE 900 MG/50ML
900 INJECTION, SOLUTION INTRAVENOUS ONCE
Status: COMPLETED | OUTPATIENT
Start: 2021-10-22 | End: 2021-10-22

## 2021-10-22 RX ORDER — ONDANSETRON 2 MG/ML
INJECTION INTRAMUSCULAR; INTRAVENOUS
Status: DISCONTINUED | OUTPATIENT
Start: 2021-10-22 | End: 2021-10-22

## 2021-10-22 RX ORDER — ONDANSETRON 2 MG/ML
4 INJECTION INTRAMUSCULAR; INTRAVENOUS DAILY PRN
Status: DISCONTINUED | OUTPATIENT
Start: 2021-10-22 | End: 2021-10-22 | Stop reason: HOSPADM

## 2021-10-22 RX ORDER — BACITRACIN ZINC 500 UNIT/G
OINTMENT (GRAM) TOPICAL
Status: DISCONTINUED
Start: 2021-10-22 | End: 2021-10-22 | Stop reason: HOSPADM

## 2021-10-22 RX ORDER — AZITHROMYCIN 250 MG/1
TABLET, FILM COATED ORAL
Qty: 6 TABLET | Refills: 0 | Status: SHIPPED | OUTPATIENT
Start: 2021-10-22 | End: 2022-03-02

## 2021-10-22 RX ORDER — SUCCINYLCHOLINE CHLORIDE 20 MG/ML
INJECTION INTRAMUSCULAR; INTRAVENOUS
Status: DISCONTINUED | OUTPATIENT
Start: 2021-10-22 | End: 2021-10-22

## 2021-10-22 RX ORDER — CLINDAMYCIN PHOSPHATE 900 MG/50ML
INJECTION, SOLUTION INTRAVENOUS
Status: COMPLETED
Start: 2021-10-22 | End: 2021-10-22

## 2021-10-22 RX ORDER — ACETAMINOPHEN 10 MG/ML
INJECTION, SOLUTION INTRAVENOUS
Status: DISCONTINUED | OUTPATIENT
Start: 2021-10-22 | End: 2021-10-22

## 2021-10-22 RX ORDER — MIDAZOLAM HYDROCHLORIDE 1 MG/ML
INJECTION INTRAMUSCULAR; INTRAVENOUS
Status: DISCONTINUED | OUTPATIENT
Start: 2021-10-22 | End: 2021-10-22

## 2021-10-22 RX ORDER — PROPOFOL 10 MG/ML
VIAL (ML) INTRAVENOUS
Status: DISCONTINUED | OUTPATIENT
Start: 2021-10-22 | End: 2021-10-22

## 2021-10-22 RX ORDER — LIDOCAINE HYDROCHLORIDE AND EPINEPHRINE 10; 10 MG/ML; UG/ML
INJECTION, SOLUTION INFILTRATION; PERINEURAL
Status: DISCONTINUED | OUTPATIENT
Start: 2021-10-22 | End: 2021-10-22 | Stop reason: HOSPADM

## 2021-10-22 RX ORDER — OXYCODONE AND ACETAMINOPHEN 5; 325 MG/1; MG/1
1 TABLET ORAL EVERY 6 HOURS PRN
Qty: 20 TABLET | Refills: 0 | Status: SHIPPED | OUTPATIENT
Start: 2021-10-22 | End: 2021-10-23 | Stop reason: DRUGHIGH

## 2021-10-22 RX ORDER — DEXAMETHASONE SODIUM PHOSPHATE 4 MG/ML
INJECTION, SOLUTION INTRA-ARTICULAR; INTRALESIONAL; INTRAMUSCULAR; INTRAVENOUS; SOFT TISSUE
Status: DISCONTINUED | OUTPATIENT
Start: 2021-10-22 | End: 2021-10-22

## 2021-10-22 RX ORDER — OXYMETAZOLINE HCL 0.05 %
SPRAY, NON-AEROSOL (ML) NASAL
Status: DISCONTINUED | OUTPATIENT
Start: 2021-10-22 | End: 2021-10-22 | Stop reason: HOSPADM

## 2021-10-22 RX ORDER — LIDOCAINE HYDROCHLORIDE 20 MG/ML
INJECTION INTRAVENOUS
Status: DISCONTINUED | OUTPATIENT
Start: 2021-10-22 | End: 2021-10-22

## 2021-10-22 RX ADMIN — GLYCOPYRROLATE 0.2 MG: 0.2 INJECTION, SOLUTION INTRAMUSCULAR; INTRAVITREAL at 11:10

## 2021-10-22 RX ADMIN — ROCURONIUM BROMIDE 35 MG: 10 INJECTION, SOLUTION INTRAVENOUS at 10:10

## 2021-10-22 RX ADMIN — SCOPALAMINE 1 PATCH: 1 PATCH, EXTENDED RELEASE TRANSDERMAL at 08:10

## 2021-10-22 RX ADMIN — FENTANYL CITRATE 100 MCG: 50 INJECTION, SOLUTION INTRAMUSCULAR; INTRAVENOUS at 10:10

## 2021-10-22 RX ADMIN — PROPOFOL 50 MG: 10 INJECTION, EMULSION INTRAVENOUS at 11:10

## 2021-10-22 RX ADMIN — Medication 50 MG: at 10:10

## 2021-10-22 RX ADMIN — CLINDAMYCIN PHOSPHATE 900 MG: 18 INJECTION, SOLUTION INTRAVENOUS at 10:10

## 2021-10-22 RX ADMIN — PROPOFOL 150 MG: 10 INJECTION, EMULSION INTRAVENOUS at 10:10

## 2021-10-22 RX ADMIN — FENTANYL CITRATE 50 MCG: 50 INJECTION, SOLUTION INTRAMUSCULAR; INTRAVENOUS at 11:10

## 2021-10-22 RX ADMIN — SODIUM CHLORIDE, SODIUM LACTATE, POTASSIUM CHLORIDE, AND CALCIUM CHLORIDE: 600; 310; 30; 20 INJECTION, SOLUTION INTRAVENOUS at 10:10

## 2021-10-22 RX ADMIN — GLYCOPYRROLATE 0.4 MG: 0.2 INJECTION, SOLUTION INTRAMUSCULAR; INTRAVITREAL at 11:10

## 2021-10-22 RX ADMIN — DEXAMETHASONE SODIUM PHOSPHATE 4 MG: 4 INJECTION, SOLUTION INTRA-ARTICULAR; INTRALESIONAL; INTRAMUSCULAR; INTRAVENOUS; SOFT TISSUE at 10:10

## 2021-10-22 RX ADMIN — ACETAMINOPHEN 1000 MG: 10 INJECTION, SOLUTION INTRAVENOUS at 10:10

## 2021-10-22 RX ADMIN — ONDANSETRON 4 MG: 2 INJECTION, SOLUTION INTRAMUSCULAR; INTRAVENOUS at 11:10

## 2021-10-22 RX ADMIN — SUCCINYLCHOLINE CHLORIDE 120 MG: 20 INJECTION, SOLUTION INTRAMUSCULAR; INTRAVENOUS at 10:10

## 2021-10-22 RX ADMIN — MIDAZOLAM HYDROCHLORIDE 2 MG: 1 INJECTION INTRAMUSCULAR; INTRAVENOUS at 10:10

## 2021-10-22 RX ADMIN — SODIUM CHLORIDE, SODIUM LACTATE, POTASSIUM CHLORIDE, AND CALCIUM CHLORIDE: 600; 310; 30; 20 INJECTION, SOLUTION INTRAVENOUS at 07:10

## 2021-10-22 RX ADMIN — ROCURONIUM BROMIDE 5 MG: 10 INJECTION, SOLUTION INTRAVENOUS at 10:10

## 2021-10-22 RX ADMIN — NEOSTIGMINE METHYLSULFATE 3 MG: 1 INJECTION INTRAVENOUS at 11:10

## 2021-10-23 DIAGNOSIS — J34.2 NASAL SEPTAL DEVIATION: Primary | ICD-10-CM

## 2021-10-23 RX ORDER — HYDROCODONE BITARTRATE AND ACETAMINOPHEN 7.5; 325 MG/1; MG/1
1 TABLET ORAL EVERY 6 HOURS PRN
Qty: 20 TABLET | Refills: 0 | Status: SHIPPED | OUTPATIENT
Start: 2021-10-23 | End: 2021-10-25

## 2021-10-25 ENCOUNTER — PATIENT MESSAGE (OUTPATIENT)
Dept: OTOLARYNGOLOGY | Facility: CLINIC | Age: 71
End: 2021-10-25
Payer: MEDICARE

## 2021-10-25 DIAGNOSIS — J34.2 NASAL SEPTAL DEVIATION: Primary | ICD-10-CM

## 2021-10-25 LAB — POCT GLUCOSE: 156 MG/DL (ref 70–110)

## 2021-10-25 RX ORDER — HYDROCODONE BITARTRATE AND ACETAMINOPHEN 7.5; 325 MG/1; MG/1
1 TABLET ORAL EVERY 6 HOURS PRN
Qty: 20 TABLET | Refills: 0 | Status: SHIPPED | OUTPATIENT
Start: 2021-10-25 | End: 2021-11-01

## 2021-10-27 ENCOUNTER — PATIENT MESSAGE (OUTPATIENT)
Dept: OTOLARYNGOLOGY | Facility: CLINIC | Age: 71
End: 2021-10-27
Payer: MEDICARE

## 2021-10-28 ENCOUNTER — OFFICE VISIT (OUTPATIENT)
Dept: OTOLARYNGOLOGY | Facility: CLINIC | Age: 71
End: 2021-10-28
Payer: MEDICARE

## 2021-10-28 VITALS
TEMPERATURE: 98 F | DIASTOLIC BLOOD PRESSURE: 81 MMHG | WEIGHT: 206.56 LBS | BODY MASS INDEX: 36.59 KG/M2 | SYSTOLIC BLOOD PRESSURE: 154 MMHG | HEART RATE: 68 BPM

## 2021-10-28 DIAGNOSIS — J34.3 HYPERTROPHY OF INFERIOR NASAL TURBINATE: ICD-10-CM

## 2021-10-28 DIAGNOSIS — M95.0 NASAL VALVE COLLAPSE: ICD-10-CM

## 2021-10-28 DIAGNOSIS — J34.2 NASAL SEPTAL DEVIATION: Primary | ICD-10-CM

## 2021-10-28 PROCEDURE — 31231 NASAL ENDOSCOPY DX: CPT | Mod: PBBFAC | Performed by: OTOLARYNGOLOGY

## 2021-10-28 PROCEDURE — 99213 PR OFFICE/OUTPT VISIT, EST, LEVL III, 20-29 MIN: ICD-10-PCS | Mod: 25,24,S$PBB, | Performed by: OTOLARYNGOLOGY

## 2021-10-28 PROCEDURE — 99999 PR PBB SHADOW E&M-EST. PATIENT-LVL III: CPT | Mod: PBBFAC,,, | Performed by: OTOLARYNGOLOGY

## 2021-10-28 PROCEDURE — 31231 PR NASAL ENDOSCOPY, DX: ICD-10-PCS | Mod: 79,S$PBB,, | Performed by: OTOLARYNGOLOGY

## 2021-10-28 PROCEDURE — 31231 NASAL ENDOSCOPY DX: CPT | Mod: 79,S$PBB,, | Performed by: OTOLARYNGOLOGY

## 2021-10-28 PROCEDURE — 99999 PR PBB SHADOW E&M-EST. PATIENT-LVL III: ICD-10-PCS | Mod: PBBFAC,,, | Performed by: OTOLARYNGOLOGY

## 2021-10-28 PROCEDURE — 99213 OFFICE O/P EST LOW 20 MIN: CPT | Mod: PBBFAC | Performed by: OTOLARYNGOLOGY

## 2021-10-28 PROCEDURE — 99213 OFFICE O/P EST LOW 20 MIN: CPT | Mod: 25,24,S$PBB, | Performed by: OTOLARYNGOLOGY

## 2021-11-01 ENCOUNTER — TELEPHONE (OUTPATIENT)
Dept: PHARMACY | Facility: CLINIC | Age: 71
End: 2021-11-01
Payer: MEDICARE

## 2021-11-17 ENCOUNTER — PATIENT MESSAGE (OUTPATIENT)
Dept: RHEUMATOLOGY | Facility: CLINIC | Age: 71
End: 2021-11-17
Payer: MEDICARE

## 2021-11-17 ENCOUNTER — TELEPHONE (OUTPATIENT)
Dept: RHEUMATOLOGY | Facility: CLINIC | Age: 71
End: 2021-11-17
Payer: MEDICARE

## 2021-11-18 ENCOUNTER — TELEPHONE (OUTPATIENT)
Dept: RHEUMATOLOGY | Facility: CLINIC | Age: 71
End: 2021-11-18
Payer: MEDICARE

## 2021-11-18 ENCOUNTER — PATIENT MESSAGE (OUTPATIENT)
Dept: RHEUMATOLOGY | Facility: CLINIC | Age: 71
End: 2021-11-18
Payer: MEDICARE

## 2021-11-29 ENCOUNTER — PATIENT OUTREACH (OUTPATIENT)
Dept: ADMINISTRATIVE | Facility: OTHER | Age: 71
End: 2021-11-29
Payer: MEDICARE

## 2021-12-02 ENCOUNTER — OFFICE VISIT (OUTPATIENT)
Dept: OTOLARYNGOLOGY | Facility: CLINIC | Age: 71
End: 2021-12-02
Payer: MEDICARE

## 2021-12-02 VITALS — WEIGHT: 211.19 LBS | BODY MASS INDEX: 38.86 KG/M2 | HEIGHT: 62 IN

## 2021-12-02 DIAGNOSIS — M95.0 NASAL VALVE COLLAPSE: ICD-10-CM

## 2021-12-02 DIAGNOSIS — J34.2 NASAL SEPTAL DEVIATION: Primary | ICD-10-CM

## 2021-12-02 DIAGNOSIS — J34.3 HYPERTROPHY OF INFERIOR NASAL TURBINATE: ICD-10-CM

## 2021-12-02 DIAGNOSIS — G47.61 PERIODIC LIMB MOVEMENT SLEEP DISORDER: ICD-10-CM

## 2021-12-02 DIAGNOSIS — J30.0 VASOMOTOR RHINITIS: ICD-10-CM

## 2021-12-02 DIAGNOSIS — G47.01 INSOMNIA DUE TO MEDICAL CONDITION: ICD-10-CM

## 2021-12-02 PROCEDURE — 99024 POSTOP FOLLOW-UP VISIT: CPT | Mod: S$PBB,,, | Performed by: OTOLARYNGOLOGY

## 2021-12-02 PROCEDURE — 99999 PR PBB SHADOW E&M-EST. PATIENT-LVL III: CPT | Mod: PBBFAC,,, | Performed by: OTOLARYNGOLOGY

## 2021-12-02 PROCEDURE — 99024 PR POST-OP FOLLOW-UP VISIT: ICD-10-PCS | Mod: S$PBB,,, | Performed by: OTOLARYNGOLOGY

## 2021-12-02 PROCEDURE — 99213 OFFICE O/P EST LOW 20 MIN: CPT | Mod: PBBFAC | Performed by: OTOLARYNGOLOGY

## 2021-12-02 PROCEDURE — 99999 PR PBB SHADOW E&M-EST. PATIENT-LVL III: ICD-10-PCS | Mod: PBBFAC,,, | Performed by: OTOLARYNGOLOGY

## 2021-12-02 RX ORDER — CLONAZEPAM 1 MG/1
TABLET ORAL
Qty: 30 TABLET | Refills: 0 | OUTPATIENT
Start: 2021-12-02

## 2021-12-02 RX ORDER — IPRATROPIUM BROMIDE 21 UG/1
2 SPRAY, METERED NASAL 3 TIMES DAILY
Qty: 30 ML | Refills: 5 | Status: SHIPPED | OUTPATIENT
Start: 2021-12-02 | End: 2022-01-01

## 2021-12-02 RX ORDER — ZOLPIDEM TARTRATE 5 MG/1
TABLET ORAL
Qty: 30 TABLET | Refills: 0 | OUTPATIENT
Start: 2021-12-02

## 2021-12-03 ENCOUNTER — PATIENT MESSAGE (OUTPATIENT)
Dept: ADMINISTRATIVE | Facility: OTHER | Age: 71
End: 2021-12-03
Payer: MEDICARE

## 2021-12-03 ENCOUNTER — PATIENT MESSAGE (OUTPATIENT)
Dept: PULMONOLOGY | Facility: CLINIC | Age: 71
End: 2021-12-03
Payer: MEDICARE

## 2021-12-03 DIAGNOSIS — G47.01 INSOMNIA DUE TO MEDICAL CONDITION: ICD-10-CM

## 2021-12-03 DIAGNOSIS — G47.61 PERIODIC LIMB MOVEMENT SLEEP DISORDER: ICD-10-CM

## 2021-12-06 RX ORDER — CLONAZEPAM 1 MG/1
1 TABLET ORAL NIGHTLY
Qty: 30 TABLET | Refills: 5 | Status: SHIPPED | OUTPATIENT
Start: 2021-12-06 | End: 2022-05-25

## 2021-12-09 DIAGNOSIS — K21.00 REFLUX ESOPHAGITIS: ICD-10-CM

## 2021-12-09 RX ORDER — OMEPRAZOLE 40 MG/1
40 CAPSULE, DELAYED RELEASE ORAL DAILY
Qty: 90 CAPSULE | Refills: 0 | Status: SHIPPED | OUTPATIENT
Start: 2021-12-09 | End: 2022-03-28

## 2021-12-09 RX ORDER — CITALOPRAM 40 MG/1
TABLET, FILM COATED ORAL
Qty: 90 TABLET | Refills: 3 | Status: SHIPPED | OUTPATIENT
Start: 2021-12-09 | End: 2022-03-02 | Stop reason: ALTCHOICE

## 2021-12-31 PROCEDURE — 99457 PR MONITORING, PHYSIOL PARAM, REMOTE, 1ST 20 MINS, PER MONTH: ICD-10-PCS | Mod: S$PBB,,, | Performed by: FAMILY MEDICINE

## 2021-12-31 PROCEDURE — 99457 RPM TX MGMT 1ST 20 MIN: CPT | Mod: S$PBB,,, | Performed by: FAMILY MEDICINE

## 2022-01-02 ENCOUNTER — PATIENT OUTREACH (OUTPATIENT)
Dept: ADMINISTRATIVE | Facility: OTHER | Age: 72
End: 2022-01-02
Payer: MEDICARE

## 2022-01-02 DIAGNOSIS — Z12.31 ENCOUNTER FOR SCREENING MAMMOGRAM FOR BREAST CANCER: Primary | ICD-10-CM

## 2022-01-03 ENCOUNTER — OFFICE VISIT (OUTPATIENT)
Dept: RHEUMATOLOGY | Facility: CLINIC | Age: 72
End: 2022-01-03
Payer: MEDICARE

## 2022-01-03 VITALS
HEART RATE: 67 BPM | DIASTOLIC BLOOD PRESSURE: 70 MMHG | SYSTOLIC BLOOD PRESSURE: 119 MMHG | HEIGHT: 62 IN | BODY MASS INDEX: 38.46 KG/M2 | WEIGHT: 209 LBS

## 2022-01-03 DIAGNOSIS — M85.89 OSTEOPENIA OF MULTIPLE SITES: ICD-10-CM

## 2022-01-03 DIAGNOSIS — M35.01 SJOGREN'S SYNDROME WITH KERATOCONJUNCTIVITIS SICCA: Primary | ICD-10-CM

## 2022-01-03 DIAGNOSIS — M79.7 FIBROMYALGIA: ICD-10-CM

## 2022-01-03 DIAGNOSIS — Z51.81 MEDICATION MONITORING ENCOUNTER: ICD-10-CM

## 2022-01-03 PROCEDURE — 99214 OFFICE O/P EST MOD 30 MIN: CPT | Mod: S$PBB,,, | Performed by: PHYSICIAN ASSISTANT

## 2022-01-03 PROCEDURE — 99214 OFFICE O/P EST MOD 30 MIN: CPT | Mod: PBBFAC | Performed by: PHYSICIAN ASSISTANT

## 2022-01-03 PROCEDURE — 99214 PR OFFICE/OUTPT VISIT, EST, LEVL IV, 30-39 MIN: ICD-10-PCS | Mod: S$PBB,,, | Performed by: PHYSICIAN ASSISTANT

## 2022-01-03 PROCEDURE — 96372 THER/PROPH/DIAG INJ SC/IM: CPT | Mod: PBBFAC

## 2022-01-03 PROCEDURE — 99999 PR PBB SHADOW E&M-EST. PATIENT-LVL IV: ICD-10-PCS | Mod: PBBFAC,,, | Performed by: PHYSICIAN ASSISTANT

## 2022-01-03 PROCEDURE — 99999 PR PBB SHADOW E&M-EST. PATIENT-LVL IV: CPT | Mod: PBBFAC,,, | Performed by: PHYSICIAN ASSISTANT

## 2022-01-03 RX ORDER — TRIAMCINOLONE ACETONIDE 40 MG/ML
40 INJECTION, SUSPENSION INTRA-ARTICULAR; INTRAMUSCULAR
Status: COMPLETED | OUTPATIENT
Start: 2022-01-03 | End: 2022-01-03

## 2022-01-03 RX ORDER — HYDROXYCHLOROQUINE SULFATE 200 MG/1
200 TABLET, FILM COATED ORAL 2 TIMES DAILY
Qty: 180 TABLET | Refills: 3 | Status: SHIPPED | OUTPATIENT
Start: 2022-01-03 | End: 2022-12-07 | Stop reason: SDUPTHER

## 2022-01-03 RX ORDER — BETAMETHASONE SODIUM PHOSPHATE AND BETAMETHASONE ACETATE 3; 3 MG/ML; MG/ML
6 INJECTION, SUSPENSION INTRA-ARTICULAR; INTRALESIONAL; INTRAMUSCULAR; SOFT TISSUE
Status: DISCONTINUED | OUTPATIENT
Start: 2022-01-03 | End: 2022-01-03

## 2022-01-03 RX ADMIN — TRIAMCINOLONE ACETONIDE 40 MG: 40 INJECTION, SUSPENSION INTRA-ARTICULAR; INTRAMUSCULAR at 11:01

## 2022-01-03 NOTE — PROGRESS NOTES
Administered 1cc of Triamcinolone Acetonide Injectable Suspension (Kenalog) 40mg/cc to right ventrogluteal. Patient tolerated well. No acute reaction noted to site. Patient instructed on S/S to report. Patient verbalized understanding. Patient advised patient to wait 15 minutes in clinic post administration. Patient Verbalized understanding.      Lot: [id number]  Exp: 11/30/2022  NDC: [address]-6

## 2022-01-03 NOTE — PROGRESS NOTES
"     RHEUMATOLOGY OUTPATIENT CLINIC NOTE    1/3/2022    Attending Rheumatologist: Lashawn Stringer PA-C  Primary Care Provider: Elias Combs MD   Physician Requesting Consultation: Cem Chu MD  Chief Complaint/Reason For Consultation:  sjogren's      Subjective:       HPI  Latonia Oliver is a very pleasant 71 y.o. female Latonia is back today for rheumatology recheck. 6 mon-1 yr follow ups- Sjogren's syndrome, fibromyalgia and osteoporosis. Still on Plaquenil 400 mg daily for Sjogren's, intermittent dryness using over-the-counter products.     Depression/anxiety and fibro- off Cymbalta. Her pcp now has her on celexa and klonopin; depression stable. Klonopin helping to control the RLS. She is off of gabapentin.     Mild pain  today pain level 4/10.   Had kenalog IM 1 yr ago which helped her joint pain for about 6 months  Last visit w/dr thrasher though ok to get 1-2 X per year if needed   Would like one today      Last dexa 7/22/20 stable; osteopenia w/low fx;  now on drug holiday  In the past completed  IV Reclast x3; moved to  PROLIA  1st dose  4/04/14, last done 6/15/15 (stopped  due SE; nausea and vomiting with both doses).  No falls fx since last visit      She has not had covid19 vaccines. She is only wanting J&J    Vitals:    01/03/22 1005   BP: 119/70   BP Location: Left arm   Patient Position: Sitting   BP Method: Large (Automatic)   Pulse: 67   Weight: 94.8 kg (208 lb 15.9 oz)   Height: 5' 2" (1.575 m)     Review of Systems   Constitutional: Negative for chills and fever.   Eyes: Negative for photophobia, pain and redness.   Respiratory: Negative for cough and hemoptysis.    Cardiovascular: Negative for chest pain and PND.   Gastrointestinal: Negative for blood in stool and melena.   Genitourinary: Negative for dysuria and hematuria.   Musculoskeletal: Positive for joint pain and myalgias.   Skin: Negative for rash.       Chronic comorbid conditions affecting medical decision making today:  Past " Medical History:   Diagnosis Date    Anxiety     Arthritis     Back pain     Basal cell carcinoma (BCC)     Breast cancer     Breast cancer     Depression     Diabetes mellitus type 2, controlled     Enthesopathy of hip 2014    Fibromyalgia     GERD (gastroesophageal reflux disease)     HTN (hypertension)     Irregular heart beat     Mixed hyperlipidemia     Obesity     Osteoporosis     Rash and nonspecific skin eruption 6/15/2015    Sjogren - Flo's syndrome     Sleep apnea     Trouble in sleeping     Type 2 diabetes mellitus      Past Surgical History:   Procedure Laterality Date    APPENDECTOMY      BELT ABDOMINOPLASTY      BREAST LUMPECTOMY      BREAST SURGERY      breast augmentation     SECTION, CLASSIC      RECONSTRUCTION, NASAL VALVE Left 10/22/2021    Procedure: RECONSTRUCTION, NASAL VALVE ;  Surgeon: Faustino Cash MD;  Location[facility];  Service: ENT;  Laterality: Left;     Family History   Problem Relation Age of Onset    Heart disease Mother     Hypertension Mother     COPD Mother     Diabetes Father     Stroke Father     Kidney disease Father      Social History     Substance and Sexual Activity   Alcohol Use Yes    Comment: Socially     Social History     Tobacco Use   Smoking Status Never Smoker   Smokeless Tobacco Never Used     Social History     Substance and Sexual Activity   Drug Use No       Current Outpatient Medications:     amLODIPine (NORVASC) 10 MG tablet, Take 1 tablet by mouth once daily, Disp: 90 tablet, Rfl: 0    azithromycin (Z-BRI) 250 MG tablet, Take as directed, Disp: 6 tablet, Rfl: 0    benazepriL (LOTENSIN) 40 MG tablet, Take 1 tablet (40 mg total) by mouth once daily., Disp: 90 tablet, Rfl: 1    citalopram (CELEXA) 40 MG tablet, Take 1 tablet by mouth once daily, Disp: 90 tablet, Rfl: 3    clonazePAM (KLONOPIN) 1 MG tablet, Take 1 tablet (1 mg total) by mouth every evening., Disp: 30 tablet, Rfl: 5    cyanocobalamin  (VITAMIN B-12) 1000 MCG tablet, Take 100 mcg by mouth once daily., Disp: , Rfl:     diclofenac sodium (VOLTAREN) 1 % Gel, Apply 2 g topically 3 (three) times daily as needed., Disp: 2 Tube, Rfl: 6    fluticasone propionate (FLONASE) 50 mcg/actuation nasal spray, 2 sprays (100 mcg total) by Each Nostril route once daily., Disp: 15.8 mL, Rfl: 5    hydroCHLOROthiazide (HYDRODIURIL) 25 MG tablet, Take 1 tablet (25 mg total) by mouth once daily., Disp: 90 tablet, Rfl: 1    hydrOXYchloroQUINE (PLAQUENIL) 200 mg tablet, Take 1 tablet (200 mg total) by mouth 2 (two) times daily., Disp: 180 tablet, Rfl: 3    methocarbamol (ROBAXIN) 750 MG Tab, Take 1 tablet (750 mg total) by mouth 3 (three) times daily., Disp: 90 tablet, Rfl: 2    omeprazole (PRILOSEC) 40 MG capsule, Take 1 capsule (40 mg total) by mouth once daily., Disp: 90 capsule, Rfl: 0    ondansetron (ZOFRAN-ODT) 4 MG TbDL, Take 1 tablet (4 mg total) by mouth every 8 (eight) hours as needed (nausea)., Disp: 20 tablet, Rfl: 0    promethazine (PHENERGAN) 25 MG tablet, Take 1 tablet (25 mg total) by mouth every 6 (six) hours as needed., Disp: 30 tablet, Rfl: 0    simvastatin (ZOCOR) 40 MG tablet, Take 1 tablet by mouth once daily, Disp: 90 tablet, Rfl: 0    zinc gluconate 50 mg tablet, Take 50 mg by mouth once daily., Disp: , Rfl:     zolpidem (AMBIEN) 5 MG Tab, Take 1 tablet (5 mg total) by mouth nightly as needed (for insomnia)., Disp: 30 tablet, Rfl: 5     Objective:         Reviewed old and all outside pertinent medical records available.    Physical Exam  Vitals reviewed.   Constitutional:       Appearance: Normal appearance.   HENT:      Head: Normocephalic and atraumatic.   Eyes:      Extraocular Movements: Extraocular movements intact.      Pupils: Pupils are equal, round, and reactive to light.   Pulmonary:      Effort: Pulmonary effort is normal.   Skin:     General: Skin is warm and dry.   Neurological:      General: No focal deficit present.       Mental Status: She is alert and oriented to person, place, and time.   Psychiatric:         Mood and Affect: Mood normal.         Behavior: Behavior normal.       All lab results personally reviewed and interpreted by me.    Lab Results   Component Value Date     09/14/2021    K 3.7 09/14/2021     09/14/2021    CO2 29 09/14/2021    GLU 94 09/14/2021    BUN 17 09/14/2021    CALCIUM 9.3 09/14/2021    PROT 7.3 09/14/2021    ALBUMIN 4.1 09/14/2021    BILITOT 0.4 09/14/2021    AST 22 09/14/2021    ALKPHOS 50 (L) 09/14/2021    ALT 25 09/14/2021       Imaging:  All imaging reviewed and independently interpreted by me.     DXA Bone Density Spine And Hip 7/22/20  FINDINGS:  The L1 to L4 vertebral bone mineral density is equal to 1.03 g/cm squared with a T score of -1.3.  There has been a 0.2% increase relative to the prior study.  The left femoral neck bone mineral density is equal to 0.962 g/cm squared with a T score of -0.5.  There has been  an 11.1% increase relative to the prior study.  There is a 12% risk of a major osteoporotic fracture and a 0.9% risk of hip fracture in the next 10 years (FRAX).     Impression  Osteopenia     DEXA 6/27/16 total femur T score -0.7, right femur neck -1.9, spine -2.2 impression osteopenia with improved bone density  DEXA 4/8/14 TF -1.0,  FN -1.5, spien -2.7 osteoporosis   DEXA 1/12/12 total femur T score -0.8, femur neck -0.9, spine -2.6 impression osteoporosis with improved bone density     ASSESSMENT / PLAN:     ASSESSMENT:  1. Sjogren's syndrome with keratoconjunctivitis sicca    2. Fibromyalgia    3. Primary osteoarthritis of right knee    4. Osteopenia of multiple sites    5. Medication monitoring encounter           Seropositive Sjogren's syndrome + PAUL 1:640, + SSA  -stable on plaquenil  -dryness controlled with otc products     Fibromyalgia/depression/RLS  - stable on klonopin and celexa per pcp      Osteoporosis by DEXa now improved to osteopenia w/low fracture  risk  - drug holiday currently   -completed IV Reclast 2010, 2011, 2012   - repeat bone density 4/8/14 showed decreased BMD at spine moved to prolia 4/21/14 --2 doses both caused nausea and vomiting-  2016 showed improving scores- started holiday  2020 dexa stable and improved- c/w holiday      Right knee OA - not an issues today    PLAN:  OK for kenalog 40mg IM Q 6-12 mon  Will give today to help back pain     Continue with plaq bid, yearly eye checks (gets every June)     dexa up to date 7/22/20 up to date-repeat in 2-3 yrs -- July 2022  Continue w/holiday for now     Covid vaccine recommended  Discussed Risk vs benefit; potential SE  All questions answered     Get w/her drug plan about [employer] coverage, can appeal if needed      aleve 1-2 po bid prn take with food     rtc in 6 months with labs    Method of contact with patient concerns: [facility] attn Rheumatology    60 minutes of total time spent on the encounter, which includes face to face time and non-face to face time preparing to see the patient (eg, review of tests), Obtaining and/or reviewing separately obtained history, Documenting clinical information in the electronic or other health record, Independently interpreting results (not separately reported) and communicating results to the patient/family/caregiver, or Care coordination (not separately reported).        Lashawn Stringer PA-C  Rheumatology Department   [facility]

## 2022-01-19 ENCOUNTER — OFFICE VISIT (OUTPATIENT)
Dept: SPORTS MEDICINE | Facility: CLINIC | Age: 72
End: 2022-01-19
Payer: MEDICARE

## 2022-01-19 VITALS — WEIGHT: 208 LBS | HEIGHT: 62 IN | BODY MASS INDEX: 38.28 KG/M2

## 2022-01-19 DIAGNOSIS — G89.29 CHRONIC PAIN OF RIGHT KNEE: ICD-10-CM

## 2022-01-19 DIAGNOSIS — M17.11 PRIMARY OSTEOARTHRITIS OF RIGHT KNEE: Primary | ICD-10-CM

## 2022-01-19 DIAGNOSIS — M25.561 CHRONIC PAIN OF RIGHT KNEE: ICD-10-CM

## 2022-01-19 PROCEDURE — 99204 OFFICE O/P NEW MOD 45 MIN: CPT | Mod: S$PBB,,, | Performed by: PHYSICAL MEDICINE & REHABILITATION

## 2022-01-19 PROCEDURE — 99204 PR OFFICE/OUTPT VISIT, NEW, LEVL IV, 45-59 MIN: ICD-10-PCS | Mod: S$PBB,,, | Performed by: PHYSICAL MEDICINE & REHABILITATION

## 2022-01-19 PROCEDURE — 99999 PR PBB SHADOW E&M-EST. PATIENT-LVL IV: CPT | Mod: PBBFAC,,, | Performed by: PHYSICAL MEDICINE & REHABILITATION

## 2022-01-19 PROCEDURE — 99214 OFFICE O/P EST MOD 30 MIN: CPT | Mod: PBBFAC | Performed by: PHYSICAL MEDICINE & REHABILITATION

## 2022-01-19 PROCEDURE — 99999 PR PBB SHADOW E&M-EST. PATIENT-LVL IV: ICD-10-PCS | Mod: PBBFAC,,, | Performed by: PHYSICAL MEDICINE & REHABILITATION

## 2022-01-19 RX ORDER — NAPROXEN 500 MG/1
500 TABLET ORAL 2 TIMES DAILY WITH MEALS
Qty: 60 TABLET | Refills: 1 | Status: SHIPPED | OUTPATIENT
Start: 2022-01-19 | End: 2023-07-13

## 2022-01-19 NOTE — PATIENT INSTRUCTIONS
Assessment:  Latonia Oliver is a 71 y.o. female   Chief Complaint   Patient presents with    Right Knee - Pain     Primary osteoarthritis of right knee  -     naproxen (NAPROSYN) 500 MG tablet; Take 1 tablet (500 mg total) by mouth 2 (two) times daily with meals.  Dispense: 60 tablet; Refill: 1    Chronic pain of right knee  -     naproxen (NAPROSYN) 500 MG tablet; Take 1 tablet (500 mg total) by mouth 2 (two) times daily with meals.  Dispense: 60 tablet; Refill: 1    Plan:   Too soon for injections.   Wants to see surgeon to discuss TKA. Referral to Dr. Delgado   Try prescription naproxen since aleve helping.   Discussed how to use voltaren appropriately for full benefit.    Discussed OTC meds that can help.   Follow-up with rheumatology as they are working up her back pain.      Follow-up: As needed     Research supports the use of these things for helping joint / arthritis pain:  Collagen supplements -  collagen info I like [place] Hydrolyzed Collagen: on Amazon  High Dose Fish Oil / Krill oil - I like [employer]: Nordic site  krill oil info  Turmeric / Curcumin - curcumin info I believe treatment dose is around 500 mg once or twice daily. No particular brand recommendation.   SAMe - SAMe info (no brand recommendation)       Patient Education       Knee Pain   The Basics   Written by the doctors and editors at [facility]   What causes knee pain? -- Many different conditions can cause knee pain. Some of the most common are listed below.  · Bending or using the knee too much - This can cause pain in the front of the knee that worsens with running, climbing steps, or sitting for a long time.  · Arthritis - Arthritis is a general term that means inflammation of the joints. There are lots of types of arthritis. The most common type, called osteoarthritis, often comes with age. It can cause pain, stiffness, and swelling (figure 1).  · Bursitis - Bursitis happens when fluid-filled sacs around the knee  "(called "bursae") get irritated or swollen (figure 2). Bursitis can cause pain and swelling.  · A collection of fluid in the knee - This can happen after a knee injury.  · A tear in the meniscus - The meniscus is a cushion of rubbery material (cartilage) between the thigh bone and the leg bone (figure 3).  · A tear in a ligament - Ligaments are bands of tissue that connect one bone to another. There are 4 ligaments in each knee (figure 3).  · Muscle strain - Different leg muscles move the knee joint, causing the knee to bend and straighten. If one of these muscles doesn't work well, moving the knee can cause pain.  · Other knee injuries, a knee joint infection, or a condition called gout, which causes crystals to form inside joints.  · Conditions that don't involve the knee - For example, problems in the hip can sometimes cause knee pain.  Is there anything I can do on my own to feel better? -- Yes. To ease your symptoms, you can:  · Put ice on the knee to reduce pain and swelling - For the first few weeks after an injury, or after an activity that makes your pain worse, you can try icing your knee. Put a cold gel pack, bag of ice, or bag of frozen vegetables on the injured area every 1 to 2 hours, for 15 minutes each time. Put a thin towel between the ice (or other cold object) and your skin. To reduce swelling, sit or lie down and raise your leg above the level of your heart when you put ice on it.  · Rest your knee and avoid movements that worsen the pain - Try not to squat, kneel, or run. Also, don't use exercise machines, such as stair steppers or rowing machines. Instead, you can walk or swim (the front and back crawl strokes) for exercise.  · Take a pain-relieving medicine, such as acetaminophen (sample brand name: Tylenol) or ibuprofen (sample brand names: Advil, Motrin).  Should I see a doctor or nurse? -- See your doctor or nurse if:  · You are unable to put weight on your knee, your knee "locks" in place, " "or your knee "gives out"  · Your knee is very swollen and painful  · You have a fever with knee pain, swelling, and redness  · Your knee pain doesn't get better or gets worse after you treat it on your own for a few days  How is knee pain treated? -- The right treatment for knee pain depends on what is causing it. Treatments might include:  · Wearing a knee brace or shoe insert  · Doing exercises to strengthen and stretch the muscles that move the knee joint - Ask your doctor or nurse which exercises can help with the cause of your pain.  · Having physical therapy  · Getting a shot of medicine in the knee  · Other medicines  · Surgery  All topics are updated as new evidence becomes available and our peer review process is complete.  This topic retrieved from [employer] on: Sep 21, 2021.  Topic 65826 Version 11.0  Release: 29.4.2 - C29.263  © 2021 UpToDate, Inc. and/or its affiliates. All rights reserved.  figure 1: Knee osteoarthritis     This drawing shows a normal knee joint next to a knee joint with osteoarthritis (OA). In the OA joint, the cartilage covering the ends of the bones roughens and becomes thin, while the bone underneath the cartilage grows thicker. Bony growths called "osteophytes" can form. The space between the bones also becomes narrower.  Graphic 299105 Version 2.0    figure 2: Knee bursa (prepatellar bursa)     Graphic 42069 Version 3.0    figure 3: Front view of the knee     This drawing shows the inner parts of the knee as seen from the front. A small bone (called the patella or the "knee cap") that sits in front of the knee has been removed so that you can see what is under that bone. The anterior cruciate ligament (ACL) is in the middle in white. It connects the thigh bone (called the "femur") to the shin bone (called the "tibia"). The meniscus is a cushion of rubbery material (cartilage) between the thigh bone and the shin bone.  Graphic 07075 Version 5.0    Consumer Information Use and " Disclaimer   This information is not specific medical advice and does not replace information you receive from your health care provider. This is only a brief summary of general information. It does NOT include all information about conditions, illnesses, injuries, tests, procedures, treatments, therapies, discharge instructions or life-style choices that may apply to you. You must talk with your health care provider for complete information about your health and treatment options. This information should not be used to decide whether or not to accept your health care provider's advice, instructions or recommendations. Only your health care provider has the knowledge and training to provide advice that is right for you. The use of this information is governed by the [employer] End User License Agreement, available at [email or web address].The use of [employer] content is governed by the [employer] Terms of Use. ©2021 [employer] All rights reserved.  Copyright   © 2021 UpToDate, Inc. and/or its affiliates. All rights reserved.

## 2022-01-19 NOTE — PROGRESS NOTES
SPORTS MEDICINE / PM&R New Patient Visit :    Referring Physician: No ref. provider found    Chief Complaint   Patient presents with    Right Knee - Pain       HPI: This is a 71 y.o.  female being seen in clinic today for evaluation of Pain of the Right Knee      The problem began 1 years ago.  She feels hot poke like pain, sharp, burning, stabbing, intermittent, pain at rest and pain with movement pain in her right knee . The symptoms are improving. She has tried NSAIDS, bracing, injection and Voltaren gel with improvement. She has not tried therapy. She has tried physical therapy for other issues and is not interested in doing physical therapy.    History obtained from patient.  Patient is a former [facility] patient and was last seen by A[facility] on 9/15/2021.  She received Synvisc One injection in September and in August received a CSI.  She states that she received approximately 5 months of relief from this but her knee has returned to hurting and currently rates it at a 5/10.  She states when she is lying on her side and the other knee hits her right knee she gets an extreme shooting pain through the medial side of her knee.  She also states that this will occur with excessive walking.  Patient has a history of Sjorgren's and has been seeing rheumatology for this.  She also also had back pain and would like a referral to someone who can help with this.    Past family, medical, social, surgical history, and vital signs reviewed in chart.    General    Nursing note and vitals reviewed.  Constitutional: She is oriented to person, place, and time. She appears well-developed and well-nourished.   HENT:   Head: Normocephalic and atraumatic.   Eyes: Conjunctivae and EOM are normal. Pupils are equal, round, and reactive to light.   Neck: Neck supple.   Cardiovascular: Intact distal pulses.    Pulmonary/Chest: Effort normal. No respiratory distress.   Abdominal: She exhibits no distension.   Neurological: She is  alert and oriented to person, place, and time. She has normal reflexes.   Psychiatric: She has a normal mood and affect.     General Musculoskeletal Exam   Gait: antalgic       Right Knee Exam     Inspection   Erythema: absent  Swelling: absent  Effusion: absent    Tenderness   The patient is tender to palpation of the medial joint line.    Range of Motion   The patient has normal right knee ROM.    Tests   Meniscus   Jennifer:  Medial - positive Lateral - negative  Ligament Examination Lachman: normal (-1 to 2mm) PCL-Posterior Drawer: normal (0 to 2mm)     MCL - Valgus: normal (0 to 2mm)  LCL - Varus: normal  Patella   Patellar apprehension: negative  Patellar Tracking: normal    Other   Meniscal Cyst: absent  Popliteal (Baker's) Cyst: absent  Sensation: normal    Left Knee Exam   Left knee exam is normal.    Inspection   Erythema: absent  Swelling: absent  Effusion: absent    Range of Motion   The patient has normal left knee ROM.    Tests   Meniscus   Jennifer:  Medial - negative Lateral - negative  Stability Lachman: normal (-1 to 2mm) PCL-Posterior Drawer: normal (0 to 2mm)  MCL - Valgus: normal (0 to 2mm)  LCL - Varus: normal (0 to 2mm)  Patella   Patellar apprehension: negative  Patellar Tracking: normal    Other   Sensation: normal    Right Hip Exam   Right hip exam is normal.     Tests   Pain w/ forced internal rotation (PENNY): absent  Left Hip Exam   Left hip exam is normal.    Tests   Pain w/ forced internal rotation (PENNY): absent          Muscle Strength   Right Lower Extremity   Hip Adduction: 5/5   Hip Flexion: 5/5   Quadriceps:  5/5   Hamstrin/5   Left Lower Extremity   Hip Adduction: 5/5   Hip Flexion: 5/5   Quadriceps:  5/5   Hamstrin/5     Vascular Exam     Right Pulses  Dorsalis Pedis:      2+          Left Pulses  Dorsalis Pedis:      2+              X-Ray Chest 1 View  Narrative: EXAMINATION:  XR CHEST 1 VIEW    CLINICAL HISTORY:  Deviated nasal septum    TECHNIQUE:  Single frontal  view of the chest was performed.    COMPARISON:  None    FINDINGS:  Postsurgical changes right mastectomy and breast augmentation.  Calcified implant noted projecting over the right mid to lower lung field.    Faintly visualized implant noted on the left without calcification.    Heart and pulmonary vasculature within normal limits.  Lungs without consolidation and effusion.  Granuloma noted in the lateral aspect right mid to upper lung field.  Mild bilateral apical pleural thickening, slightly irregular bilaterally, greater on the right.  Midline trachea.  Degenerative change bilateral shoulders and throughout the spine.  Calcification on the right suggest calcific tendinitis.  Impression: Mild hyperinflation without consolidation or effusion.    Prior granulomatous disease.    Postsurgical changes right mastectomy.  Bilateral breast augmentation with calcified implants on the right.    Findings suggestive of calcific tendinosis right shoulder.    Electronically signed by: Bud Holcomb MD  Date:    09/14/2021  Time:    14:05         Patient Instructions     Assessment:  Latonia Oliver is a 71 y.o. female   Chief Complaint   Patient presents with    Right Knee - Pain     Primary osteoarthritis of right knee  -     naproxen (NAPROSYN) 500 MG tablet; Take 1 tablet (500 mg total) by mouth 2 (two) times daily with meals.  Dispense: 60 tablet; Refill: 1    Chronic pain of right knee  -     naproxen (NAPROSYN) 500 MG tablet; Take 1 tablet (500 mg total) by mouth 2 (two) times daily with meals.  Dispense: 60 tablet; Refill: 1    Plan:   Too soon for injections.   Wants to see surgeon to discuss TKA. Referral to Dr. Delgado   Try prescription naproxen since aleve helping.   Discussed how to use voltaren appropriately for full benefit.    Discussed OTC meds that can help.   Follow-up with rheumatology as they are working up her back pain.      Follow-up: As needed     Research supports the use of these things for  "helping joint / arthritis pain:  Collagen supplements -  collagen info I like [place] Hydrolyzed Collagen: on Amazon  High Dose Fish Oil / Krill oil - I like [employer]: Nordic site  krill oil info  Turmeric / Curcumin - curcumin info I believe treatment dose is around 500 mg once or twice daily. No particular brand recommendation.   SAMe - SAMe info (no brand recommendation)       Patient Education       Knee Pain   The Basics   Written by the doctors and editors at [facility]   What causes knee pain? -- Many different conditions can cause knee pain. Some of the most common are listed below.  · Bending or using the knee too much - This can cause pain in the front of the knee that worsens with running, climbing steps, or sitting for a long time.  · Arthritis - Arthritis is a general term that means inflammation of the joints. There are lots of types of arthritis. The most common type, called osteoarthritis, often comes with age. It can cause pain, stiffness, and swelling (figure 1).  · Bursitis - Bursitis happens when fluid-filled sacs around the knee (called "bursae") get irritated or swollen (figure 2). Bursitis can cause pain and swelling.  · A collection of fluid in the knee - This can happen after a knee injury.  · A tear in the meniscus - The meniscus is a cushion of rubbery material (cartilage) between the thigh bone and the leg bone (figure 3).  · A tear in a ligament - Ligaments are bands of tissue that connect one bone to another. There are 4 ligaments in each knee (figure 3).  · Muscle strain - Different leg muscles move the knee joint, causing the knee to bend and straighten. If one of these muscles doesn't work well, moving the knee can cause pain.  · Other knee injuries, a knee joint infection, or a condition called gout, which causes crystals to form inside joints.  · Conditions that don't involve the knee - For example, problems in the hip can sometimes cause knee pain.  Is there anything I " "can do on my own to feel better? -- Yes. To ease your symptoms, you can:  · Put ice on the knee to reduce pain and swelling - For the first few weeks after an injury, or after an activity that makes your pain worse, you can try icing your knee. Put a cold gel pack, bag of ice, or bag of frozen vegetables on the injured area every 1 to 2 hours, for 15 minutes each time. Put a thin towel between the ice (or other cold object) and your skin. To reduce swelling, sit or lie down and raise your leg above the level of your heart when you put ice on it.  · Rest your knee and avoid movements that worsen the pain - Try not to squat, kneel, or run. Also, don't use exercise machines, such as stair steppers or rowing machines. Instead, you can walk or swim (the front and back crawl strokes) for exercise.  · Take a pain-relieving medicine, such as acetaminophen (sample brand name: Tylenol) or ibuprofen (sample brand names: Advil, Motrin).  Should I see a doctor or nurse? -- See your doctor or nurse if:  · You are unable to put weight on your knee, your knee "locks" in place, or your knee "gives out"  · Your knee is very swollen and painful  · You have a fever with knee pain, swelling, and redness  · Your knee pain doesn't get better or gets worse after you treat it on your own for a few days  How is knee pain treated? -- The right treatment for knee pain depends on what is causing it. Treatments might include:  · Wearing a knee brace or shoe insert  · Doing exercises to strengthen and stretch the muscles that move the knee joint - Ask your doctor or nurse which exercises can help with the cause of your pain.  · Having physical therapy  · Getting a shot of medicine in the knee  · Other medicines  · Surgery  All topics are updated as new evidence becomes available and our peer review process is complete.  This topic retrieved from [employer] on: Sep 21, 2021.  Topic 00035 Version 11.0  Release: 29.4.2 - C29.263  © 2021 UpToDate, Inc. " "and/or its affiliates. All rights reserved.  figure 1: Knee osteoarthritis     This drawing shows a normal knee joint next to a knee joint with osteoarthritis (OA). In the OA joint, the cartilage covering the ends of the bones roughens and becomes thin, while the bone underneath the cartilage grows thicker. Bony growths called "osteophytes" can form. The space between the bones also becomes narrower.  Graphic 600688 Version 2.0    figure 2: Knee bursa (prepatellar bursa)     Graphic 77757 Version 3.0    figure 3: Front view of the knee     This drawing shows the inner parts of the knee as seen from the front. A small bone (called the patella or the "knee cap") that sits in front of the knee has been removed so that you can see what is under that bone. The anterior cruciate ligament (ACL) is in the middle in white. It connects the thigh bone (called the "femur") to the shin bone (called the "tibia"). The meniscus is a cushion of rubbery material (cartilage) between the thigh bone and the shin bone.  Graphic 58583 Version 5.0    Consumer Information Use and Disclaimer   This information is not specific medical advice and does not replace information you receive from your health care provider. This is only a brief summary of general information. It does NOT include all information about conditions, illnesses, injuries, tests, procedures, treatments, therapies, discharge instructions or life-style choices that may apply to you. You must talk with your health care provider for complete information about your health and treatment options. This information should not be used to decide whether or not to accept your health care provider's advice, instructions or recommendations. Only your health care provider has the knowledge and training to provide advice that is right for you. The use of this information is governed by the [employer] End User License Agreement, available at " https://www.[employer].com/en/solutions/lexicomp/about/manolo.The use of [facility] content is governed by the [facility] Terms of Use. ©2021 UpToDate, Inc. All rights reserved.  Copyright   © 2021 UpToDate, Inc. and/or its affiliates. All rights reserved.        Disclaimer: This note was prepared using a voice recognition system and is likely to have sound alike errors within the text.     Cayla Astorga M.D.  Sports Medicine

## 2022-01-27 ENCOUNTER — TELEPHONE (OUTPATIENT)
Dept: PAIN MEDICINE | Facility: CLINIC | Age: 72
End: 2022-01-27
Payer: MEDICARE

## 2022-01-27 ENCOUNTER — TELEPHONE (OUTPATIENT)
Dept: SPORTS MEDICINE | Facility: CLINIC | Age: 72
End: 2022-01-27
Payer: MEDICARE

## 2022-01-27 NOTE — TELEPHONE ENCOUNTER
Called regarding patient's ortho referral and scheduled apt. Pt also requested an appointment for their lumbar pain. Explained msg would be sent to the Back and spine staff to schedule an apt. Msg was sent

## 2022-01-27 NOTE — TELEPHONE ENCOUNTER
Returned patient's phone call from [employer]. Patient was interested in seeing someone from our [facility] about her chronic lower bp issues. Scheduled appt with IPM.    Joyce Bazzi RN

## 2022-02-21 ENCOUNTER — TELEPHONE (OUTPATIENT)
Dept: ADMINISTRATIVE | Facility: OTHER | Age: 72
End: 2022-02-21
Payer: MEDICARE

## 2022-03-02 ENCOUNTER — OFFICE VISIT (OUTPATIENT)
Dept: INTERNAL MEDICINE | Facility: CLINIC | Age: 72
End: 2022-03-02
Payer: MEDICARE

## 2022-03-02 VITALS
HEIGHT: 62 IN | SYSTOLIC BLOOD PRESSURE: 140 MMHG | BODY MASS INDEX: 39.71 KG/M2 | OXYGEN SATURATION: 96 % | DIASTOLIC BLOOD PRESSURE: 70 MMHG | WEIGHT: 215.81 LBS | HEART RATE: 69 BPM | TEMPERATURE: 98 F

## 2022-03-02 DIAGNOSIS — Z86.59 HISTORY OF DEPRESSION: ICD-10-CM

## 2022-03-02 DIAGNOSIS — R11.0 NAUSEA: ICD-10-CM

## 2022-03-02 DIAGNOSIS — M54.50 LUMBAR BACK PAIN: ICD-10-CM

## 2022-03-02 DIAGNOSIS — G47.9 TROUBLE IN SLEEPING: ICD-10-CM

## 2022-03-02 DIAGNOSIS — F41.9 ANXIETY: ICD-10-CM

## 2022-03-02 DIAGNOSIS — R73.03 PREDIABETES: ICD-10-CM

## 2022-03-02 DIAGNOSIS — M25.50 PAIN IN JOINT, MULTIPLE SITES: ICD-10-CM

## 2022-03-02 DIAGNOSIS — I10 HYPERTENSION, UNSPECIFIED TYPE: Primary | ICD-10-CM

## 2022-03-02 DIAGNOSIS — E78.5 HYPERLIPIDEMIA, UNSPECIFIED HYPERLIPIDEMIA TYPE: ICD-10-CM

## 2022-03-02 PROCEDURE — 99999 PR PBB SHADOW E&M-EST. PATIENT-LVL IV: ICD-10-PCS | Mod: PBBFAC,,, | Performed by: FAMILY MEDICINE

## 2022-03-02 PROCEDURE — 99999 PR PBB SHADOW E&M-EST. PATIENT-LVL IV: CPT | Mod: PBBFAC,,, | Performed by: FAMILY MEDICINE

## 2022-03-02 PROCEDURE — 99214 PR OFFICE/OUTPT VISIT, EST, LEVL IV, 30-39 MIN: ICD-10-PCS | Mod: S$PBB,,, | Performed by: FAMILY MEDICINE

## 2022-03-02 PROCEDURE — 99214 OFFICE O/P EST MOD 30 MIN: CPT | Mod: S$PBB,,, | Performed by: FAMILY MEDICINE

## 2022-03-02 PROCEDURE — 99214 OFFICE O/P EST MOD 30 MIN: CPT | Mod: PBBFAC | Performed by: FAMILY MEDICINE

## 2022-03-02 RX ORDER — CHLORTHALIDONE 25 MG/1
25 TABLET ORAL DAILY
Qty: 90 TABLET | Refills: 1 | Status: SHIPPED | OUTPATIENT
Start: 2022-03-02 | End: 2022-09-19

## 2022-03-02 RX ORDER — DULOXETIN HYDROCHLORIDE 30 MG/1
60 CAPSULE, DELAYED RELEASE ORAL DAILY
Qty: 180 CAPSULE | Refills: 1 | Status: SHIPPED | OUTPATIENT
Start: 2022-03-02 | End: 2022-08-17

## 2022-03-02 RX ORDER — CYCLOBENZAPRINE HCL 10 MG
10 TABLET ORAL 3 TIMES DAILY PRN
Qty: 90 TABLET | Refills: 0 | Status: SHIPPED | OUTPATIENT
Start: 2022-03-02 | End: 2022-03-12

## 2022-03-02 NOTE — PROGRESS NOTES
Subjective:       Patient ID: Latonia Oliver is a 71 y.o. female.    Chief Complaint: Follow-up (4 month)    HPI     71 F    Patient Active Problem List   Diagnosis    Sjogren-Flo syndrome    Pain in joint, multiple sites    Sjogren's syndrome with keratoconjunctivitis sicca    Fibromyalgia    Type 2 diabetes mellitus without complication, without long-term current use of insulin    HTN (hypertension)    Mixed hyperlipidemia    GERD (gastroesophageal reflux disease)    Insomnia    Primary osteoarthritis of right knee    Lumbar back pain    Lumbar spondylosis    ROX on CPAP    Osteopenia    Depression    Fatigue    Medication monitoring encounter    Lumbar paraspinal muscle spasm    Visit for screening mammogram    Restless leg syndrome    Acute right ankle pain    Personal history of breast cancer    Major depressive disorder in partial remission    Weakness    Balance problem    Nasal septal deviation       Past Medical History:   Diagnosis Date    Anxiety     Arthritis     Back pain     Basal cell carcinoma (BCC)     Breast cancer     Breast cancer     Depression     Diabetes mellitus type 2, controlled     Enthesopathy of hip 2014    Fibromyalgia     GERD (gastroesophageal reflux disease)     HTN (hypertension)     Irregular heart beat     Mixed hyperlipidemia     Obesity     Osteoporosis     Rash and nonspecific skin eruption 6/15/2015    Sjogren - Flo's syndrome     Sleep apnea     Trouble in sleeping     Type 2 diabetes mellitus        Past Surgical History:   Procedure Laterality Date    APPENDECTOMY      BELT ABDOMINOPLASTY      BREAST LUMPECTOMY      BREAST SURGERY      breast augmentation     SECTION, CLASSIC      RECONSTRUCTION, NASAL VALVE Left 10/22/2021    Procedure: RECONSTRUCTION, NASAL VALVE ;  Surgeon: Faustino Cash MD;  Location: [facility];  Service: ENT;  Laterality: Left;       Family History   Problem Relation Age of Onset     Heart disease Mother     Hypertension Mother     COPD Mother     Diabetes Father     Stroke Father     Kidney disease Father        Social History     Tobacco Use   Smoking Status Never Smoker   Smokeless Tobacco Never Used       Wt Readings from Last 5 Encounters:   03/02/22 97.9 kg (215 lb 13.3 oz)   01/19/22 94.3 kg (208 lb)   01/03/22 94.8 kg (208 lb 15.9 oz)   12/02/21 95.8 kg (211 lb 3.2 oz)   10/28/21 93.7 kg (206 lb 9.1 oz)       For further HPI details, see assessment and plan.    Review of Systems    Objective:      Vitals:    03/02/22 0918   BP: (!) 140/70   Pulse: 69   Temp: 97.7 °F (36.5 °C)       Physical Exam  Constitutional:       General: She is not in acute distress.     Appearance: She is not ill-appearing.   Pulmonary:      Effort: Pulmonary effort is normal. No respiratory distress.   Neurological:      General: No focal deficit present.      Mental Status: She is alert.   Psychiatric:         Mood and Affect: Mood normal.         Behavior: Behavior normal.         Assessment:       1. Hypertension, unspecified type    2. Pain in joint, multiple sites    3. Lumbar back pain    4. History of depression    5. Anxiety    6. Trouble in sleeping    7. Hyperlipidemia, unspecified hyperlipidemia type    8. Nausea    9. Prediabetes        Plan:         OA  Given synvisc - one injection  Helped but pain returned.  Then saw Dr. Astorga  Recommended Dr. Reyna.  Had to get moved back.  Suspect needs knee replacement.    Lower back pain  Has upcoming pain appt  Has tried robaxin for muscle cramping    HTN  BP Readings from Last 3 Encounters:   03/02/22 (!) 140/70   01/03/22 119/70   10/28/21 (!) 154/81     On hctz  On amlodipine  On benazepril  She is worried they are not working    Working with digital HTN  Her log indicates its been high  We will switch from HCTZ to chrlothalidone      H/o depression  She believes its more inline with anxiety  Currently on celexa 40 mg  I do have my concerns  with this dose given her age  We will transition to duloxtine.  This in an effort to control underlying anxiety but also to potentially improve her pain  We will start at 30 mg  I ask that after a week she takes 2 to equal 60 mg ( once/day )   She has had cymbalta in the past and does have some concern it contributed to RLS  We will need to monitor for that.    PreDM  Lab Results   Component Value Date    HGBA1C 5.7 (H) 08/03/2021     Nasal septal deviation  Has had significant improvement since surgery      Trouble w/ sleeping  Rare use of ambien  Only when absolutely needed.    hld  On simvastatin  Lab Results   Component Value Date    LDLCALC 96.4 08/03/2021     Gets easily nauseated  Has zofran and phenergan  Only prn  We'll remove the zofran today just to clean her list up and she  Is not using it.      Plan a f/u vist in 1 month to recheck pressure and see how she is doing w transition from ssri to snri.      Patient wants to do labs before visits. I think perfectly reasonable. Ask she sends me message prior to visits via Transmit.  Patient had several appointments canceled by my office. Apologized for inconvenience.

## 2022-03-09 ENCOUNTER — TELEPHONE (OUTPATIENT)
Dept: RHEUMATOLOGY | Facility: CLINIC | Age: 72
End: 2022-03-09
Payer: MEDICARE

## 2022-03-09 ENCOUNTER — TELEPHONE (OUTPATIENT)
Dept: INTERNAL MEDICINE | Facility: CLINIC | Age: 72
End: 2022-03-09
Payer: MEDICARE

## 2022-03-09 NOTE — TELEPHONE ENCOUNTER
Called the patient to let her know not to take Celexa.  Patient states that Dr Combs told her at her visit not to take the medication so she already knew not to take it.

## 2022-03-09 NOTE — TELEPHONE ENCOUNTER
----- Message from Cheryl Fair sent at 3/9/2022  9:15 AM CST -----  Contact: self/[phone]  Patient is returning a phone call.  Who left a message for the patient: LPN  Does patient know what this is regarding:  medication changes.  Would you like a call back, or a response through your [facility] portal?:   CALL BACK  Comments:

## 2022-04-01 ENCOUNTER — OFFICE VISIT (OUTPATIENT)
Dept: INTERNAL MEDICINE | Facility: CLINIC | Age: 72
End: 2022-04-01
Payer: MEDICARE

## 2022-04-01 VITALS
OXYGEN SATURATION: 96 % | TEMPERATURE: 97 F | WEIGHT: 218.94 LBS | SYSTOLIC BLOOD PRESSURE: 118 MMHG | DIASTOLIC BLOOD PRESSURE: 74 MMHG | RESPIRATION RATE: 18 BRPM | HEART RATE: 85 BPM | BODY MASS INDEX: 40.04 KG/M2

## 2022-04-01 DIAGNOSIS — I10 HYPERTENSION, UNSPECIFIED TYPE: ICD-10-CM

## 2022-04-01 DIAGNOSIS — M19.90 OSTEOARTHRITIS, UNSPECIFIED OSTEOARTHRITIS TYPE, UNSPECIFIED SITE: Primary | ICD-10-CM

## 2022-04-01 DIAGNOSIS — F32.A ANXIETY AND DEPRESSION: ICD-10-CM

## 2022-04-01 DIAGNOSIS — F41.9 ANXIETY AND DEPRESSION: ICD-10-CM

## 2022-04-01 PROCEDURE — 99213 OFFICE O/P EST LOW 20 MIN: CPT | Mod: PBBFAC | Performed by: FAMILY MEDICINE

## 2022-04-01 PROCEDURE — 99214 PR OFFICE/OUTPT VISIT, EST, LEVL IV, 30-39 MIN: ICD-10-PCS | Mod: S$PBB,,, | Performed by: FAMILY MEDICINE

## 2022-04-01 PROCEDURE — 99999 PR PBB SHADOW E&M-EST. PATIENT-LVL III: CPT | Mod: PBBFAC,,, | Performed by: FAMILY MEDICINE

## 2022-04-01 PROCEDURE — 99214 OFFICE O/P EST MOD 30 MIN: CPT | Mod: S$PBB,,, | Performed by: FAMILY MEDICINE

## 2022-04-01 PROCEDURE — 99999 PR PBB SHADOW E&M-EST. PATIENT-LVL III: ICD-10-PCS | Mod: PBBFAC,,, | Performed by: FAMILY MEDICINE

## 2022-04-01 RX ORDER — CARVEDILOL 6.25 MG/1
6.25 TABLET ORAL 2 TIMES DAILY WITH MEALS
Qty: 180 TABLET | Refills: 1 | Status: SHIPPED | OUTPATIENT
Start: 2022-04-01 | End: 2022-08-09

## 2022-04-01 NOTE — PROGRESS NOTES
Subjective:       Patient ID: Latonia Oliver is a 71 y.o. female.    Chief Complaint: Establish Care    HPI    Patient Active Problem List   Diagnosis    Sjogren-Flo syndrome    Pain in joint, multiple sites    Sjogren's syndrome with keratoconjunctivitis sicca    Fibromyalgia    Type 2 diabetes mellitus without complication, without long-term current use of insulin    HTN (hypertension)    Mixed hyperlipidemia    GERD (gastroesophageal reflux disease)    Insomnia    Primary osteoarthritis of right knee    Lumbar back pain    Lumbar spondylosis    ROX on CPAP    Osteopenia    Depression    Fatigue    Medication monitoring encounter    Lumbar paraspinal muscle spasm    Visit for screening mammogram    Restless leg syndrome    Acute right ankle pain    Personal history of breast cancer    Major depressive disorder in partial remission    Weakness    Balance problem    Nasal septal deviation       Past Medical History:   Diagnosis Date    Anxiety     Arthritis     Back pain     Basal cell carcinoma (BCC)     Breast cancer     Breast cancer     Depression     Diabetes mellitus type 2, controlled     Enthesopathy of hip 2014    Fibromyalgia     GERD (gastroesophageal reflux disease)     HTN (hypertension)     Irregular heart beat     Mixed hyperlipidemia     Obesity     Osteoporosis     Rash and nonspecific skin eruption 6/15/2015    Sjogren - Flo's syndrome     Sleep apnea     Trouble in sleeping     Type 2 diabetes mellitus        Past Surgical History:   Procedure Laterality Date    APPENDECTOMY      BELT ABDOMINOPLASTY      BREAST LUMPECTOMY      BREAST SURGERY      breast augmentation     SECTION, CLASSIC      RECONSTRUCTION, NASAL VALVE Left 10/22/2021    Procedure: RECONSTRUCTION, NASAL VALVE ;  Surgeon: Faustino Cash MD;  Location: [facility];  Service: ENT;  Laterality: Left;       Family History   Problem Relation Age of Onset    Heart  disease Mother     Hypertension Mother     COPD Mother     Diabetes Father     Stroke Father     Kidney disease Father        Social History     Tobacco Use   Smoking Status Never Smoker   Smokeless Tobacco Never Used       Wt Readings from Last 5 Encounters:   04/01/22 99.3 kg (218 lb 14.7 oz)   03/02/22 97.9 kg (215 lb 13.3 oz)   01/19/22 94.3 kg (208 lb)   01/03/22 94.8 kg (208 lb 15.9 oz)   12/02/21 95.8 kg (211 lb 3.2 oz)       For further HPI details, see assessment and plan.    Review of Systems    Objective:      Vitals:    04/01/22 0949   BP: 118/74   Pulse: 85   Resp: 18   Temp: 97.2 °F (36.2 °C)       Physical Exam  Constitutional:       General: She is not in acute distress.     Appearance: Normal appearance. She is well-developed.   Cardiovascular:      Rate and Rhythm: Normal rate and regular rhythm.   Pulmonary:      Effort: Pulmonary effort is normal. No respiratory distress.      Breath sounds: Normal breath sounds.   Musculoskeletal:         General: Normal range of motion.   Neurological:      Mental Status: She is alert. She is not disoriented.   Psychiatric:         Mood and Affect: Mood normal.         Speech: Speech normal.         Assessment:       1. Osteoarthritis, unspecified osteoarthritis type, unspecified site    2. Hypertension, unspecified type    3. Anxiety and depression        Plan:   Osteoarthritis, unspecified osteoarthritis type, unspecified site    Hypertension, unspecified type    Anxiety and depression    Other orders  -     carvediloL (COREG) 6.25 MG tablet; Take 1 tablet (6.25 mg total) by mouth 2 (two) times daily with meals.  Dispense: 180 tablet; Refill: 1        OA  Suspected knee replacement needed  Has upcoming ortho appt    Low back pain  Has appt w/ pain mngt on the 19th    HTN  BP Readings from Last 3 Encounters:   04/01/22 118/74   03/02/22 (!) 140/70   01/03/22 119/70     Amlodipine  Benazepril  Chlorthalidone    She is finding her pressures are not  controlled  Fearful as her sister had a stroke.  Controlled in office today -but log is not controlled    We will increase her BP meds    Add coreg.    Reviewed JNC 8 guideline w/ patient    Stop hctz *has been taken erroneously.      Depression/ anxiety  Duloxetine  Doing fine  Doing 60 mg  Seems to be controlled  She had some concerns starting it due to concerns of RLS  She take klonopin - and is having no issues.      I would like to discuss trulicity w patient ( or ozempic ) for weight loss benefit.  Ran out of time today  Will set up a virtual visit at her conveneince    Will check her pressures then.

## 2022-04-11 ENCOUNTER — PATIENT OUTREACH (OUTPATIENT)
Dept: ADMINISTRATIVE | Facility: OTHER | Age: 72
End: 2022-04-11
Payer: MEDICARE

## 2022-04-11 NOTE — PROGRESS NOTES
Health Maintenance Due   Topic Date Due    COVID-19 Vaccine (1) Never done    Mammogram  03/25/2014    Colorectal Cancer Screening  05/17/2019    Shingles Vaccine (3 of 3) 04/30/2020    Eye Exam  07/10/2021    Foot Exam  07/22/2021    Hemoglobin A1c  02/03/2022     Updates were requested from care everywhere.  Chart was reviewed for overdue [facility]active [facility] Encounters (CHAYIT[facility]) topics (CRS, Breast Cancer Screening, Eye exam)  Health Maintenance has been updated.  LINKS immunization registry triggered.  Immunizations were reconciled.

## 2022-04-12 ENCOUNTER — OFFICE VISIT (OUTPATIENT)
Dept: PAIN MEDICINE | Facility: CLINIC | Age: 72
End: 2022-04-12
Payer: MEDICARE

## 2022-04-12 VITALS
RESPIRATION RATE: 17 BRPM | WEIGHT: 220.81 LBS | HEART RATE: 72 BPM | SYSTOLIC BLOOD PRESSURE: 129 MMHG | HEIGHT: 62 IN | BODY MASS INDEX: 40.63 KG/M2 | DIASTOLIC BLOOD PRESSURE: 79 MMHG

## 2022-04-12 DIAGNOSIS — M47.816 LUMBAR FACET ARTHROPATHY: Primary | ICD-10-CM

## 2022-04-12 DIAGNOSIS — M51.36 DDD (DEGENERATIVE DISC DISEASE), LUMBAR: ICD-10-CM

## 2022-04-12 DIAGNOSIS — M54.16 LUMBAR RADICULOPATHY: ICD-10-CM

## 2022-04-12 DIAGNOSIS — M17.11 PRIMARY OSTEOARTHRITIS OF RIGHT KNEE: ICD-10-CM

## 2022-04-12 PROCEDURE — 99214 OFFICE O/P EST MOD 30 MIN: CPT | Mod: PBBFAC | Performed by: ANESTHESIOLOGY

## 2022-04-12 PROCEDURE — 99204 PR OFFICE/OUTPT VISIT, NEW, LEVL IV, 45-59 MIN: ICD-10-PCS | Mod: S$PBB,,, | Performed by: ANESTHESIOLOGY

## 2022-04-12 PROCEDURE — 99204 OFFICE O/P NEW MOD 45 MIN: CPT | Mod: S$PBB,,, | Performed by: ANESTHESIOLOGY

## 2022-04-12 PROCEDURE — 99999 PR PBB SHADOW E&M-EST. PATIENT-LVL IV: CPT | Mod: PBBFAC,,, | Performed by: ANESTHESIOLOGY

## 2022-04-12 PROCEDURE — 99999 PR PBB SHADOW E&M-EST. PATIENT-LVL IV: ICD-10-PCS | Mod: PBBFAC,,, | Performed by: ANESTHESIOLOGY

## 2022-04-12 RX ORDER — SIMVASTATIN 40 MG/1
TABLET, FILM COATED ORAL
Qty: 90 TABLET | Refills: 1 | Status: SHIPPED | OUTPATIENT
Start: 2022-04-12 | End: 2022-12-07

## 2022-04-12 NOTE — H&P (VIEW-ONLY)
New Patient Chronic Pain Note (Initial Visit)    Referring Physician: Kodak Delgado    PCP: Elias Combs MD    Chief Complaint:   Chief Complaint   Patient presents with    Low-back Pain     C/o achy low back pain in lower back pain that radiates into both hips. Hurts worse when walking.     Mid-back Pain     Mid back pain started about threes ago        SUBJECTIVE:    Latonia Oliver is a 71 y.o. female with past medical history significant for anxiety, depression, Sjogren syndrome, hypertension, hyperlipidemia, breast carcinoma, type 2 diabetes, obesity, GERD, fibromyalgia, osteopenia, multi joint osteoarthritis, obstructive sleep apnea on CPAP who presents to the clinic for the evaluation of lower back and leg pain.  Patient reports pain began approximately 3 years prior without inciting accident injury or trauma.  Today patient reports pain which is constant which is rated 10/10.  Patient reports pain is insidiously worsened over the last year.  Today patient reports pain in a bandlike distribution in the lower back which radiates to bilateral buttocks.  Pain is described as aching in nature.  Pain is exacerbated with ambulation.  Patient is unable to even ambulate 1 block before requiring rest.  Pain is improved with sitting as well as with prior intramuscular corticosteroid injection.  Of note patient received prior lumbar injection with Dr. Dangelo March 10, 2016 without significant relief.  Patient has trialed gabapentin in the past at a therapeutic dose without relief.  Of note patient was recently started on Cymbalta and is awaiting therapeutic affect.  Patient has completed 6 weeks of physical therapy in the last year without significant relief.    Patient denies night fever/night sweats, urinary incontinence, bowel incontinence and significant weight loss.  Patient reports significant motor weakness.    Pain Disability Index Review:   No flowsheet data found.    Non-Pharmacologic  Treatments:  Physical Therapy/Home Exercise: yes  Ice/Heat:no  TENS: no  Acupuncture: no  Massage: no  Chiropractic: no    Other: no      Pain Medications:  - Adjuvant Medications: Alleve (Naproxen), Ambien (Zolpidem), Clonazepam (Klonopin), Cymbalta ( Duloxetine) and Topical Ointment (Voltaren Gel, Steroid cream, Anti-Inflammatory Cream, Compound cream)    Pain Procedures:   -3/9/16: lumbar MBB; Dr. Dangelo  -09/15/2021:  Right knee Synvisc injections; Ms. Estrella  -21:  Right knee corticosteroid injection; Ms. Estrella    Past Medical History:   Diagnosis Date    Anxiety     Arthritis     Back pain     Basal cell carcinoma (BCC)     Breast cancer     Breast cancer     Depression     Diabetes mellitus type 2, controlled     Enthesopathy of hip 2014    Fibromyalgia     GERD (gastroesophageal reflux disease)     HTN (hypertension)     Irregular heart beat     Mixed hyperlipidemia     Obesity     Osteoporosis     Rash and nonspecific skin eruption 6/15/2015    Sjogren - Flo's syndrome     Sleep apnea     Trouble in sleeping     Type 2 diabetes mellitus      Past Surgical History:   Procedure Laterality Date    APPENDECTOMY      BELT ABDOMINOPLASTY      BREAST LUMPECTOMY      BREAST SURGERY      breast augmentation     SECTION, CLASSIC      RECONSTRUCTION, NASAL VALVE Left 10/22/2021    Procedure: RECONSTRUCTION, NASAL VALVE ;  Surgeon: Faustino Cash MD;  Location: [facility];  Service: ENT;  Laterality: Left;     Review of patient's allergies indicates:   Allergen Reactions    Penicillins      Other reaction(s): Rash  Other reaction(s): Difficulty breathing       Current Outpatient Medications   Medication Sig    amLODIPine (NORVASC) 10 MG tablet Take 1 tablet by mouth once daily    benazepriL (LOTENSIN) 40 MG tablet Take 1 tablet by mouth once daily    carvediloL (COREG) 6.25 MG tablet Take 1 tablet (6.25 mg total) by mouth 2 (two) times daily with meals.     clonazePAM (KLONOPIN) 1 MG tablet Take 1 tablet (1 mg total) by mouth every evening.    cyanocobalamin (VITAMIN B-12) 1000 MCG tablet Take 100 mcg by mouth once daily.    DULoxetine (CYMBALTA) 30 MG capsule Take 2 capsules (60 mg total) by mouth once daily.    fluticasone propionate (FLONASE) 50 mcg/actuation nasal spray 2 sprays (100 mcg total) by Each Nostril route once daily.    hydrOXYchloroQUINE (PLAQUENIL) 200 mg tablet Take 1 tablet (200 mg total) by mouth 2 (two) times daily.    naproxen (NAPROSYN) 500 MG tablet Take 1 tablet (500 mg total) by mouth 2 (two) times daily with meals.    omeprazole (PRILOSEC) 40 MG capsule Take 1 capsule by mouth once daily    promethazine (PHENERGAN) 25 MG tablet Take 1 tablet (25 mg total) by mouth every 6 (six) hours as needed.    simvastatin (ZOCOR) 40 MG tablet Take 1 tablet by mouth once daily    zolpidem (AMBIEN) 5 MG Tab Take 1 tablet (5 mg total) by mouth nightly as needed (for insomnia).    chlorthalidone (HYGROTEN) 25 MG Tab Take 1 tablet (25 mg total) by mouth once daily.    diclofenac sodium (VOLTAREN) 1 % Gel Apply 2 g topically 3 (three) times daily as needed. (Patient not taking: Reported on 4/12/2022)     No current facility-administered medications for this visit.       Review of Systems     GENERAL:  No weight loss, malaise or fevers.  HEENT:   No recent changes in vision or hearing  NECK:  Negative for lumps, no difficulty with swallowing.  RESPIRATORY:  Negative for cough, wheezing or shortness of breath, patient denies any recent URI.  CARDIOVASCULAR:  Negative for chest pain, leg swelling or palpitations.  GI:  Negative for abdominal discomfort, blood in stools or black stools or change in bowel habits.  MUSCULOSKELETAL:  See HPI.  SKIN:  Negative for lesions, rash, and itching.  PSYCH:  No mood disorder or recent psychosocial stressors.   HEMATOLOGY/LYMPHOLOGY:  Negative for prolonged bleeding, bruising easily or swollen nodes.    NEURO:   No  "history of headaches, syncope, paralysis, seizures or tremors.  All other reviewed and negative other than HPI.    OBJECTIVE:    /79   Pulse 72   Resp 17   Ht 5' 2" (1.575 m)   Wt 100.1 kg (220 lb 12.7 oz)   BMI 40.38 kg/m²       Physical Exam    GENERAL: Well appearing, in no acute distress, alert and oriented x3.  PSYCH:  Mood and affect appropriate.  SKIN: Skin color, texture, turgor normal, no rashes or lesions.  HEAD/FACE:  Normocephalic, atraumatic. Cranial nerves grossly intact.    CV: RRR with palpation of the radial artery.  PULM: No evidence of respiratory difficulty, symmetric chest rise.  GI:  Soft and non-tender.    BACK: Straight leg raising in the sitting and supine positions is negative to radicular pain. No pain to palpation over the facet joints of the lumbar spine or spinous processes. Normal range of motion without pain reproduction.  EXTREMITIES: Peripheral joint ROM is full and pain free without obvious instability or laxity in all four extremities. No deformities, edema, or skin discoloration. Good capillary refill.  MUSCULOSKELETAL: Able to stand on heels & toes.   Shoulder, hip, and knee provocative maneuvers are negative.  There is no pain with palpation over the sacroiliac joints bilaterally.  FABERs test is negative.  Facet loading test is negative bilaterally.   Bilateral upper and lower extremity strength is normal and symmetric.  No atrophy or tone abnormalities are noted.  RIGHT Lower extremity: Hip flexion 5/5, Hip Abduction 5/5, Hip Adduction 5/5, Knee extension 5/5, Knee flexion 5/5, Ankle dorsiflexion5/5, Extensor hallucis longus 5/5, Ankle plantarflexion 5/5  LEFT Lower extremity:  Hip flexion 5/5, Hip Abduction 5/5,Hip Adduction 5/5, Knee extension 5/5, Knee flexion 5/5, Ankle dorsiflexion 5/5, Extensor hallucis longus 5/5, Ankle plantarflexion 5/5  -Normal testing knee (patellar) jerk and ankle (achilles) jerk    NEURO: Bilateral upper and lower extremity " coordination and muscle stretch reflexes are physiologic and symmetric. No loss of sensation is noted.  GAIT: normal.    Imagin20    MRI Lumbar Spine Without Contrast    FINDINGS:  No fracture.  Minimal grade 1 anterolisthesis of L3 on L4 which is unchanged.  Multilevel degenerative changes with mild spurring of the endplates.  Disc space narrowing at L3-L4.  There is lower lumbar facet arthropathy.  Overall, findings are similar to slightly progressed since the comparison exam.  No marrow replacement process.  Mild edema within the subcutaneous soft tissues of the lower back.  Visualized distal cord demonstrates normal signal.    L1-L2: No spinal canal or neural foraminal encroachment    L2-L3: No spinal canal or neural foraminal encroachment.    L3-L4: Ligamentum flavum and mild facet arthropathy.  Grade 1 anterolisthesis.  Mild posterior disc bulge is seen.  No significant spinal canal or neural foraminal encroachment.    L4-L5: Mild facet arthropathy.  Mild posterior disc bulge.  Otherwise unremarkable.    L5-S1: Central and left paracentral disc protrusion.  No spinal canal stenosis.  There is asymmetric narrowing of the left neural foramen.    Impression  Discogenic degenerative changes as described above.      16    X-Ray Lumbar Complete With Flex And Ext    Narrative  Five views of the lumbar spine and additional lateral flexion and extension views were obtained.    There is degenerative facet arthrosis at the lower 3 lumbar levels.  Facet arthrosis results in mild anterior subluxation of L3 on L4 over distance of 4 mm.  The subluxation does not change with flexion or extension.  There is degenerative disk disease at the L3-4 level with disk space narrowing and vertebral endplate osteophytes.  Vertebral body heights are well maintained.  IMPRESSION:    1.  Degenerative facet arthrosis at the lower 3 lumbar levels with mild anterior subluxation of L3.  2.  L3-4 degenerative disk  disease.      ASSESSMENT: 71 y.o. year old female with lower back pain, consistent with     1. Lumbar facet arthropathy     2. DDD (degenerative disc disease), lumbar  IR NEELA Lumbar w/ Img    Case Request-RAD/Other Procedure Area: Lumbar L5/S1 IL NEELA with RN IV sedation   3. Primary osteoarthritis of right knee  IR Aspiration Injection Large Joint W FL    Case Request-RAD/Other Procedure Area: Right knee Synvisc injection [id number] with local   4. Lumbar radiculopathy  IR NEELA Lumbar w/ Img    Case Request-RAD/Other Procedure Area: Lumbar L5/S1 IL NEELA with RN IV sedation         PLAN:   - Interventions:  Schedule for L5/S1 interlaminar epidural steroid injection to see if this helps with pain related to stenosis and radiculopathy.  Two weeks following schedule refer right-sided Synvisc injection is patient has obtained significant relief lasting several months following her last viscosupplementation.  Explained the risks and benefits of the procedure in detail with the patient today in clinic along with alternative treatment options, and the patient elected to pursue the intervention at this time.      - Anticoagulation use: no no anticoagulation     report:  Reviewed and consistent with medication use as prescribed.    - Medications:  --patient can continue Cymbalta per outside provider.  We discussed it may take a few weeks before she sees a therapeutic benefit.  We have discussed potential common side effects of duloxetine including nausea, diarrhea/constipation, fatigue, drowsiness or dry mouth.  Patient expresses understanding.      - Therapy:  We discussed continuing physical therapy exercises at home to help manage the patient/s painful condition. The patient was counseled that muscle strengthening will improve the long term prognosis in regards to pain and may also help increase range of motion and mobility.    - Imaging: Reviewed available imaging with patient and answered any questions they had regarding  study.    - Follow up visit: return to clinic in 4-6 weeks      The above plan and management options were discussed at length with patient. Patient is in agreement with the above and verbalized understanding.    - I discussed the goals of interventional chronic pain management with the patient on today's visit. We discussed a multimodal and systematic approach to pain.  This includes diagnostic and therapeutic injections, adjuvant pharmacologic treatment, physical therapy, and at times psychiatry.  I emphasized the importance of regular exercise, core strengthening and stretching, diet and weight loss as a cornerstone of long-term pain management.    - This condition does not require this patient to take time off of work, and the primary goal of our Pain Management services is to improve the patient's functional capacity.  - Patient Questions: Answered all of the patient's questions regarding diagnoses, therapy, treatment and next steps        Giovanny Arroyo MD  Interventional Pain Management  [facility]    Disclaimer:  This note was prepared using voice recognition system and is likely to have sound alike errors that may have been overlooked even after proof reading.  Please call me with any questions

## 2022-04-12 NOTE — PATIENT INSTRUCTIONS
General Neck and Back Pain    Both neck and back pain are usually caused by injury to the muscles or ligaments of the spine. Sometimes the disks that separate each bone of the spine may cause pain by pressing on a nearby nerve. Back and neck pain may appear after a sudden twisting or bending force (such as in a car accident), or sometimes after a simple awkward movement. In either case, muscle spasm is often present and adds to the pain.  Acute neck and back pain usually gets better in 1 to 2 weeks. Pain related to disk disease, arthritis in the spinal joints or spinal stenosis (narrowing of the spinal canal) can become chronic and last for months or years.  Back and neck pain are common problems. Most people feel better in 1 or 2 weeks, and most of the rest in 1 to 2 months. Most people can remain active.  People experience and describe pain differently.  Pain can be sharp, stabbing, shooting, aching, cramping, or burning  Movement, standing, bending, lifting, sitting, or walking may worsen the pain  Pain can be localized to one spot or area, or it can be more generalized  Pain can spread or radiate upwards, downwards, to the front, or go down your arms  Muscle spasm may occur.  Most of the time mechanical problems with the muscles or spine cause the pain. it is usually caused by an injury, whether known or not, to the muscles or ligaments. While illnesses can cause back pain, it is usually not caused by a serious illness. Pain is usually related to physical activity, whether sports, exercise, work, or normal activity. Sometimes it can occur without an identifiable cause. This can happen simply by stretching or moving wrong, without noting pain at the time. Other causes include:  Overexertion, lifting, pushing, pulling incorrectly or too aggressively.  Sudden twisting, bending or stretching from an accident (car or fall), or accidental movement.  Poor posture  Poor conditioning, lack of regular exercise  Spinal  disc disease or arthritis  Stress  Pregnancy, or illness like appendicitis, bladder or kidney infection, pelvic infections   Home care  For neck pain: Use a comfortable pillow that supports the head and keeps the spine in a neutral position. The position of the head should not be tilted forward or backward.  When in bed, try to find a position of comfort. A firm mattress is best. Try lying flat on your back with pillows under your knees. You can also try lying on your side with your knees bent up towards your chest and a pillow between your knees.  At first, do not try to stretch out the sore spots. If there is a strain, it is not like the good soreness you get after exercising without an injury. In this case, stretching may make it worse.  Avoid prolonged sitting, long car rides or travel. This puts more stress on the lower back than standing or walking.  During the first 24 to 72 hours after an injury, apply an ice pack to the painful area for 20 minutes and then remove it for 20 minutes over a period of 60 to 90 minutes or several times a day.   You can alternate ice and heat therapies. Talk with your healthcare provider about the best treatment for your back or neck pain. As a safety precaution, do not use a heating pad at bedtime. Sleeping with a heating pad can lead to skin burns or tissue damage.  Therapeutic massage can help relax the back and neck muscles without stretching them.  Be aware of safe lifting methods and do not lift anything over 15 pounds until all the pain is gone.  Medications  Talk to your healthcare provider before using medicine, especially if you have other medical problems or are taking other medicines.  You may use over-the-counter medicine to control pain, unless another pain medicine was prescribed. If you have chronic conditions like diabetes, liver or kidney disease, stomach ulcers,  gastrointestinal bleeding, or are taking blood thinner medicines.  Be careful if you are given pain  medicines, narcotics, or medicine for muscle spasm. They can cause drowsiness, and can affect your coordination, reflexes, and judgment. Do not drive or operate heavy machinery.  Follow-up care  Follow up with your healthcare provider, or as advised. Physical therapy or further tests may be needed.  If X-rays were taken, you will be notified of any new findings that may affect your care.  Call 911  Seek emergency medical care if any of the following occur:  Trouble breathing  Confusion  Very drowsy or trouble awakening  Fainting or loss of consciousness  Rapid or very slow heart rate  Loss of bowel or bladder control  When to seek medical advice  Call your healthcare provider right away if any of these occur:  Pain becomes worse or spreads into your arms or legs  Weakness, numbness or pain in one or both arms or legs  Numbness in the groin area  Difficulty walking  Fever of 100.4ºF (38ºC) or higher, or as directed by your healthcare provider  Date Last Reviewed: 7/1/2016  © 7040-9083 [employer]. [address] [place] [address]. All rights reserved. This information is not intended as a substitute for professional medical care. Always follow your healthcare professional's instructions.       Understanding Lumbar Radiculopathy    Lumbar radiculopathy is irritation or inflammation of a nerve root in the low back. It causes symptoms that spread out from the back down one or both legs. To understand this condition, it helps to understand the parts of the spine:  Vertebrae. These are bones that stack to form the spine. The lumbar spine contains the 5 bottom vertebrae.  Disks. These are soft pads of tissue between the vertebrae. They act as shock absorbers for the spine.  Spinal canal. This is a tunnel formed within the stacked vertebrae. In the lumbar spine, nerves run through this canal.  Nerves. These branch off and leave the spinal canal, traveling out to parts of the body. As they leave the  spinal canal, nerves pass through openings between the vertebrae. The nerve root is the part of the nerve that is closest to the spinal canal.  Sciatic nerve. This is a large nerve formed from several nerve roots in the low back. This nerve extends down the back of the leg to the foot.  With lumbar radiculopathy, nerve roots in the low back become irritated. This leads to pain and symptoms. The sciatic nerve is commonly involved, so the condition is often called sciatica.  What causes lumbar radiculopathy?  Aging, injury, poor posture, extra body weight, and other issues can lead to problems in the low back. These problems may then irritate nerve roots. They include:  Damage to a disk in the lumbar spine. The damaged disk may then press on nearby nerve roots.  Degeneration from wear and tear, and aging. This can lead to narrowing (stenosis) of the openings between the vertebrae. The narrowed openings press on nerve roots as they leave the spinal canal.  Unstable spine. This is when a vertebra slips forward. It can then press on a nerve root.  Other, less common things can put pressure on nerves in the low back. These include diabetes, infection, or a tumor.  Symptoms of lumbar radiculopathy  These include:  Pain in the low back  Pain, numbness, tingling, or weakness that travels into the buttocks, hip, groin, or leg  Muscle spasms  Treatment for lumbar radiculopathy  In most cases, your healthcare provider will first try treatments that help relieve symptoms. These may include:  Prescription and over-the-counter pain medicines. These help relieve pain, swelling, and irritation.  Limits on positions and activities that increase pain. But lying in bed or avoiding all movement is only recommended for a short period of time.  Physical therapy, including exercises and stretches. This helps decrease pain and increase movement and function.  Steroid shots into the lower back. This may help relieve symptoms for a  time.  Weight-loss program. If you are overweight, losing extra pounds may help relieve symptoms.  In some cases, you may need surgery to fix the underlying problem. This depends on the cause, the symptoms, and how long the pain has lasted.  Possible complications  Over time, an irritated and inflamed nerve may become damaged. This may lead to long-lasting (permanent) numbness or weakness in your legs and feet. If symptoms change suddenly or get worse, be sure to let your healthcare provider know.  When to call your healthcare provider  Call your healthcare provider right away if you have any of these:  New pain or pain that gets worse  New or increasing weakness, tingling, or numbness in your leg or foot  Problems controlling your bladder or bowel   Date Last Reviewed: 3/10/2016  © 3331-3637 [employer]. [address] [place] [address], [place] [address]. All rights reserved. This information is not intended as a substitute for professional medical care. Always follow your healthcare professional's instructions.       Exercises to Strengthen Your Lower Back  Strong lower back and abdominal muscles work together to support your spine. The exercises below will help strengthen the lower back. It is important that you begin exercising slowly and increase levels gradually.  Always begin any exercise program with stretching. If you feel pain while doing any of these exercises, stop and talk to your doctor about a more specific exercise program that better suits your condition.   Low back stretch  The point of stretching is to make you more flexible and increase your range of motion. Stretch only as much as you are able. Stretch slowly. Do not push your stretch to the limit. If at any point you feel pain while stretching, this is your (temporary) limit.  Lie on your back with your knees bent and both feet on the ground.  Slowly raise your left knee to your chest as you flatten your lower back against the floor. Hold  for 5 seconds.  Relax and repeat the exercise with your right knee.  Do 10 of these exercises for each leg.  Repeat hugging both knees to your chest at the same time.  Building lower back strength  Start your exercise routine with 10 to 30 minutes a day, 1 to 3 times a day.  Initial exercises  Lying on your back:  Ankle pumps: Move your foot up and down, towards your head, and then away. Repeat 10 times with each foot.  Heel slides: Slowly bend your knee, drawing the heel of your foot towards you. Then slide your heel/foot from you, straightening your knee. Do not lift your foot off the floor (this is not a leg lift).  Abdominal contraction: Bend your knees and put your hands on your stomach. Tighten your stomach muscles. Hold for 5 seconds, then relax. Repeat 10 times.  Straight leg raise: Bend one leg at the knee and keep the other leg straight. Tighten your stomach muscles. Slowly lift your straight leg 6 to 12 inches off the floor and hold for up to 5 seconds. Repeat 10 times on each side.  Standing:  Wall squats: Stand with your back against the wall. Move your feet about 12 inches away from the wall. Tighten your stomach muscles, and slowly bend your knees until they are at about a 45 degree angle. Do not go down too far. Hold about 5 seconds. Then slowly return to your starting position. Repeat 10 times.  Heel raises: Stand facing the wall. Slowly raise the heels of your feet up and down, while keeping your toes on the floor. If you have trouble balancing, you can touch the wall with your hands. Repeat 10 times.  More advanced exercises  When you feel comfortable enough, try these exercises.  Kneeling lumbar extension: Begin on your hands and knees. At the same time, raise and straighten your right arm and left leg until they are parallel to the ground. Hold for 2 seconds and come back slowly to a starting position. Repeat with left arm and right leg, alternating 10 times.  Prone lumbar extension: Lie face  down, arms extended overhead, palms on the floor. At the same time, raise your right arm and left leg as high as comfortably possible. Hold for 10 seconds and slowly return to start. Repeat with left arm and right leg, alternating 10 times. Gradually build up to 20 times. (Advanced: Repeat this exercise raising both arms and both legs a few inches off the floor at the same time. Hold for 5 seconds and release.)  Pelvic tilt: Lie on the floor on your back with your knees bent at 90 degrees. Your feet should be flat on the floor. Inhale, exhale, then slowly contract your abdominal muscles bringing your navel toward your spine. Let your pelvis rock back until your lower back is flat on the floor. Hold for 10 seconds while breathing smoothly.  Abdominal crunch: Perform a pelvic tilt (above) flattening your lower back against the floor. Holding the tension in your abdominal muscles, take another breath and raise your shoulder blades off the ground (this is not a full sit-up). Keep your head in line with your body (dont bend your neck forward). Hold for 2 seconds, then slowly lower.  Date Last Reviewed: 6/1/2016  © 0520-6713 [employer]. [address] [place] [address], [place] [address]. All rights reserved. This information is not intended as a substitute for professional medical care. Always follow your healthcare professional's instructions.

## 2022-04-12 NOTE — PROGRESS NOTES
New Patient Chronic Pain Note (Initial Visit)    Referring Physician: Kodak Delgado    PCP: Elias Combs MD    Chief Complaint:   Chief Complaint   Patient presents with    Low-back Pain     C/o achy low back pain in lower back pain that radiates into both hips. Hurts worse when walking.     Mid-back Pain     Mid back pain started about threes ago        SUBJECTIVE:    Latonia Oliver is a 71 y.o. female with past medical history significant for anxiety, depression, Sjogren syndrome, hypertension, hyperlipidemia, breast carcinoma, type 2 diabetes, obesity, GERD, fibromyalgia, osteopenia, multi joint osteoarthritis, obstructive sleep apnea on CPAP who presents to the clinic for the evaluation of lower back and leg pain.  Patient reports pain began approximately 3 years prior without inciting accident injury or trauma.  Today patient reports pain which is constant which is rated 10/10.  Patient reports pain is insidiously worsened over the last year.  Today patient reports pain in a bandlike distribution in the lower back which radiates to bilateral buttocks.  Pain is described as aching in nature.  Pain is exacerbated with ambulation.  Patient is unable to even ambulate 1 block before requiring rest.  Pain is improved with sitting as well as with prior intramuscular corticosteroid injection.  Of note patient received prior lumbar injection with Dr. Dangelo March 10, 2016 without significant relief.  Patient has trialed gabapentin in the past at a therapeutic dose without relief.  Of note patient was recently started on Cymbalta and is awaiting therapeutic affect.  Patient has completed 6 weeks of physical therapy in the last year without significant relief.    Patient denies night fever/night sweats, urinary incontinence, bowel incontinence and significant weight loss.  Patient reports significant motor weakness.    Pain Disability Index Review:   No flowsheet data found.    Non-Pharmacologic  Treatments:  Physical Therapy/Home Exercise: yes  Ice/Heat:no  TENS: no  Acupuncture: no  Massage: no  Chiropractic: no    Other: no      Pain Medications:  - Adjuvant Medications: Alleve (Naproxen), Ambien (Zolpidem), Clonazepam (Klonopin), Cymbalta ( Duloxetine) and Topical Ointment (Voltaren Gel, Steroid cream, Anti-Inflammatory Cream, Compound cream)    Pain Procedures:   -3/9/16: lumbar MBB; Dr. Dangelo  -09/15/2021:  Right knee Synvisc injections; Ms. Estrella  -21:  Right knee corticosteroid injection; Ms. Estrella    Past Medical History:   Diagnosis Date    Anxiety     Arthritis     Back pain     Basal cell carcinoma (BCC)     Breast cancer     Breast cancer     Depression     Diabetes mellitus type 2, controlled     Enthesopathy of hip 2014    Fibromyalgia     GERD (gastroesophageal reflux disease)     HTN (hypertension)     Irregular heart beat     Mixed hyperlipidemia     Obesity     Osteoporosis     Rash and nonspecific skin eruption 6/15/2015    Sjogren - Flo's syndrome     Sleep apnea     Trouble in sleeping     Type 2 diabetes mellitus      Past Surgical History:   Procedure Laterality Date    APPENDECTOMY      BELT ABDOMINOPLASTY      BREAST LUMPECTOMY      BREAST SURGERY      breast augmentation     SECTION, CLASSIC      RECONSTRUCTION, NASAL VALVE Left 10/22/2021    Procedure: RECONSTRUCTION, NASAL VALVE ;  Surgeon: Faustino Cash MD;  Location: [facility];  Service: ENT;  Laterality: Left;     Review of patient's allergies indicates:   Allergen Reactions    Penicillins      Other reaction(s): Rash  Other reaction(s): Difficulty breathing       Current Outpatient Medications   Medication Sig    amLODIPine (NORVASC) 10 MG tablet Take 1 tablet by mouth once daily    benazepriL (LOTENSIN) 40 MG tablet Take 1 tablet by mouth once daily    carvediloL (COREG) 6.25 MG tablet Take 1 tablet (6.25 mg total) by mouth 2 (two) times daily with meals.     clonazePAM (KLONOPIN) 1 MG tablet Take 1 tablet (1 mg total) by mouth every evening.    cyanocobalamin (VITAMIN B-12) 1000 MCG tablet Take 100 mcg by mouth once daily.    DULoxetine (CYMBALTA) 30 MG capsule Take 2 capsules (60 mg total) by mouth once daily.    fluticasone propionate (FLONASE) 50 mcg/actuation nasal spray 2 sprays (100 mcg total) by Each Nostril route once daily.    hydrOXYchloroQUINE (PLAQUENIL) 200 mg tablet Take 1 tablet (200 mg total) by mouth 2 (two) times daily.    naproxen (NAPROSYN) 500 MG tablet Take 1 tablet (500 mg total) by mouth 2 (two) times daily with meals.    omeprazole (PRILOSEC) 40 MG capsule Take 1 capsule by mouth once daily    promethazine (PHENERGAN) 25 MG tablet Take 1 tablet (25 mg total) by mouth every 6 (six) hours as needed.    simvastatin (ZOCOR) 40 MG tablet Take 1 tablet by mouth once daily    zolpidem (AMBIEN) 5 MG Tab Take 1 tablet (5 mg total) by mouth nightly as needed (for insomnia).    chlorthalidone (HYGROTEN) 25 MG Tab Take 1 tablet (25 mg total) by mouth once daily.    diclofenac sodium (VOLTAREN) 1 % Gel Apply 2 g topically 3 (three) times daily as needed. (Patient not taking: Reported on 4/12/2022)     No current facility-administered medications for this visit.       Review of Systems     GENERAL:  No weight loss, malaise or fevers.  HEENT:   No recent changes in vision or hearing  NECK:  Negative for lumps, no difficulty with swallowing.  RESPIRATORY:  Negative for cough, wheezing or shortness of breath, patient denies any recent URI.  CARDIOVASCULAR:  Negative for chest pain, leg swelling or palpitations.  GI:  Negative for abdominal discomfort, blood in stools or black stools or change in bowel habits.  MUSCULOSKELETAL:  See HPI.  SKIN:  Negative for lesions, rash, and itching.  PSYCH:  No mood disorder or recent psychosocial stressors.   HEMATOLOGY/LYMPHOLOGY:  Negative for prolonged bleeding, bruising easily or swollen nodes.    NEURO:   No  "history of headaches, syncope, paralysis, seizures or tremors.  All other reviewed and negative other than HPI.    OBJECTIVE:    /79   Pulse 72   Resp 17   Ht 5' 2" (1.575 m)   Wt 100.1 kg (220 lb 12.7 oz)   BMI 40.38 kg/m²       Physical Exam    GENERAL: Well appearing, in no acute distress, alert and oriented x3.  PSYCH:  Mood and affect appropriate.  SKIN: Skin color, texture, turgor normal, no rashes or lesions.  HEAD/FACE:  Normocephalic, atraumatic. Cranial nerves grossly intact.    CV: RRR with palpation of the radial artery.  PULM: No evidence of respiratory difficulty, symmetric chest rise.  GI:  Soft and non-tender.    BACK: Straight leg raising in the sitting and supine positions is negative to radicular pain. No pain to palpation over the facet joints of the lumbar spine or spinous processes. Normal range of motion without pain reproduction.  EXTREMITIES: Peripheral joint ROM is full and pain free without obvious instability or laxity in all four extremities. No deformities, edema, or skin discoloration. Good capillary refill.  MUSCULOSKELETAL: Able to stand on heels & toes.   Shoulder, hip, and knee provocative maneuvers are negative.  There is no pain with palpation over the sacroiliac joints bilaterally.  FABERs test is negative.  Facet loading test is negative bilaterally.   Bilateral upper and lower extremity strength is normal and symmetric.  No atrophy or tone abnormalities are noted.  RIGHT Lower extremity: Hip flexion 5/5, Hip Abduction 5/5, Hip Adduction 5/5, Knee extension 5/5, Knee flexion 5/5, Ankle dorsiflexion5/5, Extensor hallucis longus 5/5, Ankle plantarflexion 5/5  LEFT Lower extremity:  Hip flexion 5/5, Hip Abduction 5/5,Hip Adduction 5/5, Knee extension 5/5, Knee flexion 5/5, Ankle dorsiflexion 5/5, Extensor hallucis longus 5/5, Ankle plantarflexion 5/5  -Normal testing knee (patellar) jerk and ankle (achilles) jerk    NEURO: Bilateral upper and lower extremity " coordination and muscle stretch reflexes are physiologic and symmetric. No loss of sensation is noted.  GAIT: normal.    Imagin20    MRI Lumbar Spine Without Contrast    FINDINGS:  No fracture.  Minimal grade 1 anterolisthesis of L3 on L4 which is unchanged.  Multilevel degenerative changes with mild spurring of the endplates.  Disc space narrowing at L3-L4.  There is lower lumbar facet arthropathy.  Overall, findings are similar to slightly progressed since the comparison exam.  No marrow replacement process.  Mild edema within the subcutaneous soft tissues of the lower back.  Visualized distal cord demonstrates normal signal.    L1-L2: No spinal canal or neural foraminal encroachment    L2-L3: No spinal canal or neural foraminal encroachment.    L3-L4: Ligamentum flavum and mild facet arthropathy.  Grade 1 anterolisthesis.  Mild posterior disc bulge is seen.  No significant spinal canal or neural foraminal encroachment.    L4-L5: Mild facet arthropathy.  Mild posterior disc bulge.  Otherwise unremarkable.    L5-S1: Central and left paracentral disc protrusion.  No spinal canal stenosis.  There is asymmetric narrowing of the left neural foramen.    Impression  Discogenic degenerative changes as described above.      16    X-Ray Lumbar Complete With Flex And Ext    Narrative  Five views of the lumbar spine and additional lateral flexion and extension views were obtained.    There is degenerative facet arthrosis at the lower 3 lumbar levels.  Facet arthrosis results in mild anterior subluxation of L3 on L4 over distance of 4 mm.  The subluxation does not change with flexion or extension.  There is degenerative disk disease at the L3-4 level with disk space narrowing and vertebral endplate osteophytes.  Vertebral body heights are well maintained.  IMPRESSION:    1.  Degenerative facet arthrosis at the lower 3 lumbar levels with mild anterior subluxation of L3.  2.  L3-4 degenerative disk  disease.      ASSESSMENT: 71 y.o. year old female with lower back pain, consistent with     1. Lumbar facet arthropathy     2. DDD (degenerative disc disease), lumbar  IR NEELA Lumbar w/ Img    Case Request-RAD/Other Procedure Area: Lumbar L5/S1 IL NEELA with RN IV sedation   3. Primary osteoarthritis of right knee  IR Aspiration Injection Large Joint W FL    Case Request-RAD/Other Procedure Area: Right knee Synvisc injection J 7325 with local   4. Lumbar radiculopathy  IR NEELA Lumbar w/ Img    Case Request-RAD/Other Procedure Area: Lumbar L5/S1 IL NEELA with RN IV sedation         PLAN:   - Interventions:  Schedule for L5/S1 interlaminar epidural steroid injection to see if this helps with pain related to stenosis and radiculopathy.  Two weeks following schedule refer right-sided Synvisc injection is patient has obtained significant relief lasting several months following her last viscosupplementation.  Explained the risks and benefits of the procedure in detail with the patient today in clinic along with alternative treatment options, and the patient elected to pursue the intervention at this time.      - Anticoagulation use: no no anticoagulation     report:  Reviewed and consistent with medication use as prescribed.    - Medications:  --patient can continue Cymbalta per outside provider.  We discussed it may take a few weeks before she sees a therapeutic benefit.  We have discussed potential common side effects of duloxetine including nausea, diarrhea/constipation, fatigue, drowsiness or dry mouth.  Patient expresses understanding.      - Therapy:  We discussed continuing physical therapy exercises at home to help manage the patient/s painful condition. The patient was counseled that muscle strengthening will improve the long term prognosis in regards to pain and may also help increase range of motion and mobility.    - Imaging: Reviewed available imaging with patient and answered any questions they had regarding  study.    - Follow up visit: return to clinic in 4-6 weeks      The above plan and management options were discussed at length with patient. Patient is in agreement with the above and verbalized understanding.    - I discussed the goals of interventional chronic pain management with the patient on today's visit. We discussed a multimodal and systematic approach to pain.  This includes diagnostic and therapeutic injections, adjuvant pharmacologic treatment, physical therapy, and at times psychiatry.  I emphasized the importance of regular exercise, core strengthening and stretching, diet and weight loss as a cornerstone of long-term pain management.    - This condition does not require this patient to take time off of work, and the primary goal of our Pain Management services is to improve the patient's functional capacity.  - Patient Questions: Answered all of the patient's questions regarding diagnoses, therapy, treatment and next steps        Giovanny Arroyo MD  Interventional Pain Management  [facility]    Disclaimer:  This note was prepared using voice recognition system and is likely to have sound alike errors that may have been overlooked even after proof reading.  Please call me with any questions

## 2022-04-12 NOTE — TELEPHONE ENCOUNTER
No new care gaps identified.  Powered by [employer]. Reference number: [id number].   4/12/2022 1:25:01 PM CDT

## 2022-04-13 NOTE — TELEPHONE ENCOUNTER
Refill Authorization Note   Latonia Oliver  is requesting a refill authorization.  Brief Assessment and Rationale for Refill:  Approve     Medication Therapy Plan:       Medication Reconciliation Completed: No   Comments:     No Care Gaps recommended.     Note composed:11:15 PM 04/12/2022

## 2022-04-21 ENCOUNTER — PATIENT MESSAGE (OUTPATIENT)
Dept: PAIN MEDICINE | Facility: HOSPITAL | Age: 72
End: 2022-04-21
Payer: MEDICARE

## 2022-04-21 RX ORDER — PREGABALIN 75 MG/1
CAPSULE ORAL
Qty: 147 CAPSULE | Refills: 0 | Status: SHIPPED | OUTPATIENT
Start: 2022-04-21 | End: 2022-08-17

## 2022-04-21 NOTE — PRE-PROCEDURE INSTRUCTIONS
Attempted to PAT patient for procedure on 4.27. with Dr. messer. No answer. LVM with return phone number. No return call at this time.

## 2022-04-22 NOTE — PRE-PROCEDURE INSTRUCTIONS
Spoke with patient regarding procedure scheduled on 4.27    Arrival time 0740    Has patient been sick with fever or on antibiotics within the last 7 days? No    Does the patient have any open wounds, sores or rashes? No    Does the patient have any recent fractures? no    Has patient received a vaccination within the last 7 days? No    Received the COVID vaccination? no    Has the patient stopped all medications as directed? Na    Does patient have a pacemaker and or defibrillator? no    Does the patient have a ride to and from procedure and someone reliable to remain with patient? chika    Is the patient diabetic? yes    Does the patient have sleep apnea? Or use O2 at home? crow on cpap    Is the patient receiving sedation? yes    Is the patient instructed to remain NPO beginning at midnight the night before their procedure? yes    Procedure location confirmed with patient? Yes    Covid- Denies signs/symptoms. Instructed to notify PAT/MD if any changes.

## 2022-04-26 ENCOUNTER — PATIENT MESSAGE (OUTPATIENT)
Dept: ADMINISTRATIVE | Facility: HOSPITAL | Age: 72
End: 2022-04-26
Payer: MEDICARE

## 2022-04-27 ENCOUNTER — HOSPITAL ENCOUNTER (OUTPATIENT)
Facility: HOSPITAL | Age: 72
Discharge: HOME | End: 2022-04-27
Attending: ANESTHESIOLOGY | Admitting: ANESTHESIOLOGY
Payer: MEDICARE

## 2022-04-27 VITALS
OXYGEN SATURATION: 96 % | BODY MASS INDEX: 41.22 KG/M2 | RESPIRATION RATE: 18 BRPM | HEIGHT: 62 IN | WEIGHT: 224 LBS | DIASTOLIC BLOOD PRESSURE: 70 MMHG | SYSTOLIC BLOOD PRESSURE: 173 MMHG | TEMPERATURE: 98 F | HEART RATE: 58 BPM

## 2022-04-27 DIAGNOSIS — M54.16 LUMBAR RADICULOPATHY, CHRONIC: ICD-10-CM

## 2022-04-27 LAB — POCT GLUCOSE: 123 MG/DL (ref 70–110)

## 2022-04-27 PROCEDURE — 62323 NJX INTERLAMINAR LMBR/SAC: CPT | Performed by: ANESTHESIOLOGY

## 2022-04-27 PROCEDURE — 62323 PR INJ LUMBAR/SACRAL, W/IMAGING GUIDANCE: ICD-10-PCS | Mod: ,,, | Performed by: ANESTHESIOLOGY

## 2022-04-27 PROCEDURE — 63600175 PHARM REV CODE 636 W HCPCS: Performed by: ANESTHESIOLOGY

## 2022-04-27 PROCEDURE — 62323 NJX INTERLAMINAR LMBR/SAC: CPT | Mod: ,,, | Performed by: ANESTHESIOLOGY

## 2022-04-27 PROCEDURE — 25500020 PHARM REV CODE 255: Performed by: ANESTHESIOLOGY

## 2022-04-27 PROCEDURE — 25000003 PHARM REV CODE 250: Performed by: ANESTHESIOLOGY

## 2022-04-27 RX ORDER — MIDAZOLAM HYDROCHLORIDE 1 MG/ML
INJECTION, SOLUTION INTRAMUSCULAR; INTRAVENOUS
Status: DISCONTINUED | OUTPATIENT
Start: 2022-04-27 | End: 2022-04-27 | Stop reason: HOSPADM

## 2022-04-27 RX ORDER — FENTANYL CITRATE 50 UG/ML
INJECTION, SOLUTION INTRAMUSCULAR; INTRAVENOUS
Status: DISCONTINUED | OUTPATIENT
Start: 2022-04-27 | End: 2022-04-27 | Stop reason: HOSPADM

## 2022-04-27 RX ORDER — INDOMETHACIN 25 MG/1
CAPSULE ORAL
Status: DISCONTINUED | OUTPATIENT
Start: 2022-04-27 | End: 2022-04-27 | Stop reason: HOSPADM

## 2022-04-27 RX ORDER — METHYLPREDNISOLONE ACETATE 40 MG/ML
INJECTION, SUSPENSION INTRA-ARTICULAR; INTRALESIONAL; INTRAMUSCULAR; SOFT TISSUE
Status: DISCONTINUED | OUTPATIENT
Start: 2022-04-27 | End: 2022-04-27 | Stop reason: HOSPADM

## 2022-04-27 RX ORDER — BUPIVACAINE HYDROCHLORIDE 2.5 MG/ML
INJECTION, SOLUTION EPIDURAL; INFILTRATION; INTRACAUDAL
Status: DISCONTINUED | OUTPATIENT
Start: 2022-04-27 | End: 2022-04-27 | Stop reason: HOSPADM

## 2022-04-27 NOTE — DISCHARGE INSTRUCTIONS

## 2022-04-27 NOTE — OP NOTE
Lumbar Interlaminar Epidural Steroid Injection under Fluoroscopic Guidance.   Time-out taken to identify patient and procedure prior to starting the procedure.     04/27/2022    PROCEDURE: Interlaminar epidural steroid injection under fluoroscopic guidance.     Pre-Op diagnosis: DDD (degenerative disc disease), lumbar [M51.36]  Lumbar radiculopathy [M54.16]    Post-Op diagnosis: DDD (degenerative disc disease), lumbar [M51.36]  Lumbar radiculopathy [M54.16]    PHYSICIAN: Giovanny Arroyo MD    ASSISTANTS: None     ESTIMATED BLOOD LOSS: none.     COMPLICATIONS: none.     SPECIMENS: none    TECHNIQUE: With the patient laying in a prone position, the area was prepped and draped in the usual sterile fashion using ChloraPrep and a fenestrated drape. 1% lidocaine was given using a 27-gauge needle by raising a wheal and going down to the hub of the needle over the L5/S1 interlaminar space.  The interlaminar space was then approached from a R paramedian approach with a 3.5 inch 18-gauge Touhy needle was introduced under fluoroscopic guidance in the AP and Lateral view. Once the Ligamentum flavum was encountered loss of resistance to saline was used to enter the epidural space. With positive loss of resistance and negative CSF or Blood, 3mL contrast dye Omnipaque (300mg/ml) was injected to confirm placement and there was no vascular runoff. Then 1ml 80mg/ml Depomedrol + 5 mL 0.25% Bupivicaine was injected slowly. Displacement of the radio opaque contrast after injection of the medication confirmed that the medication went into the area of the epidural space.  The patient tolerated the procedure well.     Conscious sedation provided by M.D    The patient was monitored with continuous pulse oximetry, EKG, and intermittent blood pressure monitors.  The patient was hemodynamically stable throughout the entire process was responsive to voice, and breathing spontaneously.  Supplemental O2 was provided at 2L/min via nasal cannula.   Patient was comfortable for the duration of the procedure. (See nurse documentation and case log for sedation time)    There was a total of 1mg IV Midazolam and 75mcg Fentanyl titrated for the procedure      The patient was monitored after the procedure.   They were given post-procedure and discharge instructions to follow at home.  The patient was discharged in a stable condition.

## 2022-04-27 NOTE — DISCHARGE SUMMARY
[facility] - Pain Mgmt 1st Fl  Discharge Note  Short Stay    Procedure(s) (LRB):  Lumbar L5/S1 IL NEELA with RN IV sedation (N/A)    OUTCOME: Patient tolerated treatment/procedure well without complication and is now ready for discharge.    DISPOSITION: Home or Self Care    FINAL DIAGNOSIS:  <principal problem not specified>    FOLLOWUP: In clinic    DISCHARGE INSTRUCTIONS:  No discharge procedures on file.     TIME SPENT ON DISCHARGE: 15 minutes

## 2022-05-06 NOTE — PRE-PROCEDURE INSTRUCTIONS
Attempted to PAT patient for procedure on 5.13. with Dr. messer. No answer. LVM with return phone number. No return call at this time.

## 2022-05-09 NOTE — PRE-PROCEDURE INSTRUCTIONS
Received call from pt in regard to procedure on Friday 5.13. pt has several questions and requesting call back from clinic. Notified md office to contact pt.

## 2022-05-09 NOTE — PRE-PROCEDURE INSTRUCTIONS
Spoke with patient regarding procedure scheduled on 5.13    Arrival time 1050    Has patient been sick with fever or on antibiotics within the last 7 days? No    Does the patient have any open wounds, sores or rashes? No    Does the patient have any recent fractures? no    Has patient received a vaccination within the last 7 days? No    Received the COVID vaccination? yes    Has the patient stopped all medications as directed? NA    Does patient have a pacemaker and or defibrillator? no    Does the patient have a ride to and from procedure and someone reliable to remain with patient? Coordinating transportation. Aware of policy    Is the patient diabetic? yes    Does the patient have sleep apnea? Or use O2 at home? crow on cpap     Is the patient receiving sedation? yes    Is the patient instructed to remain NPO beginning at midnight the night before their procedure? yes    Procedure location confirmed with patient? Yes    Covid- Denies signs/symptoms. Instructed to notify PAT/MD if any changes.

## 2022-05-10 ENCOUNTER — TELEPHONE (OUTPATIENT)
Dept: PAIN MEDICINE | Facility: CLINIC | Age: 72
End: 2022-05-10
Payer: MEDICARE

## 2022-05-10 NOTE — TELEPHONE ENCOUNTER
Called pt to fu post notification that she had questions regarding her procedure per MIKE Hurst. Pt is scheduled for R knee injection and inquired about whether she would be getting the Rooster comb injection. Spoke with  Lashawn RIDDLE, and verified that the scheduled Synvisc inj is indeed a rooster comb injection. Pt made aware and verbalized understanding, just reiterated that she wanted to know what was going into her knee. Encouraged to call with any further questions or concerns.

## 2022-05-13 ENCOUNTER — HOSPITAL ENCOUNTER (OUTPATIENT)
Facility: HOSPITAL | Age: 72
Discharge: HOME | End: 2022-05-13
Attending: ANESTHESIOLOGY | Admitting: ANESTHESIOLOGY
Payer: MEDICARE

## 2022-05-13 VITALS
BODY MASS INDEX: 41.14 KG/M2 | OXYGEN SATURATION: 98 % | RESPIRATION RATE: 18 BRPM | DIASTOLIC BLOOD PRESSURE: 80 MMHG | HEIGHT: 62 IN | SYSTOLIC BLOOD PRESSURE: 145 MMHG | TEMPERATURE: 98 F | WEIGHT: 223.56 LBS | HEART RATE: 91 BPM

## 2022-05-13 DIAGNOSIS — M17.11 OSTEOARTHRITIS OF RIGHT KNEE: ICD-10-CM

## 2022-05-13 LAB — POCT GLUCOSE: 103 MG/DL (ref 70–110)

## 2022-05-13 PROCEDURE — 20610 DRAIN/INJ JOINT/BURSA W/O US: CPT | Performed by: ANESTHESIOLOGY

## 2022-05-13 PROCEDURE — 77002 PR FLUOROSCOPIC GUIDANCE NEEDLE PLACEMENT: ICD-10-PCS | Mod: 26,,, | Performed by: ANESTHESIOLOGY

## 2022-05-13 PROCEDURE — 63600175 PHARM REV CODE 636 W HCPCS: Mod: JG | Performed by: ANESTHESIOLOGY

## 2022-05-13 PROCEDURE — 20610 DRAIN/INJ JOINT/BURSA W/O US: CPT | Mod: RT,,, | Performed by: ANESTHESIOLOGY

## 2022-05-13 PROCEDURE — 20610 PR DRAIN/INJECT LARGE JOINT/BURSA: ICD-10-PCS | Mod: RT,,, | Performed by: ANESTHESIOLOGY

## 2022-05-13 PROCEDURE — 25000003 PHARM REV CODE 250: Performed by: ANESTHESIOLOGY

## 2022-05-13 PROCEDURE — 77002 NEEDLE LOCALIZATION BY XRAY: CPT | Mod: 26,,, | Performed by: ANESTHESIOLOGY

## 2022-05-13 PROCEDURE — 25500020 PHARM REV CODE 255: Performed by: ANESTHESIOLOGY

## 2022-05-13 RX ORDER — BUPIVACAINE HYDROCHLORIDE 2.5 MG/ML
INJECTION, SOLUTION EPIDURAL; INFILTRATION; INTRACAUDAL
Status: DISCONTINUED | OUTPATIENT
Start: 2022-05-13 | End: 2022-05-13 | Stop reason: HOSPADM

## 2022-05-13 RX ORDER — INDOMETHACIN 25 MG/1
CAPSULE ORAL
Status: DISCONTINUED | OUTPATIENT
Start: 2022-05-13 | End: 2022-05-13 | Stop reason: HOSPADM

## 2022-05-13 NOTE — H&P
HPI  Patient presenting for Procedure(s) (LRB):  Right knee Synvisc injection J 7325 with local (Right)     Patient on Anti-coagulation No    No health changes since previous encounter    Past Medical History:   Diagnosis Date    Anxiety     Arthritis     Back pain     Basal cell carcinoma (BCC)     Breast cancer     Breast cancer     Depression     Diabetes mellitus type 2, controlled     Enthesopathy of hip 2014    Fibromyalgia     GERD (gastroesophageal reflux disease)     HTN (hypertension)     Irregular heart beat     Mixed hyperlipidemia     Obesity     Osteoporosis     Rash and nonspecific skin eruption 6/15/2015    Sjogren - Flo's syndrome     Sleep apnea     Trouble in sleeping     Type 2 diabetes mellitus      Past Surgical History:   Procedure Laterality Date    APPENDECTOMY      BELT ABDOMINOPLASTY      BREAST LUMPECTOMY      BREAST SURGERY      breast augmentation     SECTION, CLASSIC      EPIDURAL STEROID INJECTION N/A 2022    Procedure: Lumbar L5/S1 IL NEELA with RN IV sedation;  Surgeon: Giovanny Arroyo MD;  Location: [facility];  Service: Pain Management;  Laterality: N/A;    RECONSTRUCTION, NASAL VALVE Left 10/22/2021    Procedure: RECONSTRUCTION, NASAL VALVE ;  Surgeon: Faustino Cash MD;  Location: [facility];  Service: ENT;  Laterality: Left;     Review of patient's allergies indicates:   Allergen Reactions    Penicillins      Other reaction(s): Rash  Other reaction(s): Difficulty breathing        No current facility-administered medications on file prior to encounter.     Current Outpatient Medications on File Prior to Encounter   Medication Sig Dispense Refill    naproxen (NAPROSYN) 500 MG tablet Take 1 tablet (500 mg total) by mouth 2 (two) times daily with meals. 60 tablet 1    benazepriL (LOTENSIN) 40 MG tablet Take 1 tablet by mouth once daily 90 tablet 0    carvediloL (COREG) 6.25 MG tablet Take 1 tablet (6.25 mg total) by mouth 2 (two)  "times daily with meals. 180 tablet 1    chlorthalidone (HYGROTEN) 25 MG Tab Take 1 tablet (25 mg total) by mouth once daily. 90 tablet 1    clonazePAM (KLONOPIN) 1 MG tablet Take 1 tablet (1 mg total) by mouth every evening. 30 tablet 5    cyanocobalamin (VITAMIN B-12) 1000 MCG tablet Take 100 mcg by mouth once daily.      diclofenac sodium (VOLTAREN) 1 % Gel Apply 2 g topically 3 (three) times daily as needed. (Patient not taking: Reported on 4/12/2022) 2 Tube 6    DULoxetine (CYMBALTA) 30 MG capsule Take 2 capsules (60 mg total) by mouth once daily. 180 capsule 1    fluticasone propionate (FLONASE) 50 mcg/actuation nasal spray 2 sprays (100 mcg total) by Each Nostril route once daily. 15.8 mL 5    hydrOXYchloroQUINE (PLAQUENIL) 200 mg tablet Take 1 tablet (200 mg total) by mouth 2 (two) times daily. 180 tablet 3    omeprazole (PRILOSEC) 40 MG capsule Take 1 capsule by mouth once daily 90 capsule 0    promethazine (PHENERGAN) 25 MG tablet Take 1 tablet (25 mg total) by mouth every 6 (six) hours as needed. 30 tablet 0    zolpidem (AMBIEN) 5 MG Tab Take 1 tablet (5 mg total) by mouth nightly as needed (for insomnia). 30 tablet 5    [DISCONTINUED] benazepril-hydrochlorthiazide (LOTENSIN HCT) 20-25 mg Tab Take 1 tablet by mouth once daily. 90 tablet 1        PMHx, PSHx, Allergies, Medications reviewed in epic    ROS negative except pain complaints in HPI    OBJECTIVE:    BP (!) 145/80 (BP Location: Left arm, Patient Position: Lying)   Pulse 91   Temp 98 °F (36.7 °C) (Temporal)   Resp 18   Ht 5' 2" (1.575 m)   Wt 101.4 kg (223 lb 8.7 oz)   SpO2 98%   BMI 40.89 kg/m²     PHYSICAL EXAMINATION:    GENERAL: Well appearing, in no acute distress, alert and oriented x3.  PSYCH:  Mood and affect appropriate.  SKIN: Skin color, texture, turgor normal, no rashes or lesions which will impact the procedure.  CV: RRR with palpation of the radial artery.  PULM: No evidence of respiratory difficulty, symmetric chest " rise. Clear to auscultation.  NEURO: Cranial nerves grossly intact.    Plan:    Proceed with procedure as planned Procedure(s) (LRB):  Right knee Synvisc injection J 7908 with local (Right)    Giovanny Arroyo MD  05/13/2022

## 2022-05-13 NOTE — DISCHARGE SUMMARY
[facility] 1st Fl  Discharge Note  Short Stay    Procedure(s) (LRB):  Right knee Synvisc injection J 1020 with local (Right)    OUTCOME: Patient tolerated treatment/procedure well without complication and is now ready for discharge.    DISPOSITION: Home or Self Care    FINAL DIAGNOSIS:  <principal problem not specified>    FOLLOWUP: In clinic    DISCHARGE INSTRUCTIONS:  No discharge procedures on file.     TIME SPENT ON DISCHARGE: 15 minutes

## 2022-05-13 NOTE — OP NOTE
Procedure Note:     right Knee Synvisc 1 injection under fluoroscopy    05/13/2022                                 Surgeon: Giovanny Arroyo MD       Assistant: None     Pre-Op Diagnosis:  right Primary osteoarthritis of right knee [M17.11]    Post-Op Diagnosis: Primary osteoarthritis of right knee [M17.11]     EBL: None     Complications: None     Specimens: None     Description of procedure:     After written consent was obtained, patient placed in supine position.  The area over the  lateral aspect of the right  knee(s) joint prepped with chlorhexidine.  The area was draped in the usual sterile fashion.  Approximately 5 mL 1% lidocaine was infiltrated into the skin overlying the predetermined entry point. A 22 gauge spinal needle was then advanced under fluoroscopy in the AP views into the knee joint. After negative aspiration there was injection of 1 mL radio opaque contrast dye to confirm needle location within the joint, and no evidence of intravascular spread. This was followed by injection of 6 mL of sodium hyaluronate  into the joint space. Displacement of the contrast indicated that the medication went into the area of the joint space. Needle was withdrawn and a sterile band-aid applied to the skin.     Patient tolerated the procedure well, and was reporting improvement of pain symptoms after the injection.  She was discharged from the clinic in stable condition.

## 2022-05-13 NOTE — DISCHARGE INSTRUCTIONS

## 2022-05-13 NOTE — PLAN OF CARE
Pt discharged home, awake, alert, oriented x's 4,  denies any pain to right knee, continue to c/o back pain,  no other apparent distress noted. All questions and concerns addressed and answered, pt verbalizes understanding of discharge process, pt meets discharge criteria and is being discharged to car via wheelchair.

## 2022-05-14 ENCOUNTER — PATIENT MESSAGE (OUTPATIENT)
Dept: PAIN MEDICINE | Facility: CLINIC | Age: 72
End: 2022-05-14
Payer: MEDICARE

## 2022-05-16 ENCOUNTER — PATIENT MESSAGE (OUTPATIENT)
Dept: PAIN MEDICINE | Facility: CLINIC | Age: 72
End: 2022-05-16
Payer: MEDICARE

## 2022-05-16 RX ORDER — TIZANIDINE 2 MG/1
2 TABLET ORAL 2 TIMES DAILY PRN
Qty: 60 TABLET | Refills: 1 | Status: SHIPPED | OUTPATIENT
Start: 2022-05-16 | End: 2022-06-15

## 2022-05-16 NOTE — TELEPHONE ENCOUNTER
Good Afternoon,     Pt is requesting for a refill of: muscle relaxer   Last filed: n/a   Last encounter: 4/12/2022  Last procedure: 5/13/2022  Up coming apt: setting up one now   Pharmacy: [id number] - [place] - [id number]  Is this something you can do?      Dane Grimes   Medical Assistant

## 2022-05-17 DIAGNOSIS — I10 ESSENTIAL HYPERTENSION: ICD-10-CM

## 2022-05-17 RX ORDER — BENAZEPRIL HYDROCHLORIDE 40 MG/1
40 TABLET ORAL DAILY
Qty: 90 TABLET | Refills: 0 | Status: SHIPPED | OUTPATIENT
Start: 2022-05-17 | End: 2022-08-16

## 2022-05-17 NOTE — TELEPHONE ENCOUNTER
Care Due:                  Date            Visit Type   Department     Provider  --------------------------------------------------------------------------------                                EP -                              PRIMARY      [facility] INTERNAL  Last Visit: 04-      CARE ([facility])   MEDICINE       Elias Combs  Next Visit: None Scheduled  None         None Found                                                            Last  Test          Frequency    Reason                     Performed    Due Date  --------------------------------------------------------------------------------    Lipid Panel.  12 months..  simvastatin..............  08- 07-    [facility] Embedded Care Gaps. Reference number: [id number]. 5/17/2022   1:24:23 PM CDT

## 2022-05-26 ENCOUNTER — OFFICE VISIT (OUTPATIENT)
Dept: PULMONOLOGY | Facility: CLINIC | Age: 72
End: 2022-05-26
Payer: MEDICARE

## 2022-05-26 VITALS
WEIGHT: 218.25 LBS | HEIGHT: 62 IN | SYSTOLIC BLOOD PRESSURE: 124 MMHG | HEART RATE: 73 BPM | RESPIRATION RATE: 17 BRPM | BODY MASS INDEX: 40.16 KG/M2 | DIASTOLIC BLOOD PRESSURE: 76 MMHG | OXYGEN SATURATION: 95 %

## 2022-05-26 DIAGNOSIS — G47.33 OSA ON CPAP: Primary | ICD-10-CM

## 2022-05-26 DIAGNOSIS — G47.61 PERIODIC LIMB MOVEMENT SLEEP DISORDER: ICD-10-CM

## 2022-05-26 DIAGNOSIS — G47.01 INSOMNIA DUE TO MEDICAL CONDITION: ICD-10-CM

## 2022-05-26 DIAGNOSIS — E66.01 SEVERE OBESITY (BMI 35.0-39.9) WITH COMORBIDITY: ICD-10-CM

## 2022-05-26 PROCEDURE — 99999 PR PBB SHADOW E&M-EST. PATIENT-LVL IV: CPT | Mod: PBBFAC,,, | Performed by: INTERNAL MEDICINE

## 2022-05-26 PROCEDURE — 99214 OFFICE O/P EST MOD 30 MIN: CPT | Mod: PBBFAC | Performed by: INTERNAL MEDICINE

## 2022-05-26 PROCEDURE — 99214 OFFICE O/P EST MOD 30 MIN: CPT | Mod: S$PBB,,, | Performed by: INTERNAL MEDICINE

## 2022-05-26 PROCEDURE — 99999 PR PBB SHADOW E&M-EST. PATIENT-LVL IV: ICD-10-PCS | Mod: PBBFAC,,, | Performed by: INTERNAL MEDICINE

## 2022-05-26 PROCEDURE — 99214 PR OFFICE/OUTPT VISIT, EST, LEVL IV, 30-39 MIN: ICD-10-PCS | Mod: S$PBB,,, | Performed by: INTERNAL MEDICINE

## 2022-05-26 RX ORDER — CITALOPRAM 40 MG/1
40 TABLET, FILM COATED ORAL DAILY
COMMUNITY
Start: 2022-03-09 | End: 2022-08-17

## 2022-05-26 RX ORDER — CLONAZEPAM 1 MG/1
1 TABLET ORAL NIGHTLY
Qty: 30 TABLET | Refills: 5 | Status: SHIPPED | OUTPATIENT
Start: 2022-05-26 | End: 2022-12-13

## 2022-05-26 RX ORDER — HYDROXYZINE PAMOATE 25 MG/1
25 CAPSULE ORAL NIGHTLY PRN
Qty: 30 CAPSULE | Refills: 5 | Status: SHIPPED | OUTPATIENT
Start: 2022-05-26

## 2022-05-26 RX ORDER — HYDRALAZINE HYDROCHLORIDE 10 MG/1
10 TABLET, FILM COATED ORAL 3 TIMES DAILY
Qty: 90 TABLET | Refills: 11 | Status: SHIPPED | OUTPATIENT
Start: 2022-05-26 | End: 2022-05-26 | Stop reason: CLARIF

## 2022-05-26 NOTE — PROGRESS NOTES
Subjective:     Patient ID: Latonia Oliver is a 71 y.o. female.    Chief Complaint:  Occasional problems with Obstructive Sleep Apnea / Continuous Positive Airway Pressure machine    HPI     history of Obstructive Sleep Apnea;  history of with restless leg syndrome and Obstructive Sleep Apnea and insomnia     presents for review of CPAP compliance. Information from CPAP machine downloaded and reviewed. Summary of compliance report is as follows:    Compliance  Payor Standard  Usage 04/24/2022 - 05/23/2022  Usage days 28/30 days (93%)  >= 4 hours 28 days (93%)  < 4 hours 0 days (0%)  Usage hours 224 hours 26 minutes  Average usage (total days) 7 hours 29 minutes  Average usage (days used) 8 hours 1 minutes  Median usage (days used) 8 hours 10 minutes  Total used hours (value since last reset - 05/23/2022) 3,122 hours  AirSense 10 AutoSet  Serial number [id number]  Mode CPAP  Set pressure 15 cmH2O  EPR Fulltime  EPR level 3  Therapy  Leaks - L/min Median: 0.1 95th percentile: 3.8 Maximum: 36.3  Events per hour AI: 2.6 HI: 0.3 AHI: 2.9  Apnea Index Central: 1.3 Obstructive: 1.2 Unknown: 0.1  RERA Index 0.7  Cheyne-Figueroa respiration (average duration per night) 0 minutes (0%)  Usage - hours  Printed    Past Medical History:   Diagnosis Date    Anxiety     Arthritis     Back pain     Basal cell carcinoma (BCC)     Breast cancer     Breast cancer     Depression     Diabetes mellitus type 2, controlled     Enthesopathy of hip 12/11/2014    Fibromyalgia     GERD (gastroesophageal reflux disease)     HTN (hypertension)     Irregular heart beat     Mixed hyperlipidemia     Obesity     Osteoporosis     Rash and nonspecific skin eruption 6/15/2015    Sjogren - Flo's syndrome     Sleep apnea     Trouble in sleeping     Type 2 diabetes mellitus      Past Surgical History:   Procedure Laterality Date    APPENDECTOMY      BELT ABDOMINOPLASTY      BREAST LUMPECTOMY      BREAST SURGERY      breast  augmentation     SECTION, CLASSIC      EPIDURAL STEROID INJECTION N/A 2022    Procedure: Lumbar L5/S1 IL NEELA with RN IV sedation;  Surgeon: Giovanny Arroyo MD;  Location: [facility];  Service: Pain Management;  Laterality: N/A;    INJECTION OF JOINT Right 2022    Procedure: Right knee Synvisc injection J 7325 with local;  Surgeon: Giovanny Arroyo MD;  Location: [facility]IN [facility];  Service: Pain Management;  Laterality: Right;    RECONSTRUCTION, NASAL VALVE Left 10/22/2021    Procedure: RECONSTRUCTION, NASAL VALVE ;  Surgeon: Faustino Cash MD;  Location: [facility] OR;  Service: ENT;  Laterality: Left;     Review of patient's allergies indicates:   Allergen Reactions    Penicillins      Other reaction(s): Rash  Other reaction(s): Difficulty breathing     Current Outpatient Medications on File Prior to Visit   Medication Sig Dispense Refill    amLODIPine (NORVASC) 10 MG tablet Take 1 tablet by mouth once daily 90 tablet 0    benazepriL (LOTENSIN) 40 MG tablet Take 1 tablet (40 mg total) by mouth once daily. 90 tablet 0    carvediloL (COREG) 6.25 MG tablet Take 1 tablet (6.25 mg total) by mouth 2 (two) times daily with meals. 180 tablet 1    chlorthalidone (HYGROTEN) 25 MG Tab Take 1 tablet (25 mg total) by mouth once daily. 90 tablet 1    cyanocobalamin (VITAMIN B-12) 1000 MCG tablet Take 100 mcg by mouth once daily.      diclofenac sodium (VOLTAREN) 1 % Gel Apply 2 g topically 3 (three) times daily as needed. 2 Tube 6    DULoxetine (CYMBALTA) 30 MG capsule Take 2 capsules (60 mg total) by mouth once daily. 180 capsule 1    fluticasone propionate (FLONASE) 50 mcg/actuation nasal spray 2 sprays (100 mcg total) by Each Nostril route once daily. 15.8 mL 5    hydrOXYchloroQUINE (PLAQUENIL) 200 mg tablet Take 1 tablet (200 mg total) by mouth 2 (two) times daily. 180 tablet 3    naproxen (NAPROSYN) 500 MG tablet Take 1 tablet (500 mg total) by mouth 2 (two) times daily with meals. 60 tablet 1     omeprazole (PRILOSEC) 40 MG capsule Take 1 capsule by mouth once daily 90 capsule 0    pregabalin (LYRICA) 75 MG capsule Take 1 capsule, 75 mg, once daily for 1 week.  Followed by take 1 capsule 75 mg twice daily for 1 week.  Followed by 2 capsules, 150 mg in the morning and 75 mg at night for 1 week.  Followed by 150 mg twice daily for 1 week.  Followed by 225 mg in the morning and 150 mg in the evening for 1 week.  Followed by 225 mg b.i.d.. 147 capsule 0    promethazine (PHENERGAN) 25 MG tablet Take 1 tablet (25 mg total) by mouth every 6 (six) hours as needed. 30 tablet 0    simvastatin (ZOCOR) 40 MG tablet Take 1 tablet by mouth once daily 90 tablet 1    tiZANidine (ZANAFLEX) 2 MG tablet Take 1 tablet (2 mg total) by mouth 2 (two) times daily as needed. 60 tablet 1    citalopram (CELEXA) 40 MG tablet Take 40 mg by mouth once daily.      [DISCONTINUED] benazepril-hydrochlorthiazide (LOTENSIN HCT) 20-25 mg Tab Take 1 tablet by mouth once daily. 90 tablet 1     No current facility-administered medications on file prior to visit.     Social History     Socioeconomic History    Marital status:     Number of children: 1   Occupational History     Employer: [employer]    Tobacco Use    Smoking status: Never Smoker    Smokeless tobacco: Never Used   Substance and Sexual Activity    Alcohol use: Yes     Comment: Socially    Drug use: No    Sexual activity: Not Currently     Partners: Male     Family History   Problem Relation Age of Onset    Heart disease Mother     Hypertension Mother     COPD Mother     Diabetes Father     Stroke Father     Kidney disease Father        Review of Systems   Constitutional: Negative for fever and fatigue.   HENT: Negative.  Negative for postnasal drip and rhinorrhea.    Eyes: Negative for redness and itching.   Respiratory: Positive for apnea. Negative for cough, shortness of breath, wheezing, dyspnea on extertion and Paroxysmal Nocturnal Dyspnea.   "  Cardiovascular: Negative.  Negative for chest pain.   Genitourinary: Negative.  Negative for difficulty urinating and hematuria.   Endocrine: endocrine negative Negative for polyphagia, cold intolerance and heat intolerance.    Musculoskeletal: Negative.  Negative for arthralgias.   Skin: Negative.  Negative for rash.   Gastrointestinal: Negative.  Negative for nausea, vomiting, abdominal pain and abdominal distention.   Neurological: Negative.  Negative for dizziness and headaches.   Hematological: Negative for adenopathy. Does not bruise/bleed easily and no excessive bruising.   Psychiatric/Behavioral: Positive for sleep disturbance. The patient is not nervous/anxious.        Objective:      /76   Pulse 73   Resp 17   Ht 5' 2" (1.575 m)   Wt 99 kg (218 lb 4.1 oz)   SpO2 95%   BMI 39.92 kg/m²   Physical Exam  Vitals and nursing note reviewed.   Constitutional:       Appearance: She is well-developed.   HENT:      Head: Normocephalic and atraumatic.      Nose: Nose normal.   Eyes:      Conjunctiva/sclera: Conjunctivae normal.      Pupils: Pupils are equal, round, and reactive to light.   Neck:      Thyroid: No thyromegaly.      Vascular: No JVD.      Trachea: No tracheal deviation.   Cardiovascular:      Rate and Rhythm: Normal rate and regular rhythm.      Heart sounds: Normal heart sounds.   Pulmonary:      Effort: Pulmonary effort is normal. No respiratory distress.      Breath sounds: Normal breath sounds. No wheezing or rales.   Chest:      Chest wall: No tenderness.   Abdominal:      General: Bowel sounds are normal.      Palpations: Abdomen is soft.   Musculoskeletal:         General: Normal range of motion.      Cervical back: Neck supple.   Lymphadenopathy:      Cervical: No cervical adenopathy.   Skin:     General: Skin is warm and dry.   Neurological:      Mental Status: She is alert and oriented to person, place, and time.       Personal Diagnostic Review  none pertinent    Pulmonary " Studies Review 5/26/2022   SpO2 95   Height 62   Weight 3492.09   BMI (Calculated) 39.9   Predicted Distance 209.26   Predicted Distance Meters (Calculated) 356.46       [facility]  See OP Notes for results.     IMPRESSION: See OP Notes for results.     This procedure was auto-finalized by: Virtual Radiologist      Office Spirometry Results:     No flowsheet data found.  Pulmonary Studies Review 5/26/2022   SpO2 95   Height 62   Weight 3492.09   BMI (Calculated) 39.9   Predicted Distance 209.26   Predicted Distance Meters (Calculated) 356.46         Assessment:            ROX on CPAP  -     CPAP/BIPAP SUPPLIES    Periodic limb movement sleep disorder  -     clonazePAM (KLONOPIN) 1 MG tablet; Take 1 tablet (1 mg total) by mouth every evening.  Dispense: 30 tablet; Refill: 5    Insomnia due to medical condition  -     clonazePAM (KLONOPIN) 1 MG tablet; Take 1 tablet (1 mg total) by mouth every evening.  Dispense: 30 tablet; Refill: 5  -     hydrOXYzine pamoate (VISTARIL) 25 MG Cap; Take 1 capsule (25 mg total) by mouth nightly as needed (insomnia).  Dispense: 30 capsule; Refill: 5    Severe obesity (BMI 35.0-39.9) with comorbidity    Other orders  -     Discontinue: hydrALAZINE (APRESOLINE) 10 MG tablet; Take 1 tablet (10 mg total) by mouth 3 (three) times daily.  Dispense: 90 tablet; Refill: 11          Outpatient Encounter Medications as of 5/26/2022   Medication Sig Dispense Refill    amLODIPine (NORVASC) 10 MG tablet Take 1 tablet by mouth once daily 90 tablet 0    benazepriL (LOTENSIN) 40 MG tablet Take 1 tablet (40 mg total) by mouth once daily. 90 tablet 0    carvediloL (COREG) 6.25 MG tablet Take 1 tablet (6.25 mg total) by mouth 2 (two) times daily with meals. 180 tablet 1    chlorthalidone (HYGROTEN) 25 MG Tab Take 1 tablet (25 mg total) by mouth once daily. 90 tablet 1    cyanocobalamin (VITAMIN B-12) 1000 MCG tablet Take 100 mcg by mouth once daily.      diclofenac sodium  (VOLTAREN) 1 % Gel Apply 2 g topically 3 (three) times daily as needed. 2 Tube 6    DULoxetine (CYMBALTA) 30 MG capsule Take 2 capsules (60 mg total) by mouth once daily. 180 capsule 1    fluticasone propionate (FLONASE) 50 mcg/actuation nasal spray 2 sprays (100 mcg total) by Each Nostril route once daily. 15.8 mL 5    hydrOXYchloroQUINE (PLAQUENIL) 200 mg tablet Take 1 tablet (200 mg total) by mouth 2 (two) times daily. 180 tablet 3    naproxen (NAPROSYN) 500 MG tablet Take 1 tablet (500 mg total) by mouth 2 (two) times daily with meals. 60 tablet 1    omeprazole (PRILOSEC) 40 MG capsule Take 1 capsule by mouth once daily 90 capsule 0    pregabalin (LYRICA) 75 MG capsule Take 1 capsule, 75 mg, once daily for 1 week.  Followed by take 1 capsule 75 mg twice daily for 1 week.  Followed by 2 capsules, 150 mg in the morning and 75 mg at night for 1 week.  Followed by 150 mg twice daily for 1 week.  Followed by 225 mg in the morning and 150 mg in the evening for 1 week.  Followed by 225 mg b.i.d.. 147 capsule 0    promethazine (PHENERGAN) 25 MG tablet Take 1 tablet (25 mg total) by mouth every 6 (six) hours as needed. 30 tablet 0    simvastatin (ZOCOR) 40 MG tablet Take 1 tablet by mouth once daily 90 tablet 1    tiZANidine (ZANAFLEX) 2 MG tablet Take 1 tablet (2 mg total) by mouth 2 (two) times daily as needed. 60 tablet 1    [DISCONTINUED] clonazePAM (KLONOPIN) 1 MG tablet TAKE 1 TABLET BY MOUTH ONCE DAILY IN THE EVENING 30 tablet 0    [DISCONTINUED] zolpidem (AMBIEN) 5 MG Tab Take 1 tablet (5 mg total) by mouth nightly as needed (for insomnia). 30 tablet 5    citalopram (CELEXA) 40 MG tablet Take 40 mg by mouth once daily.      clonazePAM (KLONOPIN) 1 MG tablet Take 1 tablet (1 mg total) by mouth every evening. 30 tablet 5    hydrOXYzine pamoate (VISTARIL) 25 MG Cap Take 1 capsule (25 mg total) by mouth nightly as needed (insomnia). 30 capsule 5    [DISCONTINUED] benazepril-hydrochlorthiazide (LOTENSIN  HCT) 20-25 mg Tab Take 1 tablet by mouth once daily. 90 tablet 1    [DISCONTINUED] hydrALAZINE (APRESOLINE) 10 MG tablet Take 1 tablet (10 mg total) by mouth 3 (three) times daily. 90 tablet 11     No facility-administered encounter medications on file as of 5/26/2022.     Plan:       Requested Prescriptions     Signed Prescriptions Disp Refills    clonazePAM (KLONOPIN) 1 MG tablet 30 tablet 5     Sig: Take 1 tablet (1 mg total) by mouth every evening.    hydrOXYzine pamoate (VISTARIL) 25 MG Cap 30 capsule 5     Sig: Take 1 capsule (25 mg total) by mouth nightly as needed (insomnia).     Problem List Items Addressed This Visit     Insomnia    Relevant Medications    clonazePAM (KLONOPIN) 1 MG tablet    hydrOXYzine pamoate (VISTARIL) 25 MG Cap    ROX on CPAP - Primary    Relevant Orders    CPAP/BIPAP SUPPLIES    Severe obesity (BMI 35.0-39.9) with comorbidity      Other Visit Diagnoses     Periodic limb movement sleep disorder        Relevant Medications    clonazePAM (KLONOPIN) 1 MG tablet             Follow up in about 1 year (around 5/26/2023) for CPAP download - review on day of visit.    MEDICAL DECISION MAKING: Moderate to high complexity.  Overall, the multiple problems listed are of moderate to high severity that may impact quality of life and activities of daily living. Side effects of medications, treatment plan as well as options and alternatives reviewed and discussed with patient. There was counseling of patient concerning these issues.    Total time spent in counseling and coordination of care - 30  minutes of total time spent on the encounter, which includes face to face time and non-face to face time preparing to see the patient (eg, review of tests), Obtaining and/or reviewing separately obtained history, Documenting clinical information in the electronic or other health record, Independently interpreting results (not separately reported) and communicating results to the patient/family/caregiver,  or Care coordination (not separately reported).    Time was used in discussion of prognosis, risks, benefits of treatment, instructions and compliance with regimen . Discussion with other physicians and/or health care providers - home health or for use of durable medical equipment (oxygen, nebulizers, CPAP, BiPAP) occurred.

## 2022-05-27 ENCOUNTER — PATIENT MESSAGE (OUTPATIENT)
Dept: PULMONOLOGY | Facility: CLINIC | Age: 72
End: 2022-05-27
Payer: MEDICARE

## 2022-05-27 ENCOUNTER — TELEPHONE (OUTPATIENT)
Dept: PULMONOLOGY | Facility: CLINIC | Age: 72
End: 2022-05-27
Payer: MEDICARE

## 2022-05-27 NOTE — TELEPHONE ENCOUNTER
----- Message from Diana Cheney sent at 5/27/2022 11:24 AM CDT -----  Contact: self  Latonia Oliver would like a call back at [phone], in regards to a prescription she received.

## 2022-05-30 NOTE — TELEPHONE ENCOUNTER
Called pharmacy to clarify   D/c hydralazine  Trial of vistaril  Not effective at 25 mg, instructed to increase to 50 q hs

## 2022-06-10 ENCOUNTER — TELEPHONE (OUTPATIENT)
Dept: INTERNAL MEDICINE | Facility: CLINIC | Age: 72
End: 2022-06-10
Payer: MEDICARE

## 2022-06-10 ENCOUNTER — PATIENT MESSAGE (OUTPATIENT)
Dept: INTERNAL MEDICINE | Facility: CLINIC | Age: 72
End: 2022-06-10

## 2022-06-10 ENCOUNTER — OFFICE VISIT (OUTPATIENT)
Dept: INTERNAL MEDICINE | Facility: CLINIC | Age: 72
End: 2022-06-10
Payer: MEDICARE

## 2022-06-10 ENCOUNTER — PATIENT MESSAGE (OUTPATIENT)
Dept: ADMINISTRATIVE | Facility: HOSPITAL | Age: 72
End: 2022-06-10
Payer: MEDICARE

## 2022-06-10 DIAGNOSIS — E11.9 TYPE 2 DIABETES MELLITUS WITHOUT COMPLICATION, WITHOUT LONG-TERM CURRENT USE OF INSULIN: Primary | ICD-10-CM

## 2022-06-10 DIAGNOSIS — E66.9 CLASS 2 OBESITY WITH BODY MASS INDEX (BMI) OF 39.0 TO 39.9 IN ADULT, UNSPECIFIED OBESITY TYPE, UNSPECIFIED WHETHER SERIOUS COMORBIDITY PRESENT: ICD-10-CM

## 2022-06-10 PROCEDURE — 99214 PR OFFICE/OUTPT VISIT, EST, LEVL IV, 30-39 MIN: ICD-10-PCS | Mod: 95,,, | Performed by: FAMILY MEDICINE

## 2022-06-10 PROCEDURE — 99214 OFFICE O/P EST MOD 30 MIN: CPT | Mod: 95,,, | Performed by: FAMILY MEDICINE

## 2022-06-10 RX ORDER — SEMAGLUTIDE 1.34 MG/ML
0.25 INJECTION, SOLUTION SUBCUTANEOUS
Qty: 3 PEN | Refills: 0 | Status: SHIPPED | OUTPATIENT
Start: 2022-06-10 | End: 2022-09-15 | Stop reason: DRUGHIGH

## 2022-06-10 NOTE — TELEPHONE ENCOUNTER
----- Message from Marie Ross sent at 6/10/2022 12:40 PM CDT -----  Contact: Patient  Rocio called to consult with nurse or staff to get clarification on the ozempic that was called in for the patient. She states she would like to now if it needs to be a 1 month or 3 month supply. She would like a call back and can be reached at [phone]. Thanks/MR

## 2022-06-10 NOTE — PROGRESS NOTES
Subjective:       Patient ID: Latonia Oliver is a 71 y.o. female.    Chief Complaint: No chief complaint on file.    HPI     The patient location is: home  The chief complaint leading to consultation is: obesity    Visit type: audiovisual    Face to Face time with patient: 10-15   minutes of total time spent on the encounter, which includes face to face time and non-face to face time preparing to see the patient (eg, review of tests), Obtaining and/or reviewing separately obtained history, Documenting clinical information in the electronic or other health record, Independently interpreting results (not separately reported) and communicating results to the patient/family/caregiver, or Care coordination (not separately reported).         Each patient to whom he or she provides medical services by telemedicine is:  (1) informed of the relationship between the physician and patient and the respective role of any other health care provider with respect to management of the patient; and (2) notified that he or she may decline to receive medical services by telemedicine and may withdraw from such care at any time.    Notes:       Patient Active Problem List   Diagnosis    Sjogren-Flo syndrome    Pain in joint, multiple sites    Sjogren's syndrome with keratoconjunctivitis sicca    Fibromyalgia    Type 2 diabetes mellitus without complication, without long-term current use of insulin    HTN (hypertension)    Mixed hyperlipidemia    GERD (gastroesophageal reflux disease)    Insomnia    Primary osteoarthritis of right knee    Lumbar back pain    Lumbar spondylosis    ROX on CPAP    Osteopenia    Depression    Fatigue    Medication monitoring encounter    Lumbar paraspinal muscle spasm    Visit for screening mammogram    Restless leg syndrome    Acute right ankle pain    Personal history of breast cancer    Major depressive disorder in partial remission    Weakness    Balance problem    Nasal septal  deviation    Severe obesity (BMI 35.0-39.9) with comorbidity       Past Medical History:   Diagnosis Date    Anxiety     Arthritis     Back pain     Basal cell carcinoma (BCC)     Breast cancer     Breast cancer     Depression     Diabetes mellitus type 2, controlled     Enthesopathy of hip 2014    Fibromyalgia     GERD (gastroesophageal reflux disease)     HTN (hypertension)     Irregular heart beat     Mixed hyperlipidemia     Obesity     Osteoporosis     Rash and nonspecific skin eruption 6/15/2015    Sjogren - Flo's syndrome     Sleep apnea     Trouble in sleeping     Type 2 diabetes mellitus        Past Surgical History:   Procedure Laterality Date    APPENDECTOMY      BELT ABDOMINOPLASTY      BREAST LUMPECTOMY      BREAST SURGERY      breast augmentation     SECTION, CLASSIC      EPIDURAL STEROID INJECTION N/A 2022    Procedure: Lumbar L5/S1 IL NEELA with RN IV sedation;  Surgeon: Giovanny Arroyo MD;  Location: [facility]IN [facility]GT;  Service: Pain Management;  Laterality: N/A;    INJECTION OF JOINT Right 2022    Procedure: Right knee Synvisc injection J 7325 with local;  Surgeon: Giovanny Arroyo MD;  Location: [facility] PAIN [facility]GT;  Service: Pain Management;  Laterality: Right;    RECONSTRUCTION, NASAL VALVE Left 10/22/2021    Procedure: RECONSTRUCTION, NASAL VALVE ;  Surgeon: Faustino Cash MD;  Location: [facility] OR;  Service: ENT;  Laterality: Left;       Family History   Problem Relation Age of Onset    Heart disease Mother     Hypertension Mother     COPD Mother     Diabetes Father     Stroke Father     Kidney disease Father        Social History     Tobacco Use   Smoking Status Never Smoker   Smokeless Tobacco Never Used       Wt Readings from Last 5 Encounters:   22 99 kg (218 lb 4.1 oz)   22 101.4 kg (223 lb 8.7 oz)   22 101.6 kg (223 lb 15.8 oz)   22 100.1 kg (220 lb 12.7 oz)   22 99.3 kg (218 lb 14.7 oz)       For further HPI  details, see assessment and plan.    Review of Systems   Skin: Negative for pallor.   Neurological: Negative for dizziness, tremors, seizures, speech difficulty and headaches.   Psychiatric/Behavioral: Negative for confusion. The patient is nervous/anxious.        Objective:     Physical Exam  Constitutional:       General: She is not in acute distress.     Appearance: She is not ill-appearing.   Pulmonary:      Effort: Pulmonary effort is normal. No respiratory distress.   Neurological:      General: No focal deficit present.      Mental Status: She is alert.   Psychiatric:         Mood and Affect: Mood normal.         Behavior: Behavior normal.         Assessment:       1. Type 2 diabetes mellitus without complication, without long-term current use of insulin    2. Class 2 obesity with body mass index (BMI) of 39.0 to 39.9 in adult, unspecified obesity type, unspecified whether serious comorbidity present        Plan:   Type 2 diabetes mellitus without complication, without long-term current use of insulin  -     semaglutide (OZEMPIC) 0.25 mg or 0.5 mg(2 mg/1.5 mL) pen injector; Inject 0.25 mg into the skin every 7 days.  Dispense: 3 pen; Refill: 0    Class 2 obesity with body mass index (BMI) of 39.0 to 39.9 in adult, unspecified obesity type, unspecified whether serious comorbidity present        DM II  Well controlled  Last DM level was '17  She has heard of ozempic  Has had a friend who did well with it.      No personal or FH of thyroid cancer, MEN 2, or pancreatitis.    Discussed pros/cons  Discussed side effects  Trial medication  F/u in2 months      This note was verbally dictated, please excuse any type errors.

## 2022-06-14 ENCOUNTER — PATIENT MESSAGE (OUTPATIENT)
Dept: OTHER | Facility: OTHER | Age: 72
End: 2022-06-14
Payer: MEDICARE

## 2022-06-16 ENCOUNTER — TELEPHONE (OUTPATIENT)
Dept: INTERNAL MEDICINE | Facility: CLINIC | Age: 72
End: 2022-06-16
Payer: MEDICARE

## 2022-06-16 NOTE — TELEPHONE ENCOUNTER
----- Message from Mckenna Joseph sent at 6/15/2022  5:34 PM CDT -----  Regarding: Pharmacy Authorization  Contact: [facility]  Pharmacy is requesting a call back for authorization   Patient wanted to know when they should start titrating up   Please Assist     semaglutide (OZEMPIC) 0.25 mg or 0.5 mg(2 mg/1.5 mL) pen injector      [facility] [address] [place] [address] PAULO BALL   Phone:  [phone]  Fax:  [phone]

## 2022-06-17 ENCOUNTER — PATIENT MESSAGE (OUTPATIENT)
Dept: INTERNAL MEDICINE | Facility: CLINIC | Age: 72
End: 2022-06-17
Payer: MEDICARE

## 2022-06-17 DIAGNOSIS — E11.9 TYPE 2 DIABETES MELLITUS WITHOUT COMPLICATION, UNSPECIFIED WHETHER LONG TERM INSULIN USE: ICD-10-CM

## 2022-06-28 ENCOUNTER — LAB VISIT (OUTPATIENT)
Dept: LAB | Facility: HOSPITAL | Age: 72
End: 2022-06-28
Attending: FAMILY MEDICINE
Payer: MEDICARE

## 2022-06-28 DIAGNOSIS — E11.9 TYPE 2 DIABETES MELLITUS WITHOUT COMPLICATION, WITHOUT LONG-TERM CURRENT USE OF INSULIN: ICD-10-CM

## 2022-06-28 PROCEDURE — 36415 COLL VENOUS BLD VENIPUNCTURE: CPT | Performed by: FAMILY MEDICINE

## 2022-06-28 PROCEDURE — 83036 HEMOGLOBIN GLYCOSYLATED A1C: CPT | Performed by: FAMILY MEDICINE

## 2022-06-29 LAB
ESTIMATED AVG GLUCOSE: 143 MG/DL (ref 68–131)
HBA1C MFR BLD: 6.6 % (ref 4–5.6)

## 2022-07-01 ENCOUNTER — OFFICE VISIT (OUTPATIENT)
Dept: INTERNAL MEDICINE | Facility: CLINIC | Age: 72
End: 2022-07-01
Payer: MEDICARE

## 2022-07-01 DIAGNOSIS — I10 HYPERTENSION, UNSPECIFIED TYPE: ICD-10-CM

## 2022-07-01 DIAGNOSIS — E66.9 CLASS 2 OBESITY WITH BODY MASS INDEX (BMI) OF 39.0 TO 39.9 IN ADULT, UNSPECIFIED OBESITY TYPE, UNSPECIFIED WHETHER SERIOUS COMORBIDITY PRESENT: ICD-10-CM

## 2022-07-01 DIAGNOSIS — E11.9 TYPE 2 DIABETES MELLITUS WITHOUT COMPLICATION, WITHOUT LONG-TERM CURRENT USE OF INSULIN: Primary | ICD-10-CM

## 2022-07-01 PROCEDURE — 99214 OFFICE O/P EST MOD 30 MIN: CPT | Mod: 95,,, | Performed by: FAMILY MEDICINE

## 2022-07-01 PROCEDURE — 99214 PR OFFICE/OUTPT VISIT, EST, LEVL IV, 30-39 MIN: ICD-10-PCS | Mod: 95,,, | Performed by: FAMILY MEDICINE

## 2022-07-01 NOTE — PROGRESS NOTES
Subjective:       Patient ID: Latonia Oliver is a 71 y.o. female.    Chief Complaint: No chief complaint on file.    HPI     The patient location is: home  The chief complaint leading to consultation is: f/u on med    Visit type: audiovisual    Face to Face time with patient: 10-15   minutes of total time spent on the encounter, which includes face to face time and non-face to face time preparing to see the patient (eg, review of tests), Obtaining and/or reviewing separately obtained history, Documenting clinical information in the electronic or other health record, Independently interpreting results (not separately reported) and communicating results to the patient/family/caregiver, or Care coordination (not separately reported).         Each patient to whom he or she provides medical services by telemedicine is:  (1) informed of the relationship between the physician and patient and the respective role of any other health care provider with respect to management of the patient; and (2) notified that he or she may decline to receive medical services by telemedicine and may withdraw from such care at any time.    Notes:       Patient Active Problem List   Diagnosis    Sjogren-Flo syndrome    Pain in joint, multiple sites    Sjogren's syndrome with keratoconjunctivitis sicca    Fibromyalgia    Type 2 diabetes mellitus without complication, without long-term current use of insulin    HTN (hypertension)    Mixed hyperlipidemia    GERD (gastroesophageal reflux disease)    Insomnia    Primary osteoarthritis of right knee    Lumbar back pain    Lumbar spondylosis    ROX on CPAP    Osteopenia    Depression    Fatigue    Medication monitoring encounter    Lumbar paraspinal muscle spasm    Visit for screening mammogram    Restless leg syndrome    Acute right ankle pain    Personal history of breast cancer    Major depressive disorder in partial remission    Weakness    Balance problem    Nasal  septal deviation    Severe obesity (BMI 35.0-39.9) with comorbidity       Past Medical History:   Diagnosis Date    Anxiety     Arthritis     Back pain     Basal cell carcinoma (BCC)     Breast cancer     Breast cancer     Depression     Diabetes mellitus type 2, controlled     Enthesopathy of hip 2014    Fibromyalgia     GERD (gastroesophageal reflux disease)     HTN (hypertension)     Irregular heart beat     Mixed hyperlipidemia     Obesity     Osteoporosis     Rash and nonspecific skin eruption 6/15/2015    Sjogren - Flo's syndrome     Sleep apnea     Trouble in sleeping     Type 2 diabetes mellitus        Past Surgical History:   Procedure Laterality Date    APPENDECTOMY      BELT ABDOMINOPLASTY      BREAST LUMPECTOMY      BREAST SURGERY      breast augmentation     SECTION, CLASSIC      EPIDURAL STEROID INJECTION N/A 2022    Procedure: Lumbar L5/S1 IL NEELA with RN IV sedation;  Surgeon: Giovanny Arroyo MD;  Location: [facility];  Service: Pain Management;  Laterality: N/A;    INJECTION OF JOINT Right 2022    Procedure: Right knee Synvisc injection [id number] with local;  Surgeon: Giovanny Arroyo MD;  Location: [facility];  Service: Pain Management;  Laterality: Right;    RECONSTRUCTION, NASAL VALVE Left 10/22/2021    Procedure: RECONSTRUCTION, NASAL VALVE ;  Surgeon: Faustino Cash MD;  Location: [facility] OR;  Service: ENT;  Laterality: Left;       Family History   Problem Relation Age of Onset    Heart disease Mother     Hypertension Mother     COPD Mother     Diabetes Father     Stroke Father     Kidney disease Father        Social History     Tobacco Use   Smoking Status Never Smoker   Smokeless Tobacco Never Used       Wt Readings from Last 5 Encounters:   22 99 kg (218 lb 4.1 oz)   22 101.4 kg (223 lb 8.7 oz)   22 101.6 kg (223 lb 15.8 oz)   22 100.1 kg (220 lb 12.7 oz)   22 99.3 kg (218 lb 14.7 oz)     Med fu  visit    No active concern    Tolerating ozempic    For further HPI details, see assessment and plan.    Review of Systems    Objective:      There were no vitals filed for this visit.    Physical Exam  Constitutional:       General: She is not in acute distress.     Appearance: She is not ill-appearing.   Pulmonary:      Effort: Pulmonary effort is normal. No respiratory distress.   Neurological:      General: No focal deficit present.      Mental Status: She is alert.   Psychiatric:         Mood and Affect: Mood normal.         Behavior: Behavior normal.         Assessment:       1. Type 2 diabetes mellitus without complication, without long-term current use of insulin    2. Class 2 obesity with body mass index (BMI) of 39.0 to 39.9 in adult, unspecified obesity type, unspecified whether serious comorbidity present    3. Hypertension, unspecified type        Plan:   Type 2 diabetes mellitus without complication, without long-term current use of insulin    Class 2 obesity with body mass index (BMI) of 39.0 to 39.9 in adult, unspecified obesity type, unspecified whether serious comorbidity present    Hypertension, unspecified type          Ozempic  Expensive but she opted to pay for it.  Deductible issue - so next time should be cheaper.  She is currently on 0.25 mg  She was surprised she had no issues. On the 2nd week she had a day of nausea - but was outside and feels the sun contributed.  She has noted some weight loss.   Lab Results   Component Value Date    HGBA1C 6.6 (H) 06/28/2022     Has had no issues with administering medications.    We will do one more 0.25 mg and then adjust to 0.5 mg    I ask she takes 1 month of weekly 0.5 mg and updates me on progress, tolerability and to assess whether or not to increase to 1mg dose pen.    HTN  BP Readings from Last 3 Encounters:   05/26/22 124/76   05/13/22 (!) 145/80   04/27/22 (!) 173/70     Controlled as of last check  Continue meds      Update me in 5  weeks

## 2022-07-03 ENCOUNTER — PATIENT MESSAGE (OUTPATIENT)
Dept: OTHER | Facility: OTHER | Age: 72
End: 2022-07-03
Payer: MEDICARE

## 2022-07-27 ENCOUNTER — TELEPHONE (OUTPATIENT)
Dept: INTERNAL MEDICINE | Facility: CLINIC | Age: 72
End: 2022-07-27
Payer: MEDICARE

## 2022-07-27 NOTE — TELEPHONE ENCOUNTER
----- Message from Cleopatra Chavarria sent at 7/27/2022  9:50 AM CDT -----  Type:  Pharmacy Calling to Clarify an RX    Name of Caller:pharmacist  Pharmacy Name:[facility]  Prescription Name:Ozempic  What do they need to clarify?:dosage change from 025mg to 05mg, need verbal order change  Best Call Back Number:[phone]  Additional Information: pt states she needed to start meds at 9am

## 2022-08-09 ENCOUNTER — PATIENT MESSAGE (OUTPATIENT)
Dept: ADMINISTRATIVE | Facility: HOSPITAL | Age: 72
End: 2022-08-09
Payer: MEDICARE

## 2022-08-09 RX ORDER — CARVEDILOL 6.25 MG/1
TABLET ORAL
Qty: 180 TABLET | Refills: 3 | Status: SHIPPED | OUTPATIENT
Start: 2022-08-09

## 2022-08-09 NOTE — TELEPHONE ENCOUNTER
Care Due:                  Date            Visit Type   Department     Provider  --------------------------------------------------------------------------------                                ESTABLISHED                              PATIENT -    [facility]  Last Visit: 07-      [facility]      MEDICINE       Elias Combs  Next Visit: None Scheduled  None         None Found                                                            Last  Test          Frequency    Reason                     Performed    Due Date  --------------------------------------------------------------------------------    CMP.........  12 months..  DULoxetine, benazepriL,    09-   09-                             chlorthalidone,                             simvastatin..............    Lipid Panel.  12 months..  simvastatin..............  08- 07-    [facility] Embedded Care Gaps. Reference number: [id number]. 8/09/2022   5:36:29 PM CDT

## 2022-08-10 NOTE — TELEPHONE ENCOUNTER
Refill Decision Note   Latonia Oliver  is requesting a refill authorization.  Brief Assessment and Rationale for Refill:  Approve    -Medication-Related Problems Identified: Requires labs  Medication Therapy Plan:  Labs due: CMP, Lipid panel    Medication Reconciliation Completed: No   Comments:     Provider Staff:     Action is required for this patient.   Please see care gap opportunities below in Care Due Message.     Thanks!  [facility]     Appointments      Date Provider   Last Visit   7/1/2022 Elias Combs MD   Next Visit   Visit date not found Elias Combs MD     Note composed:9:10 PM 08/09/2022           Note composed:9:10 PM 08/09/2022

## 2022-08-16 ENCOUNTER — LAB VISIT (OUTPATIENT)
Dept: LAB | Facility: HOSPITAL | Age: 72
End: 2022-08-16
Attending: FAMILY MEDICINE
Payer: MEDICARE

## 2022-08-16 DIAGNOSIS — E11.9 TYPE 2 DIABETES MELLITUS WITHOUT COMPLICATION, WITHOUT LONG-TERM CURRENT USE OF INSULIN: ICD-10-CM

## 2022-08-16 DIAGNOSIS — I10 ESSENTIAL HYPERTENSION: ICD-10-CM

## 2022-08-16 LAB
ESTIMATED AVG GLUCOSE: 137 MG/DL (ref 68–131)
HBA1C MFR BLD: 6.4 % (ref 4–5.6)

## 2022-08-16 PROCEDURE — 36415 COLL VENOUS BLD VENIPUNCTURE: CPT | Performed by: FAMILY MEDICINE

## 2022-08-16 PROCEDURE — 83036 HEMOGLOBIN GLYCOSYLATED A1C: CPT | Performed by: FAMILY MEDICINE

## 2022-08-16 RX ORDER — BENAZEPRIL HYDROCHLORIDE 40 MG/1
TABLET ORAL
Qty: 90 TABLET | Refills: 0 | Status: SHIPPED | OUTPATIENT
Start: 2022-08-16 | End: 2022-12-07

## 2022-08-17 ENCOUNTER — OFFICE VISIT (OUTPATIENT)
Dept: RHEUMATOLOGY | Facility: CLINIC | Age: 72
End: 2022-08-17
Payer: MEDICARE

## 2022-08-17 ENCOUNTER — LAB VISIT (OUTPATIENT)
Dept: LAB | Facility: HOSPITAL | Age: 72
End: 2022-08-17
Attending: PHYSICIAN ASSISTANT
Payer: MEDICARE

## 2022-08-17 ENCOUNTER — PATIENT MESSAGE (OUTPATIENT)
Dept: RHEUMATOLOGY | Facility: CLINIC | Age: 72
End: 2022-08-17

## 2022-08-17 VITALS
WEIGHT: 213.88 LBS | HEIGHT: 62 IN | HEART RATE: 80 BPM | DIASTOLIC BLOOD PRESSURE: 78 MMHG | BODY MASS INDEX: 39.36 KG/M2 | SYSTOLIC BLOOD PRESSURE: 119 MMHG

## 2022-08-17 DIAGNOSIS — D84.9 IMMUNOCOMPROMISED: ICD-10-CM

## 2022-08-17 DIAGNOSIS — M53.3 CHRONIC SI JOINT PAIN: ICD-10-CM

## 2022-08-17 DIAGNOSIS — M47.819 FACET ARTHROPATHY: ICD-10-CM

## 2022-08-17 DIAGNOSIS — M85.89 OSTEOPENIA OF MULTIPLE SITES: ICD-10-CM

## 2022-08-17 DIAGNOSIS — M35.01 SJOGREN'S SYNDROME WITH KERATOCONJUNCTIVITIS SICCA: Primary | ICD-10-CM

## 2022-08-17 DIAGNOSIS — Z51.81 MEDICATION MONITORING ENCOUNTER: ICD-10-CM

## 2022-08-17 DIAGNOSIS — E11.9 TYPE 2 DIABETES MELLITUS WITHOUT COMPLICATION: ICD-10-CM

## 2022-08-17 DIAGNOSIS — G89.29 CHRONIC SI JOINT PAIN: ICD-10-CM

## 2022-08-17 DIAGNOSIS — M35.01 SJOGREN'S SYNDROME WITH KERATOCONJUNCTIVITIS SICCA: ICD-10-CM

## 2022-08-17 LAB
ALBUMIN SERPL BCP-MCNC: 4 G/DL (ref 3.5–5.2)
ALP SERPL-CCNC: 64 U/L (ref 55–135)
ALT SERPL W/O P-5'-P-CCNC: 23 U/L (ref 10–44)
ANION GAP SERPL CALC-SCNC: 12 MMOL/L (ref 8–16)
AST SERPL-CCNC: 20 U/L (ref 10–40)
BASOPHILS # BLD AUTO: 0.04 K/UL (ref 0–0.2)
BASOPHILS NFR BLD: 0.6 % (ref 0–1.9)
BILIRUB SERPL-MCNC: 0.4 MG/DL (ref 0.1–1)
BUN SERPL-MCNC: 17 MG/DL (ref 8–23)
C3 SERPL-MCNC: 170 MG/DL (ref 50–180)
C4 SERPL-MCNC: 28 MG/DL (ref 11–44)
CALCIUM SERPL-MCNC: 10.1 MG/DL (ref 8.7–10.5)
CHLORIDE SERPL-SCNC: 103 MMOL/L (ref 95–110)
CO2 SERPL-SCNC: 28 MMOL/L (ref 23–29)
CREAT SERPL-MCNC: 0.8 MG/DL (ref 0.5–1.4)
CRP SERPL-MCNC: 7.7 MG/L (ref 0–8.2)
DIFFERENTIAL METHOD: ABNORMAL
EOSINOPHIL # BLD AUTO: 0.2 K/UL (ref 0–0.5)
EOSINOPHIL NFR BLD: 2.8 % (ref 0–8)
ERYTHROCYTE [DISTWIDTH] IN BLOOD BY AUTOMATED COUNT: 13 % (ref 11.5–14.5)
ERYTHROCYTE [SEDIMENTATION RATE] IN BLOOD BY PHOTOMETRIC METHOD: 16 MM/HR (ref 0–36)
EST. GFR  (NO RACE VARIABLE): >60 ML/MIN/1.73 M^2
GLUCOSE SERPL-MCNC: 106 MG/DL (ref 70–110)
HCT VFR BLD AUTO: 38.2 % (ref 37–48.5)
HGB BLD-MCNC: 12.9 G/DL (ref 12–16)
IMM GRANULOCYTES # BLD AUTO: 0.02 K/UL (ref 0–0.04)
IMM GRANULOCYTES NFR BLD AUTO: 0.3 % (ref 0–0.5)
LYMPHOCYTES # BLD AUTO: 1.7 K/UL (ref 1–4.8)
LYMPHOCYTES NFR BLD: 23.2 % (ref 18–48)
MCH RBC QN AUTO: 31.6 PG (ref 27–31)
MCHC RBC AUTO-ENTMCNC: 33.8 G/DL (ref 32–36)
MCV RBC AUTO: 94 FL (ref 82–98)
MONOCYTES # BLD AUTO: 0.6 K/UL (ref 0.3–1)
MONOCYTES NFR BLD: 8.4 % (ref 4–15)
NEUTROPHILS # BLD AUTO: 4.7 K/UL (ref 1.8–7.7)
NEUTROPHILS NFR BLD: 64.7 % (ref 38–73)
NRBC BLD-RTO: 0 /100 WBC
PLATELET # BLD AUTO: 215 K/UL (ref 150–450)
PMV BLD AUTO: 9.8 FL (ref 9.2–12.9)
POTASSIUM SERPL-SCNC: 3.4 MMOL/L (ref 3.5–5.1)
PROT SERPL-MCNC: 7.6 G/DL (ref 6–8.4)
RBC # BLD AUTO: 4.08 M/UL (ref 4–5.4)
SODIUM SERPL-SCNC: 143 MMOL/L (ref 136–145)
WBC # BLD AUTO: 7.25 K/UL (ref 3.9–12.7)

## 2022-08-17 PROCEDURE — 85652 RBC SED RATE AUTOMATED: CPT | Performed by: PHYSICIAN ASSISTANT

## 2022-08-17 PROCEDURE — 84165 PROTEIN E-PHORESIS SERUM: CPT | Performed by: PHYSICIAN ASSISTANT

## 2022-08-17 PROCEDURE — 99215 PR OFFICE/OUTPT VISIT, EST, LEVL V, 40-54 MIN: ICD-10-PCS | Mod: S$PBB,,, | Performed by: PHYSICIAN ASSISTANT

## 2022-08-17 PROCEDURE — 84165 PATHOLOGIST INTERPRETATION SPE: ICD-10-PCS | Mod: 26,,, | Performed by: PATHOLOGY

## 2022-08-17 PROCEDURE — 85025 COMPLETE CBC W/AUTO DIFF WBC: CPT | Performed by: PHYSICIAN ASSISTANT

## 2022-08-17 PROCEDURE — 84156 ASSAY OF PROTEIN URINE: CPT | Performed by: PHYSICIAN ASSISTANT

## 2022-08-17 PROCEDURE — 84165 PROTEIN E-PHORESIS SERUM: CPT | Mod: 26,,, | Performed by: PATHOLOGY

## 2022-08-17 PROCEDURE — 99999 PR PBB SHADOW E&M-EST. PATIENT-LVL III: ICD-10-PCS | Mod: PBBFAC,,, | Performed by: PHYSICIAN ASSISTANT

## 2022-08-17 PROCEDURE — 99999 PR PBB SHADOW E&M-EST. PATIENT-LVL III: CPT | Mod: PBBFAC,,, | Performed by: PHYSICIAN ASSISTANT

## 2022-08-17 PROCEDURE — 99215 OFFICE O/P EST HI 40 MIN: CPT | Mod: S$PBB,,, | Performed by: PHYSICIAN ASSISTANT

## 2022-08-17 PROCEDURE — 36415 COLL VENOUS BLD VENIPUNCTURE: CPT | Performed by: PHYSICIAN ASSISTANT

## 2022-08-17 PROCEDURE — 86160 COMPLEMENT ANTIGEN: CPT | Mod: 59 | Performed by: PHYSICIAN ASSISTANT

## 2022-08-17 PROCEDURE — 86160 COMPLEMENT ANTIGEN: CPT | Performed by: PHYSICIAN ASSISTANT

## 2022-08-17 PROCEDURE — 86140 C-REACTIVE PROTEIN: CPT | Performed by: PHYSICIAN ASSISTANT

## 2022-08-17 PROCEDURE — 80053 COMPREHEN METABOLIC PANEL: CPT | Performed by: PHYSICIAN ASSISTANT

## 2022-08-17 PROCEDURE — 86334 PATHOLOGIST INTERPRETATION IFE: ICD-10-PCS | Mod: 26,,, | Performed by: PATHOLOGY

## 2022-08-17 PROCEDURE — 86334 IMMUNOFIX E-PHORESIS SERUM: CPT | Mod: 26,,, | Performed by: PATHOLOGY

## 2022-08-17 PROCEDURE — 86334 IMMUNOFIX E-PHORESIS SERUM: CPT | Performed by: PHYSICIAN ASSISTANT

## 2022-08-17 PROCEDURE — 82595 ASSAY OF CRYOGLOBULIN: CPT | Performed by: PHYSICIAN ASSISTANT

## 2022-08-17 PROCEDURE — 99213 OFFICE O/P EST LOW 20 MIN: CPT | Mod: PBBFAC | Performed by: PHYSICIAN ASSISTANT

## 2022-08-17 RX ORDER — METHYLPREDNISOLONE 4 MG/1
TABLET ORAL
Qty: 1 EACH | Refills: 0 | Status: SHIPPED | OUTPATIENT
Start: 2022-08-17 | End: 2022-12-07

## 2022-08-17 NOTE — PROGRESS NOTES
Subjective:      Patient ID: Latonia Oliver is a 71 y.o. female.    Chief Complaint: Sjogrens      HPI   Latonia Oliver  is a 71 y.o. female who is established in the department, but a new patient to my clinic.  She has history of Sjogren's disease.  She is on Plaquenil 400 mg once daily.  She is tolerating that dose well without any complications.  Dry eyes dry mouth are well controlled at this point.  SS is doing quite well.    Patient's pain reason for scheduling appointment today was because she is having back pain.  She points to the junction of the lower T-spine and upper L-spine.  She has been having this problem for years.  There is no radiating symptoms.  It is located on the right side only.  At times she feels a very sharp pain that is fairly incapacitating.  She is also complaining of lumbosacral pain in the bilateral SI joints.  She has seen Interventional Pain Management here.  They have done some procedures without any therapeutic relief.    Also with history of fibromyalgia.  Doing fairly well from that.  Off cymbalta, celexa and gabapentin (ineffective).      Patient denies fevers, chills, photosensitivity, eye pain, shortness of breath, chest pain, hematuria, blood in the stool, rash, sicca symptoms, raynauds, finger ulcerations.  Rheumatologic systems otherwise negative.    Serologies/Labs:  · (+) PAUL 1;1280 speckled, (+) SSA/SSB, o/w negative profile  · Low titer RF 2005 but negative in 2013  · Negative CCP  Current Treatment:  · Plaquenil 400 mg once daily  Previous Treatment:   · Celexa  · Gabapentin  · Cymbalta      Current Outpatient Medications:     amLODIPine (NORVASC) 10 MG tablet, Take 1 tablet by mouth once daily, Disp: 90 tablet, Rfl: 0    benazepriL (LOTENSIN) 40 MG tablet, Take 1 tablet by mouth once daily, Disp: 90 tablet, Rfl: 0    carvediloL (COREG) 6.25 MG tablet, TAKE 1 TABLET BY MOUTH TWICE DAILY WITH MEALS, Disp: 180 tablet, Rfl: 3    chlorthalidone (HYGROTEN) 25 MG Tab, Take 1  tablet (25 mg total) by mouth once daily., Disp: 90 tablet, Rfl: 1    clonazePAM (KLONOPIN) 1 MG tablet, Take 1 tablet (1 mg total) by mouth every evening., Disp: 30 tablet, Rfl: 5    cyanocobalamin (VITAMIN B-12) 1000 MCG tablet, Take 100 mcg by mouth once daily., Disp: , Rfl:     fluticasone propionate (FLONASE) 50 mcg/actuation nasal spray, 2 sprays (100 mcg total) by Each Nostril route once daily., Disp: 15.8 mL, Rfl: 5    hydrOXYchloroQUINE (PLAQUENIL) 200 mg tablet, Take 1 tablet (200 mg total) by mouth 2 (two) times daily., Disp: 180 tablet, Rfl: 3    hydrOXYzine pamoate (VISTARIL) 25 MG Cap, Take 1 capsule (25 mg total) by mouth nightly as needed (insomnia)., Disp: 30 capsule, Rfl: 5    naproxen (NAPROSYN) 500 MG tablet, Take 1 tablet (500 mg total) by mouth 2 (two) times daily with meals., Disp: 60 tablet, Rfl: 1    omeprazole (PRILOSEC) 40 MG capsule, Take 1 capsule by mouth once daily, Disp: 90 capsule, Rfl: 0    semaglutide (OZEMPIC) 0.25 mg or 0.5 mg(2 mg/1.5 mL) pen injector, Inject 0.25 mg into the skin every 7 days., Disp: 3 pen, Rfl: 0    simvastatin (ZOCOR) 40 MG tablet, Take 1 tablet by mouth once daily, Disp: 90 tablet, Rfl: 1    methylPREDNISolone (MEDROL DOSEPACK) 4 mg tablet, use as directed, Disp: 1 each, Rfl: 0    Past Medical History:   Diagnosis Date    Anxiety     Arthritis     Back pain     Basal cell carcinoma (BCC)     Breast cancer     Breast cancer     Depression     Diabetes mellitus type 2, controlled     Enthesopathy of hip 12/11/2014    Fibromyalgia     GERD (gastroesophageal reflux disease)     HTN (hypertension)     Irregular heart beat     Mixed hyperlipidemia     Obesity     Osteoporosis     Rash and nonspecific skin eruption 6/15/2015    Sjogren - Flo's syndrome     Sleep apnea     Trouble in sleeping     Type 2 diabetes mellitus      Family History   Problem Relation Age of Onset    Heart disease Mother     Hypertension Mother     COPD  "Mother     Diabetes Father     Stroke Father     Kidney disease Father      Social History     Socioeconomic History    Marital status:     Number of children: 1   Occupational History     Employer: [employer]    Tobacco Use    Smoking status: Never Smoker    Smokeless tobacco: Never Used   Substance and Sexual Activity    Alcohol use: Yes     Comment: Socially    Drug use: No    Sexual activity: Not Currently     Partners: Male     Social Determinants of Health     Financial Resource Strain: Low Risk     Difficulty of Paying Living Expenses: Not very hard   Food Insecurity: No Food Insecurity    Worried About Running Out of Food in the Last Year: Never true    Ran Out of Food in the Last Year: Never true   Transportation Needs: No Transportation Needs    Lack of Transportation (Medical): No    Lack of Transportation (Non-Medical): No   Physical Activity: Inactive    Days of Exercise per Week: 0 days    Minutes of Exercise per Session: 0 min   Stress: Stress Concern Present    Feeling of Stress : Rather much   Social Connections: Unknown    Frequency of Communication with Friends and Family: More than three times a week    Frequency of Social Gatherings with Friends and Family: Once a week    Active Member of Clubs or Organizations: Yes    Attends Club or Organization Meetings: 1 to 4 times per year    Marital Status:    Housing Stability: Low Risk     Unable to Pay for Housing in the Last Year: No    Number of Places Lived in the Last Year: 1    Unstable Housing in the Last Year: No     Review of patient's allergies indicates:   Allergen Reactions    Penicillins      Other reaction(s): Rash  Other reaction(s): Difficulty breathing       Objective:   /78 (BP Location: Right arm, Patient Position: Sitting, BP Method: Large (Automatic))   Pulse 80   Ht 5' 2" (1.575 m)   Wt 97 kg (213 lb 13.5 oz)   BMI 39.11 kg/m²   Immunization History   Administered Date(s) " Administered    Influenza - High Dose - PF (65 years and older) 11/04/2015, 10/07/2016, 09/05/2017, 10/23/2018, 10/22/2019    Influenza - Quadrivalent - High Dose - PF (65 years and older) 09/24/2020, 10/07/2021    Influenza A (H1N1) 2009 Monovalent - IM 04/23/2010    Influenza Whole 10/27/2014    Pneumococcal Conjugate - 13 Valent 11/04/2015    Pneumococcal Polysaccharide - 23 Valent 12/08/2008, 04/25/2017    Tdap 04/25/2017    Zoster 02/16/2009    Zoster Recombinant 03/05/2020       Physical Exam   Constitutional: She is oriented to person, place, and time. No distress.   HENT:   Head: Normocephalic and atraumatic.   Pulmonary/Chest: Effort normal.   Abdominal: She exhibits no distension.   Musculoskeletal:         General: No swelling or tenderness. Normal range of motion.      Cervical back: Normal range of motion.   Lymphadenopathy:     She has no cervical adenopathy.   Neurological: She is alert and oriented to person, place, and time.   Skin: Skin is warm and dry. No rash noted.   Psychiatric: Mood normal.   Nursing note and vitals reviewed.  TTP upper l-spine/lower t-spine paraspinal musculature  Pain w axial comp facet joints  TTP bilat SI jts      Recent Results (from the past 672 hour(s))   Hemoglobin A1c    Collection Time: 08/16/22 10:40 AM   Result Value Ref Range    Hemoglobin A1C 6.4 (H) 4.0 - 5.6 %    Estimated Avg Glucose 137 (H) 68 - 131 mg/dL       No results found for: TBGOLDPLUS   Lab Results   Component Value Date    HEPCAB Negative 07/08/2016        Imaging  I have personally reviewed images and reports as below.  I agree with the interpretation.  MRI Lumbar Spine Without Contrast  Order: [id number]   Status: Final result     Visible to patient: Yes (seen)     Next appt: Today at 04:05 PM in Lab (LABORATORY, [facility])     Dx: Low back pain, non-specific     0 Result Notes     1 Patient Communication    Details    Reading Physician Reading Date Result Priority   Huan Valderrama  MD  [phone] 2/5/2020 Routine     Narrative & Impression  EXAMINATION:  MRI LUMBAR SPINE WITHOUT CONTRAST     CLINICAL HISTORY:  Low back pain, >6wks conservative tx, persistent-progressive sx, surgical candidate;Low back pain, rapidly progressive neuro deficit; Low back pain     TECHNIQUE:  Multiplanar, multisequence MR images were acquired from the thoracolumbar junction to the sacrum without the administration of contrast.     COMPARISON:  MRI lumbar spine from 03/18/2016     FINDINGS:  No fracture.  Minimal grade 1 anterolisthesis of L3 on L4 which is unchanged.  Multilevel degenerative changes with mild spurring of the endplates.  Disc space narrowing at L3-L4.  There is lower lumbar facet arthropathy.  Overall, findings are similar to slightly progressed since the comparison exam.  No marrow replacement process.  Mild edema within the subcutaneous soft tissues of the lower back.  Visualized distal cord demonstrates normal signal.     L1-L2: No spinal canal or neural foraminal encroachment     L2-L3: No spinal canal or neural foraminal encroachment.     L3-L4: Ligamentum flavum and mild facet arthropathy.  Grade 1 anterolisthesis.  Mild posterior disc bulge is seen.  No significant spinal canal or neural foraminal encroachment.     L4-L5: Mild facet arthropathy.  Mild posterior disc bulge.  Otherwise unremarkable.     L5-S1: Central and left paracentral disc protrusion.  No spinal canal stenosis.  There is asymmetric narrowing of the left neural foramen.     Impression:     Discogenic degenerative changes as described above.        Electronically signed by: Huan Valderrama MD  Date:                                            02/05/2020  Time:                                           14:14         Assessment:     1. Sjogren's syndrome with keratoconjunctivitis sicca    2. Facet arthropathy    3. Chronic SI joint pain    4. Osteopenia of multiple sites    5. Medication monitoring encounter    6.  Immunocompromised            Plan:     Latonai was seen today for sjogrens.    Diagnoses and all orders for this visit:    Sjogren's syndrome with keratoconjunctivitis sicca  -     Protein Electrophoresis, Serum; Standing  -     Immunofixation Electrophoresis; Standing  -     CBC Auto Differential; Standing  -     Comprehensive Metabolic Panel; Standing  -     C-Reactive Protein; Standing  -     Sedimentation rate; Standing  -     Cryoglobulin; Standing  -     C3 Complement; Standing  -     C4 Complement; Standing  -     Protein/Creatinine Ratio, Urine; Standing    Facet arthropathy  -     methylPREDNISolone (MEDROL DOSEPACK) 4 mg tablet; use as directed    Chronic SI joint pain  -     methylPREDNISolone (MEDROL DOSEPACK) 4 mg tablet; use as directed    Osteopenia of multiple sites    Medication monitoring encounter    Immunocompromised        · Sjogren's Syndrome   · Discussed conservative treatment  · Xerostomia  · Biotene products and Xylimelt Lozenges  · Avoid sleeping w fan on  · Limit alcohol, coffee, nicotene and cola intake  · Keratoconjunctivitis Sicca  · OTC Refresh or Systane drops.    · Limit alcohol/nicotene intake  · Consider humidifier  · Avoid using fans  · Discussed lymphoma risk - yearly SS labs today  · Plaquenil 400 mg bid  · Discussed risks and benefits  · yearly eye exams  · Facet arthropathy with associated back pain and SI joint pain  · F/u w IPM to discuss potential facet joint and SI joint injections  · Consider non spine sources of back pain   · No CVA tenderness  · PCP for further work up if sxs persist to r.o other causes  · Drug therapy requiring intensive monitoring for toxicity  · High Risk Medication Monitoring encounter  · No current medication related issues, no evidence of toxicity  · I ordered labs for toxicity monitoring, have personally reviewed the findings, and discussed them with the patient.  Pending labs will be sent via the portal  · Compromised immune system secondary to  autoimmune disease and/or use of immunosuppressive drugs.  Monitor carefully for infections.  Advised patient to get immediate medical care if any infection arises.  Also advised strict adherence age-appropriate vaccinations and cancer screenings with PCP.  · Patient advised to hold DMARD and/or biologic therapy for signs of infection or for surgery. If you are unsure what to do please call our office for instruction.[facility] [phone]  · Return to clinic: 6 mos reg 4    40 minutes of total time spent on the encounter, which includes face to face time and non-face to face time preparing to see the patient (eg, review of tests), Obtaining and/or reviewing separately obtained history, Documenting clinical information in the electronic or other health record, Independently interpreting results (not separately reported) and communicating results to the patient/family/caregiver, or Care coordination (not separately reported).     Follow up in about 6 months (around 2/17/2023).    The patient understands, chooses and consents to this plan and accepts all   the risks which include but are not limited to the risks mentioned above.     Disclaimer: This note was prepared using a voice recognition system and is likely to have sound alike errors within the text.

## 2022-08-17 NOTE — TELEPHONE ENCOUNTER
No new care gaps identified.  [facility] Embedded Care Gaps. Reference number: [id number]. 8/16/2022   8:24:57 PM CDT

## 2022-08-17 NOTE — TELEPHONE ENCOUNTER
Refill Decision Note   Latonia Lomaxmarcelo  is requesting a refill authorization.  Brief Assessment and Rationale for Refill:  Approve     Medication Therapy Plan:       Medication Reconciliation Completed: No   Comments:     No Care Gaps recommended.     Note composed:9:06 PM 08/16/2022

## 2022-08-18 LAB
CREAT UR-MCNC: 123 MG/DL (ref 15–325)
PROT UR-MCNC: <7 MG/DL (ref 0–15)
PROT/CREAT UR: NORMAL MG/G{CREAT} (ref 0–0.2)

## 2022-08-19 ENCOUNTER — PATIENT MESSAGE (OUTPATIENT)
Dept: RHEUMATOLOGY | Facility: CLINIC | Age: 72
End: 2022-08-19
Payer: MEDICARE

## 2022-08-19 LAB
ALBUMIN SERPL ELPH-MCNC: 4.06 G/DL (ref 3.35–5.55)
ALPHA1 GLOB SERPL ELPH-MCNC: 0.32 G/DL (ref 0.17–0.41)
ALPHA2 GLOB SERPL ELPH-MCNC: 0.94 G/DL (ref 0.43–0.99)
B-GLOBULIN SERPL ELPH-MCNC: 0.84 G/DL (ref 0.5–1.1)
GAMMA GLOB SERPL ELPH-MCNC: 1.04 G/DL (ref 0.67–1.58)
INTERPRETATION SERPL IFE-IMP: NORMAL
PATHOLOGIST INTERPRETATION IFE: NORMAL
PATHOLOGIST INTERPRETATION SPE: NORMAL
PROT SERPL-MCNC: 7.2 G/DL (ref 6–8.4)

## 2022-08-22 ENCOUNTER — TELEPHONE (OUTPATIENT)
Dept: PAIN MEDICINE | Facility: CLINIC | Age: 72
End: 2022-08-22
Payer: MEDICARE

## 2022-08-22 NOTE — TELEPHONE ENCOUNTER
Faxed medical record (MRI,last office note and demographics) over to [phone].      Dane Grimes   Medical Assistant

## 2022-08-22 NOTE — TELEPHONE ENCOUNTER
----- Message from Marnie Ibrahim sent at 8/22/2022  2:17 PM CDT -----  Contact: Dr VALDIVIA(RANDALL)-[phone]  Randall is calling for medical record(MRI,last office note and demographics), she would like it fax over to [phone]. Thanks/ar

## 2022-08-29 LAB — CRYOGLOB SER QL: NORMAL

## 2022-08-31 ENCOUNTER — TELEPHONE (OUTPATIENT)
Dept: RHEUMATOLOGY | Facility: CLINIC | Age: 72
End: 2022-08-31
Payer: MEDICARE

## 2022-08-31 NOTE — TELEPHONE ENCOUNTER
----- Message from Lisa Estrella PA-C sent at 8/31/2022  4:03 PM CDT -----  SANDIP Mujica w Reg 4 in 3 mos.  Appt w me one week later to go over labs and f/u on SS

## 2022-09-15 ENCOUNTER — OFFICE VISIT (OUTPATIENT)
Dept: INTERNAL MEDICINE | Facility: CLINIC | Age: 72
End: 2022-09-15
Payer: MEDICARE

## 2022-09-15 DIAGNOSIS — E11.9 TYPE 2 DIABETES MELLITUS WITHOUT COMPLICATION, WITHOUT LONG-TERM CURRENT USE OF INSULIN: ICD-10-CM

## 2022-09-15 DIAGNOSIS — I10 HYPERTENSION, UNSPECIFIED TYPE: ICD-10-CM

## 2022-09-15 DIAGNOSIS — E66.9 CLASS 2 OBESITY WITH BODY MASS INDEX (BMI) OF 39.0 TO 39.9 IN ADULT, UNSPECIFIED OBESITY TYPE, UNSPECIFIED WHETHER SERIOUS COMORBIDITY PRESENT: Primary | ICD-10-CM

## 2022-09-15 PROCEDURE — 99214 PR OFFICE/OUTPT VISIT, EST, LEVL IV, 30-39 MIN: ICD-10-PCS | Mod: 95,,, | Performed by: FAMILY MEDICINE

## 2022-09-15 PROCEDURE — 99214 OFFICE O/P EST MOD 30 MIN: CPT | Mod: 95,,, | Performed by: FAMILY MEDICINE

## 2022-09-15 NOTE — PROGRESS NOTES
Subjective:       Patient ID: Latonia Oliver is a 71 y.o. female.    Chief Complaint: No chief complaint on file.    Diabetes  She has type 2 diabetes mellitus. No MedicAlert identification noted. The initial diagnosis of diabetes was made 15 Years ago. Pertinent negatives for hypoglycemia include no confusion, dizziness, headaches, mood changes, nervousness/anxiousness, pallor, seizures, sleepiness, speech difficulty, sweats or tremors. Pertinent negatives for diabetes include no blurred vision, no chest pain, no fatigue, no foot paresthesias, no foot ulcerations, no polydipsia, no polyphagia, no polyuria, no visual change, no weakness and no weight loss. Pertinent negatives for hypoglycemia complications include no blackouts, no hospitalization, no nocturnal hypoglycemia, no required assistance and no required glucagon injection. Symptoms are resolved. Pertinent negatives for diabetic complications include no autonomic neuropathy, CVA, heart disease, nephropathy, peripheral neuropathy, PVD or retinopathy. Risk factors for coronary artery disease include dyslipidemia, hypertension, obesity and diabetes mellitus. Her weight is stable. She is following a generally unhealthy diet. She has not had a previous visit with a dietitian. She never participates in exercise. She monitors blood glucose at home 1-2 x per week. There is no compliance with monitoring of blood glucose. Her home blood glucose trend is decreasing steadily. She does not see a podiatrist.Eye exam is not current.     Patient Active Problem List   Diagnosis    Sjogren-Flo syndrome    Pain in joint, multiple sites    Sjogren's syndrome with keratoconjunctivitis sicca    Fibromyalgia    Type 2 diabetes mellitus without complication, without long-term current use of insulin    HTN (hypertension)    Mixed hyperlipidemia    GERD (gastroesophageal reflux disease)    Insomnia    Primary osteoarthritis of right knee    Lumbar back pain    Lumbar spondylosis     ROX on CPAP    Osteopenia    Depression    Fatigue    Medication monitoring encounter    Lumbar paraspinal muscle spasm    Visit for screening mammogram    Restless leg syndrome    Acute right ankle pain    Personal history of breast cancer    Major depressive disorder in partial remission    Weakness    Balance problem    Nasal septal deviation    Severe obesity (BMI 35.0-39.9) with comorbidity       Past Medical History:   Diagnosis Date    Anxiety     Arthritis     Back pain     Basal cell carcinoma (BCC)     Breast cancer     Breast cancer     Depression     Diabetes mellitus type 2, controlled     Enthesopathy of hip 2014    Fibromyalgia     GERD (gastroesophageal reflux disease)     HTN (hypertension)     Irregular heart beat     Mixed hyperlipidemia     Obesity     Osteoporosis     Rash and nonspecific skin eruption 6/15/2015    Sjogren - Flo's syndrome     Sleep apnea     Trouble in sleeping     Type 2 diabetes mellitus        Past Surgical History:   Procedure Laterality Date    APPENDECTOMY      BELT ABDOMINOPLASTY      BREAST LUMPECTOMY      BREAST SURGERY      breast augmentation     SECTION, CLASSIC      EPIDURAL STEROID INJECTION N/A 2022    Procedure: Lumbar L5/S1 IL NEELA with RN IV sedation;  Surgeon: Giovanny Arroyo MD;  Location: [facility];  Service: Pain Management;  Laterality: N/A;    INJECTION OF JOINT Right 2022    Procedure: Right knee Synvisc injection J [id number] with local;  Surgeon: Giovanny Arroyo MD;  Location: [facility];  Service: Pain Management;  Laterality: Right;    RECONSTRUCTION, NASAL VALVE Left 10/22/2021    Procedure: RECONSTRUCTION, NASAL VALVE ;  Surgeon: Faustino Cash MD;  Location: [facility] OR;  Service: ENT;  Laterality: Left;       Family History   Problem Relation Age of Onset    Heart disease Mother     Hypertension Mother     COPD Mother     Diabetes Father     Stroke Father     Kidney disease Father        Social History     Tobacco Use    Smoking Status Never   Smokeless Tobacco Never       Wt Readings from Last 5 Encounters:   08/17/22 97 kg (213 lb 13.5 oz)   05/26/22 99 kg (218 lb 4.1 oz)   05/13/22 101.4 kg (223 lb 8.7 oz)   04/27/22 101.6 kg (223 lb 15.8 oz)   04/12/22 100.1 kg (220 lb 12.7 oz)       For further HPI details, see assessment and plan.    Review of Systems   Constitutional:  Negative for fatigue and weight loss.   Eyes:  Negative for blurred vision.   Cardiovascular:  Negative for chest pain.   Endocrine: Negative for polydipsia, polyphagia and polyuria.   Skin:  Negative for pallor.   Neurological:  Negative for dizziness, tremors, seizures, speech difficulty, weakness and headaches.   Psychiatric/Behavioral:  Negative for confusion. The patient is not nervous/anxious.      Objective:      Vitals:    09/23/22 1521   BP: 119/78       Physical Exam  Constitutional:       General: She is not in acute distress.     Appearance: She is not ill-appearing.   Pulmonary:      Effort: Pulmonary effort is normal. No respiratory distress.   Neurological:      General: No focal deficit present.      Mental Status: She is alert.   Psychiatric:         Mood and Affect: Mood normal.         Behavior: Behavior normal.       Assessment:       1. Class 2 obesity with body mass index (BMI) of 39.0 to 39.9 in adult, unspecified obesity type, unspecified whether serious comorbidity present    2. Type 2 diabetes mellitus without complication, without long-term current use of insulin    3. Hypertension, unspecified type        Plan:   Class 2 obesity with body mass index (BMI) of 39.0 to 39.9 in adult, unspecified obesity type, unspecified whether serious comorbidity present    Type 2 diabetes mellitus without complication, without long-term current use of insulin    Hypertension, unspecified type    Other orders  -     semaglutide (OZEMPIC) 1 mg/dose (4 mg/3 mL); Inject 1 mg into the skin every 7 days.  Dispense: 2 pen; Refill: 0      Weight    Problematic  Ozempic  Not losing weight  She can't exercise -  Her pain management appointments have not helped  Pain is an ongoing   [facility] did [facility] full imaging study of her back.  She has an old compression fracture they have identified.  She is going to go see a spine specialist soon.  Another MRI is pending currently - pending insurance approval.     Weight loss will be beneficial  She is on the 0.5 mg dose  Has had a bit of nausea - but tolerate  Wants to go up  Will send in 1 mg  Ask she updates me after a few weeks    HTN  controlled  BP Readings from Last 3 Encounters:   09/23/22 119/78   08/17/22 119/78   05/26/22 124/76     Lab Results   Component Value Date    HGBA1C 6.4 (H) 08/16/2022

## 2022-09-23 VITALS — SYSTOLIC BLOOD PRESSURE: 119 MMHG | DIASTOLIC BLOOD PRESSURE: 78 MMHG

## 2022-10-03 DIAGNOSIS — Z71.89 COMPLEX CARE COORDINATION: ICD-10-CM

## 2022-10-04 ENCOUNTER — PATIENT MESSAGE (OUTPATIENT)
Dept: ADMINISTRATIVE | Facility: HOSPITAL | Age: 72
End: 2022-10-04
Payer: MEDICARE

## 2022-10-18 ENCOUNTER — PATIENT MESSAGE (OUTPATIENT)
Dept: ADMINISTRATIVE | Facility: HOSPITAL | Age: 72
End: 2022-10-18
Payer: MEDICARE

## 2022-10-19 ENCOUNTER — PATIENT MESSAGE (OUTPATIENT)
Dept: INTERNAL MEDICINE | Facility: CLINIC | Age: 72
End: 2022-10-19
Payer: MEDICARE

## 2022-10-19 NOTE — TELEPHONE ENCOUNTER
Care Due:                  Date            Visit Type   Department     Provider  --------------------------------------------------------------------------------                                ESTABLISHED                              PATIENT -    [facility] INTERNAL  Last Visit: 09-      [facility]      MEDICINE       Elias Combs                              EP -                              PRIMARY      [facility] INTERNAL  Next Visit: 02-      CARE ([facility])   MEDICINE       Elias Combs                                                            Last  Test          Frequency    Reason                     Performed    Due Date  --------------------------------------------------------------------------------    Lipid Panel.  12 months..  simvastatin..............  08- 07-    [facility] Embedded Care Gaps. Reference number: [id number]. 10/19/2022   3:00:27 PM CDT

## 2022-10-20 ENCOUNTER — TELEPHONE (OUTPATIENT)
Dept: INTERNAL MEDICINE | Facility: CLINIC | Age: 72
End: 2022-10-20
Payer: MEDICARE

## 2022-10-20 NOTE — TELEPHONE ENCOUNTER
----- Message from Donna Burgess sent at 10/20/2022 10:58 AM CDT -----  .Type:  Pharmacy Calling to Clarify an RX    Name of Caller: Diya  Pharmacy Name: [facility] on [address]   Prescription Name:Ozempic  What do they need to clarify?: 1 month or 2 months for pins   Best Call Back Number: [phone]   Additional Information:     Thanks c

## 2022-10-28 RX ORDER — SEMAGLUTIDE 2.68 MG/ML
2 INJECTION, SOLUTION SUBCUTANEOUS WEEKLY
Qty: 3 ML | Refills: 5 | Status: SHIPPED | OUTPATIENT
Start: 2022-10-28 | End: 2023-04-03

## 2022-12-02 ENCOUNTER — PATIENT OUTREACH (OUTPATIENT)
Dept: ADMINISTRATIVE | Facility: HOSPITAL | Age: 72
End: 2022-12-02
Payer: MEDICARE

## 2022-12-07 ENCOUNTER — LAB VISIT (OUTPATIENT)
Dept: LAB | Facility: HOSPITAL | Age: 72
End: 2022-12-07
Attending: PHYSICIAN ASSISTANT
Payer: MEDICARE

## 2022-12-07 ENCOUNTER — OFFICE VISIT (OUTPATIENT)
Dept: RHEUMATOLOGY | Facility: CLINIC | Age: 72
End: 2022-12-07
Payer: MEDICARE

## 2022-12-07 VITALS
SYSTOLIC BLOOD PRESSURE: 111 MMHG | BODY MASS INDEX: 36.95 KG/M2 | DIASTOLIC BLOOD PRESSURE: 77 MMHG | WEIGHT: 200.81 LBS | HEIGHT: 62 IN | HEART RATE: 79 BPM

## 2022-12-07 DIAGNOSIS — M54.9 BACK PAIN, UNSPECIFIED BACK LOCATION, UNSPECIFIED BACK PAIN LATERALITY, UNSPECIFIED CHRONICITY: ICD-10-CM

## 2022-12-07 DIAGNOSIS — M35.01 SJOGREN'S SYNDROME WITH KERATOCONJUNCTIVITIS SICCA: Primary | ICD-10-CM

## 2022-12-07 DIAGNOSIS — M35.01 SJOGREN'S SYNDROME WITH KERATOCONJUNCTIVITIS SICCA: ICD-10-CM

## 2022-12-07 LAB
ALBUMIN SERPL BCP-MCNC: 4.3 G/DL (ref 3.5–5.2)
ALP SERPL-CCNC: 66 U/L (ref 55–135)
ALT SERPL W/O P-5'-P-CCNC: 29 U/L (ref 10–44)
ANION GAP SERPL CALC-SCNC: 10 MMOL/L (ref 8–16)
AST SERPL-CCNC: 28 U/L (ref 10–40)
BASOPHILS # BLD AUTO: 0.06 K/UL (ref 0–0.2)
BASOPHILS NFR BLD: 0.9 % (ref 0–1.9)
BILIRUB SERPL-MCNC: 0.5 MG/DL (ref 0.1–1)
BUN SERPL-MCNC: 14 MG/DL (ref 8–23)
CALCIUM SERPL-MCNC: 10.4 MG/DL (ref 8.7–10.5)
CHLORIDE SERPL-SCNC: 98 MMOL/L (ref 95–110)
CO2 SERPL-SCNC: 31 MMOL/L (ref 23–29)
CREAT SERPL-MCNC: 1 MG/DL (ref 0.5–1.4)
CRP SERPL-MCNC: 2.7 MG/L (ref 0–8.2)
DIFFERENTIAL METHOD: NORMAL
EOSINOPHIL # BLD AUTO: 0.1 K/UL (ref 0–0.5)
EOSINOPHIL NFR BLD: 1.6 % (ref 0–8)
ERYTHROCYTE [DISTWIDTH] IN BLOOD BY AUTOMATED COUNT: 12.7 % (ref 11.5–14.5)
ERYTHROCYTE [SEDIMENTATION RATE] IN BLOOD BY PHOTOMETRIC METHOD: 14 MM/HR (ref 0–36)
EST. GFR  (NO RACE VARIABLE): 59 ML/MIN/1.73 M^2
GLUCOSE SERPL-MCNC: 138 MG/DL (ref 70–110)
HCT VFR BLD AUTO: 39.7 % (ref 37–48.5)
HGB BLD-MCNC: 13.3 G/DL (ref 12–16)
IMM GRANULOCYTES # BLD AUTO: 0.01 K/UL (ref 0–0.04)
IMM GRANULOCYTES NFR BLD AUTO: 0.1 % (ref 0–0.5)
LYMPHOCYTES # BLD AUTO: 1.7 K/UL (ref 1–4.8)
LYMPHOCYTES NFR BLD: 25.1 % (ref 18–48)
MCH RBC QN AUTO: 30.8 PG (ref 27–31)
MCHC RBC AUTO-ENTMCNC: 33.5 G/DL (ref 32–36)
MCV RBC AUTO: 92 FL (ref 82–98)
MONOCYTES # BLD AUTO: 0.7 K/UL (ref 0.3–1)
MONOCYTES NFR BLD: 10.4 % (ref 4–15)
NEUTROPHILS # BLD AUTO: 4.2 K/UL (ref 1.8–7.7)
NEUTROPHILS NFR BLD: 61.9 % (ref 38–73)
NRBC BLD-RTO: 0 /100 WBC
PLATELET # BLD AUTO: 205 K/UL (ref 150–450)
PMV BLD AUTO: 9.7 FL (ref 9.2–12.9)
POTASSIUM SERPL-SCNC: 3.8 MMOL/L (ref 3.5–5.1)
PROT SERPL-MCNC: 7.7 G/DL (ref 6–8.4)
RBC # BLD AUTO: 4.32 M/UL (ref 4–5.4)
SODIUM SERPL-SCNC: 139 MMOL/L (ref 136–145)
WBC # BLD AUTO: 6.85 K/UL (ref 3.9–12.7)

## 2022-12-07 PROCEDURE — 80053 COMPREHEN METABOLIC PANEL: CPT | Performed by: PHYSICIAN ASSISTANT

## 2022-12-07 PROCEDURE — 99215 OFFICE O/P EST HI 40 MIN: CPT | Mod: S$PBB,,, | Performed by: PHYSICIAN ASSISTANT

## 2022-12-07 PROCEDURE — 96372 THER/PROPH/DIAG INJ SC/IM: CPT | Mod: PBBFAC

## 2022-12-07 PROCEDURE — 99999 PR PBB SHADOW E&M-EST. PATIENT-LVL IV: CPT | Mod: PBBFAC,,, | Performed by: PHYSICIAN ASSISTANT

## 2022-12-07 PROCEDURE — 36415 COLL VENOUS BLD VENIPUNCTURE: CPT | Performed by: PHYSICIAN ASSISTANT

## 2022-12-07 PROCEDURE — 85025 COMPLETE CBC W/AUTO DIFF WBC: CPT | Performed by: PHYSICIAN ASSISTANT

## 2022-12-07 PROCEDURE — 85652 RBC SED RATE AUTOMATED: CPT | Performed by: PHYSICIAN ASSISTANT

## 2022-12-07 PROCEDURE — 99215 PR OFFICE/OUTPT VISIT, EST, LEVL V, 40-54 MIN: ICD-10-PCS | Mod: S$PBB,,, | Performed by: PHYSICIAN ASSISTANT

## 2022-12-07 PROCEDURE — 86140 C-REACTIVE PROTEIN: CPT | Performed by: PHYSICIAN ASSISTANT

## 2022-12-07 PROCEDURE — 99999 PR PBB SHADOW E&M-EST. PATIENT-LVL IV: ICD-10-PCS | Mod: PBBFAC,,, | Performed by: PHYSICIAN ASSISTANT

## 2022-12-07 PROCEDURE — 99214 OFFICE O/P EST MOD 30 MIN: CPT | Mod: PBBFAC | Performed by: PHYSICIAN ASSISTANT

## 2022-12-07 RX ORDER — BETAMETHASONE SODIUM PHOSPHATE AND BETAMETHASONE ACETATE 3; 3 MG/ML; MG/ML
6 INJECTION, SUSPENSION INTRA-ARTICULAR; INTRALESIONAL; INTRAMUSCULAR; SOFT TISSUE ONCE
Status: COMPLETED | OUTPATIENT
Start: 2022-12-07 | End: 2022-12-07

## 2022-12-07 RX ORDER — HYDROXYCHLOROQUINE SULFATE 200 MG/1
200 TABLET, FILM COATED ORAL 2 TIMES DAILY
Qty: 180 TABLET | Refills: 3 | Status: SHIPPED | OUTPATIENT
Start: 2022-12-07 | End: 2023-12-07 | Stop reason: SDUPTHER

## 2022-12-07 RX ADMIN — BETAMETHASONE SODIUM PHOSPHATE AND BETAMETHASONE ACETATE 6 MG: 3; 3 INJECTION, SUSPENSION INTRA-ARTICULAR; INTRALESIONAL; INTRAMUSCULAR at 12:12

## 2022-12-07 NOTE — PROGRESS NOTES
Administered 1 cc Betamethasone 6mg/cc IM per protocol. See MAR for details.  Band-Aid applied.  Pt tolerated well. Instructed patient on S/S to report to provider. Monitored pt x 15 min. post injection for adverse reactions. None noted, denied discomfort. Pt discharged.     Lot: [id number]  Exp: 10/2023

## 2022-12-07 NOTE — PROGRESS NOTES
Subjective:      Patient ID: Latonia Oliver is a 72 y.o. female.    Chief Complaint: No chief complaint on file.      HPI   Latonia Oliver  is a 72 y.o. female who is here for f/u on Sjogren's disease.  She is on Plaquenil 400 mg once daily.  She is tolerating that dose well without any complications.  Dry eyes dry mouth are well controlled at this point.  SS is doing quite well.  She is requesting a printed prescription for Plaquenil with a year's worth of refills.  She feels it through [facility] because it is much cheaper.  She denies night sweats, bone pain, lymphadenopathy, unexplained fevers    Patient's biggest concern today continued back pain.  She points to the junction of the lower T-spine and upper L-spine.  She has been having this problem for years.  There is no radiating symptoms.  She has seen Interventional Pain Management here.  They have done some procedures without any therapeutic relief.  She has since seen to interventional pain management doctors outside of [facility] in addition to an orthopedist and an orthopedic spine specialist.  Most recently she tells me she saw [facility].  She was not overly satisfied with that visit.  Apparently he recommended some ablations.  She has not scheduled that.  She is complaining of increased depression surrounding chronic pain and just wants to do something that will help give her relief.  She is requesting an intramuscular injection to help with her back pain.  This has helped her previously as long as 6 months.    Also with history of fibromyalgia.  Doing fairly well from that.  Off cymbalta, celexa and gabapentin (ineffective).  She goes on to tell me that Dr. Burkett recently resumed Cymbalta.  She is not really sure why.    Patient denies fevers, chills, photosensitivity, eye pain, shortness of breath, chest pain, hematuria, blood in the stool, rash, sicca symptoms, raynauds, finger ulcerations.  Rheumatologic systems otherwise  negative.    Serologies/Labs:  (+) PAUL 1;1280 speckled, (+) SSA/SSB, o/w negative profile  Low titer RF 2005 but negative in 2013  Negative CCP  Current Treatment:  Plaquenil 400 mg once daily  Previous Treatment:   Celexa  Gabapentin  Cymbalta      Current Outpatient Medications:     amLODIPine (NORVASC) 10 MG tablet, Take 1 tablet by mouth once daily, Disp: 90 tablet, Rfl: 0    benazepriL (LOTENSIN) 40 MG tablet, Take 1 tablet by mouth once daily, Disp: 90 tablet, Rfl: 2    carvediloL (COREG) 6.25 MG tablet, TAKE 1 TABLET BY MOUTH TWICE DAILY WITH MEALS, Disp: 180 tablet, Rfl: 3    chlorthalidone (HYGROTEN) 25 MG Tab, Take 1 tablet by mouth once daily, Disp: 90 tablet, Rfl: 0    clonazePAM (KLONOPIN) 1 MG tablet, Take 1 tablet (1 mg total) by mouth every evening., Disp: 30 tablet, Rfl: 5    cyanocobalamin (VITAMIN B-12) 1000 MCG tablet, Take 100 mcg by mouth once daily., Disp: , Rfl:     fluticasone propionate (FLONASE) 50 mcg/actuation nasal spray, 2 sprays (100 mcg total) by Each Nostril route once daily., Disp: 15.8 mL, Rfl: 5    hydrOXYzine pamoate (VISTARIL) 25 MG Cap, Take 1 capsule (25 mg total) by mouth nightly as needed (insomnia)., Disp: 30 capsule, Rfl: 5    naproxen (NAPROSYN) 500 MG tablet, Take 1 tablet (500 mg total) by mouth 2 (two) times daily with meals., Disp: 60 tablet, Rfl: 1    omeprazole (PRILOSEC) 40 MG capsule, Take 1 capsule by mouth once daily, Disp: 90 capsule, Rfl: 3    semaglutide (OZEMPIC) 2 mg/dose (8 mg/3 mL) PnIj, Inject 2 mg into the skin once a week., Disp: 3 mL, Rfl: 5    simvastatin (ZOCOR) 40 MG tablet, Take 1 tablet by mouth once daily, Disp: 90 tablet, Rfl: 0    hydrOXYchloroQUINE (PLAQUENIL) 200 mg tablet, Take 1 tablet (200 mg total) by mouth 2 (two) times daily., Disp: 180 tablet, Rfl: 3    methylPREDNISolone (MEDROL DOSEPACK) 4 mg tablet, use as directed, Disp: 1 each, Rfl: 0  No current facility-administered medications for this visit.    Past Medical History:    Diagnosis Date    Anxiety     Arthritis     Back pain     Basal cell carcinoma (BCC)     Breast cancer     Breast cancer     Depression     Diabetes mellitus type 2, controlled     Enthesopathy of hip 12/11/2014    Fibromyalgia     GERD (gastroesophageal reflux disease)     HTN (hypertension)     Irregular heart beat     Mixed hyperlipidemia     Obesity     Osteoporosis     Rash and nonspecific skin eruption 6/15/2015    Sjogren - Flo's syndrome     Sleep apnea     Trouble in sleeping     Type 2 diabetes mellitus      Family History   Problem Relation Age of Onset    Heart disease Mother     Hypertension Mother     COPD Mother     Diabetes Father     Stroke Father     Kidney disease Father      Social History     Socioeconomic History    Marital status:     Number of children: 1   Occupational History     Employer: [employer]    Tobacco Use    Smoking status: Never    Smokeless tobacco: Never   Substance and Sexual Activity    Alcohol use: Yes     Comment: Socially    Drug use: No    Sexual activity: Not Currently     Partners: Male     Social Determinants of Health     Financial Resource Strain: Low Risk     Difficulty of Paying Living Expenses: Not very hard   Food Insecurity: No Food Insecurity    Worried About Running Out of Food in the Last Year: Never true    Ran Out of Food in the Last Year: Never true   Transportation Needs: Unknown    Lack of Transportation (Medical): Patient refused    Lack of Transportation (Non-Medical): Patient refused   Physical Activity: Inactive    Days of Exercise per Week: 0 days    Minutes of Exercise per Session: 0 min   Stress: No Stress Concern Present    Feeling of Stress : Not at all   Social Connections: Unknown    Frequency of Communication with Friends and Family: Patient refused    Frequency of Social Gatherings with Friends and Family: Patient refused    Active Member of Clubs or Organizations: Yes    Attends Club or Organization Meetings: More than 4 times  "per year    Marital Status:    Housing Stability: Low Risk     Unable to Pay for Housing in the Last Year: No    Number of Places Lived in the Last Year: 1    Unstable Housing in the Last Year: No     Review of patient's allergies indicates:   Allergen Reactions    Penicillins      Other reaction(s): Rash  Other reaction(s): Difficulty breathing       Objective:   /77   Pulse 79   Ht 5' 2" (1.575 m)   Wt 91.1 kg (200 lb 13.4 oz)   BMI 36.73 kg/m²   Immunization History   Administered Date(s) Administered    Influenza - High Dose - PF (65 years and older) 11/04/2015, 10/07/2016, 09/05/2017, 10/23/2018, 10/22/2019    Influenza - Quadrivalent - High Dose - PF (65 years and older) 09/24/2020, 10/07/2021    Influenza A (H1N1) 2009 Monovalent - IM 04/23/2010    Influenza Whole 10/27/2014    Pneumococcal Conjugate - 13 Valent 11/04/2015    Pneumococcal Polysaccharide - 23 Valent 12/08/2008, 04/25/2017    Tdap 04/25/2017    Zoster 02/16/2009    Zoster Recombinant 03/05/2020       Physical Exam   Constitutional: She is oriented to person, place, and time. No distress.   HENT:   Head: Normocephalic and atraumatic.   Pulmonary/Chest: Effort normal.   Abdominal: She exhibits no distension.   Musculoskeletal:         General: No swelling or tenderness. Normal range of motion.      Cervical back: Normal range of motion.   Lymphadenopathy:     She has no cervical adenopathy.   Neurological: She is alert and oriented to person, place, and time.   Skin: Skin is warm and dry. No rash noted.   Psychiatric: Mood normal.   Nursing note and vitals reviewed.  TTP upper l-spine/lower t-spine paraspinal musculature  Pain w axial comp facet joints  TTP bilat SI jts      Recent Results (from the past 672 hour(s))   CBC Auto Differential    Collection Time: 12/07/22 11:52 AM   Result Value Ref Range    WBC 6.85 3.90 - 12.70 K/uL    RBC 4.32 4.00 - 5.40 M/uL    Hemoglobin 13.3 12.0 - 16.0 g/dL    Hematocrit 39.7 37.0 - 48.5 % "    MCV 92 82 - 98 fL    MCH 30.8 27.0 - 31.0 pg    MCHC 33.5 32.0 - 36.0 g/dL    RDW 12.7 11.5 - 14.5 %    Platelets 205 150 - 450 K/uL    MPV 9.7 9.2 - 12.9 fL    Immature Granulocytes 0.1 0.0 - 0.5 %    Gran # (ANC) 4.2 1.8 - 7.7 K/uL    Immature Grans (Abs) 0.01 0.00 - 0.04 K/uL    Lymph # 1.7 1.0 - 4.8 K/uL    Mono # 0.7 0.3 - 1.0 K/uL    Eos # 0.1 0.0 - 0.5 K/uL    Baso # 0.06 0.00 - 0.20 K/uL    nRBC 0 0 /100 WBC    Gran % 61.9 38.0 - 73.0 %    Lymph % 25.1 18.0 - 48.0 %    Mono % 10.4 4.0 - 15.0 %    Eosinophil % 1.6 0.0 - 8.0 %    Basophil % 0.9 0.0 - 1.9 %    Differential Method Automated    Comprehensive Metabolic Panel    Collection Time: 12/07/22 11:52 AM   Result Value Ref Range    Sodium 139 136 - 145 mmol/L    Potassium 3.8 3.5 - 5.1 mmol/L    Chloride 98 95 - 110 mmol/L    CO2 31 (H) 23 - 29 mmol/L    Glucose 138 (H) 70 - 110 mg/dL    BUN 14 8 - 23 mg/dL    Creatinine 1.0 0.5 - 1.4 mg/dL    Calcium 10.4 8.7 - 10.5 mg/dL    Total Protein 7.7 6.0 - 8.4 g/dL    Albumin 4.3 3.5 - 5.2 g/dL    Total Bilirubin 0.5 0.1 - 1.0 mg/dL    Alkaline Phosphatase 66 55 - 135 U/L    AST 28 10 - 40 U/L    ALT 29 10 - 44 U/L    Anion Gap 10 8 - 16 mmol/L    eGFR 59 (A) >60 mL/min/1.73 m^2   C-Reactive Protein    Collection Time: 12/07/22 11:52 AM   Result Value Ref Range    CRP 2.7 0.0 - 8.2 mg/L         No results found for: TBGOLDPLUS   Lab Results   Component Value Date    HEPCAB Negative 07/08/2016        Imaging  I have personally reviewed images and reports as below.  I agree with the interpretation.  MRI Lumbar Spine Without Contrast  Order: [id number]  Status: Final result    Visible to patient: Yes (seen)    Next appt: Today at 04:05 PM in Lab (LABORATORY, [facility])    Dx: Low back pain, non-specific    0 Result Notes    1 Patient Communication    Details    Reading Physician Reading Date Result Priority   Huan Valderrama MD  [phone] 2/5/2020 Routine     Narrative & Impression  EXAMINATION:  MRI LUMBAR SPINE  WITHOUT CONTRAST     CLINICAL HISTORY:  Low back pain, >6wks conservative tx, persistent-progressive sx, surgical candidate;Low back pain, rapidly progressive neuro deficit; Low back pain     TECHNIQUE:  Multiplanar, multisequence MR images were acquired from the thoracolumbar junction to the sacrum without the administration of contrast.     COMPARISON:  MRI lumbar spine from 03/18/2016     FINDINGS:  No fracture.  Minimal grade 1 anterolisthesis of L3 on L4 which is unchanged.  Multilevel degenerative changes with mild spurring of the endplates.  Disc space narrowing at L3-L4.  There is lower lumbar facet arthropathy.  Overall, findings are similar to slightly progressed since the comparison exam.  No marrow replacement process.  Mild edema within the subcutaneous soft tissues of the lower back.  Visualized distal cord demonstrates normal signal.     L1-L2: No spinal canal or neural foraminal encroachment     L2-L3: No spinal canal or neural foraminal encroachment.     L3-L4: Ligamentum flavum and mild facet arthropathy.  Grade 1 anterolisthesis.  Mild posterior disc bulge is seen.  No significant spinal canal or neural foraminal encroachment.     L4-L5: Mild facet arthropathy.  Mild posterior disc bulge.  Otherwise unremarkable.     L5-S1: Central and left paracentral disc protrusion.  No spinal canal stenosis.  There is asymmetric narrowing of the left neural foramen.     Impression:     Discogenic degenerative changes as described above.        Electronically signed by: Huan Valderrama MD  Date:                                            02/05/2020  Time:                                           14:14         Assessment:     1. Sjogren's syndrome with keratoconjunctivitis sicca    2. Back pain, unspecified back location, unspecified back pain laterality, unspecified chronicity              Plan:     Diagnoses and all orders for this visit:    Sjogren's syndrome with keratoconjunctivitis sicca  -      hydrOXYchloroQUINE (PLAQUENIL) 200 mg tablet; Take 1 tablet (200 mg total) by mouth 2 (two) times daily.    Back pain, unspecified back location, unspecified back pain laterality, unspecified chronicity  -     betamethasone acetate-betamethasone sodium phosphate injection 6 mg        Sjogren's Syndrome   Reminded pt importance of having labs done prior to her appt  Lab appt b/f fu today was cancelled  Reg 4 today  Discussed conservative treatment  Xerostomia  Biotene products and Xylimelt Lozenges  Avoid sleeping w fan on  Limit alcohol, coffee, nicotene and cola intake  Keratoconjunctivitis Sicca  OTC Refresh or Systane drops.    Limit alcohol/nicotene intake  Consider humidifier  Avoid using fans  Discussed lymphoma risk - yearly SS labs done in Aug and in acceptable range  Plaquenil 400 mg bid  Discussed risks and benefits  yearly eye exams  Printed rx given to the patient  Facet arthropathy with associated back pain and SI joint pain  Pt had extensive questions regarding options for back pain  I politely redirected the visit multiple times  I explained to the patient I do not have the training or benefit of being able to view her most recent images to render my opinion  She is looking for someone up to date on the latest and greatest, wondering if there is any sort of injectable disc procedure.    I encouraged her to get another opinion.  She will let me know if she would like a referral.  IM celestone today after labs drawn  Drug therapy requiring intensive monitoring for toxicity  High Risk Medication Monitoring encounter  No current medication related issues, no evidence of toxicity  I ordered labs for toxicity monitoring, have personally reviewed the findings, and discussed them with the patient.  Pending labs will be sent via the portal  Compromised immune system secondary to autoimmune disease and/or use of immunosuppressive drugs.  Monitor carefully for infections.  Advised patient to get immediate  medical care if any infection arises.  Also advised strict adherence age-appropriate vaccinations and cancer screenings with PCP.  Patient advised to hold DMARD and/or biologic therapy for signs of infection or for surgery. If you are unsure what to do please call our office for instruction.[facility] [phone]  Return to clinic: 6 mos reg SS labs    44 minutes of total time spent on the encounter, which includes face to face time and non-face to face time preparing to see the patient (eg, review of tests), Obtaining and/or reviewing separately obtained history, Documenting clinical information in the electronic or other health record, Independently interpreting results (not separately reported) and communicating results to the patient/family/caregiver, or Care coordination (not separately reported).     Follow up in about 6 months (around 6/7/2023).    The patient understands, chooses and consents to this plan and accepts all the risks which include but are not limited to the risks mentioned above.     Disclaimer: This note was prepared using a voice recognition system and is likely to have sound alike errors within the text.

## 2022-12-22 ENCOUNTER — PATIENT OUTREACH (OUTPATIENT)
Dept: ADMINISTRATIVE | Facility: HOSPITAL | Age: 72
End: 2022-12-22
Payer: MEDICARE

## 2022-12-29 NOTE — TELEPHONE ENCOUNTER
No new care gaps identified.  [facility] Embedded Care Gaps. Reference number: [id number]. 12/29/2022   1:56:57 PM CST

## 2022-12-30 RX ORDER — CHLORTHALIDONE 25 MG/1
25 TABLET ORAL DAILY
Qty: 90 TABLET | Refills: 2 | Status: SHIPPED | OUTPATIENT
Start: 2022-12-30 | End: 2023-04-11

## 2023-01-05 ENCOUNTER — PATIENT MESSAGE (OUTPATIENT)
Dept: INTERNAL MEDICINE | Facility: CLINIC | Age: 73
End: 2023-01-05
Payer: MEDICARE

## 2023-01-05 RX ORDER — DULOXETIN HYDROCHLORIDE 30 MG/1
30 CAPSULE, DELAYED RELEASE ORAL 2 TIMES DAILY
Qty: 60 CAPSULE | Refills: 11 | Status: SHIPPED | OUTPATIENT
Start: 2023-01-05 | End: 2024-03-04

## 2023-01-05 NOTE — TELEPHONE ENCOUNTER
No new care gaps identified.  [facility] Embedded Care Gaps. Reference number: [id number]. 1/05/2023   12:01:00 PM CST

## 2023-01-25 ENCOUNTER — PATIENT MESSAGE (OUTPATIENT)
Dept: ADMINISTRATIVE | Facility: HOSPITAL | Age: 73
End: 2023-01-25
Payer: MEDICARE

## 2023-02-20 ENCOUNTER — PATIENT OUTREACH (OUTPATIENT)
Dept: ADMINISTRATIVE | Facility: HOSPITAL | Age: 73
End: 2023-02-20
Payer: MEDICARE

## 2023-02-21 ENCOUNTER — PATIENT OUTREACH (OUTPATIENT)
Dept: ADMINISTRATIVE | Facility: HOSPITAL | Age: 73
End: 2023-02-21
Payer: MEDICARE

## 2023-03-02 ENCOUNTER — TELEPHONE (OUTPATIENT)
Dept: PULMONOLOGY | Facility: CLINIC | Age: 73
End: 2023-03-02
Payer: MEDICARE

## 2023-03-02 ENCOUNTER — PATIENT MESSAGE (OUTPATIENT)
Dept: PULMONOLOGY | Facility: CLINIC | Age: 73
End: 2023-03-02
Payer: MEDICARE

## 2023-03-02 DIAGNOSIS — G47.01 INSOMNIA DUE TO MEDICAL CONDITION: ICD-10-CM

## 2023-03-02 DIAGNOSIS — G47.61 PERIODIC LIMB MOVEMENT SLEEP DISORDER: ICD-10-CM

## 2023-03-02 RX ORDER — CLONAZEPAM 0.5 MG/1
1 TABLET ORAL NIGHTLY
Qty: 60 TABLET | Refills: 5 | Status: SHIPPED | OUTPATIENT
Start: 2023-03-02 | End: 2023-10-03

## 2023-03-02 NOTE — TELEPHONE ENCOUNTER
----- Message from Donald Rod LPN sent at 3/1/2023  1:23 PM CST -----  Regarding: FW: pharmacy request    ----- Message -----  From: Lucrecia Mello  Sent: 3/1/2023  12:47 PM CST  To: [facility] Staff  Subject: pharmacy request                                 Name of Who is Calling: pharmacy     Order submitted      What is the request in detail:      clonazePAM (KLONOPIN) 1 MG tablet    Pharmacy would like to change to the 0.5 x 2 instead of the 1 mg because 1 mg is out of stock and back order please advise          Can the clinic reply by [facility]:          [facility]     [id number]  [place] - [address]   [address] [address]  Phone: [phone] Fax: [phone]

## 2023-04-05 DIAGNOSIS — E11.9 TYPE 2 DIABETES MELLITUS WITHOUT COMPLICATION, WITHOUT LONG-TERM CURRENT USE OF INSULIN: Primary | ICD-10-CM

## 2023-04-05 DIAGNOSIS — Z12.39 ENCOUNTER FOR SCREENING FOR MALIGNANT NEOPLASM OF BREAST, UNSPECIFIED SCREENING MODALITY: ICD-10-CM

## 2023-04-05 DIAGNOSIS — Z12.31 ENCOUNTER FOR SCREENING MAMMOGRAM FOR MALIGNANT NEOPLASM OF BREAST: ICD-10-CM

## 2023-04-06 ENCOUNTER — LAB VISIT (OUTPATIENT)
Dept: LAB | Facility: HOSPITAL | Age: 73
End: 2023-04-06
Attending: FAMILY MEDICINE
Payer: MEDICARE

## 2023-04-06 DIAGNOSIS — E11.9 TYPE 2 DIABETES MELLITUS WITHOUT COMPLICATION, WITHOUT LONG-TERM CURRENT USE OF INSULIN: ICD-10-CM

## 2023-04-06 LAB
ALBUMIN SERPL BCP-MCNC: 3.9 G/DL (ref 3.5–5.2)
ALP SERPL-CCNC: 63 U/L (ref 55–135)
ALT SERPL W/O P-5'-P-CCNC: 15 U/L (ref 10–44)
ANION GAP SERPL CALC-SCNC: 11 MMOL/L (ref 8–16)
AST SERPL-CCNC: 21 U/L (ref 10–40)
BILIRUB SERPL-MCNC: 0.4 MG/DL (ref 0.1–1)
BUN SERPL-MCNC: 16 MG/DL (ref 8–23)
CALCIUM SERPL-MCNC: 9.4 MG/DL (ref 8.7–10.5)
CHLORIDE SERPL-SCNC: 101 MMOL/L (ref 95–110)
CHOLEST SERPL-MCNC: 184 MG/DL (ref 120–199)
CHOLEST/HDLC SERPL: 4.1 {RATIO} (ref 2–5)
CO2 SERPL-SCNC: 28 MMOL/L (ref 23–29)
CREAT SERPL-MCNC: 1.1 MG/DL (ref 0.5–1.4)
EST. GFR  (NO RACE VARIABLE): 53.1 ML/MIN/1.73 M^2
ESTIMATED AVG GLUCOSE: 123 MG/DL (ref 68–131)
GLUCOSE SERPL-MCNC: 111 MG/DL (ref 70–110)
HBA1C MFR BLD: 5.9 % (ref 4–5.6)
HDLC SERPL-MCNC: 45 MG/DL (ref 40–75)
HDLC SERPL: 24.5 % (ref 20–50)
LDLC SERPL CALC-MCNC: 114.4 MG/DL (ref 63–159)
NONHDLC SERPL-MCNC: 139 MG/DL
POTASSIUM SERPL-SCNC: 3.4 MMOL/L (ref 3.5–5.1)
PROT SERPL-MCNC: 6.8 G/DL (ref 6–8.4)
SODIUM SERPL-SCNC: 140 MMOL/L (ref 136–145)
TRIGL SERPL-MCNC: 123 MG/DL (ref 30–150)
TSH SERPL DL<=0.005 MIU/L-ACNC: 1.07 UIU/ML (ref 0.4–4)

## 2023-04-06 PROCEDURE — 80053 COMPREHEN METABOLIC PANEL: CPT | Performed by: FAMILY MEDICINE

## 2023-04-06 PROCEDURE — 83036 HEMOGLOBIN GLYCOSYLATED A1C: CPT | Performed by: FAMILY MEDICINE

## 2023-04-06 PROCEDURE — 80061 LIPID PANEL: CPT | Performed by: FAMILY MEDICINE

## 2023-04-06 PROCEDURE — 36415 COLL VENOUS BLD VENIPUNCTURE: CPT | Performed by: FAMILY MEDICINE

## 2023-04-06 PROCEDURE — 84443 ASSAY THYROID STIM HORMONE: CPT | Performed by: FAMILY MEDICINE

## 2023-04-11 ENCOUNTER — OFFICE VISIT (OUTPATIENT)
Dept: INTERNAL MEDICINE | Facility: CLINIC | Age: 73
End: 2023-04-11
Payer: MEDICARE

## 2023-04-11 VITALS
HEIGHT: 62 IN | WEIGHT: 178.56 LBS | SYSTOLIC BLOOD PRESSURE: 94 MMHG | TEMPERATURE: 97 F | HEART RATE: 68 BPM | DIASTOLIC BLOOD PRESSURE: 60 MMHG | BODY MASS INDEX: 32.86 KG/M2 | OXYGEN SATURATION: 96 %

## 2023-04-11 DIAGNOSIS — E11.9 TYPE 2 DIABETES MELLITUS WITHOUT COMPLICATION, WITHOUT LONG-TERM CURRENT USE OF INSULIN: ICD-10-CM

## 2023-04-11 DIAGNOSIS — E78.5 HYPERLIPIDEMIA, UNSPECIFIED HYPERLIPIDEMIA TYPE: ICD-10-CM

## 2023-04-11 DIAGNOSIS — E87.6 HYPOKALEMIA: Primary | ICD-10-CM

## 2023-04-11 DIAGNOSIS — N28.9 DECREASED RENAL FUNCTION: ICD-10-CM

## 2023-04-11 DIAGNOSIS — I10 HYPERTENSION, UNSPECIFIED TYPE: ICD-10-CM

## 2023-04-11 PROCEDURE — 99214 OFFICE O/P EST MOD 30 MIN: CPT | Mod: S$PBB,,, | Performed by: FAMILY MEDICINE

## 2023-04-11 PROCEDURE — 99214 OFFICE O/P EST MOD 30 MIN: CPT | Mod: PBBFAC | Performed by: FAMILY MEDICINE

## 2023-04-11 PROCEDURE — 99214 PR OFFICE/OUTPT VISIT, EST, LEVL IV, 30-39 MIN: ICD-10-PCS | Mod: S$PBB,,, | Performed by: FAMILY MEDICINE

## 2023-04-11 PROCEDURE — 99999 PR PBB SHADOW E&M-EST. PATIENT-LVL IV: ICD-10-PCS | Mod: PBBFAC,,, | Performed by: FAMILY MEDICINE

## 2023-04-11 PROCEDURE — 99999 PR PBB SHADOW E&M-EST. PATIENT-LVL IV: CPT | Mod: PBBFAC,,, | Performed by: FAMILY MEDICINE

## 2023-04-11 RX ORDER — ZOSTER VACCINE RECOMBINANT, ADJUVANTED 50 MCG/0.5
KIT INTRAMUSCULAR
COMMUNITY
Start: 2022-10-18 | End: 2023-10-25

## 2023-04-11 RX ORDER — LORAZEPAM 2 MG/1
TABLET ORAL
COMMUNITY
Start: 2023-03-08 | End: 2023-10-25

## 2023-04-11 RX ORDER — ATORVASTATIN CALCIUM 40 MG/1
40 TABLET, FILM COATED ORAL DAILY
Qty: 90 TABLET | Refills: 3 | Status: SHIPPED | OUTPATIENT
Start: 2023-04-11 | End: 2024-03-26

## 2023-04-11 NOTE — PATIENT INSTRUCTIONS
Dr. Diamond    I am very pleased to see your weight loss.  We will continue Ozempic at 2 mg.  Remember the conversation we had about muscle strengthening and maintaining muscle mass.      I suspect with all of your weight loss you are requiring less blood pressure medication.  I want you to discontinue the chlorthalidone today.  I want you to continue to monitor your pressures.  If they rise we may add it back.      I have selected this medication particularly because your potassium was low.  I am placing an order for a metabolic panel to be done next week so I can monitor your potassium in your renal function which has dropped slightly as well.      I am also discontinuing the Simvastatin.  I am replacing it with atorvastatin otherwise known as Lipitor.  I am doing this as it is a higher intensity statin and will hopefully low your cholesterol even further.

## 2023-04-11 NOTE — PROGRESS NOTES
Subjective:       Patient ID: Latonia Oliver is a 72 y.o. female.    Chief Complaint: Annual Exam    HPI    Patient Active Problem List   Diagnosis    Sjogren-Flo syndrome    Pain in joint, multiple sites    Sjogren's syndrome with keratoconjunctivitis sicca    Fibromyalgia    Type 2 diabetes mellitus without complication, without long-term current use of insulin    HTN (hypertension)    Mixed hyperlipidemia    GERD (gastroesophageal reflux disease)    Insomnia    Primary osteoarthritis of right knee    Lumbar back pain    Lumbar spondylosis    ROX on CPAP    Osteopenia    Depression    Fatigue    Medication monitoring encounter    Lumbar paraspinal muscle spasm    Visit for screening mammogram    Restless leg syndrome    Acute right ankle pain    Personal history of breast cancer    Major depressive disorder in partial remission    Weakness    Balance problem    Nasal septal deviation    Severe obesity (BMI 35.0-39.9) with comorbidity       Past Medical History:   Diagnosis Date    Anxiety     Arthritis     Back pain     Basal cell carcinoma (BCC)     Breast cancer     Breast cancer     Depression     Diabetes mellitus type 2, controlled     Enthesopathy of hip 2014    Fibromyalgia     GERD (gastroesophageal reflux disease)     HTN (hypertension)     Irregular heart beat     Mixed hyperlipidemia     Obesity     Osteoporosis     Rash and nonspecific skin eruption 6/15/2015    Sjogren - Flo's syndrome     Sleep apnea     Trouble in sleeping     Type 2 diabetes mellitus        Past Surgical History:   Procedure Laterality Date    APPENDECTOMY      BELT ABDOMINOPLASTY      BREAST LUMPECTOMY      BREAST SURGERY      breast augmentation     SECTION, CLASSIC      EPIDURAL STEROID INJECTION N/A 2022    Procedure: Lumbar L5/S1 IL NEELA with RN IV sedation;  Surgeon: Giovanny Arroyo MD;  Location: [facility];  Service: Pain Management;  Laterality: N/A;    INJECTION OF JOINT Right 2022     Procedure: Right knee Synvisc injection J 7325 with local;  Surgeon: Giovanny Arroyo MD;  Location: [facility];  Service: Pain Management;  Laterality: Right;    RECONSTRUCTION, NASAL VALVE Left 10/22/2021    Procedure: RECONSTRUCTION, NASAL VALVE ;  Surgeon: Faustino Cash MD;  Location: [facility] OR;  Service: ENT;  Laterality: Left;       Family History   Problem Relation Age of Onset    Heart disease Mother     Hypertension Mother     COPD Mother     Diabetes Father     Stroke Father     Kidney disease Father        Social History     Tobacco Use   Smoking Status Never   Smokeless Tobacco Never       Wt Readings from Last 5 Encounters:   04/11/23 81 kg (178 lb 9.2 oz)   12/07/22 91.1 kg (200 lb 13.4 oz)   08/17/22 97 kg (213 lb 13.5 oz)   05/26/22 99 kg (218 lb 4.1 oz)   05/13/22 101.4 kg (223 lb 8.7 oz)       For further HPI details, see assessment and plan.    Review of Systems    Objective:      Vitals:    04/11/23 1056   BP: 94/60   Pulse: 68   Temp: 97 °F (36.1 °C)       Physical Exam  Constitutional:       General: She is not in acute distress.     Appearance: Normal appearance. She is well-developed.   Cardiovascular:      Rate and Rhythm: Normal rate and regular rhythm.   Pulmonary:      Effort: Pulmonary effort is normal. No respiratory distress.      Breath sounds: Normal breath sounds.   Musculoskeletal:         General: Normal range of motion.   Neurological:      Mental Status: She is alert. She is not disoriented.   Psychiatric:         Mood and Affect: Mood normal.         Speech: Speech normal.       Assessment:       1. Hypokalemia    2. Decreased renal function    3. Type 2 diabetes mellitus without complication, without long-term current use of insulin        Plan:   Hypokalemia  -     Cancel: POTASSIUM; Future; Expected date: 04/11/2023  -     Basic Metabolic Panel; Future; Expected date: 04/11/2023    Decreased renal function  -     Basic Metabolic Panel; Future; Expected date:  04/11/2023    Type 2 diabetes mellitus without complication, without long-term current use of insulin  -     Microalbumin/Creatinine Ratio, Urine    Other orders  -     atorvastatin (LIPITOR) 40 MG tablet; Take 1 tablet (40 mg total) by mouth once daily.  Dispense: 90 tablet; Refill: 3        HTN  BP Readings from Last 3 Encounters:   04/11/23 94/60   12/07/22 111/77   09/23/22 119/78   Amlodipine 10  Benazpril 40  Coreg 6.25  Chlorthalidone 25  Has a machine  Not working at present but she is working on that.  Wt Readings from Last 5 Encounters:   04/11/23 81 kg (178 lb 9.2 oz)   12/07/22 91.1 kg (200 lb 13.4 oz)   08/17/22 97 kg (213 lb 13.5 oz)   05/26/22 99 kg (218 lb 4.1 oz)   05/13/22 101.4 kg (223 lb 8.7 oz)     Substantial weight loss  Suspect so much pressure med is not needed  Stopping chlorthalidone today - phill given low k  Lab Results   Component Value Date    K 3.4 (L) 04/06/2023     Noted mild gfr drop as well  Will monitor this as well      Will check K again in a week or so-  ordered metabolic panel to monitor both issues. Next week    DM II  Lab Results   Component Value Date    HGBA1C 5.9 (H) 04/06/2023   Very well controlled.  Ozempic 2 mg  Tolerating well  No issue whatsoever.  Due for eye exam - she is going to schedule this and be sure I get records.      Chronic back pain  Ongoing issue  She is hoping weight loss has helped.  Encouraged as much exercise as tolerates to maintain muscle mass given weight loss.  She is considering intervention w/ pain mngt  Using a walker.    HLD  On simvastin 40 mg  Lab Results   Component Value Date    LDLCALC 114.4 04/06/2023     Lab Results   Component Value Date    ALT 15 04/06/2023    AST 21 04/06/2023    ALKPHOS 63 04/06/2023    BILITOT 0.4 04/06/2023     Her lipids aren't terrible, but given her diabetic history I would like the LDL to be at least less than 100 with a goal of <70  Will transition to higher intensity tstatin today - lipitor 40  Monitor for  any new aches/pain    3 month f/u    This note was verbally dictated, please excuse any type errors.

## 2023-04-18 ENCOUNTER — LAB VISIT (OUTPATIENT)
Dept: LAB | Facility: HOSPITAL | Age: 73
End: 2023-04-18
Attending: FAMILY MEDICINE
Payer: MEDICARE

## 2023-04-18 DIAGNOSIS — E87.6 HYPOKALEMIA: ICD-10-CM

## 2023-04-18 DIAGNOSIS — N28.9 DECREASED RENAL FUNCTION: ICD-10-CM

## 2023-04-18 LAB
ANION GAP SERPL CALC-SCNC: 10 MMOL/L (ref 8–16)
BUN SERPL-MCNC: 13 MG/DL (ref 8–23)
CALCIUM SERPL-MCNC: 9.4 MG/DL (ref 8.7–10.5)
CHLORIDE SERPL-SCNC: 103 MMOL/L (ref 95–110)
CO2 SERPL-SCNC: 30 MMOL/L (ref 23–29)
CREAT SERPL-MCNC: 1 MG/DL (ref 0.5–1.4)
EST. GFR  (NO RACE VARIABLE): 59.5 ML/MIN/1.73 M^2
GLUCOSE SERPL-MCNC: 94 MG/DL (ref 70–110)
POTASSIUM SERPL-SCNC: 4.1 MMOL/L (ref 3.5–5.1)
SODIUM SERPL-SCNC: 143 MMOL/L (ref 136–145)

## 2023-04-18 PROCEDURE — 80048 BASIC METABOLIC PNL TOTAL CA: CPT | Performed by: FAMILY MEDICINE

## 2023-04-18 PROCEDURE — 36415 COLL VENOUS BLD VENIPUNCTURE: CPT | Performed by: FAMILY MEDICINE

## 2023-04-19 ENCOUNTER — PATIENT MESSAGE (OUTPATIENT)
Dept: ADMINISTRATIVE | Facility: HOSPITAL | Age: 73
End: 2023-04-19
Payer: MEDICARE

## 2023-05-03 DIAGNOSIS — Z71.89 COMPLEX CARE COORDINATION: ICD-10-CM

## 2023-05-16 DIAGNOSIS — I10 ESSENTIAL HYPERTENSION: ICD-10-CM

## 2023-05-16 RX ORDER — AMLODIPINE BESYLATE 10 MG/1
TABLET ORAL
Qty: 90 TABLET | Refills: 3 | Status: SHIPPED | OUTPATIENT
Start: 2023-05-16

## 2023-05-16 NOTE — TELEPHONE ENCOUNTER
Refill Decision Note   Latonia Oliver  is requesting a refill authorization.  Brief Assessment and Rationale for Refill:  Approve     Medication Therapy Plan:         Comments:     Note composed:1:46 PM 05/16/2023             Appointments     Last Visit   4/11/2023 Elias Combs MD   Next Visit   7/13/2023 Elias Combs MD

## 2023-05-16 NOTE — TELEPHONE ENCOUNTER
No care due was identified.  [facility] Embedded Care Due Messages. Reference number: [id number].   5/16/2023 1:41:39 PM CDT

## 2023-06-05 ENCOUNTER — LAB VISIT (OUTPATIENT)
Dept: LAB | Facility: HOSPITAL | Age: 73
End: 2023-06-05
Attending: PHYSICIAN ASSISTANT
Payer: MEDICARE

## 2023-06-05 DIAGNOSIS — M35.01 SJOGREN'S SYNDROME WITH KERATOCONJUNCTIVITIS SICCA: ICD-10-CM

## 2023-06-05 LAB
ALBUMIN SERPL BCP-MCNC: 4.1 G/DL (ref 3.5–5.2)
ALP SERPL-CCNC: 73 U/L (ref 55–135)
ALT SERPL W/O P-5'-P-CCNC: 21 U/L (ref 10–44)
ANION GAP SERPL CALC-SCNC: 10 MMOL/L (ref 8–16)
AST SERPL-CCNC: 26 U/L (ref 10–40)
BASOPHILS # BLD AUTO: 0.07 K/UL (ref 0–0.2)
BASOPHILS NFR BLD: 1.4 % (ref 0–1.9)
BILIRUB SERPL-MCNC: 0.6 MG/DL (ref 0.1–1)
BUN SERPL-MCNC: 12 MG/DL (ref 8–23)
C3 SERPL-MCNC: 138 MG/DL (ref 50–180)
C4 SERPL-MCNC: 27 MG/DL (ref 11–44)
CALCIUM SERPL-MCNC: 9.4 MG/DL (ref 8.7–10.5)
CHLORIDE SERPL-SCNC: 106 MMOL/L (ref 95–110)
CO2 SERPL-SCNC: 28 MMOL/L (ref 23–29)
CREAT SERPL-MCNC: 0.8 MG/DL (ref 0.5–1.4)
CREAT UR-MCNC: 87.4 MG/DL (ref 15–325)
CRP SERPL-MCNC: 2.2 MG/L (ref 0–8.2)
DIFFERENTIAL METHOD: ABNORMAL
EOSINOPHIL # BLD AUTO: 0.1 K/UL (ref 0–0.5)
EOSINOPHIL NFR BLD: 2.7 % (ref 0–8)
ERYTHROCYTE [DISTWIDTH] IN BLOOD BY AUTOMATED COUNT: 13.6 % (ref 11.5–14.5)
ERYTHROCYTE [SEDIMENTATION RATE] IN BLOOD BY WESTERGREN METHOD: 27 MM/HR (ref 0–20)
EST. GFR  (NO RACE VARIABLE): >60 ML/MIN/1.73 M^2
GLUCOSE SERPL-MCNC: 90 MG/DL (ref 70–110)
HCT VFR BLD AUTO: 36.8 % (ref 37–48.5)
HGB BLD-MCNC: 11.9 G/DL (ref 12–16)
IMM GRANULOCYTES # BLD AUTO: 0.01 K/UL (ref 0–0.04)
IMM GRANULOCYTES NFR BLD AUTO: 0.2 % (ref 0–0.5)
LYMPHOCYTES # BLD AUTO: 1.2 K/UL (ref 1–4.8)
LYMPHOCYTES NFR BLD: 24.1 % (ref 18–48)
MCH RBC QN AUTO: 29.9 PG (ref 27–31)
MCHC RBC AUTO-ENTMCNC: 32.3 G/DL (ref 32–36)
MCV RBC AUTO: 93 FL (ref 82–98)
MONOCYTES # BLD AUTO: 0.4 K/UL (ref 0.3–1)
MONOCYTES NFR BLD: 8 % (ref 4–15)
NEUTROPHILS # BLD AUTO: 3.1 K/UL (ref 1.8–7.7)
NEUTROPHILS NFR BLD: 63.6 % (ref 38–73)
NRBC BLD-RTO: 0 /100 WBC
PLATELET # BLD AUTO: 202 K/UL (ref 150–450)
PMV BLD AUTO: 9.9 FL (ref 9.2–12.9)
POTASSIUM SERPL-SCNC: 4.1 MMOL/L (ref 3.5–5.1)
PROT SERPL-MCNC: 7.4 G/DL (ref 6–8.4)
PROT UR-MCNC: <7 MG/DL (ref 0–15)
PROT/CREAT UR: NORMAL MG/G{CREAT} (ref 0–0.2)
RBC # BLD AUTO: 3.98 M/UL (ref 4–5.4)
SODIUM SERPL-SCNC: 144 MMOL/L (ref 136–145)
WBC # BLD AUTO: 4.86 K/UL (ref 3.9–12.7)

## 2023-06-05 PROCEDURE — 84165 PROTEIN E-PHORESIS SERUM: CPT | Mod: 26,,, | Performed by: PATHOLOGY

## 2023-06-05 PROCEDURE — 82595 ASSAY OF CRYOGLOBULIN: CPT | Performed by: PHYSICIAN ASSISTANT

## 2023-06-05 PROCEDURE — 36415 COLL VENOUS BLD VENIPUNCTURE: CPT | Performed by: PHYSICIAN ASSISTANT

## 2023-06-05 PROCEDURE — 84165 PATHOLOGIST INTERPRETATION SPE: ICD-10-PCS | Mod: 26,,, | Performed by: PATHOLOGY

## 2023-06-05 PROCEDURE — 86334 PATHOLOGIST INTERPRETATION IFE: ICD-10-PCS | Mod: 26,,, | Performed by: PATHOLOGY

## 2023-06-05 PROCEDURE — 86160 COMPLEMENT ANTIGEN: CPT | Mod: 59 | Performed by: PHYSICIAN ASSISTANT

## 2023-06-05 PROCEDURE — 80053 COMPREHEN METABOLIC PANEL: CPT | Performed by: PHYSICIAN ASSISTANT

## 2023-06-05 PROCEDURE — 86140 C-REACTIVE PROTEIN: CPT | Performed by: PHYSICIAN ASSISTANT

## 2023-06-05 PROCEDURE — 85651 RBC SED RATE NONAUTOMATED: CPT | Performed by: PHYSICIAN ASSISTANT

## 2023-06-05 PROCEDURE — 86160 COMPLEMENT ANTIGEN: CPT | Performed by: PHYSICIAN ASSISTANT

## 2023-06-05 PROCEDURE — 86334 IMMUNOFIX E-PHORESIS SERUM: CPT | Performed by: PHYSICIAN ASSISTANT

## 2023-06-05 PROCEDURE — 84165 PROTEIN E-PHORESIS SERUM: CPT | Performed by: PHYSICIAN ASSISTANT

## 2023-06-05 PROCEDURE — 82570 ASSAY OF URINE CREATININE: CPT | Performed by: PHYSICIAN ASSISTANT

## 2023-06-05 PROCEDURE — 85025 COMPLETE CBC W/AUTO DIFF WBC: CPT | Performed by: PHYSICIAN ASSISTANT

## 2023-06-05 PROCEDURE — 86334 IMMUNOFIX E-PHORESIS SERUM: CPT | Mod: 26,,, | Performed by: PATHOLOGY

## 2023-06-06 LAB
ALBUMIN SERPL ELPH-MCNC: 3.93 G/DL (ref 3.35–5.55)
ALPHA1 GLOB SERPL ELPH-MCNC: 0.31 G/DL (ref 0.17–0.41)
ALPHA2 GLOB SERPL ELPH-MCNC: 0.78 G/DL (ref 0.43–0.99)
B-GLOBULIN SERPL ELPH-MCNC: 0.73 G/DL (ref 0.5–1.1)
GAMMA GLOB SERPL ELPH-MCNC: 0.95 G/DL (ref 0.67–1.58)
INTERPRETATION SERPL IFE-IMP: NORMAL
PATHOLOGIST INTERPRETATION IFE: NORMAL
PATHOLOGIST INTERPRETATION SPE: NORMAL
PROT SERPL-MCNC: 6.7 G/DL (ref 6–8.4)

## 2023-06-07 ENCOUNTER — OFFICE VISIT (OUTPATIENT)
Dept: RHEUMATOLOGY | Facility: CLINIC | Age: 73
End: 2023-06-07
Payer: MEDICARE

## 2023-06-07 ENCOUNTER — HOSPITAL ENCOUNTER (OUTPATIENT)
Dept: RADIOLOGY | Facility: HOSPITAL | Age: 73
Discharge: HOME | End: 2023-06-07
Attending: PHYSICIAN ASSISTANT
Payer: MEDICARE

## 2023-06-07 VITALS
DIASTOLIC BLOOD PRESSURE: 69 MMHG | BODY MASS INDEX: 32.91 KG/M2 | HEART RATE: 69 BPM | WEIGHT: 178.81 LBS | HEIGHT: 62 IN | SYSTOLIC BLOOD PRESSURE: 112 MMHG

## 2023-06-07 DIAGNOSIS — S69.92XA INJURY OF LEFT HAND, INITIAL ENCOUNTER: ICD-10-CM

## 2023-06-07 DIAGNOSIS — D84.9 IMMUNOCOMPROMISED: ICD-10-CM

## 2023-06-07 DIAGNOSIS — G47.00 INSOMNIA, UNSPECIFIED TYPE: ICD-10-CM

## 2023-06-07 DIAGNOSIS — R13.10 DYSPHAGIA, UNSPECIFIED TYPE: Primary | ICD-10-CM

## 2023-06-07 DIAGNOSIS — M35.01 SJOGREN'S SYNDROME WITH KERATOCONJUNCTIVITIS SICCA: ICD-10-CM

## 2023-06-07 DIAGNOSIS — M79.7 FIBROMYALGIA: ICD-10-CM

## 2023-06-07 DIAGNOSIS — Z51.81 MEDICATION MONITORING ENCOUNTER: ICD-10-CM

## 2023-06-07 DIAGNOSIS — R89.9 ABNORMAL LABORATORY TEST: ICD-10-CM

## 2023-06-07 DIAGNOSIS — Z79.899 HIGH RISK MEDICATION USE: ICD-10-CM

## 2023-06-07 DIAGNOSIS — M47.819 FACET ARTHROPATHY: ICD-10-CM

## 2023-06-07 PROCEDURE — 73130 XR HAND COMPLETE 3 VIEW LEFT: ICD-10-PCS | Mod: 26,LT,, | Performed by: STUDENT IN AN ORGANIZED HEALTH CARE EDUCATION/TRAINING PROGRAM

## 2023-06-07 PROCEDURE — 99215 OFFICE O/P EST HI 40 MIN: CPT | Mod: S$PBB,,, | Performed by: PHYSICIAN ASSISTANT

## 2023-06-07 PROCEDURE — 99999 PR PBB SHADOW E&M-EST. PATIENT-LVL V: CPT | Mod: PBBFAC,,, | Performed by: PHYSICIAN ASSISTANT

## 2023-06-07 PROCEDURE — 99215 OFFICE O/P EST HI 40 MIN: CPT | Mod: PBBFAC | Performed by: PHYSICIAN ASSISTANT

## 2023-06-07 PROCEDURE — 99215 PR OFFICE/OUTPT VISIT, EST, LEVL V, 40-54 MIN: ICD-10-PCS | Mod: S$PBB,,, | Performed by: PHYSICIAN ASSISTANT

## 2023-06-07 PROCEDURE — 73130 X-RAY EXAM OF HAND: CPT | Mod: TC,LT

## 2023-06-07 PROCEDURE — 73130 X-RAY EXAM OF HAND: CPT | Mod: 26,LT,, | Performed by: STUDENT IN AN ORGANIZED HEALTH CARE EDUCATION/TRAINING PROGRAM

## 2023-06-07 PROCEDURE — 99999 PR PBB SHADOW E&M-EST. PATIENT-LVL V: ICD-10-PCS | Mod: PBBFAC,,, | Performed by: PHYSICIAN ASSISTANT

## 2023-06-07 RX ORDER — ZOLPIDEM TARTRATE 5 MG/1
5 TABLET ORAL NIGHTLY PRN
Qty: 30 TABLET | Refills: 0 | Status: SHIPPED | OUTPATIENT
Start: 2023-06-07 | End: 2023-12-06

## 2023-06-07 NOTE — PROGRESS NOTES
Subjective:      Patient ID: Latonia Oliver is a 72 y.o. female.    Chief Complaint: Sjogren's syndrome and Disease Management        HPI   Latonia Oliver  is a 72 y.o. female who is here for f/u on Sjogren's disease.  She is on Plaquenil 400 mg once daily.  She is tolerating that dose well without any complications.  Dry eyes dry mouth are well controlled at this point.  SS is stable.  Been on HCQ 10+ years.  Hasn't had eye exam in well over a year.  She feels HCQ through [facility] because it is much cheaper.  She denies night sweats, bone pain, lymphadenopathy, unexplained fevers    In past back pain has been a problem.  Got so bad in the last 6 mos that she was having increased depression.  Last visit, I gave an IM celestone injection which has helped.  Previously saw Dr. Erickson at [facility].      Also with history of fibromyalgia.  Doing fairly well from that.  Off cymbalta, celexa and gabapentin (ineffective).  She goes on to tell me that Dr. Burkett recently resumed Cymbalta.  She is not really sure why.    Having difficulty sleeping.  She uses a smart watch to monitor her sleep cycles.  She has episodes of multiple waking knee things at night.  She is on a CPAP.  In past has done better with her sleep cycle when she takes Ambien.  She only does this very rarely.  Requesting refill of Ambien today.    Also c/o recent injury to left long finger.  It got twisted up in her dogs collar about 5 weeks ago.  Still sore over the PIP w swelling.  Having trouble making a fist.    Patient is complaining of dysphagia today.  She does not feel like it is in the upper esophagus but much lower.  Has not seen GI.  denies fevers, chills, photosensitivity, eye pain, shortness of breath, chest pain, hematuria, blood in the stool, rash, sicca symptoms, raynauds, finger ulcerations.  Rheumatologic systems otherwise negative.    Serologies/Labs:  (+) PAUL 1;1280 speckled, (+) SSA/SSB, o/w negative profile  Low titer RF 2005 but negative  in 2013  Negative CCP  Cymbalta 30 mg bid  Current Treatment:  Plaquenil 400 mg once daily  Previous Treatment:   Celexa  Gabapentin        Current Outpatient Medications:     amLODIPine (NORVASC) 10 MG tablet, Take 1 tablet by mouth once daily, Disp: 90 tablet, Rfl: 3    atorvastatin (LIPITOR) 40 MG tablet, Take 1 tablet (40 mg total) by mouth once daily., Disp: 90 tablet, Rfl: 3    benazepriL (LOTENSIN) 40 MG tablet, Take 1 tablet by mouth once daily, Disp: 90 tablet, Rfl: 2    carvediloL (COREG) 6.25 MG tablet, TAKE 1 TABLET BY MOUTH TWICE DAILY WITH MEALS, Disp: 180 tablet, Rfl: 3    clonazePAM (KLONOPIN) 0.5 MG tablet, Take 2 tablets (1 mg total) by mouth every evening., Disp: 60 tablet, Rfl: 5    cyanocobalamin (VITAMIN B-12) 1000 MCG tablet, Take 100 mcg by mouth once daily., Disp: , Rfl:     DULoxetine (CYMBALTA) 30 MG capsule, Take 1 capsule (30 mg total) by mouth 2 (two) times daily., Disp: 60 capsule, Rfl: 11    fluticasone propionate (FLONASE) 50 mcg/actuation nasal spray, 2 sprays (100 mcg total) by Each Nostril route once daily., Disp: 15.8 mL, Rfl: 5    hydrOXYchloroQUINE (PLAQUENIL) 200 mg tablet, Take 1 tablet (200 mg total) by mouth 2 (two) times daily., Disp: 180 tablet, Rfl: 3    hydrOXYzine pamoate (VISTARIL) 25 MG Cap, Take 1 capsule (25 mg total) by mouth nightly as needed (insomnia)., Disp: 30 capsule, Rfl: 5    LORazepam (ATIVAN) 2 MG Tab, TAKE 2 TABLETS BY MOUTH 1 HOUR BEFORE APPOINTMENT, Disp: , Rfl:     naproxen (NAPROSYN) 500 MG tablet, Take 1 tablet (500 mg total) by mouth 2 (two) times daily with meals., Disp: 60 tablet, Rfl: 1    omeprazole (PRILOSEC) 40 MG capsule, Take 1 capsule by mouth once daily, Disp: 90 capsule, Rfl: 3    semaglutide (OZEMPIC) 2 mg/dose (8 mg/3 mL) PnIj, INJECT 2 MG  SUBCUTANEOUSLY ONCE A WEEK, Disp: 3 mL, Rfl: 3    SHINGRIX, PF, 50 mcg/0.5 mL injection, , Disp: , Rfl:     zolpidem (AMBIEN) 5 MG Tab, Take 1 tablet (5 mg total) by mouth nightly as needed  (insomnia)., Disp: 30 tablet, Rfl: 0    Past Medical History:   Diagnosis Date    Anxiety     Arthritis     Back pain     Basal cell carcinoma (BCC)     Breast cancer     Breast cancer     Depression     Diabetes mellitus type 2, controlled     Enthesopathy of hip 12/11/2014    Fibromyalgia     GERD (gastroesophageal reflux disease)     HTN (hypertension)     Irregular heart beat     Mixed hyperlipidemia     Obesity     Osteoporosis     Rash and nonspecific skin eruption 6/15/2015    Sjogren - Flo's syndrome     Sleep apnea     Trouble in sleeping     Type 2 diabetes mellitus      Family History   Problem Relation Age of Onset    Heart disease Mother     Hypertension Mother     COPD Mother     Diabetes Father     Stroke Father     Kidney disease Father      Social History     Socioeconomic History    Marital status:     Number of children: 1   Occupational History     Employer: [employer]    Tobacco Use    Smoking status: Never    Smokeless tobacco: Never   Substance and Sexual Activity    Alcohol use: Yes     Comment: Socially    Drug use: No    Sexual activity: Not Currently     Partners: Male     Social Determinants of Health     Financial Resource Strain: Low Risk     Difficulty of Paying Living Expenses: Not very hard   Food Insecurity: No Food Insecurity    Worried About Running Out of Food in the Last Year: Never true    Ran Out of Food in the Last Year: Never true   Transportation Needs: Unknown    Lack of Transportation (Medical): Patient refused    Lack of Transportation (Non-Medical): Patient refused   Physical Activity: Inactive    Days of Exercise per Week: 0 days    Minutes of Exercise per Session: 0 min   Stress: No Stress Concern Present    Feeling of Stress : Not at all   Social Connections: Unknown    Frequency of Communication with Friends and Family: Patient refused    Frequency of Social Gatherings with Friends and Family: Patient refused    Active Member of Clubs or Organizations: Yes  "   Attends Club or Organization Meetings: More than 4 times per year    Marital Status:    Housing Stability: Low Risk     Unable to Pay for Housing in the Last Year: No    Number of Places Lived in the Last Year: 1    Unstable Housing in the Last Year: No     Review of patient's allergies indicates:   Allergen Reactions    Penicillins      Other reaction(s): Rash  Other reaction(s): Difficulty breathing       Objective:   /69   Pulse 69   Ht 5' 2" (1.575 m)   Wt 81.1 kg (178 lb 12.7 oz)   BMI 32.70 kg/m²   Immunization History   Administered Date(s) Administered    Influenza - High Dose - PF (65 years and older) 11/04/2015, 10/07/2016, 09/05/2017, 10/23/2018, 10/22/2019    Influenza - Quadrivalent - High Dose - PF (65 years and older) 09/24/2020, 10/07/2021, 10/18/2022    Influenza A (H1N1) 2009 Monovalent - IM 04/23/2010    Influenza Whole 10/27/2014    Pneumococcal Conjugate - 13 Valent 11/04/2015    Pneumococcal Polysaccharide - 23 Valent 12/08/2008, 04/25/2017    Tdap 04/25/2017    Zoster 02/16/2009    Zoster Recombinant 03/05/2020, 10/18/2022, 03/01/2023       Physical Exam   Constitutional: She is oriented to person, place, and time. No distress.   HENT:   Head: Normocephalic and atraumatic.   Pulmonary/Chest: Effort normal.   Abdominal: She exhibits no distension.   Musculoskeletal:         General: No swelling or tenderness. Normal range of motion.      Cervical back: Normal range of motion.   Lymphadenopathy:     She has no cervical adenopathy.   Neurological: She is alert and oriented to person, place, and time.   Skin: Skin is warm and dry. No rash noted.   Psychiatric: Mood normal.   Nursing note and vitals reviewed.        Recent Results (from the past 672 hour(s))   Protein Electrophoresis, Serum    Collection Time: 06/05/23 10:05 AM   Result Value Ref Range    Protein, Serum 6.7 6.0 - 8.4 g/dL    Albumin 3.93 3.35 - 5.55 g/dL    Alpha-1 0.31 0.17 - 0.41 g/dL    Alpha-2 0.78 0.43 - " 0.99 g/dL    Beta 0.73 0.50 - 1.10 g/dL    Gamma 0.95 0.67 - 1.58 g/dL   Immunofixation Electrophoresis    Collection Time: 06/05/23 10:05 AM   Result Value Ref Range    Immunofix Interp. SEE COMMENT    CBC Auto Differential    Collection Time: 06/05/23 10:05 AM   Result Value Ref Range    WBC 4.86 3.90 - 12.70 K/uL    RBC 3.98 (L) 4.00 - 5.40 M/uL    Hemoglobin 11.9 (L) 12.0 - 16.0 g/dL    Hematocrit 36.8 (L) 37.0 - 48.5 %    MCV 93 82 - 98 fL    MCH 29.9 27.0 - 31.0 pg    MCHC 32.3 32.0 - 36.0 g/dL    RDW 13.6 11.5 - 14.5 %    Platelets 202 150 - 450 K/uL    MPV 9.9 9.2 - 12.9 fL    Immature Granulocytes 0.2 0.0 - 0.5 %    Gran # (ANC) 3.1 1.8 - 7.7 K/uL    Immature Grans (Abs) 0.01 0.00 - 0.04 K/uL    Lymph # 1.2 1.0 - 4.8 K/uL    Mono # 0.4 0.3 - 1.0 K/uL    Eos # 0.1 0.0 - 0.5 K/uL    Baso # 0.07 0.00 - 0.20 K/uL    nRBC 0 0 /100 WBC    Gran % 63.6 38.0 - 73.0 %    Lymph % 24.1 18.0 - 48.0 %    Mono % 8.0 4.0 - 15.0 %    Eosinophil % 2.7 0.0 - 8.0 %    Basophil % 1.4 0.0 - 1.9 %    Differential Method Automated    Comprehensive Metabolic Panel    Collection Time: 06/05/23 10:05 AM   Result Value Ref Range    Sodium 144 136 - 145 mmol/L    Potassium 4.1 3.5 - 5.1 mmol/L    Chloride 106 95 - 110 mmol/L    CO2 28 23 - 29 mmol/L    Glucose 90 70 - 110 mg/dL    BUN 12 8 - 23 mg/dL    Creatinine 0.8 0.5 - 1.4 mg/dL    Calcium 9.4 8.7 - 10.5 mg/dL    Total Protein 7.4 6.0 - 8.4 g/dL    Albumin 4.1 3.5 - 5.2 g/dL    Total Bilirubin 0.6 0.1 - 1.0 mg/dL    Alkaline Phosphatase 73 55 - 135 U/L    AST 26 10 - 40 U/L    ALT 21 10 - 44 U/L    Anion Gap 10 8 - 16 mmol/L    eGFR >60 >60 mL/min/1.73 m^2   C-Reactive Protein    Collection Time: 06/05/23 10:05 AM   Result Value Ref Range    CRP 2.2 0.0 - 8.2 mg/L   Sedimentation rate    Collection Time: 06/05/23 10:05 AM   Result Value Ref Range    Sed Rate 27 (H) 0 - 20 mm/Hr   C3 Complement    Collection Time: 06/05/23 10:05 AM   Result Value Ref Range    Complement (C-3) 138  50 - 180 mg/dL   C4 Complement    Collection Time: 06/05/23 10:05 AM   Result Value Ref Range    Complement (C-4) 27 11 - 44 mg/dL   Pathologist Interpretation SANDIP    Collection Time: 06/05/23 10:05 AM   Result Value Ref Range    Pathologist Interpretation SANDIP REVIEWED    Pathologist Interpretation SPE    Collection Time: 06/05/23 10:05 AM   Result Value Ref Range    Pathologist Interpretation SPE REVIEWED    Protein/Creatinine Ratio, Urine    Collection Time: 06/05/23  2:13 PM   Result Value Ref Range    Protein, Urine Random <7 0 - 15 mg/dL    Creatinine, Urine 87.4 15.0 - 325.0 mg/dL    Prot/Creat Ratio, Urine Unable to calculate 0.00 - 0.20         No results found for: TBGOLDPLUS   Lab Results   Component Value Date    HEPCAB Negative 07/08/2016        Imaging  I have personally reviewed images and reports as below.  I agree with the interpretation.  MRI Lumbar Spine Without Contrast  Order: [id number]  Status: Final result    Visible to patient: Yes (seen)    Next appt: Today at 04:05 PM in Lab (LABORATORY, [facility])    Dx: Low back pain, non-specific    0 Result Notes    1 Patient Communication    Details    Reading Physician Reading Date Result Priority   Huan Valderrama MD  [phone] 2/5/2020 Routine     Narrative & Impression  EXAMINATION:  MRI LUMBAR SPINE WITHOUT CONTRAST     CLINICAL HISTORY:  Low back pain, >6wks conservative tx, persistent-progressive sx, surgical candidate;Low back pain, rapidly progressive neuro deficit; Low back pain     TECHNIQUE:  Multiplanar, multisequence MR images were acquired from the thoracolumbar junction to the sacrum without the administration of contrast.     COMPARISON:  MRI lumbar spine from 03/18/2016     FINDINGS:  No fracture.  Minimal grade 1 anterolisthesis of L3 on L4 which is unchanged.  Multilevel degenerative changes with mild spurring of the endplates.  Disc space narrowing at L3-L4.  There is lower lumbar facet arthropathy.  Overall, findings are similar to  slightly progressed since the comparison exam.  No marrow replacement process.  Mild edema within the subcutaneous soft tissues of the lower back.  Visualized distal cord demonstrates normal signal.     L1-L2: No spinal canal or neural foraminal encroachment     L2-L3: No spinal canal or neural foraminal encroachment.     L3-L4: Ligamentum flavum and mild facet arthropathy.  Grade 1 anterolisthesis.  Mild posterior disc bulge is seen.  No significant spinal canal or neural foraminal encroachment.     L4-L5: Mild facet arthropathy.  Mild posterior disc bulge.  Otherwise unremarkable.     L5-S1: Central and left paracentral disc protrusion.  No spinal canal stenosis.  There is asymmetric narrowing of the left neural foramen.     Impression:     Discogenic degenerative changes as described above.        Electronically signed by: Huan Valderrama MD  Date:                                            02/05/2020  Time:                                           14:14         Assessment:     1. Dysphagia, unspecified type    2. Sjogren's syndrome with keratoconjunctivitis sicca    3. Abnormal laboratory test    4. Insomnia, unspecified type    5. Injury of left hand, initial encounter    6. Immunocompromised    7. Medication monitoring encounter    8. High risk medication use    9. Fibromyalgia    10. Facet arthropathy                Plan:     Latonia was seen today for sjogren's syndrome and disease management.    Diagnoses and all orders for this visit:    Dysphagia, unspecified type  -     PAUL Screen w/Reflex; Future  -     RNA polymerase III Ab, IgG; Future  -     Anti-Scleroderma Antibody; Future  -     Ambulatory referral/consult to Gastroenterology; Future    Sjogren's syndrome with keratoconjunctivitis sicca  -     Ambulatory referral/consult to Hematology / Oncology; Future  -     Ambulatory referral/consult to Gastroenterology; Future    Abnormal laboratory test  -     Immunoglobulins (IgG, IgA, IgM) Quantitative;  Standing  -     Immunoglobulin Free LT Chains Blood; Future  -     Ambulatory referral/consult to Hematology / Oncology; Future    Insomnia, unspecified type  -     zolpidem (AMBIEN) 5 MG Tab; Take 1 tablet (5 mg total) by mouth nightly as needed (insomnia).  -     Ambulatory referral/consult to Sleep Disorders; Future    Injury of left hand, initial encounter  -     X-Ray Hand Complete Left; Future    Immunocompromised    Medication monitoring encounter    High risk medication use    Fibromyalgia    Facet arthropathy            Sjogren's Syndrome   Labs reviewed  Faint monoclonal band on SANDIP interpretation - new  Quantitative immunoglobulins as well as immunoglobulins free LT chains today  Hematology referral for monoclonal gamapathy  Cryoglobulins pending  Min elevation ESR (corrected for age is wnl),   CRP/C3/C4 wnl  No proteinuria  Mild anemia  Discussed conservative treatment  Xerostomia  Biotene products and Xylimelt Lozenges  Avoid sleeping w fan on  Limit alcohol, coffee, nicotene and cola intake  Keratoconjunctivitis Sicca  OTC Refresh or Systane drops.    Limit alcohol/nicotene intake  Consider humidifier  Avoid using fans  Discussed lymphoma risk - refer to hematology for monoclonal band  Plaquenil 400 mg bid  Discussed risks and benefits  yearly eye exams  Over due  Recommend eye exam asap  Will not refill HCQ without HCQ monitoring exam  Pt deferred internal referral so she can see her estab doctor  Dysphagia - new  PAUL, RNA polymerase, Scl 70 today  GI referral  Facet arthropathy with associated back pain and SI joint pain  Improved today  Hand injury -   xray today  Ortho ref vs watchful mgmt discussed  Insomnia  Ambien 5 mg qhs prn x 1 mos  Defer chronic refills to PCP or sleep clinic  Sleep clinic referral placed  H/o osteopenia  DEXA due 2023  Drug therapy requiring intensive monitoring for toxicity  High Risk Medication Monitoring encounter  No current medication related issues, no evidence of  toxicity  I ordered labs for toxicity monitoring, have personally reviewed the findings, and discussed them with the patient.  Pending labs will be sent via the portal  Compromised immune system secondary to autoimmune disease and/or use of immunosuppressive drugs.  Monitor carefully for infections.  Advised patient to get immediate medical care if any infection arises.  Also advised strict adherence age-appropriate vaccinations and cancer screenings with PCP.  Patient advised to hold DMARD and/or biologic therapy for signs of infection or for surgery. If you are unsure what to do please call our office for instruction.[facility] [phone]  Return to clinic: 4-6 mos reg 4 prior    40 minutes of total time spent on the encounter, which includes face to face time and non-face to face time preparing to see the patient (eg, review of tests), Obtaining and/or reviewing separately obtained history, Documenting clinical information in the electronic or other health record, Independently interpreting results (not separately reported) and communicating results to the patient/family/caregiver, or Care coordination (not separately reported).     Follow up in about 6 months (around 12/7/2023).    The patient understands, chooses and consents to this plan and accepts all the risks which include but are not limited to the risks mentioned above.     Disclaimer: This note was prepared using a voice recognition system and is likely to have sound alike errors within the text.

## 2023-06-12 ENCOUNTER — TELEPHONE (OUTPATIENT)
Dept: GASTROENTEROLOGY | Facility: CLINIC | Age: 73
End: 2023-06-12
Payer: MEDICARE

## 2023-06-15 LAB — CRYOGLOB SER QL: NORMAL

## 2023-06-27 ENCOUNTER — PATIENT OUTREACH (OUTPATIENT)
Dept: ADMINISTRATIVE | Facility: HOSPITAL | Age: 73
End: 2023-06-27
Payer: MEDICARE

## 2023-06-30 ENCOUNTER — TELEPHONE (OUTPATIENT)
Dept: PULMONOLOGY | Facility: CLINIC | Age: 73
End: 2023-06-30
Payer: MEDICARE

## 2023-07-05 NOTE — PROGRESS NOTES
Subjective:       Patient ID: Latonia Oliver is a 72 y.o. female.    Chief Complaint: Sleep Apnea    71yo female here for annual CPAP follow up  Compliance download reviewed, days with usage > 4 hours is 98%, average AHI is 1.1  Patient states improved symptoms with use of CPAP. Sleeping more soundly. Waking up feeling more refreshed.  Requesting order for hybrid cpap mask  Has chronic daytime fatigue, she is unsure if this is related to her srojens and fibromyalgia  Takes ambian for insomnia which helps  She denies shortness of breath, cough, chest pain or fever  Recently started ozempic and complains of side effects of nausea and vomiting, she has appointment with PCP soon    BP Readings from Last 3 Encounters:   07/06/23 120/88   06/07/23 112/69   04/11/23 94/60     [place] Sleepiness Scale TOTAL =   5  (validated sleepiness questionnaire with a higher score indicating greater sleepiness; range 0-24)  EPWORTH SLEEPINESS SCALE 7/6/2023   Sitting and reading 1   Watching TV 0   Sitting, inactive in a public place (e.g. a theatre or a meeting) 0   As a passenger in a car for an hour without a break 1   Lying down to rest in the afternoon when circumstances permit 2   Sitting and talking to someone 0   Sitting quietly after a lunch without alcohol 1   In a car, while stopped for a few minutes in traffic 0   Total score 5         Immunization History   Administered Date(s) Administered    Influenza - High Dose - PF (65 years and older) 11/04/2015, 10/07/2016, 09/05/2017, 10/23/2018, 10/22/2019    Influenza - Quadrivalent - High Dose - PF (65 years and older) 09/24/2020, 10/07/2021, 10/18/2022    Influenza A (H1N1) 2009 Monovalent - IM 04/23/2010    Influenza Whole 10/27/2014    Pneumococcal Conjugate - 13 Valent 11/04/2015    Pneumococcal Polysaccharide - 23 Valent 12/08/2008, 04/25/2017    Tdap 04/25/2017    Zoster 02/16/2009    Zoster Recombinant 03/05/2020, 10/18/2022, 03/01/2023      Tobacco Use: Low Risk      Smoking Tobacco Use: Never    Smokeless Tobacco Use: Never    Passive Exposure: Not on file      Past Medical History:   Diagnosis Date    Anxiety     Arthritis     Back pain     Basal cell carcinoma (BCC)     Breast cancer     Breast cancer     Depression     Diabetes mellitus type 2, controlled     Enthesopathy of hip 12/11/2014    Fibromyalgia     GERD (gastroesophageal reflux disease)     HTN (hypertension)     Irregular heart beat     Mixed hyperlipidemia     Obesity     Osteoporosis     Rash and nonspecific skin eruption 6/15/2015    Sjogren - Flo's syndrome     Sleep apnea     Trouble in sleeping     Type 2 diabetes mellitus       Current Outpatient Medications on File Prior to Visit   Medication Sig Dispense Refill    amLODIPine (NORVASC) 10 MG tablet Take 1 tablet by mouth once daily 90 tablet 3    atorvastatin (LIPITOR) 40 MG tablet Take 1 tablet (40 mg total) by mouth once daily. 90 tablet 3    benazepriL (LOTENSIN) 40 MG tablet Take 1 tablet by mouth once daily 90 tablet 2    carvediloL (COREG) 6.25 MG tablet TAKE 1 TABLET BY MOUTH TWICE DAILY WITH MEALS 180 tablet 3    clonazePAM (KLONOPIN) 0.5 MG tablet Take 2 tablets (1 mg total) by mouth every evening. 60 tablet 5    cyanocobalamin (VITAMIN B-12) 1000 MCG tablet Take 100 mcg by mouth once daily.      DULoxetine (CYMBALTA) 30 MG capsule Take 1 capsule (30 mg total) by mouth 2 (two) times daily. 60 capsule 11    fluticasone propionate (FLONASE) 50 mcg/actuation nasal spray 2 sprays (100 mcg total) by Each Nostril route once daily. 15.8 mL 5    hydrOXYchloroQUINE (PLAQUENIL) 200 mg tablet Take 1 tablet (200 mg total) by mouth 2 (two) times daily. 180 tablet 3    hydrOXYzine pamoate (VISTARIL) 25 MG Cap Take 1 capsule (25 mg total) by mouth nightly as needed (insomnia). 30 capsule 5    LORazepam (ATIVAN) 2 MG Tab TAKE 2 TABLETS BY MOUTH 1 HOUR BEFORE APPOINTMENT      naproxen (NAPROSYN) 500 MG tablet Take 1 tablet (500 mg total) by mouth 2 (two)  "times daily with meals. 60 tablet 1    omeprazole (PRILOSEC) 40 MG capsule Take 1 capsule by mouth once daily 90 capsule 3    semaglutide (OZEMPIC) 2 mg/dose (8 mg/3 mL) PnIj INJECT 2 MG  SUBCUTANEOUSLY ONCE A WEEK 3 mL 3    SHINGRIX, PF, 50 mcg/0.5 mL injection       zolpidem (AMBIEN) 5 MG Tab Take 1 tablet (5 mg total) by mouth nightly as needed (insomnia). 30 tablet 0    [DISCONTINUED] benazepril-hydrochlorthiazide (LOTENSIN HCT) 20-25 mg Tab Take 1 tablet by mouth once daily. 90 tablet 1     No current facility-administered medications on file prior to visit.        Review of Systems   Constitutional:  Positive for fatigue. Negative for fever, weight loss, appetite change and weakness.   HENT:  Negative for postnasal drip, rhinorrhea, sinus pressure, trouble swallowing and congestion.    Respiratory:  Positive for somnolence. Negative for cough, sputum production, choking, chest tightness, shortness of breath, wheezing and dyspnea on extertion.    Cardiovascular:  Negative for chest pain and leg swelling.   Musculoskeletal:  Negative for gait problem and joint swelling.   Gastrointestinal:  Negative for nausea, vomiting and abdominal pain.   Neurological:  Negative for dizziness, weakness and headaches.   All other systems reviewed and are negative.    Objective:       Vitals:    07/06/23 0911   BP: 120/88   Pulse: 74   Resp: 17   SpO2: 96%   Weight: 79.2 kg (174 lb 7.9 oz)   Height: 5' 2.5" (1.588 m)       Physical Exam   Constitutional: She is oriented to person, place, and time. She appears well-developed and well-nourished. No distress.   HENT:   Head: Normocephalic.   Nose: Nose normal.   Mouth/Throat: Oropharynx is clear and moist.   Cardiovascular: Normal rate and regular rhythm.   Pulmonary/Chest: Effort normal. No respiratory distress. She has no wheezes. She has no rhonchi. She has no rales.   Musculoskeletal:         General: No edema.      Cervical back: Normal range of motion and neck supple. "   Neurological: She is alert and oriented to person, place, and time.   Skin: Skin is warm and dry.   Psychiatric: She has a normal mood and affect.   Vitals reviewed.  Personal Diagnostic Review    Compliance Report  Compliance  Payor Standard  Usage 05/06/2023 - 07/04/2023  Usage days 59/60 days (98%)  >= 4 hours 59 days (98%)  < 4 hours 0 days (0%)  Usage hours 511 hours 48 minutes  Average usage (total days) 8 hours 32 minutes  Average usage (days used) 8 hours 40 minutes  Median usage (days used) 8 hours 52 minutes  Total used hours (value since last reset - 07/04/2023) 6,656 hours  AirSense 10 AutoSet  Serial number [id number]  Mode CPAP  Set pressure 15 cmH2O  EPR Fulltime  EPR level 3  Therapy  Leaks - L/min Median: 0.6 95th percentile: 3.8 Maximum: 81.3  Events per hour AI: 1.0 HI: 0.1 AHI: 1.1  Apnea Index Central: 0.8 Obstructive: 0.1 Unknown: 0.1  RERA Index 0.1  Cheyne-Figueroa respiration (average duration per night) 0 minutes (0%)  Usage - hours  Printed on    Assessment/Plan:       Problem List Items Addressed This Visit          Psychiatric    Major depressive disorder in partial remission     Stable on cymbalta            Cardiac/Vascular    HTN (hypertension)     Stable, continue current medication management             Other    Insomnia     controlled         ROX on CPAP     Using cpap nightly Benefits from use  Discussed therapeutic goals for CPAP: Ideal usage 100% of nights for 6-8 hours per night. Minimum usage is 70% of night for at least 4 hours per night.            Other Visit Diagnoses       ROX (obstructive sleep apnea)    -  Primary    Relevant Orders    CPAP/BIPAP SUPPLIES            Follow up in about 1 year (around 7/6/2024) for ROX follow up.    Discussed diagnosis, its evaluation, treatment and usual course. All questions answered.    Patient verbalized understanding of plan and left in no acute distress    Thank you for the courtesy of participating in the care of this  patient    Elizabeth G Blough, PA-C [facility]

## 2023-07-06 ENCOUNTER — OFFICE VISIT (OUTPATIENT)
Dept: PULMONOLOGY | Facility: CLINIC | Age: 73
End: 2023-07-06
Payer: MEDICARE

## 2023-07-06 VITALS
HEIGHT: 63 IN | DIASTOLIC BLOOD PRESSURE: 88 MMHG | OXYGEN SATURATION: 96 % | RESPIRATION RATE: 17 BRPM | HEART RATE: 74 BPM | BODY MASS INDEX: 30.92 KG/M2 | SYSTOLIC BLOOD PRESSURE: 120 MMHG | WEIGHT: 174.5 LBS

## 2023-07-06 DIAGNOSIS — G47.33 OSA (OBSTRUCTIVE SLEEP APNEA): Primary | ICD-10-CM

## 2023-07-06 DIAGNOSIS — G47.00 INSOMNIA, UNSPECIFIED TYPE: ICD-10-CM

## 2023-07-06 DIAGNOSIS — G47.33 OSA ON CPAP: ICD-10-CM

## 2023-07-06 DIAGNOSIS — F32.4 MAJOR DEPRESSIVE DISORDER IN PARTIAL REMISSION, UNSPECIFIED WHETHER RECURRENT: ICD-10-CM

## 2023-07-06 DIAGNOSIS — I10 PRIMARY HYPERTENSION: ICD-10-CM

## 2023-07-06 PROCEDURE — 99999 PR PBB SHADOW E&M-EST. PATIENT-LVL V: CPT | Mod: PBBFAC,,, | Performed by: PHYSICIAN ASSISTANT

## 2023-07-06 PROCEDURE — 99213 PR OFFICE/OUTPT VISIT, EST, LEVL III, 20-29 MIN: ICD-10-PCS | Mod: S$PBB,,, | Performed by: PHYSICIAN ASSISTANT

## 2023-07-06 PROCEDURE — 99215 OFFICE O/P EST HI 40 MIN: CPT | Mod: PBBFAC | Performed by: PHYSICIAN ASSISTANT

## 2023-07-06 PROCEDURE — 99213 OFFICE O/P EST LOW 20 MIN: CPT | Mod: S$PBB,,, | Performed by: PHYSICIAN ASSISTANT

## 2023-07-06 PROCEDURE — 99999 PR PBB SHADOW E&M-EST. PATIENT-LVL V: ICD-10-PCS | Mod: PBBFAC,,, | Performed by: PHYSICIAN ASSISTANT

## 2023-07-06 NOTE — ASSESSMENT & PLAN NOTE
Using cpap nightly Benefits from use  Discussed therapeutic goals for CPAP: Ideal usage 100% of nights for 6-8 hours per night. Minimum usage is 70% of night for at least 4 hours per night.

## 2023-07-11 ENCOUNTER — LAB VISIT (OUTPATIENT)
Dept: LAB | Facility: HOSPITAL | Age: 73
End: 2023-07-11
Attending: FAMILY MEDICINE
Payer: MEDICARE

## 2023-07-11 DIAGNOSIS — E11.9 TYPE 2 DIABETES MELLITUS WITHOUT COMPLICATION: ICD-10-CM

## 2023-07-11 DIAGNOSIS — E11.9 TYPE 2 DIABETES MELLITUS WITHOUT COMPLICATION, WITHOUT LONG-TERM CURRENT USE OF INSULIN: ICD-10-CM

## 2023-07-11 LAB
CHOLEST SERPL-MCNC: 122 MG/DL (ref 120–199)
CHOLEST/HDLC SERPL: 2.8 {RATIO} (ref 2–5)
ESTIMATED AVG GLUCOSE: 103 MG/DL (ref 68–131)
HBA1C MFR BLD: 5.2 % (ref 4–5.6)
HDLC SERPL-MCNC: 44 MG/DL (ref 40–75)
HDLC SERPL: 36.1 % (ref 20–50)
LDLC SERPL CALC-MCNC: 66 MG/DL (ref 63–159)
NONHDLC SERPL-MCNC: 78 MG/DL
TRIGL SERPL-MCNC: 60 MG/DL (ref 30–150)

## 2023-07-11 PROCEDURE — 36415 COLL VENOUS BLD VENIPUNCTURE: CPT | Performed by: FAMILY MEDICINE

## 2023-07-11 PROCEDURE — 83036 HEMOGLOBIN GLYCOSYLATED A1C: CPT | Performed by: FAMILY MEDICINE

## 2023-07-11 PROCEDURE — 80061 LIPID PANEL: CPT | Performed by: FAMILY MEDICINE

## 2023-07-13 ENCOUNTER — OFFICE VISIT (OUTPATIENT)
Dept: INTERNAL MEDICINE | Facility: CLINIC | Age: 73
End: 2023-07-13
Payer: MEDICARE

## 2023-07-13 VITALS
SYSTOLIC BLOOD PRESSURE: 104 MMHG | WEIGHT: 173.75 LBS | OXYGEN SATURATION: 96 % | RESPIRATION RATE: 18 BRPM | DIASTOLIC BLOOD PRESSURE: 60 MMHG | HEART RATE: 78 BPM | BODY MASS INDEX: 31.27 KG/M2 | TEMPERATURE: 98 F

## 2023-07-13 DIAGNOSIS — D64.9 MILD ANEMIA: ICD-10-CM

## 2023-07-13 DIAGNOSIS — E66.9 OBESITY, UNSPECIFIED CLASSIFICATION, UNSPECIFIED OBESITY TYPE, UNSPECIFIED WHETHER SERIOUS COMORBIDITY PRESENT: ICD-10-CM

## 2023-07-13 DIAGNOSIS — E78.5 HYPERLIPIDEMIA, UNSPECIFIED HYPERLIPIDEMIA TYPE: ICD-10-CM

## 2023-07-13 DIAGNOSIS — G89.29 CHRONIC BILATERAL LOW BACK PAIN WITHOUT SCIATICA: Primary | ICD-10-CM

## 2023-07-13 DIAGNOSIS — F41.9 ANXIETY AND DEPRESSION: ICD-10-CM

## 2023-07-13 DIAGNOSIS — M54.50 CHRONIC BILATERAL LOW BACK PAIN WITHOUT SCIATICA: Primary | ICD-10-CM

## 2023-07-13 DIAGNOSIS — F32.A ANXIETY AND DEPRESSION: ICD-10-CM

## 2023-07-13 DIAGNOSIS — E11.9 TYPE 2 DIABETES MELLITUS WITHOUT COMPLICATION, WITHOUT LONG-TERM CURRENT USE OF INSULIN: ICD-10-CM

## 2023-07-13 PROCEDURE — 99999 PR PBB SHADOW E&M-EST. PATIENT-LVL III: CPT | Mod: PBBFAC,,, | Performed by: FAMILY MEDICINE

## 2023-07-13 PROCEDURE — 99999 PR PBB SHADOW E&M-EST. PATIENT-LVL III: ICD-10-PCS | Mod: PBBFAC,,, | Performed by: FAMILY MEDICINE

## 2023-07-13 PROCEDURE — 99213 OFFICE O/P EST LOW 20 MIN: CPT | Mod: PBBFAC | Performed by: FAMILY MEDICINE

## 2023-07-13 PROCEDURE — 99214 PR OFFICE/OUTPT VISIT, EST, LEVL IV, 30-39 MIN: ICD-10-PCS | Mod: S$PBB,,, | Performed by: FAMILY MEDICINE

## 2023-07-13 PROCEDURE — 99214 OFFICE O/P EST MOD 30 MIN: CPT | Mod: S$PBB,,, | Performed by: FAMILY MEDICINE

## 2023-07-13 RX ORDER — SEMAGLUTIDE 2.68 MG/ML
INJECTION, SOLUTION SUBCUTANEOUS
Qty: 3 ML | Refills: 6 | Status: SHIPPED | OUTPATIENT
Start: 2023-07-13 | End: 2024-02-07

## 2023-07-13 NOTE — PROGRESS NOTES
Subjective:       Patient ID: Latonia Oliver is a 72 y.o. female.    Chief Complaint: follow up  Follow-up      Patient Active Problem List   Diagnosis    Sjogren-Flo syndrome    Pain in joint, multiple sites    Sjogren's syndrome with keratoconjunctivitis sicca    Fibromyalgia    Type 2 diabetes mellitus without complication, without long-term current use of insulin    HTN (hypertension)    Mixed hyperlipidemia    GERD (gastroesophageal reflux disease)    Insomnia    Primary osteoarthritis of right knee    Lumbar back pain    Lumbar spondylosis    ROX on CPAP    Osteopenia    Depression    Fatigue    Medication monitoring encounter    Lumbar paraspinal muscle spasm    Visit for screening mammogram    Restless leg syndrome    Acute right ankle pain    Personal history of breast cancer    Major depressive disorder in partial remission    Weakness    Balance problem    Nasal septal deviation    Severe obesity (BMI 35.0-39.9) with comorbidity       Past Medical History:   Diagnosis Date    Anxiety     Arthritis     Back pain     Basal cell carcinoma (BCC)     Breast cancer     Breast cancer     Depression     Diabetes mellitus type 2, controlled     Enthesopathy of hip 2014    Fibromyalgia     GERD (gastroesophageal reflux disease)     HTN (hypertension)     Irregular heart beat     Mixed hyperlipidemia     Obesity     Osteoporosis     Rash and nonspecific skin eruption 6/15/2015    Sjogren - Flo's syndrome     Sleep apnea     Trouble in sleeping     Type 2 diabetes mellitus        Past Surgical History:   Procedure Laterality Date    APPENDECTOMY      BELT ABDOMINOPLASTY      BREAST LUMPECTOMY      BREAST SURGERY      breast augmentation     SECTION, CLASSIC      EPIDURAL STEROID INJECTION N/A 2022    Procedure: Lumbar L5/S1 IL NEELA with RN IV sedation;  Surgeon: Giovanny Arroyo MD;  Location: [facility] PAIN [facility]GT;  Service: Pain Management;  Laterality: N/A;    INJECTION OF JOINT Right 2022     Procedure: Right knee Synvisc injection J [id number] with local;  Surgeon: Giovanny Arroyo MD;  Location: [facility] PAIN [facility]GT;  Service: Pain Management;  Laterality: Right;    RECONSTRUCTION, NASAL VALVE Left 10/22/2021    Procedure: RECONSTRUCTION, NASAL VALVE ;  Surgeon: Faustino Cash MD;  Location: [facility] OR;  Service: ENT;  Laterality: Left;       Family History   Problem Relation Age of Onset    Heart disease Mother     Hypertension Mother     COPD Mother     Diabetes Father     Stroke Father     Kidney disease Father        Social History     Tobacco Use   Smoking Status Never    Passive exposure: Past   Smokeless Tobacco Never       Wt Readings from Last 5 Encounters:   07/13/23 78.8 kg (173 lb 11.6 oz)   07/06/23 79.2 kg (174 lb 7.9 oz)   06/07/23 81.1 kg (178 lb 12.7 oz)   04/11/23 81 kg (178 lb 9.2 oz)   12/07/22 91.1 kg (200 lb 13.4 oz)       For further HPI details, see assessment and plan.    Review of Systems    Objective:      Vitals:    07/13/23 1001   BP: 104/60   Pulse: 78   Resp: 18   Temp: 97.8 °F (36.6 °C)       Physical Exam  Constitutional:       General: She is not in acute distress.     Appearance: She is not ill-appearing.   Pulmonary:      Effort: Pulmonary effort is normal. No respiratory distress.   Neurological:      General: No focal deficit present.      Mental Status: She is alert.   Psychiatric:         Mood and Affect: Mood normal.         Behavior: Behavior normal.       Assessment:       1. Chronic bilateral low back pain without sciatica    2. Type 2 diabetes mellitus without complication, without long-term current use of insulin    3. Anxiety and depression    4. Obesity, unspecified classification, unspecified obesity type, unspecified whether serious comorbidity present    5. Mild anemia    6. Hyperlipidemia, unspecified hyperlipidemia type        Plan:   Chronic bilateral low back pain without sciatica  -     Ambulatory referral/consult to Pain Clinic; Future; Expected date:  07/20/2023    Type 2 diabetes mellitus without complication, without long-term current use of insulin  -     Ambulatory referral/consult to Optometry; Future; Expected date: 07/20/2023    Anxiety and depression    Obesity, unspecified classification, unspecified obesity type, unspecified whether serious comorbidity present    Mild anemia    Hyperlipidemia, unspecified hyperlipidemia type    Other orders  -     semaglutide (OZEMPIC) 2 mg/dose (8 mg/3 mL) PnIj; INJECT 2 MG  SUBCUTANEOUSLY ONCE A WEEK  Dispense: 3 mL; Refill: 6          Hypertension  At our last visit we discontinued chlorthalidone.  Noting today her blood pressure is mildly low.  Noting recently it was checked it was more appropriate.  She is not dizzy nor is she lightheaded.  Continue current medications as is.  BP Readings from Last 3 Encounters:   07/13/23 104/60   07/06/23 120/88   06/07/23 112/69     Discussed I do have concerns about hypotension.  Discussed monitor for any dizziness or lightheadedness and if such occurs we will further reduce her medication.      At present her blood pressure medicine is as follows  Amlodipine 10   Benazepril 40   Carvedilol 6.25 mg b.i.d.    Type 2 diabetes  Obesity  While her BMI is still 31 I am very pleased to see the weight loss.    On ozempic 2 mg  Tolerating  Wants to continue   Will refill med for 6 months  Patient has experienced some nausea and vomiting associated with medication.  She feels it is secondary to the delayed gastric emptying.  She plans to adjust this with dietary habits and timing.  At present time she does not want to make any changes and we will continue the medication.  If her issue continues or persists we can consider dose adjustments or alternative medications.  Due for eye exam      Chronic back pain  Patient is on Cymbalta at 60 mg. (For anxiety /depression - but drug also used for chronic pain)  She takes 30 mg twice daily.  I would be okay if she takes 2 of the 30 mg at the  same time to equal 60 mg once daily.  She has been seen by orthopedic specialist at an external orthopedic clinic.  She was told no surgical intervention is possible and she reports significant arthritis with in her back.  She takes a periodic nonsteroidal anti-inflammatory drug.  Would advise see limit secondary to my concerns of renal and gastric issues.  Offered consultation with pain management department.  Will ask the staff to arrange.    Transitioned to higher intensity statin  Now at goal  Lab Results   Component Value Date    LDLCALC 66.0 07/11/2023   Refuses colon cancer screening    Mild anemia  Minimal drop appreciated.  She reports having history of this.  Was told to see hemeatology 2'2 abnormal rheum labs  Will ask staff to set up    Patient inquired about renal function   Her last GFR was above 60.  Prior to that it was in the 50s.  I suspect she is stage II.  Potentially borderline stage 3 chronic kidney disease.  Discussed avoiding nonsteroidal anti-inflammatory drugs and the importance of continued glucose and pressure controlled.  She is on ACE given DM status    3 months    This note was verbally dictated, please excuse any type errors.

## 2023-07-14 ENCOUNTER — TELEPHONE (OUTPATIENT)
Dept: PAIN MEDICINE | Facility: CLINIC | Age: 73
End: 2023-07-14
Payer: MEDICARE

## 2023-08-02 ENCOUNTER — OFFICE VISIT (OUTPATIENT)
Dept: HEMATOLOGY/ONCOLOGY | Facility: CLINIC | Age: 73
End: 2023-08-02
Payer: MEDICARE

## 2023-08-02 VITALS
DIASTOLIC BLOOD PRESSURE: 73 MMHG | HEIGHT: 62 IN | HEART RATE: 75 BPM | WEIGHT: 170 LBS | OXYGEN SATURATION: 97 % | BODY MASS INDEX: 31.28 KG/M2 | RESPIRATION RATE: 18 BRPM | SYSTOLIC BLOOD PRESSURE: 110 MMHG | TEMPERATURE: 97 F

## 2023-08-02 DIAGNOSIS — Z85.3 HISTORY OF RIGHT BREAST CANCER: ICD-10-CM

## 2023-08-02 DIAGNOSIS — R89.9 ABNORMAL LABORATORY TEST: ICD-10-CM

## 2023-08-02 DIAGNOSIS — D64.9 ANEMIA, UNSPECIFIED TYPE: ICD-10-CM

## 2023-08-02 DIAGNOSIS — D47.2 MGUS (MONOCLONAL GAMMOPATHY OF UNKNOWN SIGNIFICANCE): Primary | ICD-10-CM

## 2023-08-02 DIAGNOSIS — M35.01 SJOGREN'S SYNDROME WITH KERATOCONJUNCTIVITIS SICCA: ICD-10-CM

## 2023-08-02 PROCEDURE — 99999 PR PBB SHADOW E&M-EST. PATIENT-LVL IV: ICD-10-PCS | Mod: PBBFAC,,, | Performed by: INTERNAL MEDICINE

## 2023-08-02 PROCEDURE — 99204 PR OFFICE/OUTPT VISIT, NEW, LEVL IV, 45-59 MIN: ICD-10-PCS | Mod: S$PBB,,, | Performed by: INTERNAL MEDICINE

## 2023-08-02 PROCEDURE — 99214 OFFICE O/P EST MOD 30 MIN: CPT | Mod: PBBFAC | Performed by: INTERNAL MEDICINE

## 2023-08-02 PROCEDURE — 99204 OFFICE O/P NEW MOD 45 MIN: CPT | Mod: S$PBB,,, | Performed by: INTERNAL MEDICINE

## 2023-08-02 PROCEDURE — 99999 PR PBB SHADOW E&M-EST. PATIENT-LVL IV: CPT | Mod: PBBFAC,,, | Performed by: INTERNAL MEDICINE

## 2023-08-02 NOTE — Clinical Note
Elliei would you mind arrangeing mammo screening?  History of right breast cancer status post lumpectomy re-resection for positive margin, axillary dissection (sentinel lymph node positive), adjuvant chemotherapy and radiation and 1 year aromatase inhibitor discontinued due to hair loss.  Discussed importance of surveillance mammogram which she has not had routinely but notes being amenable after our discussion.

## 2023-08-02 NOTE — PROGRESS NOTES
Subjective:      DATE OF VISIT: 8/2/23     ?  Patient ID:?Latonia Oliver is a 72 y.o. female.?? MR#: [id number]   ?   REFERRING PROVIDER: Lisa Estrella PA-C  [id number] [place] [address]     ? Primary Care Providers:  Elias Combs MD, MD (General)     CHIEF COMPLAINT: ?Faint kappa free light chain??   ?   HPI    Ms. Oliver is a pleasant 72-year-old woman with history of right breast cancer 15 years ago, type 2 diabetes, Sjogren's disease on hydroxychloroquine.  She is been in stable health.  Screening labs with Rheumatology including SPEP/SANDIP, free light chains with mild free light chain elevation, ratio 1.5 and faint juliann free light chain unquantified.  She is referred for Hematology for further evaluation.  Most recent labs with mild anemia hemoglobin 11.9 previously over 12.  No renal dysfunction hypercalcemia or history of fracture.  No unintentional weight loss but did have some weight loss with diuretic.  She follows with Rheumatology with Sjogren's disease under good control with hydroxychloroquine.  She does note following with outside dermatologist and history of basal cell carcinoma skin.  Regarding her history of breast cancer diagnosed 15 years ago at outside institution received surgery lumpectomy right breast with positive margin re-resection sentinel lymph node positive with completion axillary dissection adjuvant chemotherapy and radiation and 1 year of anastrozole discontinued due to hair loss per patient report (no records available at time of visit).  She has not had subsequent follow-up with oncology or surveillance mammograms.    Review of Systems    ?   A comprehensive 14-point review of systems was reviewed with patient and was negative other than as specified above.   ?   PAST MEDICAL HISTORY:   Past Medical History:   Diagnosis Date    Anxiety     Arthritis     Back pain     Basal cell carcinoma (BCC)     Breast cancer     Breast cancer     Depression      Diabetes mellitus type 2, controlled     Enthesopathy of hip 2014    Fibromyalgia     GERD (gastroesophageal reflux disease)     HTN (hypertension)     Irregular heart beat     Mixed hyperlipidemia     Obesity     Osteoporosis     Rash and nonspecific skin eruption 6/15/2015    Sjogren - Flo's syndrome     Sleep apnea     Trouble in sleeping     Type 2 diabetes mellitus     ?     PAST SURGICAL HISTORY:   Past Surgical History:   Procedure Laterality Date    APPENDECTOMY      BELT ABDOMINOPLASTY      BREAST LUMPECTOMY      BREAST SURGERY      breast augmentation     SECTION, CLASSIC      EPIDURAL STEROID INJECTION N/A 2022    Procedure: Lumbar L5/S1 IL NEELA with RN IV sedation;  Surgeon: Giovanny Arroyo MD;  Location: [facility];  Service: Pain Management;  Laterality: N/A;    INJECTION OF JOINT Right 2022    Procedure: Right knee Synvisc injection J 7325 with local;  Surgeon: Giovanny Arroyo MD;  Location: [facility];  Service: Pain Management;  Laterality: Right;    RECONSTRUCTION, NASAL VALVE Left 10/22/2021    Procedure: RECONSTRUCTION, NASAL VALVE ;  Surgeon: Faustino Cash MD;  Location: [facility] OR;  Service: ENT;  Laterality: Left;      ?   ALLERGIES:   Allergies as of 2023 - Reviewed 2023   Allergen Reaction Noted    Penicillins  2013      ?   MEDICATIONS:?   Outpatient Medications Marked as Taking for the 23 encounter (Office Visit) with Christal Bright MD   Medication Sig Dispense Refill    amLODIPine (NORVASC) 10 MG tablet Take 1 tablet by mouth once daily 90 tablet 3    atorvastatin (LIPITOR) 40 MG tablet Take 1 tablet (40 mg total) by mouth once daily. 90 tablet 3    benazepriL (LOTENSIN) 40 MG tablet Take 1 tablet by mouth once daily 90 tablet 2    carvediloL (COREG) 6.25 MG tablet TAKE 1 TABLET BY MOUTH TWICE DAILY WITH MEALS 180 tablet 3    clonazePAM (KLONOPIN) 0.5 MG tablet Take 2 tablets (1 mg total) by mouth every  evening. 60 tablet 5    cyanocobalamin (VITAMIN B-12) 1000 MCG tablet Take 100 mcg by mouth once daily.      DULoxetine (CYMBALTA) 30 MG capsule Take 1 capsule (30 mg total) by mouth 2 (two) times daily. 60 capsule 11    fluticasone propionate (FLONASE) 50 mcg/actuation nasal spray 2 sprays (100 mcg total) by Each Nostril route once daily. 15.8 mL 5    hydrOXYchloroQUINE (PLAQUENIL) 200 mg tablet Take 1 tablet (200 mg total) by mouth 2 (two) times daily. 180 tablet 3    hydrOXYzine pamoate (VISTARIL) 25 MG Cap Take 1 capsule (25 mg total) by mouth nightly as needed (insomnia). 30 capsule 5    LORazepam (ATIVAN) 2 MG Tab TAKE 2 TABLETS BY MOUTH 1 HOUR BEFORE APPOINTMENT      omeprazole (PRILOSEC) 40 MG capsule Take 1 capsule by mouth once daily 90 capsule 3    semaglutide (OZEMPIC) 2 mg/dose (8 mg/3 mL) PnIj INJECT 2 MG  SUBCUTANEOUSLY ONCE A WEEK 3 mL 6    SHINGRIX, PF, 50 mcg/0.5 mL injection       zolpidem (AMBIEN) 5 MG Tab Take 1 tablet (5 mg total) by mouth nightly as needed (insomnia). 30 tablet 0      ?   SOCIAL HISTORY:?   Social History     Tobacco Use    Smoking status: Never     Passive exposure: Past    Smokeless tobacco: Never   Substance Use Topics    Alcohol use: Yes     Comment: Socially      ?      ?   FAMILY HISTORY:   family history includes COPD in her mother; Diabetes in her father; Heart disease in her mother; Hypertension in her mother; Kidney disease in her father; Stroke in her father.   ?        Objective:      Physical Exam      ?   Vitals:    08/02/23 1620   BP: 110/73   Pulse: 75   Resp: 18   Temp: 97.4 °F (36.3 °C)      ?   ECOG:?0  General appearance: Generally well appearing, in no acute distress.   Head, eyes, ears, nose, and throat: moist mucous membranes.   Respiratory:  Normal work of breathing  Abdomen: nontender, nondistended.   Extremities: Warm, without edema.   Neurologic: Alert and oriented.   Skin: No rashes, ecchymoses or petechial lesion.   Psychiatric:   Normal mood and affect.    ?   Laboratory:    Lab Results   Component Value Date    WBC 4.86 06/05/2023    RBC 3.98 (L) 06/05/2023    HGB 11.9 (L) 06/05/2023    HCT 36.8 (L) 06/05/2023    MCV 93 06/05/2023    MCH 29.9 06/05/2023    MCHC 32.3 06/05/2023    RDW 13.6 06/05/2023     06/05/2023    MPV 9.9 06/05/2023    GRAN 3.1 06/05/2023    GRAN 63.6 06/05/2023    LYMPH 1.2 06/05/2023    LYMPH 24.1 06/05/2023    MONO 0.4 06/05/2023    MONO 8.0 06/05/2023    EOS 0.1 06/05/2023    BASO 0.07 06/05/2023    EOSINOPHIL 2.7 06/05/2023    BASOPHIL 1.4 06/05/2023       Sodium   Date Value Ref Range Status   06/05/2023 144 136 - 145 mmol/L Final     Potassium   Date Value Ref Range Status   06/05/2023 4.1 3.5 - 5.1 mmol/L Final     Chloride   Date Value Ref Range Status   06/05/2023 106 95 - 110 mmol/L Final     CO2   Date Value Ref Range Status   06/05/2023 28 23 - 29 mmol/L Final     Glucose   Date Value Ref Range Status   06/05/2023 90 70 - 110 mg/dL Final     BUN   Date Value Ref Range Status   06/05/2023 12 8 - 23 mg/dL Final     Creatinine   Date Value Ref Range Status   06/05/2023 0.8 0.5 - 1.4 mg/dL Final     Calcium   Date Value Ref Range Status   06/05/2023 9.4 8.7 - 10.5 mg/dL Final     Total Protein   Date Value Ref Range Status   06/05/2023 7.4 6.0 - 8.4 g/dL Final     Albumin   Date Value Ref Range Status   06/05/2023 4.1 3.5 - 5.2 g/dL Final     Total Bilirubin   Date Value Ref Range Status   06/05/2023 0.6 0.1 - 1.0 mg/dL Final     Comment:     For infants and newborns, interpretation of results should be based  on gestational age, weight and in agreement with clinical  observations.    Premature Infant recommended reference ranges:  Up to 24 hours.............<8.0 mg/dL  Up to 48 hours............<12.0 mg/dL  3-5 days..................<15.0 mg/dL  6-29 days.................<15.0 mg/dL       Alkaline Phosphatase   Date Value Ref Range Status   06/05/2023 73 55 - 135 U/L Final     AST   Date Value Ref Range  Status   06/05/2023 26 10 - 40 U/L Final     ALT   Date Value Ref Range Status   06/05/2023 21 10 - 44 U/L Final     Anion Gap   Date Value Ref Range Status   06/05/2023 10 8 - 16 mmol/L Final     eGFR if    Date Value Ref Range Status   09/14/2021 >60.0 >60 mL/min/1.73 m^2 Final     eGFR if non    Date Value Ref Range Status   09/14/2021 >60.0 >60 mL/min/1.73 m^2 Final     Comment:     Calculation used to obtain the estimated glomerular filtration  rate (eGFR) is the CKD-EPI equation.          TSH   Date Value Ref Range Status   04/06/2023 1.068 0.400 - 4.000 uIU/mL Final           ?   Assessment/Plan:   MGUS (monoclonal gammopathy of unknown significance)  -     CBC Auto Differential; Future; Expected date: 08/02/2023  -     Comprehensive Metabolic Panel; Future; Expected date: 08/02/2023  -     Immunoglobulin Free LT Chains Blood; Future; Expected date: 08/02/2023  -     Protein Electrophoresis, Serum; Future; Expected date: 08/02/2023  -     SANDIP; Future; Expected date: 08/02/2023    Abnormal laboratory test  -     Ambulatory referral/consult to Hematology / Oncology    Sjogren's syndrome with keratoconjunctivitis sicca  -     Ambulatory referral/consult to Hematology / Oncology    History of right breast cancer    Anemia, unspecified type       1. MGUS (monoclonal gammopathy of unknown significance)    2. Abnormal laboratory test    3. Sjogren's syndrome with keratoconjunctivitis sicca    4. History of right breast cancer    5. Anemia, unspecified type          Plan:     Problem List Items Addressed This Visit          Ophtho    Sjogren's syndrome with keratoconjunctivitis sicca    Overview     + PAUL, +ssb, + ssa          Other Visit Diagnoses       MGUS (monoclonal gammopathy of unknown significance)    -  Primary    Abnormal laboratory test        History of right breast cancer        Anemia, unspecified type              mild free light chain elevation with normal ratio 1.5  suspect related to mild inflammation in setting of autoimmune disorder. faint juliann free light chain monoclonal band, unquantified:  We discussed in detail diagnosis of MGUS in spectrum of plasma cell dyscrasia disorder.  Aside from faint lambda free light chain unquantified with normal free light chain ratio no other signs or symptoms concerning for multiple myeloma/end-organ effect.  No notable anemia recommend trending hemoglobin 11.9.  Recommend surveillance and we will arrange in hematology clinic follow-up of paraprotein and myeloma labs in 3 months.    Sjogren's disease:  On hydroxychloroquine recommend continued following with Rheumatology     History of basal cell carcinoma skin: Recommend surveillance with Dermatology     History of right breast cancer status post lumpectomy re-resection for positive margin, axillary dissection (sentinel lymph node positive), adjuvant chemotherapy and radiation and 1 year aromatase inhibitor discontinued due to hair loss.  Discussed importance of surveillance mammogram which she has not had routinely but notes being amenable after our discussion.  Will notify primary care to please arrange.    Follow-Up:   Route Chart for Scheduling    Med Onc Chart Routing      Follow up with physician    Follow up with JASPAL 3 months.   Infusion scheduling note    Injection scheduling note    Labs CBC, CMP, free light chains and SPEP   Scheduling:  Preferred lab:  Lab interval:     Imaging    Pharmacy appointment    Other referrals

## 2023-08-15 ENCOUNTER — TELEPHONE (OUTPATIENT)
Dept: INTERNAL MEDICINE | Facility: CLINIC | Age: 73
End: 2023-08-15
Payer: MEDICARE

## 2023-08-15 DIAGNOSIS — Z12.31 ENCOUNTER FOR SCREENING MAMMOGRAM FOR MALIGNANT NEOPLASM OF BREAST: Primary | ICD-10-CM

## 2023-08-15 DIAGNOSIS — Z12.39 ENCOUNTER FOR SCREENING FOR MALIGNANT NEOPLASM OF BREAST, UNSPECIFIED SCREENING MODALITY: Primary | ICD-10-CM

## 2023-08-15 DIAGNOSIS — Z12.31 ENCOUNTER FOR SCREENING MAMMOGRAM FOR MALIGNANT NEOPLASM OF BREAST: ICD-10-CM

## 2023-08-15 NOTE — TELEPHONE ENCOUNTER
"Your fax has been successfully sent to [phone] at [phone].  ------------------------------------------------------------  From: [id number]  ------------------------------------------------------------  8/15/2023 3:02:08 PM Transmission Record   Sent to [phone] with remote ID "[facility]     "   Result: (0/339;0/0) Success   Page record: 1 - 4   Elapsed time: 01:26 on channel 45      Faxed mm order to [facility] MM dept.   "

## 2023-08-30 ENCOUNTER — PATIENT MESSAGE (OUTPATIENT)
Dept: OPHTHALMOLOGY | Facility: CLINIC | Age: 73
End: 2023-08-30

## 2023-08-30 ENCOUNTER — OFFICE VISIT (OUTPATIENT)
Dept: OPHTHALMOLOGY | Facility: CLINIC | Age: 73
End: 2023-08-30
Payer: MEDICARE

## 2023-08-30 DIAGNOSIS — H04.123 DRY EYES, BILATERAL: ICD-10-CM

## 2023-08-30 DIAGNOSIS — H52.7 REFRACTIVE ERRORS: ICD-10-CM

## 2023-08-30 DIAGNOSIS — E11.36 DIABETIC CATARACT: ICD-10-CM

## 2023-08-30 DIAGNOSIS — M35.01 SJOGREN'S SYNDROME WITH KERATOCONJUNCTIVITIS SICCA: ICD-10-CM

## 2023-08-30 DIAGNOSIS — E11.9 DIABETES MELLITUS TYPE 2 WITHOUT RETINOPATHY: Primary | ICD-10-CM

## 2023-08-30 DIAGNOSIS — E11.9 TYPE 2 DIABETES MELLITUS WITHOUT COMPLICATION, WITHOUT LONG-TERM CURRENT USE OF INSULIN: ICD-10-CM

## 2023-08-30 DIAGNOSIS — Z79.899 LONG-TERM USE OF PLAQUENIL: ICD-10-CM

## 2023-08-30 PROCEDURE — 92083 EXTENDED VISUAL FIELD XM: CPT | Mod: PBBFAC | Performed by: OPTOMETRIST

## 2023-08-30 PROCEDURE — 92134 CPTRZ OPH DX IMG PST SGM RTA: CPT | Mod: PBBFAC | Performed by: OPTOMETRIST

## 2023-08-30 PROCEDURE — 99999 PR PBB SHADOW E&M-EST. PATIENT-LVL II: CPT | Mod: PBBFAC,,, | Performed by: OPTOMETRIST

## 2023-08-30 PROCEDURE — 92015 PR REFRACTION: ICD-10-PCS | Mod: ,,, | Performed by: OPTOMETRIST

## 2023-08-30 PROCEDURE — 92014 COMPRE OPH EXAM EST PT 1/>: CPT | Mod: S$PBB,,, | Performed by: OPTOMETRIST

## 2023-08-30 PROCEDURE — 92014 PR EYE EXAM, EST PATIENT,COMPREHESV: ICD-10-PCS | Mod: S$PBB,,, | Performed by: OPTOMETRIST

## 2023-08-30 PROCEDURE — 92134 POSTERIOR SEGMENT OCT RETINA (OCULAR COHERENCE TOMOGRAPHY)-BOTH EYES: ICD-10-PCS | Mod: 26,S$PBB,, | Performed by: OPTOMETRIST

## 2023-08-30 PROCEDURE — 92083 HUMPHREY VISUAL FIELD - OU - BOTH EYES: ICD-10-PCS | Mod: 26,S$PBB,, | Performed by: OPTOMETRIST

## 2023-08-30 PROCEDURE — 92015 DETERMINE REFRACTIVE STATE: CPT | Mod: ,,, | Performed by: OPTOMETRIST

## 2023-08-30 PROCEDURE — 99212 OFFICE O/P EST SF 10 MIN: CPT | Mod: PBBFAC | Performed by: OPTOMETRIST

## 2023-08-30 PROCEDURE — 99999 PR PBB SHADOW E&M-EST. PATIENT-LVL II: ICD-10-PCS | Mod: PBBFAC,,, | Performed by: OPTOMETRIST

## 2023-08-30 NOTE — PROGRESS NOTES
HPI     Diabetic Eye Exam     Additional comments: Diet controlled DM  Plaquenil x 2011 for Shogrens           Last edited by Pedrito Riggs, OD on 8/30/2023  1:17 PM.            Assessment /Plan     For exam results, see Encounter Report.    Diabetes mellitus type 2 without retinopathy    Type 2 diabetes mellitus without complication, without long-term current use of insulin  -     Ambulatory referral/consult to Optometry    Diabetic cataract    Dry eyes, bilateral    Sjogren's syndrome with keratoconjunctivitis sicca  -     Posterior Segment OCT Retina-Both eyes  -     Pelayo Visual Field - OU - Extended - Both Eyes    Long-term use of Plaquenil  -     Posterior Segment OCT Retina-Both eyes  -     Pelayo Visual Field - OU - Extended - Both Eyes    Refractive errors      No Background Diabetic Retinopathy  Strict BG control, f/u w/ PCP, and annual DFE  Stressed importance of DM control to preserve vision    Moderate cataracts OU, not surgical  Follow annually.    Worksheet given. Discussed Dry Eyes in detail including Artificial Tears, lubricants, and Omega 3 Fish Oils.    No active anterior or posterior uveitis OU.  No ocular changes from Plaquenil use  Normal VF and mOCT OU    Dispense Final Rx for glasses.  RTC 1 year  Discussed above and answered questions.

## 2023-09-15 ENCOUNTER — TELEPHONE (OUTPATIENT)
Dept: INTERNAL MEDICINE | Facility: CLINIC | Age: 73
End: 2023-09-15

## 2023-09-15 NOTE — TELEPHONE ENCOUNTER
----- Message from Shannan Byrdlawsonnara sent at 9/15/2023 11:36 AM CDT -----  Contact: [facility] is calling in regard to an order for additional views in order to be scheduled.  Please call her [phone] lona/mpd

## 2023-09-26 ENCOUNTER — TELEPHONE (OUTPATIENT)
Dept: INTERNAL MEDICINE | Facility: CLINIC | Age: 73
End: 2023-09-26
Payer: MEDICARE

## 2023-09-26 NOTE — TELEPHONE ENCOUNTER
----- Message from Quita Weston sent at 9/26/2023  2:05 PM CDT -----  Contact: [facility]  ..Type:  Patient Returning Call    Who Called:Leigh   Who Left Message for Patient:Kelly   Does the patient know what this is regarding?: additional imaging   Would the patient rather a call back or a response via [facility]? Call back  Best Call Back Number:[phone]  Additional Information: Leigh states she missed a call

## 2023-09-26 NOTE — TELEPHONE ENCOUNTER
----- Message from Elias Combs MD sent at 9/26/2023 12:24 PM CDT -----  Regarding: RE: pt orders  What orders? Ultrasound? Did I get sent results with recommendations.  ----- Message -----  From: Kelly Salmon LPN  Sent: 9/25/2023  12:36 PM CDT  To: Elias Combs MD  Subject: FW: pt orders                                      ----- Message -----  From: Michela Falcon  Sent: 9/25/2023  11:46 AM CDT  To: Garret Griffin Staff  Subject: pt orders                                        Name of Who is Calling:[facility]         What is the request in detail: Office needs Dr Combs to send more orders for additional screening due to abnormal imaging on her right breast. Please advise and contact     [phone]           Can the clinic reply by [facility]: no         What Number to Call Back if not in [facility]: [phone]

## 2023-09-26 NOTE — TELEPHONE ENCOUNTER
Attempted to contact back the imaging department at [facility]. Left a message with the imaging department to get a return call to gather further info per Dr. Combs.

## 2023-09-26 NOTE — TELEPHONE ENCOUNTER
Patient took her mmg order over to the [facility] to be done. Per Patricia, breast navigator for , they had faxed it over. Advised her that we had not received the results. Requested that she fax over a copy to keaton Mendoza at [phone].     She wanted us to note that the patient has implants. She allowed displacement of the implant on the left side, but when they got to the right side she would not allow the technologist to perform the displacement. The right side is where the abnormality came from. They did not get an unobstructed view of the right breast tissue.     They do advise that she became very upset and angry with them. They do not know if she will even use them again for the ultrasound. I ensured Patricia that I would reach out to the patient first and let them know if she planned on going [facility]. If not, I will set it up here at [facility] if appropriate.     1415 on 9/26 - awaiting results from [facility] to be faxed over. Will provide to physician when received.

## 2023-09-27 ENCOUNTER — TELEPHONE (OUTPATIENT)
Dept: INTERNAL MEDICINE | Facility: CLINIC | Age: 73
End: 2023-09-27
Payer: MEDICARE

## 2023-09-27 NOTE — TELEPHONE ENCOUNTER
I received a mammography report from [facility] indicating further imaging is necessary.  I called patient to see how she would like to address this I she initially declined further imaging.  She informed me that she plans to meet and discuss this with a specialist physician at [facility].  I will defer breast cancer screening to her specialist.  I do ask she keeps me in the loop on future imaging studies.  We can obtain records in the future if need be

## 2023-10-02 DIAGNOSIS — G47.61 PERIODIC LIMB MOVEMENT SLEEP DISORDER: ICD-10-CM

## 2023-10-02 DIAGNOSIS — G47.01 INSOMNIA DUE TO MEDICAL CONDITION: ICD-10-CM

## 2023-10-03 RX ORDER — CLONAZEPAM 0.5 MG/1
1 TABLET ORAL NIGHTLY PRN
Qty: 60 TABLET | Refills: 5 | Status: SHIPPED | OUTPATIENT
Start: 2023-10-03 | End: 2024-04-01

## 2023-10-24 DIAGNOSIS — I10 ESSENTIAL HYPERTENSION: ICD-10-CM

## 2023-10-24 RX ORDER — BENAZEPRIL HYDROCHLORIDE 40 MG/1
TABLET ORAL
Qty: 90 TABLET | Refills: 2 | Status: SHIPPED | OUTPATIENT
Start: 2023-10-24

## 2023-10-24 NOTE — TELEPHONE ENCOUNTER
Care Due:                  Date            Visit Type   Department     Provider  --------------------------------------------------------------------------------                                EP -                              PRIMARY      [facility] INTERNAL  Last Visit: 07-      CARE ([facility])   WILBERTO Combs                              EP -                              PRIMARY      [facility] INTERNAL  Next Visit: 10-      CARE ([facility])   WILBERTO Combs                                                            Last  Test          Frequency    Reason                     Performed    Due Date  --------------------------------------------------------------------------------    HBA1C.......  6 months...  semaglutide..............  07- 01-    [facility] Embedded Care Due Messages. Reference number: [id number].   10/24/2023 12:15:27 PM CDT

## 2023-10-24 NOTE — TELEPHONE ENCOUNTER
Provider Staff:  Action required for this patient     Please see care gap opportunities below in Care Due Message.    Thanks!  [facility]     Appointments      Date Provider   Last Visit   7/13/2023 Elias Combs MD   Next Visit   10/25/2023 Elias Combs MD     Refill Decision Note   Latonia Oliver  is requesting a refill authorization.  Brief Assessment and Rationale for Refill:  Approve     Medication Therapy Plan:  LABS:(A1C)      Comments:     Note composed:1:18 PM 10/24/2023               No

## 2023-10-25 ENCOUNTER — OFFICE VISIT (OUTPATIENT)
Dept: INTERNAL MEDICINE | Facility: CLINIC | Age: 73
End: 2023-10-25
Payer: MEDICARE

## 2023-10-25 VITALS
TEMPERATURE: 96 F | BODY MASS INDEX: 30.73 KG/M2 | SYSTOLIC BLOOD PRESSURE: 112 MMHG | OXYGEN SATURATION: 96 % | DIASTOLIC BLOOD PRESSURE: 70 MMHG | HEART RATE: 69 BPM | HEIGHT: 62 IN | WEIGHT: 167 LBS

## 2023-10-25 DIAGNOSIS — Z78.0 POSTMENOPAUSAL: ICD-10-CM

## 2023-10-25 DIAGNOSIS — G89.29 CHRONIC BILATERAL LOW BACK PAIN WITHOUT SCIATICA: ICD-10-CM

## 2023-10-25 DIAGNOSIS — M85.80 OSTEOPENIA, UNSPECIFIED LOCATION: Primary | ICD-10-CM

## 2023-10-25 DIAGNOSIS — F32.A ANXIETY AND DEPRESSION: ICD-10-CM

## 2023-10-25 DIAGNOSIS — E78.5 HYPERLIPIDEMIA, UNSPECIFIED HYPERLIPIDEMIA TYPE: ICD-10-CM

## 2023-10-25 DIAGNOSIS — I10 HYPERTENSION, UNSPECIFIED TYPE: ICD-10-CM

## 2023-10-25 DIAGNOSIS — E11.9 TYPE 2 DIABETES MELLITUS WITHOUT COMPLICATION, WITHOUT LONG-TERM CURRENT USE OF INSULIN: ICD-10-CM

## 2023-10-25 DIAGNOSIS — Z79.899 ENCOUNTER FOR LONG-TERM (CURRENT) USE OF MEDICATIONS: ICD-10-CM

## 2023-10-25 DIAGNOSIS — D64.9 MILD ANEMIA: ICD-10-CM

## 2023-10-25 DIAGNOSIS — F41.9 ANXIETY AND DEPRESSION: ICD-10-CM

## 2023-10-25 DIAGNOSIS — M54.50 CHRONIC BILATERAL LOW BACK PAIN WITHOUT SCIATICA: ICD-10-CM

## 2023-10-25 PROCEDURE — 99214 PR OFFICE/OUTPT VISIT, EST, LEVL IV, 30-39 MIN: ICD-10-PCS | Mod: S$PBB,,, | Performed by: FAMILY MEDICINE

## 2023-10-25 PROCEDURE — 99999 PR PBB SHADOW E&M-EST. PATIENT-LVL III: ICD-10-PCS | Mod: PBBFAC,,, | Performed by: FAMILY MEDICINE

## 2023-10-25 PROCEDURE — G0008 ADMIN INFLUENZA VIRUS VAC: HCPCS | Mod: PBBFAC

## 2023-10-25 PROCEDURE — 99213 OFFICE O/P EST LOW 20 MIN: CPT | Mod: PBBFAC,25 | Performed by: FAMILY MEDICINE

## 2023-10-25 PROCEDURE — 99999PBSHW FLU VACCINE - QUADRIVALENT - ADJUVANTED: Mod: PBBFAC,,,

## 2023-10-25 PROCEDURE — 99999 PR PBB SHADOW E&M-EST. PATIENT-LVL III: CPT | Mod: PBBFAC,,, | Performed by: FAMILY MEDICINE

## 2023-10-25 PROCEDURE — 99999PBSHW FLU VACCINE - QUADRIVALENT - ADJUVANTED: ICD-10-PCS | Mod: PBBFAC,,,

## 2023-10-25 PROCEDURE — 99214 OFFICE O/P EST MOD 30 MIN: CPT | Mod: S$PBB,,, | Performed by: FAMILY MEDICINE

## 2023-10-25 RX ORDER — CHLORTHALIDONE 25 MG/1
25 TABLET ORAL
COMMUNITY
Start: 2023-06-24 | End: 2023-10-25

## 2023-10-25 NOTE — PROGRESS NOTES
Subjective:       Patient ID: Latonia Oliver is a 72 y.o. female.    Chief Complaint: Follow-up (DM)    Follow-up  Pertinent negatives include no chest pain, chills, congestion, fever or rash.       Patient Active Problem List   Diagnosis    Sjogren-Flo syndrome    Pain in joint, multiple sites    Sjogren's syndrome with keratoconjunctivitis sicca    Fibromyalgia    Type 2 diabetes mellitus without complication, without long-term current use of insulin    HTN (hypertension)    Mixed hyperlipidemia    GERD (gastroesophageal reflux disease)    Insomnia    Primary osteoarthritis of right knee    Lumbar back pain    Lumbar spondylosis    ROX on CPAP    Osteopenia    Depression    Fatigue    Medication monitoring encounter    Lumbar paraspinal muscle spasm    Visit for screening mammogram    Restless leg syndrome    Acute right ankle pain    Personal history of breast cancer    Major depressive disorder in partial remission    Weakness    Balance problem    Nasal septal deviation    Severe obesity (BMI 35.0-39.9) with comorbidity       Past Medical History:   Diagnosis Date    Anxiety     Arthritis     Back pain     Basal cell carcinoma (BCC)     Breast cancer     Breast cancer     Depression     Diabetes mellitus type 2, controlled     Enthesopathy of hip 2014    Fibromyalgia     GERD (gastroesophageal reflux disease)     HTN (hypertension)     Irregular heart beat     Mixed hyperlipidemia     Obesity     Osteoporosis     Rash and nonspecific skin eruption 6/15/2015    Sjogren - Flo's syndrome     Sleep apnea     Trouble in sleeping     Type 2 diabetes mellitus        Past Surgical History:   Procedure Laterality Date    APPENDECTOMY      BELT ABDOMINOPLASTY      BREAST LUMPECTOMY      BREAST SURGERY      breast augmentation     SECTION, CLASSIC      EPIDURAL STEROID INJECTION N/A 2022    Procedure: Lumbar L5/S1 IL NEELA with RN IV sedation;  Surgeon: Giovanny Arroyo MD;  Location: [facility];   Service: Pain Management;  Laterality: N/A;    INJECTION OF JOINT Right 5/13/2022    Procedure: Right knee Synvisc injection J 7325 with local;  Surgeon: Giovanny Arroyo MD;  Location: [facility];  Service: Pain Management;  Laterality: Right;    RECONSTRUCTION, NASAL VALVE Left 10/22/2021    Procedure: RECONSTRUCTION, NASAL VALVE ;  Surgeon: Faustino Cash MD;  Location: [facility] OR;  Service: ENT;  Laterality: Left;       Family History   Problem Relation Age of Onset    Heart disease Mother     Hypertension Mother     COPD Mother     Diabetes Father     Stroke Father     Kidney disease Father        Social History     Tobacco Use   Smoking Status Never    Passive exposure: Past   Smokeless Tobacco Never       Wt Readings from Last 5 Encounters:   10/25/23 75.8 kg (167 lb)   08/02/23 77.1 kg (169 lb 15.6 oz)   07/13/23 78.8 kg (173 lb 11.6 oz)   07/06/23 79.2 kg (174 lb 7.9 oz)   06/07/23 81.1 kg (178 lb 12.7 oz)       For further HPI details, see assessment and plan.    Review of Systems   Constitutional:  Negative for chills and fever.   HENT:  Negative for congestion, sinus pressure and voice change.    Respiratory:  Negative for shortness of breath.    Cardiovascular:  Negative for chest pain.   Gastrointestinal:  Negative for constipation and diarrhea.   Genitourinary:  Negative for difficulty urinating.   Musculoskeletal:  Negative for gait problem.   Skin:  Negative for rash.   Neurological:  Negative for dizziness and light-headedness.   Psychiatric/Behavioral:  Negative for dysphoric mood.        Objective:      Vitals:    10/25/23 1126   BP: 112/70   Pulse: 69   Temp: 96 °F (35.6 °C)     Protective Sensation (w/ 10 gram monofilament):  Right: Intact  Left: Intact    Visual Inspection:  Normal -  Bilateral    Pedal Pulses:   Right: Present  Left: Present    Posterior Tibialis Pulses:   Right:Present  Left: Present    Physical Exam  Constitutional:       General: She is not in acute distress.     Appearance:  She is not ill-appearing.   Pulmonary:      Effort: Pulmonary effort is normal. No respiratory distress.   Neurological:      General: No focal deficit present.      Mental Status: She is alert.   Psychiatric:         Mood and Affect: Mood normal.         Behavior: Behavior normal.         Assessment:       1. Osteopenia, unspecified location    2. Postmenopausal    3. Type 2 diabetes mellitus without complication, without long-term current use of insulin    4. Anxiety and depression    5. Chronic bilateral low back pain without sciatica    6. Hypertension, unspecified type    7. Mild anemia    8. Hyperlipidemia, unspecified hyperlipidemia type    9. Encounter for long-term (current) use of medications        Plan:     Hypertension   Her blood pressure continues to be well controlled.  Noting mildly less than normal.  She is on substantial medication.  Amlodipine 10, benazepril 40, carvedilol 6.25 mg.  She is not having any dizziness.  No lightheadedness.  Monitor for such and if any develops we will cut back on medication.  Discussed the risk of swelling of her lips her mouth or tongue with benazepril and to discontinue medication if any ever occurs.    Type 2 diabetes  Foot exam performed today   She is on Ozempic 2 mg   She is doing quite well with medication.  She was having some GI/vomiting issues.  They have resolved entirely.  Continue medication.  Continue to monitor weight loss.  She is trending downward.  She is aware I do want her doing resistance training given the weight loss.      Chronic back pain  Patient is on duloxetine 60 mg.  She reports her pain is doing well.  I would placed a pain consult.  Was not necessary.  Not using frequent NSAIDs.    History of gastroesophageal reflux disease.  Patient took herself off proton pump inhibitor.  She is doing well.  Pleased to hear this.  No longer an issue.      anxiety and depression   Continue duloxetine 60   Sounds as if she was having some fatigue  issues but seems to be doing better.  Continue medication.    History of breast cancer   She plans to have this further evaluated managed at [facility].    Osteopenia  We will obtain DEXA scan as it has been 3 years.    Hyperlipidemia   She is on a statin.  Continue Lipitor at 40 mg    Anemia   Mild   Continue working with Hematology for evaluation and management.      Flu shot - today   Follow up in 6 months or as needed    Reviewed medication list today.  Should be 100% accurate.  Removed agents no longer being utilized    This note was verbally dictated, please excuse any type errors.

## 2023-10-26 ENCOUNTER — APPOINTMENT (OUTPATIENT)
Dept: RADIOLOGY | Facility: HOSPITAL | Age: 73
End: 2023-10-26
Attending: FAMILY MEDICINE
Payer: MEDICARE

## 2023-10-26 DIAGNOSIS — Z78.0 POSTMENOPAUSAL: ICD-10-CM

## 2023-10-26 DIAGNOSIS — M85.80 OSTEOPENIA, UNSPECIFIED LOCATION: ICD-10-CM

## 2023-10-26 PROCEDURE — 77080 DXA BONE DENSITY AXIAL: CPT | Mod: TC

## 2023-10-26 PROCEDURE — 77080 DXA BONE DENSITY AXIAL SKELETON 1 OR MORE SITES: ICD-10-PCS | Mod: 26,,, | Performed by: RADIOLOGY

## 2023-10-26 PROCEDURE — 77080 DXA BONE DENSITY AXIAL: CPT | Mod: 26,,, | Performed by: RADIOLOGY

## 2023-11-03 ENCOUNTER — LAB VISIT (OUTPATIENT)
Dept: LAB | Facility: HOSPITAL | Age: 73
End: 2023-11-03
Payer: MEDICARE

## 2023-11-03 DIAGNOSIS — M35.01 SJOGREN'S SYNDROME WITH KERATOCONJUNCTIVITIS SICCA: ICD-10-CM

## 2023-11-03 DIAGNOSIS — D47.2 MGUS (MONOCLONAL GAMMOPATHY OF UNKNOWN SIGNIFICANCE): ICD-10-CM

## 2023-11-03 LAB
ALBUMIN SERPL BCP-MCNC: 4.1 G/DL (ref 3.5–5.2)
ALP SERPL-CCNC: 69 U/L (ref 55–135)
ALT SERPL W/O P-5'-P-CCNC: 33 U/L (ref 10–44)
ANION GAP SERPL CALC-SCNC: 13 MMOL/L (ref 8–16)
AST SERPL-CCNC: 31 U/L (ref 10–40)
BASOPHILS # BLD AUTO: 0.06 K/UL (ref 0–0.2)
BASOPHILS NFR BLD: 1.1 % (ref 0–1.9)
BILIRUB SERPL-MCNC: 0.4 MG/DL (ref 0.1–1)
BUN SERPL-MCNC: 13 MG/DL (ref 8–23)
CALCIUM SERPL-MCNC: 9.6 MG/DL (ref 8.7–10.5)
CHLORIDE SERPL-SCNC: 106 MMOL/L (ref 95–110)
CO2 SERPL-SCNC: 25 MMOL/L (ref 23–29)
CREAT SERPL-MCNC: 0.9 MG/DL (ref 0.5–1.4)
DIFFERENTIAL METHOD: NORMAL
EOSINOPHIL # BLD AUTO: 0.1 K/UL (ref 0–0.5)
EOSINOPHIL NFR BLD: 2.5 % (ref 0–8)
ERYTHROCYTE [DISTWIDTH] IN BLOOD BY AUTOMATED COUNT: 13 % (ref 11.5–14.5)
EST. GFR  (NO RACE VARIABLE): >60 ML/MIN/1.73 M^2
GLUCOSE SERPL-MCNC: 80 MG/DL (ref 70–110)
HCT VFR BLD AUTO: 37.6 % (ref 37–48.5)
HGB BLD-MCNC: 12.5 G/DL (ref 12–16)
IMM GRANULOCYTES # BLD AUTO: 0.01 K/UL (ref 0–0.04)
IMM GRANULOCYTES NFR BLD AUTO: 0.2 % (ref 0–0.5)
LYMPHOCYTES # BLD AUTO: 1.6 K/UL (ref 1–4.8)
LYMPHOCYTES NFR BLD: 29.2 % (ref 18–48)
MCH RBC QN AUTO: 30.6 PG (ref 27–31)
MCHC RBC AUTO-ENTMCNC: 33.2 G/DL (ref 32–36)
MCV RBC AUTO: 92 FL (ref 82–98)
MONOCYTES # BLD AUTO: 0.5 K/UL (ref 0.3–1)
MONOCYTES NFR BLD: 8.8 % (ref 4–15)
NEUTROPHILS # BLD AUTO: 3.2 K/UL (ref 1.8–7.7)
NEUTROPHILS NFR BLD: 58.2 % (ref 38–73)
NRBC BLD-RTO: 0 /100 WBC
PLATELET # BLD AUTO: 192 K/UL (ref 150–450)
PMV BLD AUTO: 9.5 FL (ref 9.2–12.9)
POTASSIUM SERPL-SCNC: 3.7 MMOL/L (ref 3.5–5.1)
PROT SERPL-MCNC: 7.4 G/DL (ref 6–8.4)
RBC # BLD AUTO: 4.08 M/UL (ref 4–5.4)
SODIUM SERPL-SCNC: 144 MMOL/L (ref 136–145)
WBC # BLD AUTO: 5.54 K/UL (ref 3.9–12.7)

## 2023-11-03 PROCEDURE — 86334 IMMUNOFIX E-PHORESIS SERUM: CPT | Performed by: PHYSICIAN ASSISTANT

## 2023-11-03 PROCEDURE — 86334 IMMUNOFIX E-PHORESIS SERUM: CPT | Mod: 26,,, | Performed by: PATHOLOGY

## 2023-11-03 PROCEDURE — 84165 PATHOLOGIST INTERPRETATION SPE: ICD-10-PCS | Mod: 26,,, | Performed by: PATHOLOGY

## 2023-11-03 PROCEDURE — 83521 IG LIGHT CHAINS FREE EACH: CPT | Mod: 59 | Performed by: INTERNAL MEDICINE

## 2023-11-03 PROCEDURE — 84165 PROTEIN E-PHORESIS SERUM: CPT | Performed by: INTERNAL MEDICINE

## 2023-11-03 PROCEDURE — 80053 COMPREHEN METABOLIC PANEL: CPT | Performed by: INTERNAL MEDICINE

## 2023-11-03 PROCEDURE — 84165 PROTEIN E-PHORESIS SERUM: CPT | Mod: 26,,, | Performed by: PATHOLOGY

## 2023-11-03 PROCEDURE — 85025 COMPLETE CBC W/AUTO DIFF WBC: CPT | Performed by: INTERNAL MEDICINE

## 2023-11-03 PROCEDURE — 86334 PATHOLOGIST INTERPRETATION IFE: ICD-10-PCS | Mod: 26,,, | Performed by: PATHOLOGY

## 2023-11-03 PROCEDURE — 36415 COLL VENOUS BLD VENIPUNCTURE: CPT | Performed by: PHYSICIAN ASSISTANT

## 2023-11-06 ENCOUNTER — OFFICE VISIT (OUTPATIENT)
Dept: HEMATOLOGY/ONCOLOGY | Facility: CLINIC | Age: 73
End: 2023-11-06
Payer: MEDICARE

## 2023-11-06 VITALS
HEART RATE: 70 BPM | TEMPERATURE: 97 F | HEIGHT: 63 IN | OXYGEN SATURATION: 97 % | SYSTOLIC BLOOD PRESSURE: 112 MMHG | DIASTOLIC BLOOD PRESSURE: 70 MMHG | WEIGHT: 168.75 LBS | BODY MASS INDEX: 29.9 KG/M2

## 2023-11-06 DIAGNOSIS — D47.2 MONOCLONAL PARAPROTEINEMIA: Primary | ICD-10-CM

## 2023-11-06 DIAGNOSIS — E66.9 OBESITY (BMI 30-39.9): ICD-10-CM

## 2023-11-06 DIAGNOSIS — Z85.3 PERSONAL HISTORY OF BREAST CANCER: ICD-10-CM

## 2023-11-06 PROBLEM — E66.01 SEVERE OBESITY (BMI 35.0-39.9) WITH COMORBIDITY: Status: RESOLVED | Noted: 2022-05-26 | Resolved: 2023-11-06

## 2023-11-06 LAB
ALBUMIN SERPL ELPH-MCNC: 4.06 G/DL (ref 3.35–5.55)
ALPHA1 GLOB SERPL ELPH-MCNC: 0.3 G/DL (ref 0.17–0.41)
ALPHA2 GLOB SERPL ELPH-MCNC: 0.81 G/DL (ref 0.43–0.99)
B-GLOBULIN SERPL ELPH-MCNC: 0.73 G/DL (ref 0.5–1.1)
GAMMA GLOB SERPL ELPH-MCNC: 0.99 G/DL (ref 0.67–1.58)
INTERPRETATION SERPL IFE-IMP: NORMAL
KAPPA LC SER QL IA: 2.34 MG/DL (ref 0.33–1.94)
KAPPA LC/LAMBDA SER IA: 1.67 (ref 0.26–1.65)
LAMBDA LC SER QL IA: 1.4 MG/DL (ref 0.57–2.63)
PROT SERPL-MCNC: 6.9 G/DL (ref 6–8.4)

## 2023-11-06 PROCEDURE — 99214 OFFICE O/P EST MOD 30 MIN: CPT | Mod: S$PBB,,, | Performed by: NURSE PRACTITIONER

## 2023-11-06 PROCEDURE — 99213 OFFICE O/P EST LOW 20 MIN: CPT | Mod: PBBFAC | Performed by: NURSE PRACTITIONER

## 2023-11-06 PROCEDURE — 99999 PR PBB SHADOW E&M-EST. PATIENT-LVL III: ICD-10-PCS | Mod: PBBFAC,,, | Performed by: NURSE PRACTITIONER

## 2023-11-06 PROCEDURE — 99214 PR OFFICE/OUTPT VISIT, EST, LEVL IV, 30-39 MIN: ICD-10-PCS | Mod: S$PBB,,, | Performed by: NURSE PRACTITIONER

## 2023-11-06 PROCEDURE — 99999 PR PBB SHADOW E&M-EST. PATIENT-LVL III: CPT | Mod: PBBFAC,,, | Performed by: NURSE PRACTITIONER

## 2023-11-06 NOTE — ASSESSMENT & PLAN NOTE
Encourage healthy nutrition along with portion control. Encourage daily exercise    Reports 50+lbs weight loss since starting Ozempic

## 2023-11-06 NOTE — PROGRESS NOTES
Subjective:       Patient ID: Latonia Oliver is a 72 y.o. female.    Chief Complaint: Review labs. Follow up    HPI: 72 y.o female with h/o right breast cancer 15 years ago, DM II, Sjogren's disease on hydroxychloroquine. Screening labs with Rheumatology including SPEP/SANDIP, free light chains with mild free light chain elevation, ratio 1.5 and faint juliann free light chain unquantified.  She was referred for Hematology for further evaluation of her abnormal lab. She does note following with outside Dermatologist and history of basal cell carcinoma skin.  Regarding her history of breast cancer diagnosed 15 years ago at outside institution received surgery lumpectomy right breast with positive margin re-resection sentinel lymph node positive with completion axillary dissection adjuvant chemotherapy and radiation and 1 year of anastrozole discontinued due to hair loss per patient report (no records available at time of visit).  She had not had subsequent follow-up with oncology or surveillance mammograms.    Today she presents for follow up of her lambda light chain lab. She notes feeling well and is without complaints. Intentional 50+lbs weight loss on Ozempic. Denies B symptoms.   Social History     Socioeconomic History    Marital status:     Number of children: 1   Occupational History     Employer: [employer]    Tobacco Use    Smoking status: Never     Passive exposure: Past    Smokeless tobacco: Never   Substance and Sexual Activity    Alcohol use: Yes     Comment: Socially    Drug use: No    Sexual activity: Not Currently     Partners: Male     Social Determinants of Health     Financial Resource Strain: Low Risk  (9/15/2022)    Overall Financial Resource Strain (CARDIA)     Difficulty of Paying Living Expenses: Not very hard   Food Insecurity: No Food Insecurity (9/15/2022)    Hunger Vital Sign     Worried About Running Out of Food in the Last Year: Never true     Ran Out of Food in the Last Year:  Never true   Transportation Needs: Unknown (9/15/2022)    PRAPARE - Transportation     Lack of Transportation (Medical): Patient refused     Lack of Transportation (Non-Medical): Patient refused   Physical Activity: Inactive (9/15/2022)    Exercise Vital Sign     Days of Exercise per Week: 0 days     Minutes of Exercise per Session: 0 min   Stress: No Stress Concern Present (9/15/2022)    [employer] [place] [employer] - Occupational Stress Questionnaire     Feeling of Stress : Not at all   Recent Concern: Stress - Stress Concern Present (6/29/2022)    [employer] [place] [employer] - Occupational Stress Questionnaire     Feeling of Stress : Rather much   Social Connections: Unknown (9/15/2022)    Social Connection and Isolation Panel [NHANES]     Frequency of Communication with Friends and Family: Patient refused     Frequency of Social Gatherings with Friends and Family: Patient refused     Active Member of Clubs or Organizations: Yes     Attends Club or Organization Meetings: More than 4 times per year     Marital Status:    Housing Stability: Low Risk  (9/15/2022)    Housing Stability Vital Sign     Unable to Pay for Housing in the Last Year: No     Number of Places Lived in the Last Year: 1     Unstable Housing in the Last Year: No       Past Medical History:   Diagnosis Date    Anxiety     Arthritis     Back pain     Basal cell carcinoma (BCC)     Breast cancer     Breast cancer     Depression     Diabetes mellitus type 2, controlled     Enthesopathy of hip 12/11/2014    Fibromyalgia     GERD (gastroesophageal reflux disease)     HTN (hypertension)     Irregular heart beat     Mixed hyperlipidemia     Obesity     Osteoporosis     Rash and nonspecific skin eruption 6/15/2015    Sjogren - Flo's syndrome     Sleep apnea     Trouble in sleeping     Type 2 diabetes mellitus        Family History   Problem Relation Age of Onset    Heart disease  Mother     Hypertension Mother     COPD Mother     Diabetes Father     Stroke Father     Kidney disease Father        Past Surgical History:   Procedure Laterality Date    APPENDECTOMY      BELT ABDOMINOPLASTY      BREAST LUMPECTOMY      BREAST SURGERY      breast augmentation     SECTION, CLASSIC      EPIDURAL STEROID INJECTION N/A 2022    Procedure: Lumbar L5/S1 IL NEELA with RN IV sedation;  Surgeon: Giovanny Arroyo MD;  Location: [facility];  Service: Pain Management;  Laterality: N/A;    INJECTION OF JOINT Right 2022    Procedure: Right knee Synvisc injection J 7325 with local;  Surgeon: Giovanny Arroyo MD;  Location: [facility];  Service: Pain Management;  Laterality: Right;    RECONSTRUCTION, NASAL VALVE Left 10/22/2021    Procedure: RECONSTRUCTION, NASAL VALVE ;  Surgeon: Faustino Cash MD;  Location: [facility] OR;  Service: ENT;  Laterality: Left;       Review of Systems   Constitutional:  Negative for appetite change, chills, fatigue, fever and unexpected weight change.   HENT:  Negative for congestion, mouth sores, nosebleeds, sore throat, trouble swallowing and voice change.    Respiratory:  Negative for cough, chest tightness, shortness of breath and wheezing.    Cardiovascular:  Negative for chest pain and leg swelling.   Gastrointestinal:  Negative for abdominal distention, abdominal pain, blood in stool, constipation, diarrhea, nausea and vomiting.   Genitourinary:  Negative for difficulty urinating, dysuria and hematuria.   Musculoskeletal:  Negative for arthralgias, back pain and myalgias.   Skin:  Negative for pallor, rash and wound.   Neurological:  Negative for dizziness, syncope, weakness and headaches.   Hematological:  Negative for adenopathy. Does not bruise/bleed easily.   Psychiatric/Behavioral:  The patient is nervous/anxious.          Medication List with Changes/Refills   Current Medications    AMLODIPINE (NORVASC) 10 MG TABLET    Take 1 tablet by mouth once  daily    ATORVASTATIN (LIPITOR) 40 MG TABLET    Take 1 tablet (40 mg total) by mouth once daily.    BENAZEPRIL (LOTENSIN) 40 MG TABLET    Take 1 tablet by mouth once daily    CARVEDILOL (COREG) 6.25 MG TABLET    TAKE 1 TABLET BY MOUTH TWICE DAILY WITH MEALS    CLONAZEPAM (KLONOPIN) 0.5 MG TABLET    Take 2 tablets (1 mg total) by mouth nightly as needed for Anxiety (insomnia).    DULOXETINE (CYMBALTA) 30 MG CAPSULE    Take 1 capsule (30 mg total) by mouth 2 (two) times daily.    FLUTICASONE PROPIONATE (FLONASE) 50 MCG/ACTUATION NASAL SPRAY    2 sprays (100 mcg total) by Each Nostril route once daily.    HYDROXYCHLOROQUINE (PLAQUENIL) 200 MG TABLET    Take 1 tablet (200 mg total) by mouth 2 (two) times daily.    HYDROXYZINE PAMOATE (VISTARIL) 25 MG CAP    Take 1 capsule (25 mg total) by mouth nightly as needed (insomnia).    SEMAGLUTIDE (OZEMPIC) 2 MG/DOSE (8 MG/3 ML) PNIJ    INJECT 2 MG  SUBCUTANEOUSLY ONCE A WEEK    ZOLPIDEM (AMBIEN) 5 MG TAB    Take 1 tablet (5 mg total) by mouth nightly as needed (insomnia).     Objective:     Vitals:    11/06/23 1259   BP: 112/70   Pulse: 70   Temp: 97.2 °F (36.2 °C)     Lab Results   Component Value Date    WBC 5.54 11/03/2023    HGB 12.5 11/03/2023    HCT 37.6 11/03/2023    MCV 92 11/03/2023     11/03/2023       BMP  Lab Results   Component Value Date     11/03/2023    K 3.7 11/03/2023     11/03/2023    CO2 25 11/03/2023    BUN 13 11/03/2023    CREATININE 0.9 11/03/2023    CALCIUM 9.6 11/03/2023    ANIONGAP 13 11/03/2023    EGFRNORACEVR >60 11/03/2023     Lab Results   Component Value Date    ALT 33 11/03/2023    AST 31 11/03/2023    ALKPHOS 69 11/03/2023    BILITOT 0.4 11/03/2023         Physical Exam  Vitals reviewed.   Constitutional:       Appearance: She is well-developed.   HENT:      Head: Normocephalic.      Right Ear: Hearing and external ear normal. No drainage.      Left Ear: Hearing and external ear normal. No drainage.      Nose: Nose normal.    Eyes:      General: Lids are normal. No scleral icterus.        Right eye: No discharge.         Left eye: No discharge.      Conjunctiva/sclera: Conjunctivae normal.   Neck:      Thyroid: No thyroid mass.   Cardiovascular:      Rate and Rhythm: Normal rate and regular rhythm.      Heart sounds: Normal heart sounds.   Pulmonary:      Effort: Pulmonary effort is normal. No respiratory distress.      Breath sounds: Normal breath sounds. No wheezing or rales.   Abdominal:      General: There is no distension.   Genitourinary:     Comments: deferred  Musculoskeletal:         General: Normal range of motion.      Cervical back: Normal range of motion.   Skin:     General: Skin is warm and dry.   Neurological:      Mental Status: She is alert and oriented to person, place, and time.   Psychiatric:         Speech: Speech normal.         Behavior: Behavior normal. Behavior is cooperative.         Thought Content: Thought content normal.        Assessment:     Problem List Items Addressed This Visit          Oncology    Personal history of breast cancer     Mammogram per PCP         Monoclonal paraproteinemia     Faint free lambda light chain specific monoclonal band noted on SANDIP. No other concerning findings. No CRAB criteria. Possibly secondary to her autoimmune disorder. Recommend continued Rheumatology follow up. Continue to follow up labs Q 3-6 months            Endocrine    Obesity (BMI 30-39.9)     Encourage healthy nutrition along with portion control. Encourage daily exercise    Reports 50+lbs weight loss since starting Ozempic              Plan:     Obesity (BMI 30-39.9)    Personal history of breast cancer    Monoclonal paraproteinemia            Med Onc Chart Routing      Follow up with physician    Follow up with [facility] 3 months.   Infusion scheduling note    Injection scheduling note    Labs CMP, free light chains, beta 2 microglobulin, CBC, LDH, other and SPEP   Scheduling:  Preferred lab:  Lab interval:  labs  1 week prior   Imaging None      Pharmacy appointment No pharmacy appointment needed      Other referrals       No additional referrals needed             Shellie Frank, PREETIP-C

## 2023-11-06 NOTE — ASSESSMENT & PLAN NOTE
Faint free lambda light chain specific monoclonal band noted on SANDIP. No other concerning findings. No CRAB criteria. Possibly secondary to her autoimmune disorder. Recommend continued Rheumatology follow up. Continue to follow up labs Q 3-6 months

## 2023-11-07 LAB
PATHOLOGIST INTERPRETATION IFE: NORMAL
PATHOLOGIST INTERPRETATION SPE: NORMAL

## 2023-11-17 ENCOUNTER — PATIENT MESSAGE (OUTPATIENT)
Dept: ADMINISTRATIVE | Facility: HOSPITAL | Age: 73
End: 2023-11-17
Payer: MEDICARE

## 2023-11-30 ENCOUNTER — LAB VISIT (OUTPATIENT)
Dept: LAB | Facility: HOSPITAL | Age: 73
End: 2023-11-30
Payer: MEDICARE

## 2023-11-30 DIAGNOSIS — M35.01 SJOGREN'S SYNDROME WITH KERATOCONJUNCTIVITIS SICCA: ICD-10-CM

## 2023-11-30 LAB
ALBUMIN SERPL BCP-MCNC: 4 G/DL (ref 3.5–5.2)
ALP SERPL-CCNC: 68 U/L (ref 55–135)
ALT SERPL W/O P-5'-P-CCNC: 40 U/L (ref 10–44)
ANION GAP SERPL CALC-SCNC: 12 MMOL/L (ref 8–16)
AST SERPL-CCNC: 30 U/L (ref 10–40)
BASOPHILS # BLD AUTO: 0.06 K/UL (ref 0–0.2)
BASOPHILS NFR BLD: 1.2 % (ref 0–1.9)
BILIRUB SERPL-MCNC: 0.4 MG/DL (ref 0.1–1)
BUN SERPL-MCNC: 8 MG/DL (ref 8–23)
CALCIUM SERPL-MCNC: 9 MG/DL (ref 8.7–10.5)
CHLORIDE SERPL-SCNC: 105 MMOL/L (ref 95–110)
CO2 SERPL-SCNC: 27 MMOL/L (ref 23–29)
CREAT SERPL-MCNC: 0.8 MG/DL (ref 0.5–1.4)
CRP SERPL-MCNC: 1 MG/L (ref 0–8.2)
DIFFERENTIAL METHOD: NORMAL
EOSINOPHIL # BLD AUTO: 0.1 K/UL (ref 0–0.5)
EOSINOPHIL NFR BLD: 2.6 % (ref 0–8)
ERYTHROCYTE [DISTWIDTH] IN BLOOD BY AUTOMATED COUNT: 12.8 % (ref 11.5–14.5)
ERYTHROCYTE [SEDIMENTATION RATE] IN BLOOD BY WESTERGREN METHOD: 25 MM/HR (ref 0–20)
EST. GFR  (NO RACE VARIABLE): >60 ML/MIN/1.73 M^2
GLUCOSE SERPL-MCNC: 113 MG/DL (ref 70–110)
HCT VFR BLD AUTO: 37.7 % (ref 37–48.5)
HGB BLD-MCNC: 12.3 G/DL (ref 12–16)
IMM GRANULOCYTES # BLD AUTO: 0.01 K/UL (ref 0–0.04)
IMM GRANULOCYTES NFR BLD AUTO: 0.2 % (ref 0–0.5)
LYMPHOCYTES # BLD AUTO: 1.4 K/UL (ref 1–4.8)
LYMPHOCYTES NFR BLD: 27.9 % (ref 18–48)
MCH RBC QN AUTO: 30.6 PG (ref 27–31)
MCHC RBC AUTO-ENTMCNC: 32.6 G/DL (ref 32–36)
MCV RBC AUTO: 94 FL (ref 82–98)
MONOCYTES # BLD AUTO: 0.4 K/UL (ref 0.3–1)
MONOCYTES NFR BLD: 8.5 % (ref 4–15)
NEUTROPHILS # BLD AUTO: 3 K/UL (ref 1.8–7.7)
NEUTROPHILS NFR BLD: 59.6 % (ref 38–73)
NRBC BLD-RTO: 0 /100 WBC
PLATELET # BLD AUTO: 192 K/UL (ref 150–450)
PMV BLD AUTO: 10.2 FL (ref 9.2–12.9)
POTASSIUM SERPL-SCNC: 4 MMOL/L (ref 3.5–5.1)
PROT SERPL-MCNC: 7.1 G/DL (ref 6–8.4)
RBC # BLD AUTO: 4.02 M/UL (ref 4–5.4)
SODIUM SERPL-SCNC: 144 MMOL/L (ref 136–145)
WBC # BLD AUTO: 4.95 K/UL (ref 3.9–12.7)

## 2023-11-30 PROCEDURE — 86140 C-REACTIVE PROTEIN: CPT | Performed by: PHYSICIAN ASSISTANT

## 2023-11-30 PROCEDURE — 85651 RBC SED RATE NONAUTOMATED: CPT | Performed by: PHYSICIAN ASSISTANT

## 2023-11-30 PROCEDURE — 85025 COMPLETE CBC W/AUTO DIFF WBC: CPT | Performed by: PHYSICIAN ASSISTANT

## 2023-11-30 PROCEDURE — 80053 COMPREHEN METABOLIC PANEL: CPT | Performed by: PHYSICIAN ASSISTANT

## 2023-11-30 PROCEDURE — 36415 COLL VENOUS BLD VENIPUNCTURE: CPT | Performed by: PHYSICIAN ASSISTANT

## 2023-12-03 DIAGNOSIS — Z71.89 COMPLEX CARE COORDINATION: ICD-10-CM

## 2023-12-07 ENCOUNTER — OFFICE VISIT (OUTPATIENT)
Dept: RHEUMATOLOGY | Facility: CLINIC | Age: 73
End: 2023-12-07
Payer: MEDICARE

## 2023-12-07 VITALS
DIASTOLIC BLOOD PRESSURE: 67 MMHG | SYSTOLIC BLOOD PRESSURE: 113 MMHG | BODY MASS INDEX: 30.82 KG/M2 | HEIGHT: 63 IN | WEIGHT: 173.94 LBS | HEART RATE: 70 BPM

## 2023-12-07 DIAGNOSIS — Z79.899 HIGH RISK MEDICATION USE: ICD-10-CM

## 2023-12-07 DIAGNOSIS — D84.821 IMMUNOCOMPROMISED STATE DUE TO DRUG THERAPY: ICD-10-CM

## 2023-12-07 DIAGNOSIS — R13.10 DYSPHAGIA, UNSPECIFIED TYPE: ICD-10-CM

## 2023-12-07 DIAGNOSIS — Z79.899 IMMUNOCOMPROMISED STATE DUE TO DRUG THERAPY: ICD-10-CM

## 2023-12-07 DIAGNOSIS — Z51.81 MEDICATION MONITORING ENCOUNTER: ICD-10-CM

## 2023-12-07 DIAGNOSIS — M35.01 SJOGREN'S SYNDROME WITH KERATOCONJUNCTIVITIS SICCA: Primary | ICD-10-CM

## 2023-12-07 PROCEDURE — 99999 PR PBB SHADOW E&M-EST. PATIENT-LVL III: ICD-10-PCS | Mod: PBBFAC,,, | Performed by: PHYSICIAN ASSISTANT

## 2023-12-07 PROCEDURE — 99999 PR PBB SHADOW E&M-EST. PATIENT-LVL III: CPT | Mod: PBBFAC,,, | Performed by: PHYSICIAN ASSISTANT

## 2023-12-07 PROCEDURE — 99213 OFFICE O/P EST LOW 20 MIN: CPT | Mod: PBBFAC | Performed by: PHYSICIAN ASSISTANT

## 2023-12-07 PROCEDURE — 99214 PR OFFICE/OUTPT VISIT, EST, LEVL IV, 30-39 MIN: ICD-10-PCS | Mod: S$PBB,,, | Performed by: PHYSICIAN ASSISTANT

## 2023-12-07 PROCEDURE — 99214 OFFICE O/P EST MOD 30 MIN: CPT | Mod: S$PBB,,, | Performed by: PHYSICIAN ASSISTANT

## 2023-12-07 RX ORDER — HYDROXYCHLOROQUINE SULFATE 200 MG/1
400 TABLET, FILM COATED ORAL DAILY
Qty: 180 TABLET | Refills: 3 | OUTPATIENT
Start: 2023-12-07 | End: 2023-12-07 | Stop reason: SDUPTHER

## 2023-12-07 RX ORDER — HYDROXYCHLOROQUINE SULFATE 200 MG/1
400 TABLET, FILM COATED ORAL DAILY
Qty: 180 TABLET | Refills: 3 | Status: SHIPPED | OUTPATIENT
Start: 2023-12-07

## 2023-12-07 NOTE — PROGRESS NOTES
Subjective:      Patient ID: Latonia Oliver is a 73 y.o. female.    Chief Complaint: Disease Management    HPI   Latonia Oliver  is a 73 y.o. female who is here for f/u on Sjogren's disease.  She is on Plaquenil 400 mg once daily.  She is tolerating that dose well without any complications.  Dry eyes dry mouth are well controlled at this point.  SS is stable.  Been on HCQ 10+ years.  Recent eye exam done 8/2023.  She feels HCQ through [facility] because it is much cheaper.  She denies night sweats, bone pain, lymphadenopathy, unexplained fevers    In past back pain has been a problem.  Got so bad in the last 6 mos that she was having increased depression.  Last visit, I gave an IM celestone injection which has helped.  Previously saw Dr. Erickson at [facility].      Also with history of fibromyalgia.  Doing fairly well from that.  On cymbalta 60 mg daily.  Off celexa and gabapentin (ineffective).      H/o Osteopenia - had recent dexa w PCP.  No recent fx.    Patient is complaining of dysphagia today.  Occurs when swallowing dry food.  Has not seen GI.  denies fevers, chills, photosensitivity, eye pain, shortness of breath, chest pain, hematuria, blood in the stool, rash, sicca symptoms, raynauds, finger ulcerations.  Rheumatologic systems otherwise negative.    Serologies/Labs:  (+) PAUL 1;1280 speckled, (+) SSA/SSB, o/w negative profile  Low titer RF 2005 but negative in 2013  Negative CCP  Neg scleroderma work up  Current Treatment:  Plaquenil 400 mg once daily  Cymbalta 60 mg daily  Previous Treatment:   Celexa  Gabapentin    Current Outpatient Medications:     amLODIPine (NORVASC) 10 MG tablet, Take 1 tablet by mouth once daily, Disp: 90 tablet, Rfl: 3    atorvastatin (LIPITOR) 40 MG tablet, Take 1 tablet (40 mg total) by mouth once daily., Disp: 90 tablet, Rfl: 3    benazepriL (LOTENSIN) 40 MG tablet, Take 1 tablet by mouth once daily, Disp: 90 tablet, Rfl: 2    carvediloL (COREG) 6.25 MG tablet, TAKE 1 TABLET BY MOUTH  TWICE DAILY WITH MEALS, Disp: 180 tablet, Rfl: 3    clonazePAM (KLONOPIN) 0.5 MG tablet, Take 2 tablets (1 mg total) by mouth nightly as needed for Anxiety (insomnia)., Disp: 60 tablet, Rfl: 5    DULoxetine (CYMBALTA) 30 MG capsule, Take 1 capsule (30 mg total) by mouth 2 (two) times daily., Disp: 60 capsule, Rfl: 11    fluticasone propionate (FLONASE) 50 mcg/actuation nasal spray, 2 sprays (100 mcg total) by Each Nostril route once daily., Disp: 15.8 mL, Rfl: 5    hydrOXYzine pamoate (VISTARIL) 25 MG Cap, Take 1 capsule (25 mg total) by mouth nightly as needed (insomnia)., Disp: 30 capsule, Rfl: 5    semaglutide (OZEMPIC) 2 mg/dose (8 mg/3 mL) PnIj, INJECT 2 MG  SUBCUTANEOUSLY ONCE A WEEK, Disp: 3 mL, Rfl: 6    hydroxychloroquine (PLAQUENIL) 200 mg tablet, Take 2 tablets (400 mg total) by mouth once daily., Disp: 180 tablet, Rfl: 3    zolpidem (AMBIEN) 5 MG Tab, Take 1 tablet (5 mg total) by mouth nightly as needed (insomnia)., Disp: 30 tablet, Rfl: 0    Past Medical History:   Diagnosis Date    Anxiety     Arthritis     Back pain     Basal cell carcinoma (BCC)     Breast cancer     Breast cancer     Depression     Diabetes mellitus type 2, controlled     Enthesopathy of hip 12/11/2014    Fibromyalgia     GERD (gastroesophageal reflux disease)     HTN (hypertension)     Irregular heart beat     Mixed hyperlipidemia     Obesity     Osteoporosis     Rash and nonspecific skin eruption 6/15/2015    Sjogren - Flo's syndrome     Sleep apnea     Trouble in sleeping     Type 2 diabetes mellitus      Family History   Problem Relation Age of Onset    Heart disease Mother     Hypertension Mother     COPD Mother     Diabetes Father     Stroke Father     Kidney disease Father      Social History     Socioeconomic History    Marital status:     Number of children: 1   Occupational History     Employer: [employer]    Tobacco Use    Smoking status: Never     Passive exposure: Past    Smokeless tobacco: Never   Substance  and Sexual Activity    Alcohol use: Yes     Comment: Socially    Drug use: No    Sexual activity: Not Currently     Partners: Male     Social Determinants of Health     Financial Resource Strain: Low Risk  (9/15/2022)    Overall Financial Resource Strain (CARDIA)     Difficulty of Paying Living Expenses: Not very hard   Food Insecurity: No Food Insecurity (9/15/2022)    Hunger Vital Sign     Worried About Running Out of Food in the Last Year: Never true     Ran Out of Food in the Last Year: Never true   Transportation Needs: Unknown (9/15/2022)    PRAPARE - Transportation     Lack of Transportation (Medical): Patient refused     Lack of Transportation (Non-Medical): Patient refused   Physical Activity: Inactive (9/15/2022)    Exercise Vital Sign     Days of Exercise per Week: 0 days     Minutes of Exercise per Session: 0 min   Stress: No Stress Concern Present (9/15/2022)    [employer] [employer] - Occupational Stress Questionnaire     Feeling of Stress : Not at all   Recent Concern: Stress - Stress Concern Present (6/29/2022)    [place] [employer] - Occupational Stress Questionnaire     Feeling of Stress : Rather much   Social Connections: Unknown (9/15/2022)    Social Connection and Isolation Panel [NHANES]     Frequency of Communication with Friends and Family: Patient refused     Frequency of Social Gatherings with Friends and Family: Patient refused     Active Member of Clubs or Organizations: Yes     Attends Club or Organization Meetings: More than 4 times per year     Marital Status:    Housing Stability: Low Risk  (9/15/2022)    Housing Stability Vital Sign     Unable to Pay for Housing in the Last Year: No     Number of Places Lived in the Last Year: 1     Unstable Housing in the Last Year: No     Review of patient's allergies indicates:   Allergen Reactions    Penicillins      Other reaction(s): Rash  Other reaction(s): Difficulty breathing       Objective:  "  /67   Pulse 70   Ht 5' 2.52" (1.588 m)   Wt 78.9 kg (173 lb 15.1 oz)   BMI 31.29 kg/m²   Immunization History   Administered Date(s) Administered    Influenza (FLUAD) - Quadrivalent - Adjuvanted - PF *Preferred* (65+) 10/25/2023    Influenza - High Dose - PF (65 years and older) 11/04/2015, 10/07/2016, 09/05/2017, 10/23/2018, 10/22/2019    Influenza - Quadrivalent - High Dose - PF (65 years and older) 09/24/2020, 10/07/2021, 10/18/2022    Influenza - Quadrivalent - PF *Preferred* (6 months and older) 10/27/2014    Influenza A (H1N1) 2009 Monovalent - IM 04/23/2010    Pneumococcal Conjugate - 13 Valent 11/04/2015    Pneumococcal Polysaccharide - 23 Valent 12/08/2008, 04/25/2017    Tdap 04/25/2017    Zoster 02/16/2009    Zoster Recombinant 03/05/2020, 10/18/2022, 03/01/2023       Physical Exam   Constitutional: She is oriented to person, place, and time. No distress.   HENT:   Head: Normocephalic and atraumatic.   Pulmonary/Chest: Effort normal.   Abdominal: She exhibits no distension.   Musculoskeletal:         General: No swelling or tenderness. Normal range of motion.      Cervical back: Normal range of motion.   Lymphadenopathy:     She has no cervical adenopathy.   Neurological: She is alert and oriented to person, place, and time.   Skin: Skin is warm and dry. No rash noted.   Psychiatric: Mood normal.   Nursing note and vitals reviewed.    No synovitis, no dactylitis, no enthesitis  No effusions of large or small joints  100% fist formation  Well preserved ROM        Recent Results (from the past 672 hour(s))   CBC Auto Differential    Collection Time: 11/30/23 11:36 AM   Result Value Ref Range    WBC 4.95 3.90 - 12.70 K/uL    RBC 4.02 4.00 - 5.40 M/uL    Hemoglobin 12.3 12.0 - 16.0 g/dL    Hematocrit 37.7 37.0 - 48.5 %    MCV 94 82 - 98 fL    MCH 30.6 27.0 - 31.0 pg    MCHC 32.6 32.0 - 36.0 g/dL    RDW 12.8 11.5 - 14.5 %    Platelets 192 150 - 450 K/uL    MPV 10.2 9.2 - 12.9 fL    Immature " "Granulocytes 0.2 0.0 - 0.5 %    Gran # (ANC) 3.0 1.8 - 7.7 K/uL    Immature Grans (Abs) 0.01 0.00 - 0.04 K/uL    Lymph # 1.4 1.0 - 4.8 K/uL    Mono # 0.4 0.3 - 1.0 K/uL    Eos # 0.1 0.0 - 0.5 K/uL    Baso # 0.06 0.00 - 0.20 K/uL    nRBC 0 0 /100 WBC    Gran % 59.6 38.0 - 73.0 %    Lymph % 27.9 18.0 - 48.0 %    Mono % 8.5 4.0 - 15.0 %    Eosinophil % 2.6 0.0 - 8.0 %    Basophil % 1.2 0.0 - 1.9 %    Differential Method Automated    Comprehensive Metabolic Panel    Collection Time: 11/30/23 11:36 AM   Result Value Ref Range    Sodium 144 136 - 145 mmol/L    Potassium 4.0 3.5 - 5.1 mmol/L    Chloride 105 95 - 110 mmol/L    CO2 27 23 - 29 mmol/L    Glucose 113 (H) 70 - 110 mg/dL    BUN 8 8 - 23 mg/dL    Creatinine 0.8 0.5 - 1.4 mg/dL    Calcium 9.0 8.7 - 10.5 mg/dL    Total Protein 7.1 6.0 - 8.4 g/dL    Albumin 4.0 3.5 - 5.2 g/dL    Total Bilirubin 0.4 0.1 - 1.0 mg/dL    Alkaline Phosphatase 68 55 - 135 U/L    AST 30 10 - 40 U/L    ALT 40 10 - 44 U/L    eGFR >60 >60 mL/min/1.73 m^2    Anion Gap 12 8 - 16 mmol/L   C-Reactive Protein    Collection Time: 11/30/23 11:36 AM   Result Value Ref Range    CRP 1.0 0.0 - 8.2 mg/L   Sedimentation rate    Collection Time: 11/30/23 11:36 AM   Result Value Ref Range    Sed Rate 25 (H) 0 - 20 mm/Hr         No results found for: "TBGOLDPLUS"   Lab Results   Component Value Date    HEPCAB Negative 07/08/2016        Imaging  I have personally reviewed images and reports as below.  I agree with the interpretation.  MRI Lumbar Spine Without Contrast  Order: [id number]  Status: Final result    Visible to patient: Yes (seen)    Next appt: Today at 04:05 PM in Lab (LABORATORY, [facility])    Dx: Low back pain, non-specific    0 Result Notes    1 Patient Communication    Details    Reading Physician Reading Date Result Priority   Huan Valderrama MD  [phone] 2/5/2020 Routine     Narrative & Impression  EXAMINATION:  MRI LUMBAR SPINE WITHOUT CONTRAST     CLINICAL HISTORY:  Low back pain, >6wks " conservative tx, persistent-progressive sx, surgical candidate;Low back pain, rapidly progressive neuro deficit; Low back pain     TECHNIQUE:  Multiplanar, multisequence MR images were acquired from the thoracolumbar junction to the sacrum without the administration of contrast.     COMPARISON:  MRI lumbar spine from 03/18/2016     FINDINGS:  No fracture.  Minimal grade 1 anterolisthesis of L3 on L4 which is unchanged.  Multilevel degenerative changes with mild spurring of the endplates.  Disc space narrowing at L3-L4.  There is lower lumbar facet arthropathy.  Overall, findings are similar to slightly progressed since the comparison exam.  No marrow replacement process.  Mild edema within the subcutaneous soft tissues of the lower back.  Visualized distal cord demonstrates normal signal.     L1-L2: No spinal canal or neural foraminal encroachment     L2-L3: No spinal canal or neural foraminal encroachment.     L3-L4: Ligamentum flavum and mild facet arthropathy.  Grade 1 anterolisthesis.  Mild posterior disc bulge is seen.  No significant spinal canal or neural foraminal encroachment.     L4-L5: Mild facet arthropathy.  Mild posterior disc bulge.  Otherwise unremarkable.     L5-S1: Central and left paracentral disc protrusion.  No spinal canal stenosis.  There is asymmetric narrowing of the left neural foramen.     Impression:     Discogenic degenerative changes as described above.        Electronically signed by: Huan Valderrama MD  Date:                                            02/05/2020  Time:                                           14:14     DXA Bone Density Axial Skeleton 1 or more sites  Narrative: EXAMINATION:  DXA BONE DENSITY AXIAL SKELETON 1 OR MORE SITES    CLINICAL HISTORY:  Other specified disorders of bone density and structure, unspecified site    TECHNIQUE:  DXA scanning was performed over the left hip and lumbar spine.  Review of the images confirms satisfactory positioning and  technique.    COMPARISON:  07/22/2020    FINDINGS:  The L1 to L4 vertebral bone mineral density is equal to 0.921 g/cm squared with a T score of -2.2.  There has been a -10.6% statistically significant change relative to the prior study.    The left femoral neck bone mineral density is equal to 0.839 g/cm squared with a T score of -1.4.  There has been  a -12.8% statistically significant change relative to the prior study.    There is a 16.1% risk of a major osteoporotic fracture and a 2.6% risk of hip fracture in the next 10 years (FRAX).  Impression: Osteopenia    Consider FDA approved medical therapies in postmenopausal women and men aged 50 years and older, based on the following:    *A hip or vertebral (clinical or morphometric) fracture  *T score less than or equal to -2.5 at the femoral neck or spine after appropriate evaluation to exclude secondary causes.  *Low bone mass -- also known as osteopenia (T score between -1.0 and -2.5 at the femoral neck or spine) and a 10 year probability of hip fracture greater than or equal to 3% or a 10 year probability of major osteoporosis-related fracture greater than or equal to 20% based on the [place]-adapted WHO algorithm.  *Clinicians judgment and/or patient preference may indicate treatment for people with 10 year fracture probabilities is above or below these levels.    Electronically signed by: SHON Hurt MD  Date:    10/26/2023  Time:    13:59        Assessment:     1. Sjogren's syndrome with keratoconjunctivitis sicca    2. Dysphagia, unspecified type    3. Medication monitoring encounter    4. High risk medication use    5. Immunocompromised state due to drug therapy        Plan:     Latonia was seen today for disease management.    Diagnoses and all orders for this visit:    Sjogren's syndrome with keratoconjunctivitis sicca  -     Discontinue: hydroxychloroquine (PLAQUENIL) 200 mg tablet; Take 2 tablets (400 mg total) by mouth once daily.  -      hydroxychloroquine (PLAQUENIL) 200 mg tablet; Take 2 tablets (400 mg total) by mouth once daily.    Dysphagia, unspecified type    Medication monitoring encounter    High risk medication use    Immunocompromised state due to drug therapy      Sjogren's Syndrome   Labs reviewed and stable as above  Min elevation ESR (corrected for age is wnl),   Prior SPEP/SANDIP w monoclonal gamapathy  Pt has been referred to heme - had appt last mos  Will follow w heme every 3 mos  Discussed conservative treatment  Xerostomia  Biotene products and Xylimelt Lozenges  Avoid sleeping w fan on  Limit alcohol, coffee, nicotene and cola intake  Keratoconjunctivitis Sicca  OTC Refresh or Systane drops.    Limit alcohol/nicotene intake  Consider humidifier  Avoid using fans  Discussed lymphoma risk - no night sweats, bone pain, unexplained fevers, cryoglobulins previously absent  Ok to continue Plaquenil 400 mg bid   Discussed risks and benefits  yearly eye exams  Saw Dr. Beckett 8/2023  I have personally reivewed external OV notes today  No evidence of maculopathy  Dysphagia   Scleroderma w/u negative  GI referral ordered - pt hasn't seen them  Tends to be w dry foods - related to ss is a possibility  H/o osteopenia low FRAX   Recent dexa reviewed  Osteopenia low frax  BMD declining  DEXA due 2025  Recommend appropriate calcium and vit d   Drug therapy requiring intensive monitoring for toxicity  High Risk Medication Monitoring encounter  No current medication related issues, no evidence of toxicity  I ordered labs for toxicity monitoring, have personally reviewed the findings, and discussed them with the patient.  Pending labs will be sent via the portal  Compromised immune system secondary to autoimmune disease and/or use of immunosuppressive drugs.  Monitor carefully for infections.  Advised patient to get immediate medical care if any infection arises.  Also advised strict adherence age-appropriate vaccinations and cancer screenings with  PCP.  Patient advised to hold DMARD and/or biologic therapy for signs of infection or for surgery. If you are unsure what to do please call our office for instruction.[facility] [phone]  Return to clinic: 4-6 mos reg 4 prior    40 minutes of total time spent on the encounter, which includes face to face time and non-face to face time preparing to see the patient (eg, review of tests), Obtaining and/or reviewing separately obtained history, Documenting clinical information in the electronic or other health record, Independently interpreting results (not separately reported) and communicating results to the patient/family/caregiver, or Care coordination (not separately reported).     Follow up in about 6 months (around 6/7/2024).    The patient understands, chooses and consents to this plan and accepts all the risks which include but are not limited to the risks mentioned above.     Disclaimer: This note was prepared using a voice recognition system and is likely to have sound alike errors within the text.

## 2024-02-07 RX ORDER — SEMAGLUTIDE 2.68 MG/ML
2 INJECTION, SOLUTION SUBCUTANEOUS WEEKLY
Qty: 9 ML | Refills: 0 | Status: SHIPPED | OUTPATIENT
Start: 2024-02-07 | End: 2024-04-26 | Stop reason: SDUPTHER

## 2024-02-07 NOTE — TELEPHONE ENCOUNTER
Care Due:                  Date            Visit Type   Department     Provider  --------------------------------------------------------------------------------                                EP -                              PRIMARY      [facility] INTER[facility]  Last Visit: 10-      CARE ([facility])   WILBERTO Combs                              EP -                              PRIMARY      [facility] INTER[facility]  Next Visit: 04-      CARE ([facility])   WILBERTO Combs                                                            Last  Test          Frequency    Reason                     Performed    Due Date  --------------------------------------------------------------------------------    HBA1C.......  6 months...  semaglutide..............  07- 01-    [facility] Embedded Care Due Messages. Reference number: [id number].   2/07/2024 6:01:58 AM CST

## 2024-02-07 NOTE — TELEPHONE ENCOUNTER
Refill Routing Note   Medication(s) are not appropriate for processing by [facility] for the following reason(s):        Required labs outdated: FLOS 4.22.2024    OR action(s):  Defer      Medication Therapy Plan:         Appointments  past 12m or future 3m with PCP    Date Provider   Last Visit   10/25/2023 Elias Combs MD   Next Visit   4/26/2024 Elias Combs MD   ED visits in past 90 days: 0        Note composed:10:20 AM 02/07/2024

## 2024-02-08 ENCOUNTER — LAB VISIT (OUTPATIENT)
Dept: LAB | Facility: HOSPITAL | Age: 74
End: 2024-02-08
Attending: NURSE PRACTITIONER
Payer: MEDICARE

## 2024-02-08 DIAGNOSIS — D47.2 MONOCLONAL PARAPROTEINEMIA: ICD-10-CM

## 2024-02-08 LAB
ALBUMIN SERPL BCP-MCNC: 4.1 G/DL (ref 3.5–5.2)
ALP SERPL-CCNC: 69 U/L (ref 55–135)
ALT SERPL W/O P-5'-P-CCNC: 42 U/L (ref 10–44)
ANION GAP SERPL CALC-SCNC: 9 MMOL/L (ref 8–16)
AST SERPL-CCNC: 35 U/L (ref 10–40)
BASOPHILS # BLD AUTO: 0.08 K/UL (ref 0–0.2)
BASOPHILS NFR BLD: 1.6 % (ref 0–1.9)
BILIRUB SERPL-MCNC: 0.6 MG/DL (ref 0.1–1)
BUN SERPL-MCNC: 11 MG/DL (ref 8–23)
CALCIUM SERPL-MCNC: 9.7 MG/DL (ref 8.7–10.5)
CHLORIDE SERPL-SCNC: 106 MMOL/L (ref 95–110)
CO2 SERPL-SCNC: 29 MMOL/L (ref 23–29)
CREAT SERPL-MCNC: 0.8 MG/DL (ref 0.5–1.4)
DIFFERENTIAL METHOD BLD: NORMAL
EOSINOPHIL # BLD AUTO: 0.1 K/UL (ref 0–0.5)
EOSINOPHIL NFR BLD: 2.6 % (ref 0–8)
ERYTHROCYTE [DISTWIDTH] IN BLOOD BY AUTOMATED COUNT: 13.2 % (ref 11.5–14.5)
EST. GFR  (NO RACE VARIABLE): >60 ML/MIN/1.73 M^2
GLUCOSE SERPL-MCNC: 95 MG/DL (ref 70–110)
HCT VFR BLD AUTO: 39 % (ref 37–48.5)
HGB BLD-MCNC: 12.9 G/DL (ref 12–16)
IGA SERPL-MCNC: 199 MG/DL (ref 40–350)
IGG SERPL-MCNC: 1119 MG/DL (ref 650–1600)
IGM SERPL-MCNC: 73 MG/DL (ref 50–300)
IMM GRANULOCYTES # BLD AUTO: 0.01 K/UL (ref 0–0.04)
IMM GRANULOCYTES NFR BLD AUTO: 0.2 % (ref 0–0.5)
LDH SERPL L TO P-CCNC: 172 U/L (ref 110–260)
LYMPHOCYTES # BLD AUTO: 1.2 K/UL (ref 1–4.8)
LYMPHOCYTES NFR BLD: 24.7 % (ref 18–48)
MCH RBC QN AUTO: 30.5 PG (ref 27–31)
MCHC RBC AUTO-ENTMCNC: 33.1 G/DL (ref 32–36)
MCV RBC AUTO: 92 FL (ref 82–98)
MONOCYTES # BLD AUTO: 0.5 K/UL (ref 0.3–1)
MONOCYTES NFR BLD: 9.6 % (ref 4–15)
NEUTROPHILS # BLD AUTO: 3.1 K/UL (ref 1.8–7.7)
NEUTROPHILS NFR BLD: 61.3 % (ref 38–73)
NRBC BLD-RTO: 0 /100 WBC
PLATELET # BLD AUTO: 203 K/UL (ref 150–450)
PMV BLD AUTO: 9.8 FL (ref 9.2–12.9)
POTASSIUM SERPL-SCNC: 4 MMOL/L (ref 3.5–5.1)
PROT SERPL-MCNC: 7.4 G/DL (ref 6–8.4)
RBC # BLD AUTO: 4.23 M/UL (ref 4–5.4)
SODIUM SERPL-SCNC: 144 MMOL/L (ref 136–145)
WBC # BLD AUTO: 5.02 K/UL (ref 3.9–12.7)

## 2024-02-08 PROCEDURE — 82232 ASSAY OF BETA-2 PROTEIN: CPT | Performed by: NURSE PRACTITIONER

## 2024-02-08 PROCEDURE — 86334 IMMUNOFIX E-PHORESIS SERUM: CPT | Mod: 26,,, | Performed by: PATHOLOGY

## 2024-02-08 PROCEDURE — 36415 COLL VENOUS BLD VENIPUNCTURE: CPT | Performed by: NURSE PRACTITIONER

## 2024-02-08 PROCEDURE — 86334 IMMUNOFIX E-PHORESIS SERUM: CPT | Performed by: NURSE PRACTITIONER

## 2024-02-08 PROCEDURE — 83615 LACTATE (LD) (LDH) ENZYME: CPT | Performed by: NURSE PRACTITIONER

## 2024-02-08 PROCEDURE — 83521 IG LIGHT CHAINS FREE EACH: CPT | Mod: 59 | Performed by: NURSE PRACTITIONER

## 2024-02-08 PROCEDURE — 80053 COMPREHEN METABOLIC PANEL: CPT | Performed by: NURSE PRACTITIONER

## 2024-02-08 PROCEDURE — 82784 ASSAY IGA/IGD/IGG/IGM EACH: CPT | Mod: 59 | Performed by: NURSE PRACTITIONER

## 2024-02-08 PROCEDURE — 85025 COMPLETE CBC W/AUTO DIFF WBC: CPT | Performed by: NURSE PRACTITIONER

## 2024-02-09 LAB
B2 MICROGLOB SERPL-MCNC: 2.2 UG/ML (ref 0–2.5)
INTERPRETATION SERPL IFE-IMP: NORMAL
KAPPA LC SER QL IA: 2.18 MG/DL (ref 0.33–1.94)
KAPPA LC/LAMBDA SER IA: 1.5 (ref 0.26–1.65)
LAMBDA LC SER QL IA: 1.45 MG/DL (ref 0.57–2.63)
PATHOLOGIST INTERPRETATION IFE: NORMAL

## 2024-02-16 ENCOUNTER — OFFICE VISIT (OUTPATIENT)
Dept: HEMATOLOGY/ONCOLOGY | Facility: CLINIC | Age: 74
End: 2024-02-16
Payer: MEDICARE

## 2024-02-16 VITALS
WEIGHT: 172.38 LBS | DIASTOLIC BLOOD PRESSURE: 78 MMHG | OXYGEN SATURATION: 99 % | HEART RATE: 67 BPM | BODY MASS INDEX: 31.01 KG/M2 | TEMPERATURE: 97 F | SYSTOLIC BLOOD PRESSURE: 148 MMHG

## 2024-02-16 DIAGNOSIS — D47.2 MONOCLONAL PARAPROTEINEMIA: ICD-10-CM

## 2024-02-16 DIAGNOSIS — E66.9 OBESITY (BMI 30-39.9): Primary | ICD-10-CM

## 2024-02-16 DIAGNOSIS — R53.82 CHRONIC FATIGUE: ICD-10-CM

## 2024-02-16 PROCEDURE — 99999 PR PBB SHADOW E&M-EST. PATIENT-LVL III: CPT | Mod: PBBFAC,,, | Performed by: NURSE PRACTITIONER

## 2024-02-16 PROCEDURE — 99214 OFFICE O/P EST MOD 30 MIN: CPT | Mod: S$PBB,,, | Performed by: NURSE PRACTITIONER

## 2024-02-16 PROCEDURE — 99213 OFFICE O/P EST LOW 20 MIN: CPT | Mod: PBBFAC | Performed by: NURSE PRACTITIONER

## 2024-02-16 NOTE — PROGRESS NOTES
Subjective:       Patient ID: Latonia Oliver is a 73 y.o. female.    Chief Complaint: Review labs. Follow up    HPI: 73 y.o female with h/o right breast cancer 15 years ago, DM II, Sjogren's disease on hydroxychloroquine. Screening labs with Rheumatology including SPEP/SANDIP, free light chains with mild free light chain elevation, ratio 1.5 and faint juliann free light chain unquantified.  She was referred for Hematology for further evaluation of her abnormal lab. She does note following with outside Dermatologist and history of basal cell carcinoma skin.  Regarding her history of breast cancer diagnosed 15 years ago at outside institution received surgery lumpectomy right breast with positive margin re-resection sentinel lymph node positive with completion axillary dissection adjuvant chemotherapy and radiation and 1 year of anastrozole discontinued due to hair loss per patient report (no records available at time of visit).  She had not had subsequent follow-up with oncology or surveillance mammograms.    Today she presents for follow up of her lambda light chain lab. She notes feeling well and is without complaints. Intentional 50+lbs weight loss on Ozempic. Denies B symptoms.   Social History     Socioeconomic History    Marital status:     Number of children: 1   Occupational History     Employer: [employer]    Tobacco Use    Smoking status: Never     Passive exposure: Past    Smokeless tobacco: Never   Substance and Sexual Activity    Alcohol use: Yes     Comment: Socially    Drug use: No    Sexual activity: Not Currently     Partners: Male     Social Determinants of Health     Financial Resource Strain: Low Risk  (9/15/2022)    Overall Financial Resource Strain (CARDIA)     Difficulty of Paying Living Expenses: Not very hard   Food Insecurity: No Food Insecurity (9/15/2022)    Hunger Vital Sign     Worried About Running Out of Food in the Last Year: Never true     Ran Out of Food in the Last Year:  Never true   Transportation Needs: Unknown (9/15/2022)    PRAPARE - Transportation     Lack of Transportation (Medical): Patient declined     Lack of Transportation (Non-Medical): Patient declined   Physical Activity: Inactive (9/15/2022)    Exercise Vital Sign     Days of Exercise per Week: 0 days     Minutes of Exercise per Session: 0 min   Stress: No Stress Concern Present (9/15/2022)    [place] [employer] - Occupational Stress Questionnaire     Feeling of Stress : Not at all   Recent Concern: Stress - Stress Concern Present (6/29/2022)    [place] [employer] - Occupational Stress Questionnaire     Feeling of Stress : Rather much   Social Connections: Unknown (9/15/2022)    Social Connection and Isolation Panel [NHANES]     Frequency of Communication with Friends and Family: Patient declined     Frequency of Social Gatherings with Friends and Family: Patient declined     Active Member of Clubs or Organizations: Yes     Attends Club or Organization Meetings: More than 4 times per year     Marital Status:    Housing Stability: Low Risk  (9/15/2022)    Housing Stability Vital Sign     Unable to Pay for Housing in the Last Year: No     Number of Places Lived in the Last Year: 1     Unstable Housing in the Last Year: No       Past Medical History:   Diagnosis Date    Anxiety     Arthritis     Back pain     Basal cell carcinoma (BCC)     Breast cancer     Breast cancer     Depression     Diabetes mellitus type 2, controlled     Enthesopathy of hip 12/11/2014    Fibromyalgia     GERD (gastroesophageal reflux disease)     HTN (hypertension)     Irregular heart beat     Mixed hyperlipidemia     Obesity     Osteoporosis     Rash and nonspecific skin eruption 6/15/2015    Sjogren - Flo's syndrome     Sleep apnea     Trouble in sleeping     Type 2 diabetes mellitus        Family History   Problem Relation Age of Onset    Heart disease  Mother     Hypertension Mother     COPD Mother     Diabetes Father     Stroke Father     Kidney disease Father        Past Surgical History:   Procedure Laterality Date    APPENDECTOMY      BELT ABDOMINOPLASTY      BREAST LUMPECTOMY      BREAST SURGERY      breast augmentation     SECTION, CLASSIC      EPIDURAL STEROID INJECTION N/A 2022    Procedure: Lumbar L5/S1 IL NEELA with RN IV sedation;  Surgeon: Giovanny Arroyo MD;  Location: [facility]IN [facility];  Service: Pain Management;  Laterality: N/A;    INJECTION OF JOINT Right 2022    Procedure: Right knee Synvisc injection J 7325 with local;  Surgeon: Giovanny Arroyo MD;  Location: [facility]IN [facility];  Service: Pain Management;  Laterality: Right;    RECONSTRUCTION, NASAL VALVE Left 10/22/2021    Procedure: RECONSTRUCTION, NASAL VALVE ;  Surgeon: Faustino Cash MD;  Location: [facility] OR;  Service: ENT;  Laterality: Left;       Review of Systems   Constitutional:  Positive for fatigue. Negative for appetite change, chills, fever and unexpected weight change.   HENT:  Negative for congestion, mouth sores, nosebleeds, sore throat, trouble swallowing and voice change.    Respiratory:  Negative for cough, chest tightness, shortness of breath and wheezing.    Cardiovascular:  Negative for chest pain and leg swelling.   Gastrointestinal:  Negative for abdominal distention, abdominal pain, blood in stool, constipation, diarrhea, nausea and vomiting.   Genitourinary:  Negative for difficulty urinating, dysuria and hematuria.   Musculoskeletal:  Negative for arthralgias, back pain and myalgias.   Skin:  Negative for pallor, rash and wound.   Neurological:  Negative for dizziness, syncope, weakness and headaches.   Hematological:  Negative for adenopathy. Does not bruise/bleed easily.   Psychiatric/Behavioral:  The patient is nervous/anxious.          Medication List with Changes/Refills   Current Medications    AMLODIPINE (NORVASC) 10 MG TABLET    Take 1 tablet by  mouth once daily    ATORVASTATIN (LIPITOR) 40 MG TABLET    Take 1 tablet (40 mg total) by mouth once daily.    BENAZEPRIL (LOTENSIN) 40 MG TABLET    Take 1 tablet by mouth once daily    CARVEDILOL (COREG) 6.25 MG TABLET    TAKE 1 TABLET BY MOUTH TWICE DAILY WITH MEALS    CLONAZEPAM (KLONOPIN) 0.5 MG TABLET    Take 2 tablets (1 mg total) by mouth nightly as needed for Anxiety (insomnia).    DULOXETINE (CYMBALTA) 30 MG CAPSULE    Take 1 capsule (30 mg total) by mouth 2 (two) times daily.    FLUTICASONE PROPIONATE (FLONASE) 50 MCG/ACTUATION NASAL SPRAY    2 sprays (100 mcg total) by Each Nostril route once daily.    HYDROXYCHLOROQUINE (PLAQUENIL) 200 MG TABLET    Take 2 tablets (400 mg total) by mouth once daily.    HYDROXYZINE PAMOATE (VISTARIL) 25 MG CAP    Take 1 capsule (25 mg total) by mouth nightly as needed (insomnia).    SEMAGLUTIDE (OZEMPIC) 2 MG/DOSE (8 MG/3 ML) PNIJ    Inject 2 mg into the skin once a week.    ZOLPIDEM (AMBIEN) 5 MG TAB    Take 1 tablet (5 mg total) by mouth nightly as needed (insomnia).     Objective:     Vitals:    02/16/24 1319   BP: (!) 148/78   Pulse: 67   Temp: 97.4 °F (36.3 °C)     Lab Results   Component Value Date    WBC 5.02 02/08/2024    HGB 12.9 02/08/2024    HCT 39.0 02/08/2024    MCV 92 02/08/2024     02/08/2024       BMP  Lab Results   Component Value Date     02/08/2024    K 4.0 02/08/2024     02/08/2024    CO2 29 02/08/2024    BUN 11 02/08/2024    CREATININE 0.8 02/08/2024    CALCIUM 9.7 02/08/2024    ANIONGAP 9 02/08/2024    EGFRNORACEVR >60 02/08/2024     Lab Results   Component Value Date    ALT 42 02/08/2024    AST 35 02/08/2024    ALKPHOS 69 02/08/2024    BILITOT 0.6 02/08/2024         Physical Exam  Vitals reviewed.   Constitutional:       Appearance: She is well-developed.   HENT:      Head: Normocephalic.      Right Ear: Hearing and external ear normal. No drainage.      Left Ear: Hearing and external ear normal. No drainage.      Nose: Nose  normal.   Eyes:      General: Lids are normal. No scleral icterus.        Right eye: No discharge.         Left eye: No discharge.      Conjunctiva/sclera: Conjunctivae normal.   Neck:      Thyroid: No thyroid mass.   Cardiovascular:      Rate and Rhythm: Normal rate and regular rhythm.      Heart sounds: Normal heart sounds.   Pulmonary:      Effort: Pulmonary effort is normal. No respiratory distress.      Breath sounds: Normal breath sounds. No wheezing or rales.   Abdominal:      General: There is no distension.   Genitourinary:     Comments: deferred  Musculoskeletal:         General: Normal range of motion.      Cervical back: Normal range of motion.   Skin:     General: Skin is warm and dry.   Neurological:      Mental Status: She is alert and oriented to person, place, and time.   Psychiatric:         Speech: Speech normal.         Behavior: Behavior normal. Behavior is cooperative.         Thought Content: Thought content normal.        Assessment:     Problem List Items Addressed This Visit          Oncology    Monoclonal paraproteinemia     Faint free lambda light chain specific monoclonal band noted on SANDIP. No other concerning findings. No CRAB criteria. Possibly secondary to her autoimmune disorder. Recommend continued Rheumatology follow up.    Most recent labs without findings concerning for progression.  No clinical concerns noted. Continue to follow up labs Q 3-6 months         Relevant Orders    Beta 2 Microglobulin, Serum    CBC Auto Differential    Comprehensive Metabolic Panel    Immunofixation Electrophoresis    Immunoglobulin Free LT Chains Blood    Immunoglobulins (IgG, IgA, IgM) Quantitative    Protein Electrophoresis, Serum       Endocrine    Obesity (BMI 30-39.9) - Primary     Encourage healthy nutrition along with portion control. Encourage daily exercise    Reports 50+lbs weight loss since starting Ozempic            Other    Fatigue         Plan:     Obesity (BMI 30-39.9)    Monoclonal  paraproteinemia  -     Beta 2 Microglobulin, Serum; Future; Expected date: 02/16/2024  -     CBC Auto Differential; Future; Expected date: 02/16/2024  -     Comprehensive Metabolic Panel; Future; Expected date: 02/16/2024  -     Immunofixation Electrophoresis; Future; Expected date: 02/16/2024  -     Immunoglobulin Free LT Chains Blood; Future; Expected date: 02/16/2024  -     Immunoglobulins (IgG, IgA, IgM) Quantitative; Future; Expected date: 02/16/2024  -     Protein Electrophoresis, Serum; Future; Expected date: 02/16/2024    Chronic fatigue            Med Onc Chart Routing      Follow up with physician    Follow up with JASPAL 6 months.   Infusion scheduling note    Injection scheduling note    Labs Beta 2 microglobulin, CBC, CMP, free light chains, SPEP and other   Scheduling:  Preferred lab:  Lab interval:  labs 1 week prior   Imaging None      Pharmacy appointment No pharmacy appointment needed      Other referrals       No additional referrals needed         Shellie Frank, PREETIP-C

## 2024-02-16 NOTE — ASSESSMENT & PLAN NOTE
Faint free lambda light chain specific monoclonal band noted on SANDIP. No other concerning findings. No CRAB criteria. Possibly secondary to her autoimmune disorder. Recommend continued Rheumatology follow up.    Most recent labs without findings concerning for progression.  No clinical concerns noted. Continue to follow up labs Q 3-6 months

## 2024-03-04 RX ORDER — DULOXETIN HYDROCHLORIDE 30 MG/1
30 CAPSULE, DELAYED RELEASE ORAL 2 TIMES DAILY
Qty: 180 CAPSULE | Refills: 2 | Status: SHIPPED | OUTPATIENT
Start: 2024-03-04 | End: 2024-04-26

## 2024-03-04 NOTE — TELEPHONE ENCOUNTER
No care due was identified.  Health [facility] Embedded Care Due Messages. Reference number: [id number].   3/03/2024 7:12:50 PM CST

## 2024-03-04 NOTE — TELEPHONE ENCOUNTER
Refill Routing Note   Medication(s) are not appropriate for processing by [facility] for the following reason(s):        No active prescription written by provider  Responsible provider unclear  Required vitals abnormal    [facility] action(s):  Defer             Appointments  past 12m or future 3m with PCP    Date Provider   Last Visit   10/25/2023 Elias Combs MD   Next Visit   4/26/2024 Elias Combs MD   ED visits in past 90 days: 0        Note composed:7:23 PM 03/03/2024

## 2024-03-26 RX ORDER — ATORVASTATIN CALCIUM 40 MG/1
40 TABLET, FILM COATED ORAL
Qty: 90 TABLET | Refills: 1 | Status: SHIPPED | OUTPATIENT
Start: 2024-03-26

## 2024-03-26 NOTE — TELEPHONE ENCOUNTER
No care due was identified.  [facility] Embedded Care Due Messages. Reference number: [id number].   3/25/2024 7:16:41 PM CDT

## 2024-03-26 NOTE — TELEPHONE ENCOUNTER
Refill Decision Note   Latonia Oliver  is requesting a refill authorization.  Brief Assessment and Rationale for Refill:  Approve     Medication Therapy Plan:         Comments:     Note composed:12:00 PM 03/26/2024

## 2024-03-31 DIAGNOSIS — G47.01 INSOMNIA DUE TO MEDICAL CONDITION: ICD-10-CM

## 2024-03-31 DIAGNOSIS — G47.61 PERIODIC LIMB MOVEMENT SLEEP DISORDER: ICD-10-CM

## 2024-04-01 RX ORDER — CLONAZEPAM 0.5 MG/1
TABLET ORAL
Qty: 60 TABLET | Refills: 0 | Status: SHIPPED | OUTPATIENT
Start: 2024-04-01 | End: 2024-05-06

## 2024-04-04 NOTE — TELEPHONE ENCOUNTER
No care due was identified.  Health [facility] Embedded Care Due Messages. Reference number: [id number].   4/04/2024 1:20:16 PM CDT

## 2024-04-05 RX ORDER — CARVEDILOL 6.25 MG/1
TABLET ORAL
Qty: 180 TABLET | Refills: 1 | Status: SHIPPED | OUTPATIENT
Start: 2024-04-05

## 2024-04-05 NOTE — TELEPHONE ENCOUNTER
Refill Routing Note   Medication(s) are not appropriate for processing by [facility] for the following reason(s):        Required vitals abnormal    [facility] action(s):  Defer               Appointments  past 12m or future 3m with PCP    Date Provider   Last Visit   10/25/2023 Elias Combs MD   Next Visit   4/26/2024 Elias Combs MD   ED visits in past 90 days: 0        Note composed:2:55 PM 04/05/2024

## 2024-04-16 DIAGNOSIS — I10 ESSENTIAL HYPERTENSION: ICD-10-CM

## 2024-04-16 RX ORDER — AMLODIPINE BESYLATE 10 MG/1
TABLET ORAL
Qty: 90 TABLET | Refills: 0 | Status: SHIPPED | OUTPATIENT
Start: 2024-04-16 | End: 2024-04-26 | Stop reason: SDUPTHER

## 2024-04-16 NOTE — TELEPHONE ENCOUNTER
Refill Routing Note   Medication(s) are not appropriate for processing by [facility] for the following reason(s):        Required vitals abnormal    [facility] action(s):  Defer        Medication Therapy Plan: FLOS      Appointments  past 12m or future 3m with PCP    Date Provider   Last Visit   10/25/2023 Elias Combs MD   Next Visit   4/26/2024 Elias Combs MD   ED visits in past 90 days: 0        Note composed:1:34 PM 04/16/2024

## 2024-04-16 NOTE — TELEPHONE ENCOUNTER
Care Due:                  Date            Visit Type   Department     Provider  --------------------------------------------------------------------------------                                EP -                              PRIMARY      [facility] INTERNAL  Last Visit: 10-      CARE ([facility])   WILBERTO Combs                              EP -                              PRIMARY      [facility] INTERNAL  Next Visit: 04-      CARE ([facility])   WILBERTO Combs                                                            Last  Test          Frequency    Reason                     Performed    Due Date  --------------------------------------------------------------------------------    HBA1C.......  6 months...  semaglutide..............  07- 01-    Lipid Panel.  12 months..  atorvastatin.............  07- 07-    Health [facility] Embedded Care Due Messages. Reference number: [id number].   4/16/2024 6:02:48 AM CDT

## 2024-04-22 ENCOUNTER — LAB VISIT (OUTPATIENT)
Dept: LAB | Facility: HOSPITAL | Age: 74
End: 2024-04-22
Attending: FAMILY MEDICINE
Payer: MEDICARE

## 2024-04-22 DIAGNOSIS — Z79.899 ENCOUNTER FOR LONG-TERM (CURRENT) USE OF MEDICATIONS: ICD-10-CM

## 2024-04-22 LAB
ALBUMIN SERPL BCP-MCNC: 4 G/DL (ref 3.5–5.2)
ALP SERPL-CCNC: 62 U/L (ref 55–135)
ALT SERPL W/O P-5'-P-CCNC: 61 U/L (ref 10–44)
ANION GAP SERPL CALC-SCNC: 9 MMOL/L (ref 8–16)
AST SERPL-CCNC: 46 U/L (ref 10–40)
BASOPHILS # BLD AUTO: 0.07 K/UL (ref 0–0.2)
BASOPHILS NFR BLD: 1.3 % (ref 0–1.9)
BILIRUB SERPL-MCNC: 0.3 MG/DL (ref 0.1–1)
BUN SERPL-MCNC: 12 MG/DL (ref 8–23)
CALCIUM SERPL-MCNC: 9.6 MG/DL (ref 8.7–10.5)
CHLORIDE SERPL-SCNC: 106 MMOL/L (ref 95–110)
CHOLEST SERPL-MCNC: 125 MG/DL (ref 120–199)
CHOLEST/HDLC SERPL: 2.2 {RATIO} (ref 2–5)
CO2 SERPL-SCNC: 27 MMOL/L (ref 23–29)
CREAT SERPL-MCNC: 0.8 MG/DL (ref 0.5–1.4)
DIFFERENTIAL METHOD BLD: ABNORMAL
EOSINOPHIL # BLD AUTO: 0.2 K/UL (ref 0–0.5)
EOSINOPHIL NFR BLD: 3.1 % (ref 0–8)
ERYTHROCYTE [DISTWIDTH] IN BLOOD BY AUTOMATED COUNT: 13.2 % (ref 11.5–14.5)
EST. GFR  (NO RACE VARIABLE): >60 ML/MIN/1.73 M^2
ESTIMATED AVG GLUCOSE: 105 MG/DL (ref 68–131)
GLUCOSE SERPL-MCNC: 88 MG/DL (ref 70–110)
HBA1C MFR BLD: 5.3 % (ref 4–5.6)
HCT VFR BLD AUTO: 38.4 % (ref 37–48.5)
HDLC SERPL-MCNC: 57 MG/DL (ref 40–75)
HDLC SERPL: 45.6 % (ref 20–50)
HGB BLD-MCNC: 12.6 G/DL (ref 12–16)
IMM GRANULOCYTES # BLD AUTO: 0 K/UL (ref 0–0.04)
IMM GRANULOCYTES NFR BLD AUTO: 0 % (ref 0–0.5)
LDLC SERPL CALC-MCNC: 56.4 MG/DL (ref 63–159)
LYMPHOCYTES # BLD AUTO: 1.5 K/UL (ref 1–4.8)
LYMPHOCYTES NFR BLD: 27.7 % (ref 18–48)
MCH RBC QN AUTO: 31.3 PG (ref 27–31)
MCHC RBC AUTO-ENTMCNC: 32.8 G/DL (ref 32–36)
MCV RBC AUTO: 95 FL (ref 82–98)
MONOCYTES # BLD AUTO: 0.6 K/UL (ref 0.3–1)
MONOCYTES NFR BLD: 10.7 % (ref 4–15)
NEUTROPHILS # BLD AUTO: 3.1 K/UL (ref 1.8–7.7)
NEUTROPHILS NFR BLD: 57.2 % (ref 38–73)
NONHDLC SERPL-MCNC: 68 MG/DL
NRBC BLD-RTO: 0 /100 WBC
PLATELET # BLD AUTO: 195 K/UL (ref 150–450)
PMV BLD AUTO: 10.7 FL (ref 9.2–12.9)
POTASSIUM SERPL-SCNC: 4.3 MMOL/L (ref 3.5–5.1)
PROT SERPL-MCNC: 7.2 G/DL (ref 6–8.4)
RBC # BLD AUTO: 4.03 M/UL (ref 4–5.4)
SODIUM SERPL-SCNC: 142 MMOL/L (ref 136–145)
TRIGL SERPL-MCNC: 58 MG/DL (ref 30–150)
TSH SERPL DL<=0.005 MIU/L-ACNC: 2.31 UIU/ML (ref 0.4–4)
VIT B12 SERPL-MCNC: 749 PG/ML (ref 210–950)
WBC # BLD AUTO: 5.41 K/UL (ref 3.9–12.7)

## 2024-04-22 PROCEDURE — 36415 COLL VENOUS BLD VENIPUNCTURE: CPT | Performed by: FAMILY MEDICINE

## 2024-04-22 PROCEDURE — 83036 HEMOGLOBIN GLYCOSYLATED A1C: CPT | Performed by: FAMILY MEDICINE

## 2024-04-22 PROCEDURE — 80061 LIPID PANEL: CPT | Performed by: FAMILY MEDICINE

## 2024-04-22 PROCEDURE — 85025 COMPLETE CBC W/AUTO DIFF WBC: CPT | Performed by: FAMILY MEDICINE

## 2024-04-22 PROCEDURE — 84443 ASSAY THYROID STIM HORMONE: CPT | Performed by: FAMILY MEDICINE

## 2024-04-22 PROCEDURE — 80053 COMPREHEN METABOLIC PANEL: CPT | Performed by: FAMILY MEDICINE

## 2024-04-22 PROCEDURE — 82607 VITAMIN B-12: CPT | Performed by: FAMILY MEDICINE

## 2024-04-26 ENCOUNTER — TELEPHONE (OUTPATIENT)
Dept: PAIN MEDICINE | Facility: CLINIC | Age: 74
End: 2024-04-26
Payer: MEDICARE

## 2024-04-26 ENCOUNTER — OFFICE VISIT (OUTPATIENT)
Dept: INTERNAL MEDICINE | Facility: CLINIC | Age: 74
End: 2024-04-26
Payer: MEDICARE

## 2024-04-26 VITALS
OXYGEN SATURATION: 97 % | SYSTOLIC BLOOD PRESSURE: 100 MMHG | DIASTOLIC BLOOD PRESSURE: 60 MMHG | HEART RATE: 60 BPM | WEIGHT: 171.19 LBS | HEIGHT: 62 IN | BODY MASS INDEX: 31.5 KG/M2

## 2024-04-26 DIAGNOSIS — F32.A ANXIETY AND DEPRESSION: Primary | ICD-10-CM

## 2024-04-26 DIAGNOSIS — F41.9 ANXIETY AND DEPRESSION: Primary | ICD-10-CM

## 2024-04-26 DIAGNOSIS — F32.4 MAJOR DEPRESSIVE DISORDER IN PARTIAL REMISSION, UNSPECIFIED WHETHER RECURRENT: ICD-10-CM

## 2024-04-26 DIAGNOSIS — M85.89 OSTEOPENIA OF MULTIPLE SITES: ICD-10-CM

## 2024-04-26 DIAGNOSIS — Z79.899 ENCOUNTER FOR LONG-TERM (CURRENT) USE OF MEDICATIONS: ICD-10-CM

## 2024-04-26 DIAGNOSIS — E78.2 MIXED HYPERLIPIDEMIA: ICD-10-CM

## 2024-04-26 DIAGNOSIS — G89.29 CHRONIC LOW BACK PAIN, UNSPECIFIED BACK PAIN LATERALITY, UNSPECIFIED WHETHER SCIATICA PRESENT: ICD-10-CM

## 2024-04-26 DIAGNOSIS — R79.89 ELEVATED LIVER FUNCTION TESTS: ICD-10-CM

## 2024-04-26 DIAGNOSIS — I10 ESSENTIAL HYPERTENSION: ICD-10-CM

## 2024-04-26 DIAGNOSIS — E11.9 TYPE 2 DIABETES MELLITUS WITHOUT COMPLICATION, WITHOUT LONG-TERM CURRENT USE OF INSULIN: ICD-10-CM

## 2024-04-26 DIAGNOSIS — G47.33 OSA (OBSTRUCTIVE SLEEP APNEA): ICD-10-CM

## 2024-04-26 DIAGNOSIS — M54.50 CHRONIC LOW BACK PAIN, UNSPECIFIED BACK PAIN LATERALITY, UNSPECIFIED WHETHER SCIATICA PRESENT: ICD-10-CM

## 2024-04-26 DIAGNOSIS — R94.5 ABNORMAL RESULTS OF LIVER FUNCTION STUDIES: ICD-10-CM

## 2024-04-26 PROCEDURE — 99999 PR PBB SHADOW E&M-EST. PATIENT-LVL III: CPT | Mod: PBBFAC,,, | Performed by: FAMILY MEDICINE

## 2024-04-26 PROCEDURE — 99213 OFFICE O/P EST LOW 20 MIN: CPT | Mod: PBBFAC | Performed by: FAMILY MEDICINE

## 2024-04-26 PROCEDURE — 99215 OFFICE O/P EST HI 40 MIN: CPT | Mod: S$PBB,,, | Performed by: FAMILY MEDICINE

## 2024-04-26 RX ORDER — AMLODIPINE BESYLATE 10 MG/1
10 TABLET ORAL DAILY
Qty: 90 TABLET | Refills: 1 | Status: SHIPPED | OUTPATIENT
Start: 2024-04-26

## 2024-04-26 RX ORDER — TRAZODONE HYDROCHLORIDE 50 MG/1
50 TABLET ORAL NIGHTLY
Qty: 90 TABLET | Refills: 1 | Status: SHIPPED | OUTPATIENT
Start: 2024-04-26 | End: 2025-04-26

## 2024-04-26 RX ORDER — DULOXETIN HYDROCHLORIDE 60 MG/1
60 CAPSULE, DELAYED RELEASE ORAL DAILY
Qty: 90 CAPSULE | Refills: 1 | Status: SHIPPED | OUTPATIENT
Start: 2024-04-26 | End: 2025-04-26

## 2024-04-26 RX ORDER — BENAZEPRIL HYDROCHLORIDE 40 MG/1
40 TABLET ORAL DAILY
Qty: 90 TABLET | Refills: 1 | Status: SHIPPED | OUTPATIENT
Start: 2024-04-26

## 2024-04-26 RX ORDER — SEMAGLUTIDE 2.68 MG/ML
2 INJECTION, SOLUTION SUBCUTANEOUS WEEKLY
Qty: 9 ML | Refills: 1 | Status: SHIPPED | OUTPATIENT
Start: 2024-04-26

## 2024-04-26 NOTE — PROGRESS NOTES
Subjective:       Patient ID: Latonia Oliver is a 73 y.o. female.    Chief Complaint: Follow-up (lab)    Follow-up  Associated symptoms include fatigue. Pertinent negatives include no chest pain, chills or fever.       Patient Active Problem List   Diagnosis    Sjogren-Flo syndrome    Pain in joint, multiple sites    Sjogren's syndrome with keratoconjunctivitis sicca    Fibromyalgia    Type 2 diabetes mellitus without complication, without long-term current use of insulin    HTN (hypertension)    Mixed hyperlipidemia    GERD (gastroesophageal reflux disease)    Insomnia    Primary osteoarthritis of right knee    Lumbar back pain    Lumbar spondylosis    ROX on CPAP    Osteopenia    Depression    Fatigue    Medication monitoring encounter    Lumbar paraspinal muscle spasm    Visit for screening mammogram    Restless leg syndrome    Acute right ankle pain    Personal history of breast cancer    Major depressive disorder in partial remission    Weakness    Balance problem    Nasal septal deviation    Obesity (BMI 30-39.9)    Monoclonal paraproteinemia       Past Medical History:   Diagnosis Date    Anxiety     Arthritis     Back pain     Basal cell carcinoma (BCC)     Breast cancer     Breast cancer     Depression     Diabetes mellitus type 2, controlled     Enthesopathy of hip 2014    Fibromyalgia     GERD (gastroesophageal reflux disease)     HTN (hypertension)     Irregular heart beat     Mixed hyperlipidemia     Obesity     Osteoporosis     Rash and nonspecific skin eruption 6/15/2015    Sjogren - Flo's syndrome     Sleep apnea     Trouble in sleeping     Type 2 diabetes mellitus        Past Surgical History:   Procedure Laterality Date    APPENDECTOMY      BELT ABDOMINOPLASTY      BREAST LUMPECTOMY      BREAST SURGERY      breast augmentation     SECTION, CLASSIC      EPIDURAL STEROID INJECTION N/A 2022    Procedure: Lumbar L5/S1 IL NEELA with RN IV sedation;  Surgeon: Giovanny Arroyo MD;   [facility];  Service: Pain Management;  Laterality: N/A;    INJECTION OF JOINT Right 5/13/2022    Procedure: Right knee Synvisc injection J 7325 with local;  Surgeon: Giovanny Arroyo MD;  Location: [facility];  Service: Pain Management;  Laterality: Right;    RECONSTRUCTION, NASAL VALVE Left 10/22/2021    Procedure: RECONSTRUCTION, NASAL VALVE ;  Surgeon: Faustino Cash MD;  Location: [facility] OR;  Service: ENT;  Laterality: Left;       Family History   Problem Relation Name Age of Onset    Heart disease Mother      Hypertension Mother      COPD Mother      Diabetes Father      Stroke Father      Kidney disease Father         Social History     Tobacco Use   Smoking Status Never    Passive exposure: Past   Smokeless Tobacco Never       Wt Readings from Last 5 Encounters:   04/26/24 77.7 kg (171 lb 3 oz)   02/16/24 78.2 kg (172 lb 6.4 oz)   12/07/23 78.9 kg (173 lb 15.1 oz)   11/06/23 76.5 kg (168 lb 12.2 oz)   10/25/23 75.8 kg (167 lb)       For further HPI details, see assessment and plan.    Review of Systems   Constitutional:  Positive for fatigue. Negative for chills and fever.   HENT:  Negative for trouble swallowing.    Respiratory:  Negative for shortness of breath.    Cardiovascular:  Negative for chest pain.   Gastrointestinal:  Positive for constipation. Negative for diarrhea.   Genitourinary:  Negative for difficulty urinating.   Skin:  Negative for color change.   Neurological:  Negative for dizziness and light-headedness.       Objective:      Vitals:    04/26/24 1247   BP: 100/60   Pulse: 60       Physical Exam  Constitutional:       General: She is not in acute distress.     Appearance: She is not ill-appearing, toxic-appearing or diaphoretic.   Pulmonary:      Effort: Pulmonary effort is normal. No respiratory distress.   Neurological:      General: No focal deficit present.      Mental Status: She is alert.   Psychiatric:         Mood and Affect: Mood normal.         Behavior: Behavior  normal.         Thought Content: Thought content normal.         Judgment: Judgment normal.         Assessment:       1. Anxiety and depression    2. Essential hypertension    3. Major depressive disorder in partial remission, unspecified whether recurrent    4. Type 2 diabetes mellitus without complication, without long-term current use of insulin    5. Osteopenia of multiple sites    6. Mixed hyperlipidemia    7. Chronic low back pain, unspecified back pain laterality, unspecified whether sciatica present        Plan:       For follow-up on chronic conditions   Labs recently obtained and reviewed with the patient     Noted a mild liver function elevation   Plan to obtain a hepatic function panel and a hepatitis panel in about 2 weeks.  If elevation persists plan to obtain liver ultrasound.  If abnormalities persist with unidentified cause we will consult hepatology    Type 2 diabetes  Patient is on Ozempic at 2 mg.  She is done well with the medication.  She has lost weight with the medication although that seems to have stagnated.  She is having constipation which I suspect to be an adverse reaction to the medication.  Plan to trial conservative measures with Metamucil and MiraLax over-the-counter.  If this fails I want her to trial Linzess.  I have sent it to the pharmacy.  If that fails we may need to explore alternative diabetes medications    Hypertension  Blood pressure is low in the office today.  Fortunately she has asymptomatic with no dizziness or lightheadedness.  Her pressures have been variable in the office over the past 4 months.  At present time she is taking amlodipine 10 once daily, benazepril 40 once daily, carvedilol 6.25 mg once daily.  Given she is asymptomatic and today's low blood pressure seems to be unique we will continue medications as is.  I do encourage her to continue to monitor and if her blood pressure is running too high or too low to reach out so we can adjust medications.  We  very much need to avoid hypotension given age and osteopenia.    Osteopenia   Seen on DEXA scan roughly 6 months ago.  We will continue to monitor in the future.  Limit caffeine, limit alcohol, strongly encouraged physical activity and exercise.  Somewhat limited by her chronic back pain     Chronic back pain   Patient struggles with ongoing chronic back pain.  She is on duloxetine which could be beneficial.  Given her ongoing struggles plan to obtain a consultation with the pain management department.    Additionally to address chronic back pain duloxetine used to address her anxiety and depression.  She was as needed benzo per her pulmonologist.  She takes duloxetine 30 mg b.i.d..  We will transition to 60 mg once daily to reduce pill burden    Hyperlipidemia   Patient is on a statin Lipitor 40.  Monitoring liver function.  Pleased to see her lipids are well controlled.  We will continue the medication unless her liver function rises dramatically    Insomnia  High risk medication  Ongoing chronic problem.  Patient has Klonopin, hydroxyzine, Ambien listed.  I do have reservations about these medications particularly the benzodiazepine in the Ambien given her age.  Plan to trial her on trazodone at 50 mg and we can titrate dosage accordingly.  Patient takes 2 tablets of 0.5 Klonopin on a nightly basis.  I am apprehensive to discontinue it cold turkey.  I want her to try taking just 1 tablet.  It was my hope trazodone is helpful in getting her rest and if that is the case we will continue to wean off of the benzodiazepine. Fatigue an ongoing issues - suspect 2'2 insomnia and inadequate sleep.    ROX  On cpap    Immunization due  Will set up RSV    Patient declines colon cancer screening.  Declines covid shot    Drug list reviewed at present time should be 100% accurate    Visit time in total over 40 minutes.    This note was verbally dictated, please excuse any type errors.

## 2024-04-26 NOTE — TELEPHONE ENCOUNTER
----- Message from Shweta De La Rosa MA sent at 4/26/2024  1:45 PM CDT -----  Patient is an established with Pain Clinic but referred to see Dr. Darnell. Can you get in touch with her to set up visit please.

## 2024-04-30 ENCOUNTER — LAB VISIT (OUTPATIENT)
Dept: LAB | Facility: HOSPITAL | Age: 74
End: 2024-04-30
Attending: PHYSICIAN ASSISTANT
Payer: MEDICARE

## 2024-04-30 DIAGNOSIS — M35.01 SJOGREN'S SYNDROME WITH KERATOCONJUNCTIVITIS SICCA: ICD-10-CM

## 2024-04-30 PROCEDURE — 82570 ASSAY OF URINE CREATININE: CPT | Performed by: PHYSICIAN ASSISTANT

## 2024-05-01 LAB
CREAT UR-MCNC: 41 MG/DL (ref 15–325)
PROT UR-MCNC: <7 MG/DL (ref 0–15)
PROT/CREAT UR: NORMAL MG/G{CREAT} (ref 0–0.2)

## 2024-05-04 DIAGNOSIS — G47.61 PERIODIC LIMB MOVEMENT SLEEP DISORDER: ICD-10-CM

## 2024-05-04 DIAGNOSIS — G47.01 INSOMNIA DUE TO MEDICAL CONDITION: ICD-10-CM

## 2024-05-06 RX ORDER — CLONAZEPAM 0.5 MG/1
TABLET ORAL
Qty: 60 TABLET | Refills: 0 | Status: SHIPPED | OUTPATIENT
Start: 2024-05-06

## 2024-05-10 ENCOUNTER — LAB VISIT (OUTPATIENT)
Dept: LAB | Facility: HOSPITAL | Age: 74
End: 2024-05-10
Attending: FAMILY MEDICINE
Payer: MEDICARE

## 2024-05-10 DIAGNOSIS — R79.89 ELEVATED LIVER FUNCTION TESTS: ICD-10-CM

## 2024-05-10 DIAGNOSIS — Z79.899 ENCOUNTER FOR LONG-TERM (CURRENT) USE OF MEDICATIONS: ICD-10-CM

## 2024-05-10 DIAGNOSIS — R94.5 ABNORMAL RESULTS OF LIVER FUNCTION STUDIES: ICD-10-CM

## 2024-05-10 LAB
ALBUMIN SERPL BCP-MCNC: 4.2 G/DL (ref 3.5–5.2)
ALP SERPL-CCNC: 63 U/L (ref 55–135)
ALT SERPL W/O P-5'-P-CCNC: 44 U/L (ref 10–44)
AST SERPL-CCNC: 35 U/L (ref 10–40)
BILIRUB DIRECT SERPL-MCNC: 0.2 MG/DL (ref 0.1–0.3)
BILIRUB SERPL-MCNC: 0.5 MG/DL (ref 0.1–1)
HAV IGM SERPL QL IA: NORMAL
HBV CORE IGM SERPL QL IA: NORMAL
HBV SURFACE AG SERPL QL IA: NORMAL
HCV AB SERPL QL IA: NORMAL
PROT SERPL-MCNC: 7.3 G/DL (ref 6–8.4)

## 2024-05-10 PROCEDURE — 36415 COLL VENOUS BLD VENIPUNCTURE: CPT | Performed by: FAMILY MEDICINE

## 2024-05-10 PROCEDURE — 80076 HEPATIC FUNCTION PANEL: CPT | Performed by: FAMILY MEDICINE

## 2024-05-10 PROCEDURE — 80074 ACUTE HEPATITIS PANEL: CPT | Performed by: FAMILY MEDICINE

## 2024-05-13 ENCOUNTER — TELEPHONE (OUTPATIENT)
Dept: PAIN MEDICINE | Facility: CLINIC | Age: 74
End: 2024-05-13
Payer: MEDICARE

## 2024-05-13 NOTE — TELEPHONE ENCOUNTER
----- Message from Hank Santos sent at 5/13/2024  3:09 PM CDT -----  .Type:  Needs Medical Advice    Who Called: pt    Would the patient rather a call back or a response via [facility]? Call back  Best Call Back Number: [phone]  Additional Information:     Pt stated she missed a call and would like a call back

## 2024-05-13 NOTE — TELEPHONE ENCOUNTER
Spoke with pt she will call the office when she is ready to make appt, no appt needed at this time.

## 2024-05-27 ENCOUNTER — PATIENT OUTREACH (OUTPATIENT)
Dept: ADMINISTRATIVE | Facility: HOSPITAL | Age: 74
End: 2024-05-27
Payer: MEDICARE

## 2024-05-27 DIAGNOSIS — E11.9 TYPE 2 DIABETES MELLITUS WITHOUT COMPLICATION, WITHOUT LONG-TERM CURRENT USE OF INSULIN: Primary | ICD-10-CM

## 2024-05-27 NOTE — PROGRESS NOTES
Working Diabetes Lab.    Pt has lab appt scheduled 5/31/24.  Micro albumin urine ordered and linked to appt.

## 2024-05-31 ENCOUNTER — LAB VISIT (OUTPATIENT)
Dept: LAB | Facility: HOSPITAL | Age: 74
End: 2024-05-31
Attending: PHYSICIAN ASSISTANT
Payer: MEDICARE

## 2024-05-31 DIAGNOSIS — M35.01 SJOGREN'S SYNDROME WITH KERATOCONJUNCTIVITIS SICCA: ICD-10-CM

## 2024-05-31 DIAGNOSIS — E11.9 TYPE 2 DIABETES MELLITUS WITHOUT COMPLICATION, WITHOUT LONG-TERM CURRENT USE OF INSULIN: ICD-10-CM

## 2024-05-31 LAB
ALBUMIN SERPL BCP-MCNC: 4 G/DL (ref 3.5–5.2)
ALBUMIN/CREAT UR: 9.4 UG/MG (ref 0–30)
ALP SERPL-CCNC: 69 U/L (ref 55–135)
ALT SERPL W/O P-5'-P-CCNC: 37 U/L (ref 10–44)
ANION GAP SERPL CALC-SCNC: 6 MMOL/L (ref 8–16)
AST SERPL-CCNC: 28 U/L (ref 10–40)
BASOPHILS # BLD AUTO: 0.06 K/UL (ref 0–0.2)
BASOPHILS NFR BLD: 1.3 % (ref 0–1.9)
BILIRUB SERPL-MCNC: 0.3 MG/DL (ref 0.1–1)
BUN SERPL-MCNC: 17 MG/DL (ref 8–23)
C3 SERPL-MCNC: 120 MG/DL (ref 50–180)
C4 SERPL-MCNC: 22 MG/DL (ref 11–44)
CALCIUM SERPL-MCNC: 9.4 MG/DL (ref 8.7–10.5)
CHLORIDE SERPL-SCNC: 107 MMOL/L (ref 95–110)
CO2 SERPL-SCNC: 28 MMOL/L (ref 23–29)
CREAT SERPL-MCNC: 0.9 MG/DL (ref 0.5–1.4)
CREAT UR-MCNC: 277 MG/DL (ref 15–325)
CREAT UR-MCNC: 282.2 MG/DL (ref 15–325)
CRP SERPL-MCNC: 0.5 MG/L (ref 0–8.2)
DIFFERENTIAL METHOD BLD: NORMAL
EOSINOPHIL # BLD AUTO: 0.1 K/UL (ref 0–0.5)
EOSINOPHIL NFR BLD: 3.1 % (ref 0–8)
ERYTHROCYTE [DISTWIDTH] IN BLOOD BY AUTOMATED COUNT: 13.3 % (ref 11.5–14.5)
ERYTHROCYTE [SEDIMENTATION RATE] IN BLOOD BY WESTERGREN METHOD: 20 MM/HR (ref 0–20)
EST. GFR  (NO RACE VARIABLE): >60 ML/MIN/1.73 M^2
GLUCOSE SERPL-MCNC: 98 MG/DL (ref 70–110)
HCT VFR BLD AUTO: 38 % (ref 37–48.5)
HGB BLD-MCNC: 12.4 G/DL (ref 12–16)
IMM GRANULOCYTES # BLD AUTO: 0 K/UL (ref 0–0.04)
IMM GRANULOCYTES NFR BLD AUTO: 0 % (ref 0–0.5)
LYMPHOCYTES # BLD AUTO: 1.2 K/UL (ref 1–4.8)
LYMPHOCYTES NFR BLD: 25.2 % (ref 18–48)
MCH RBC QN AUTO: 30.8 PG (ref 27–31)
MCHC RBC AUTO-ENTMCNC: 32.6 G/DL (ref 32–36)
MCV RBC AUTO: 95 FL (ref 82–98)
MICROALBUMIN UR DL<=1MG/L-MCNC: 26 UG/ML
MONOCYTES # BLD AUTO: 0.5 K/UL (ref 0.3–1)
MONOCYTES NFR BLD: 10.3 % (ref 4–15)
NEUTROPHILS # BLD AUTO: 2.7 K/UL (ref 1.8–7.7)
NEUTROPHILS NFR BLD: 60.1 % (ref 38–73)
NRBC BLD-RTO: 0 /100 WBC
PLATELET # BLD AUTO: 168 K/UL (ref 150–450)
PMV BLD AUTO: 9.8 FL (ref 9.2–12.9)
POTASSIUM SERPL-SCNC: 3.9 MMOL/L (ref 3.5–5.1)
PROT SERPL-MCNC: 7.1 G/DL (ref 6–8.4)
PROT UR-MCNC: 19 MG/DL (ref 0–15)
PROT/CREAT UR: 0.07 MG/G{CREAT} (ref 0–0.2)
RBC # BLD AUTO: 4.02 M/UL (ref 4–5.4)
SODIUM SERPL-SCNC: 141 MMOL/L (ref 136–145)
WBC # BLD AUTO: 4.56 K/UL (ref 3.9–12.7)

## 2024-05-31 PROCEDURE — 80053 COMPREHEN METABOLIC PANEL: CPT | Performed by: PHYSICIAN ASSISTANT

## 2024-05-31 PROCEDURE — 86140 C-REACTIVE PROTEIN: CPT | Performed by: PHYSICIAN ASSISTANT

## 2024-05-31 PROCEDURE — 82043 UR ALBUMIN QUANTITATIVE: CPT | Performed by: FAMILY MEDICINE

## 2024-05-31 PROCEDURE — 36415 COLL VENOUS BLD VENIPUNCTURE: CPT | Performed by: PHYSICIAN ASSISTANT

## 2024-05-31 PROCEDURE — 85651 RBC SED RATE NONAUTOMATED: CPT | Performed by: PHYSICIAN ASSISTANT

## 2024-05-31 PROCEDURE — 86160 COMPLEMENT ANTIGEN: CPT | Performed by: PHYSICIAN ASSISTANT

## 2024-05-31 PROCEDURE — 84156 ASSAY OF PROTEIN URINE: CPT | Performed by: PHYSICIAN ASSISTANT

## 2024-05-31 PROCEDURE — 85025 COMPLETE CBC W/AUTO DIFF WBC: CPT | Performed by: PHYSICIAN ASSISTANT

## 2024-05-31 PROCEDURE — 86160 COMPLEMENT ANTIGEN: CPT | Mod: 59 | Performed by: PHYSICIAN ASSISTANT

## 2024-06-05 ENCOUNTER — OFFICE VISIT (OUTPATIENT)
Dept: RHEUMATOLOGY | Facility: CLINIC | Age: 74
End: 2024-06-05
Payer: MEDICARE

## 2024-06-05 ENCOUNTER — HOSPITAL ENCOUNTER (OUTPATIENT)
Dept: RADIOLOGY | Facility: HOSPITAL | Age: 74
Discharge: HOME | End: 2024-06-05
Attending: PHYSICIAN ASSISTANT
Payer: MEDICARE

## 2024-06-05 VITALS
BODY MASS INDEX: 32.14 KG/M2 | HEART RATE: 71 BPM | WEIGHT: 174.63 LBS | SYSTOLIC BLOOD PRESSURE: 130 MMHG | DIASTOLIC BLOOD PRESSURE: 77 MMHG | HEIGHT: 62 IN

## 2024-06-05 DIAGNOSIS — M79.7 FIBROMYALGIA: ICD-10-CM

## 2024-06-05 DIAGNOSIS — G89.29 CHRONIC PAIN OF RIGHT KNEE: ICD-10-CM

## 2024-06-05 DIAGNOSIS — M25.561 CHRONIC PAIN OF RIGHT KNEE: ICD-10-CM

## 2024-06-05 DIAGNOSIS — D84.9 IMMUNOCOMPROMISED: ICD-10-CM

## 2024-06-05 DIAGNOSIS — Z79.899 HIGH RISK MEDICATION USE: ICD-10-CM

## 2024-06-05 DIAGNOSIS — M17.11 PRIMARY OSTEOARTHRITIS OF RIGHT KNEE: ICD-10-CM

## 2024-06-05 DIAGNOSIS — M35.01 SJOGREN'S SYNDROME WITH KERATOCONJUNCTIVITIS SICCA: ICD-10-CM

## 2024-06-05 DIAGNOSIS — M85.89 OSTEOPENIA OF MULTIPLE SITES: ICD-10-CM

## 2024-06-05 DIAGNOSIS — Z51.81 MEDICATION MONITORING ENCOUNTER: ICD-10-CM

## 2024-06-05 DIAGNOSIS — M17.11 PRIMARY OSTEOARTHRITIS OF RIGHT KNEE: Primary | ICD-10-CM

## 2024-06-05 DIAGNOSIS — M35.01 SJOGREN'S SYNDROME WITH KERATOCONJUNCTIVITIS SICCA: Primary | ICD-10-CM

## 2024-06-05 PROCEDURE — 99999 PR PBB SHADOW E&M-EST. PATIENT-LVL IV: CPT | Mod: PBBFAC,,, | Performed by: PHYSICIAN ASSISTANT

## 2024-06-05 PROCEDURE — 20610 DRAIN/INJ JOINT/BURSA W/O US: CPT | Mod: PBBFAC | Performed by: PHYSICIAN ASSISTANT

## 2024-06-05 PROCEDURE — 73564 X-RAY EXAM KNEE 4 OR MORE: CPT | Mod: TC,50

## 2024-06-05 PROCEDURE — 99214 OFFICE O/P EST MOD 30 MIN: CPT | Mod: 25,S$PBB,, | Performed by: PHYSICIAN ASSISTANT

## 2024-06-05 PROCEDURE — 99999PBSHW PR PBB SHADOW TECHNICAL ONLY FILED TO HB: Mod: PBBFAC,,,

## 2024-06-05 PROCEDURE — 99214 OFFICE O/P EST MOD 30 MIN: CPT | Mod: PBBFAC,25 | Performed by: PHYSICIAN ASSISTANT

## 2024-06-05 PROCEDURE — 20610 DRAIN/INJ JOINT/BURSA W/O US: CPT | Mod: S$PBB,RT,, | Performed by: PHYSICIAN ASSISTANT

## 2024-06-05 PROCEDURE — 73564 X-RAY EXAM KNEE 4 OR MORE: CPT | Mod: 26,50,, | Performed by: RADIOLOGY

## 2024-06-05 RX ORDER — TRIAMCINOLONE ACETONIDE 40 MG/ML
40 INJECTION, SUSPENSION INTRA-ARTICULAR; INTRAMUSCULAR
Status: DISCONTINUED | OUTPATIENT
Start: 2024-06-05 | End: 2024-06-05 | Stop reason: HOSPADM

## 2024-06-05 RX ADMIN — TRIAMCINOLONE ACETONIDE 40 MG: 40 INJECTION, SUSPENSION INTRA-ARTICULAR; INTRAMUSCULAR at 12:06

## 2024-06-05 NOTE — PROCEDURES
Large Joint Aspiration/Injection: R knee    Date/Time: 6/5/2024 12:00 PM    Performed by: Lisa Estrella PA-C  Authorized by: Lisa Estrella PA-C    Consent Done?:  Yes (Verbal)  Indications:  Pain  Site marked: the procedure site was marked    Timeout: prior to procedure the correct patient, procedure, and site was verified    Prep: patient was prepped and draped in usual sterile fashion      Local anesthesia used?: Yes    Local anesthetic:  Lidocaine 2% without epinephrine  Anesthetic total (ml):  2      Details:  Needle Size:  22 G  Ultrasonic Guidance for needle placement?: No    Approach:  Anterolateral  Location:  Knee  Site:  R knee  Medications:  40 mg triamcinolone acetonide 40 mg/mL  Patient tolerance:  Patient tolerated the procedure well with no immediate complications     Right Knee Injection Report:  After verbal consent was obtained for right knee injection, patient ID, site, and side were verified.  The  right  knee was sterilly prepped in the standard fashion.  A 22-gauge needle was introduced into right knee joint from an marilee-lateral approach without complication. The right knee was then injected with 40 mg lidocaine plain and 40 mg Kenalog.  A sterile bandaid was applied.  The patient was informed to apply an ice pack approximately 10min once arriving home and not to do anything strenuous for 24hours.  She was instructed to call if there were any problems. The patient was discharged in stable condition.

## 2024-06-05 NOTE — PROGRESS NOTES
Subjective:      Patient ID: Latonia Oliver is a 73 y.o. female.    Chief Complaint: sjogans syndrome    HPI   Latonia Oliver  is a 73 y.o. female who is here for f/u on Sjogren's disease.  She is on Plaquenil 400 mg once daily.  She is tolerating that dose well without any complications.  Having more trouble w dry eyes.  Systane OTC as advised by ophtho not helpful.  Has not followed up w ophthalmology since last appt.  Xerostomia ok.  Been on HCQ 10+ years.  Recent eye exam done 8/2023.  She feels HCQ through [facility] because it is much cheaper.  She denies night sweats, bone pain, lymphadenopathy, unexplained fevers    Previously saw Dr. Erickson at [facility].  In past back pain has been a problem.  It got so bad that she was having increased depression. I have previously given IM celestone which was helpful.    Also with history of fibromyalgia.  Doing fairly well from that.  On cymbalta 60 mg daily.  Off celexa and gabapentin (ineffective).  She c/o increased brain fog recently    H/o Osteopenia - had recent dexa w PCP.  No recent fx.    Patient denies fevers, chills, photosensitivity, eye pain, shortness of breath, chest pain, hematuria, blood in the stool, rash, sicca symptoms, raynauds, finger ulcerations.  Rheumatologic systems otherwise negative.    Serologies/Labs:  (+) PAUL 1;1280 speckled, (+) SSA/SSB, o/w negative profile  Low titer RF 2005 but negative in 2013  Negative CCP  Neg scleroderma work up  Current Treatment:  Plaquenil 400 mg once daily  Cymbalta 60 mg daily  Previous Treatment:   Celexa  Gabapentin    Current Outpatient Medications:     amLODIPine (NORVASC) 10 MG tablet, Take 1 tablet (10 mg total) by mouth once daily., Disp: 90 tablet, Rfl: 1    atorvastatin (LIPITOR) 40 MG tablet, Take 1 tablet by mouth once daily, Disp: 90 tablet, Rfl: 1    benazepriL (LOTENSIN) 40 MG tablet, Take 1 tablet (40 mg total) by mouth once daily., Disp: 90 tablet, Rfl: 1    carvediloL (COREG) 6.25 MG tablet, TAKE 1  TABLET BY MOUTH TWICE DAILY WITH MEALS, Disp: 180 tablet, Rfl: 1    clonazePAM (KLONOPIN) 0.5 MG tablet, TAKE 2 TABLETS BY MOUTH NIGHTLY AS NEEDED FOR ANXIETY FOR INSOMNIA, Disp: 60 tablet, Rfl: 0    DULoxetine (CYMBALTA) 60 MG capsule, Take 1 capsule (60 mg total) by mouth once daily., Disp: 90 capsule, Rfl: 1    fluticasone propionate (FLONASE) 50 mcg/actuation nasal spray, 2 sprays (100 mcg total) by Each Nostril route once daily., Disp: 15.8 mL, Rfl: 5    hydroxychloroquine (PLAQUENIL) 200 mg tablet, Take 2 tablets (400 mg total) by mouth once daily., Disp: 180 tablet, Rfl: 3    hydrOXYzine pamoate (VISTARIL) 25 MG Cap, Take 1 capsule (25 mg total) by mouth nightly as needed (insomnia)., Disp: 30 capsule, Rfl: 5    linaCLOtide (LINZESS) 145 mcg Cap capsule, Take 1 capsule (145 mcg total) by mouth before breakfast., Disp: 90 capsule, Rfl: 1    semaglutide (OZEMPIC) 2 mg/dose (8 mg/3 mL) PnIj, Inject 2 mg into the skin once a week., Disp: 9 mL, Rfl: 1    traZODone (DESYREL) 50 MG tablet, Take 1 tablet (50 mg total) by mouth every evening., Disp: 90 tablet, Rfl: 1    zolpidem (AMBIEN) 5 MG Tab, Take 1 tablet (5 mg total) by mouth nightly as needed (insomnia)., Disp: 30 tablet, Rfl: 0    Past Medical History:   Diagnosis Date    Anxiety     Arthritis     Back pain     Basal cell carcinoma (BCC)     Breast cancer     Breast cancer     Depression     Diabetes mellitus type 2, controlled     Enthesopathy of hip 12/11/2014    Fibromyalgia     GERD (gastroesophageal reflux disease)     HTN (hypertension)     Irregular heart beat     Mixed hyperlipidemia     Obesity     Osteoporosis     Rash and nonspecific skin eruption 6/15/2015    Sjogren - Flo's syndrome     Sleep apnea     Trouble in sleeping     Type 2 diabetes mellitus      Family History   Problem Relation Name Age of Onset    Heart disease Mother      Hypertension Mother      COPD Mother      Diabetes Father      Stroke Father      Kidney disease Father    "    Social History     Socioeconomic History    Marital status:     Number of children: 1   Occupational History     Employer: [employer]    Tobacco Use    Smoking status: Never     Passive exposure: Past    Smokeless tobacco: Never   Substance and Sexual Activity    Alcohol use: Yes     Comment: Socially    Drug use: No    Sexual activity: Not Currently     Partners: Male     Social Determinants of Health     Financial Resource Strain: Low Risk  (9/15/2022)    Overall Financial Resource Strain (CARDIA)     Difficulty of Paying Living Expenses: Not very hard   Food Insecurity: No Food Insecurity (9/15/2022)    Hunger Vital Sign     Worried About Running Out of Food in the Last Year: Never true     Ran Out of Food in the Last Year: Never true   Transportation Needs: Unknown (9/15/2022)    PRAPARE - Transportation     Lack of Transportation (Medical): Patient declined     Lack of Transportation (Non-Medical): Patient declined   Physical Activity: Inactive (9/15/2022)    Exercise Vital Sign     Days of Exercise per Week: 0 days     Minutes of Exercise per Session: 0 min   Stress: No Stress Concern Present (9/15/2022)    [employer] - Occupational Stress Questionnaire     Feeling of Stress : Not at all   Recent Concern: Stress - Stress Concern Present (6/29/2022)    [employer] - Occupational Stress Questionnaire     Feeling of Stress : Rather much   Housing Stability: Low Risk  (9/15/2022)    Housing Stability Vital Sign     Unable to Pay for Housing in the Last Year: No     Number of Places Lived in the Last Year: 1     Unstable Housing in the Last Year: No     Review of patient's allergies indicates:   Allergen Reactions    Penicillins      Other reaction(s): Rash  Other reaction(s): Difficulty breathing       Objective:   /77   Pulse 71   Ht 5' 2.25" (1.581 m)   Wt 79.2 kg (174 lb 9.7 oz)   BMI 31.68 kg/m²   Immunization History   Administered " Date(s) Administered    Influenza (FLUAD) - Quadrivalent - Adjuvanted - PF *Preferred* (65+) 10/25/2023    Influenza - High Dose - PF (65 years and older) 11/04/2015, 10/07/2016, 09/05/2017, 10/23/2018, 10/22/2019    Influenza - Quadrivalent - High Dose - PF (65 years and older) 09/24/2020, 10/07/2021, 10/18/2022    Influenza - Trivalent - PF (ADULT) 10/27/2014    Influenza A (H1N1) 2009 Monovalent - IM 04/23/2010    Pneumococcal Conjugate - 13 Valent 11/04/2015    Pneumococcal Polysaccharide - 23 Valent 12/08/2008, 04/25/2017    Tdap 04/25/2017    Zoster 02/16/2009    Zoster Recombinant 03/05/2020, 10/18/2022, 03/01/2023       Physical Exam   Constitutional: She is oriented to person, place, and time. No distress.   HENT:   Head: Normocephalic and atraumatic.   Pulmonary/Chest: Effort normal.   Abdominal: She exhibits no distension.   Musculoskeletal:         General: No swelling or tenderness. Normal range of motion.      Cervical back: Normal range of motion.   Lymphadenopathy:     She has no cervical adenopathy.   Neurological: She is alert and oriented to person, place, and time.   Skin: Skin is warm and dry. No rash noted.   Psychiatric: Mood normal.   Nursing note and vitals reviewed.    No synovitis, no dactylitis, no enthesitis  No effusions of large or small joints  100% fist formation  Well preserved ROM        Recent Results (from the past 672 hour(s))   Hepatic Function Panel    Collection Time: 05/10/24  9:08 AM   Result Value Ref Range    Total Protein 7.3 6.0 - 8.4 g/dL    Albumin 4.2 3.5 - 5.2 g/dL    Total Bilirubin 0.5 0.1 - 1.0 mg/dL    Bilirubin, Direct 0.2 0.1 - 0.3 mg/dL    AST 35 10 - 40 U/L    ALT 44 10 - 44 U/L    Alkaline Phosphatase 63 55 - 135 U/L   HEPATITIS PANEL, ACUTE    Collection Time: 05/10/24  9:08 AM   Result Value Ref Range    Hepatitis B Surface Ag Non-reactive Non-reactive    Hep B C IgM Non-reactive Non-reactive    Hep A IgM Non-reactive Non-reactive    Hepatitis C Ab  Non-reactive Non-reactive   Microalbumin/Creatinine Ratio, Urine    Collection Time: 05/31/24  9:07 AM   Result Value Ref Range    Microalbumin, Urine 26.0 ug/mL    Creatinine, Urine 277.0 15.0 - 325.0 mg/dL    Microalb/Creat Ratio 9.4 0.0 - 30.0 ug/mg   CBC Auto Differential    Collection Time: 05/31/24  9:11 AM   Result Value Ref Range    WBC 4.56 3.90 - 12.70 K/uL    RBC 4.02 4.00 - 5.40 M/uL    Hemoglobin 12.4 12.0 - 16.0 g/dL    Hematocrit 38.0 37.0 - 48.5 %    MCV 95 82 - 98 fL    MCH 30.8 27.0 - 31.0 pg    MCHC 32.6 32.0 - 36.0 g/dL    RDW 13.3 11.5 - 14.5 %    Platelets 168 150 - 450 K/uL    MPV 9.8 9.2 - 12.9 fL    Immature Granulocytes 0.0 0.0 - 0.5 %    Gran # (ANC) 2.7 1.8 - 7.7 K/uL    Immature Grans (Abs) 0.00 0.00 - 0.04 K/uL    Lymph # 1.2 1.0 - 4.8 K/uL    Mono # 0.5 0.3 - 1.0 K/uL    Eos # 0.1 0.0 - 0.5 K/uL    Baso # 0.06 0.00 - 0.20 K/uL    nRBC 0 0 /100 WBC    Gran % 60.1 38.0 - 73.0 %    Lymph % 25.2 18.0 - 48.0 %    Mono % 10.3 4.0 - 15.0 %    Eosinophil % 3.1 0.0 - 8.0 %    Basophil % 1.3 0.0 - 1.9 %    Differential Method Automated    Comprehensive Metabolic Panel    Collection Time: 05/31/24  9:11 AM   Result Value Ref Range    Sodium 141 136 - 145 mmol/L    Potassium 3.9 3.5 - 5.1 mmol/L    Chloride 107 95 - 110 mmol/L    CO2 28 23 - 29 mmol/L    Glucose 98 70 - 110 mg/dL    BUN 17 8 - 23 mg/dL    Creatinine 0.9 0.5 - 1.4 mg/dL    Calcium 9.4 8.7 - 10.5 mg/dL    Total Protein 7.1 6.0 - 8.4 g/dL    Albumin 4.0 3.5 - 5.2 g/dL    Total Bilirubin 0.3 0.1 - 1.0 mg/dL    Alkaline Phosphatase 69 55 - 135 U/L    AST 28 10 - 40 U/L    ALT 37 10 - 44 U/L    eGFR >60 >60 mL/min/1.73 m^2    Anion Gap 6 (L) 8 - 16 mmol/L   C-Reactive Protein    Collection Time: 05/31/24  9:11 AM   Result Value Ref Range    CRP 0.5 0.0 - 8.2 mg/L   Sedimentation rate    Collection Time: 05/31/24  9:11 AM   Result Value Ref Range    Sed Rate 20 0 - 20 mm/Hr   C3 Complement    Collection Time: 05/31/24  9:11 AM   Result  "Value Ref Range    Complement (C-3) 120 50 - 180 mg/dL   C4 Complement    Collection Time: 05/31/24  9:11 AM   Result Value Ref Range    Complement (C-4) 22 11 - 44 mg/dL   Protein/Creatinine Ratio, Urine    Collection Time: 05/31/24  9:37 AM   Result Value Ref Range    Protein, Urine Random 19 (H) 0 - 15 mg/dL    Creatinine, Urine 282.2 15.0 - 325.0 mg/dL    Prot/Creat Ratio, Urine 0.07 0.00 - 0.20         No results found for: "TBGOLDPLUS"   Lab Results   Component Value Date    HEPAIGM Non-reactive 05/10/2024    HEPBIGM Non-reactive 05/10/2024    HEPCAB Non-reactive 05/10/2024        Imaging  I have personally reviewed images and reports as below.  I agree with the interpretation.  X-ray Knee Ortho Bilateral with Flexion  Narrative: EXAM: XR KNEE ORTHO BILAT WITH FLEXION    CLINICAL HISTORY: Bilateral knee joint pain.    FINDINGS: 5 views bilateral knees.  No comparison     Diffuse osteopenia limits evaluation.  No fracture, dislocation, or other acute abnormality is identified.  No significant joint effusion is evident laterally.  Mild tricompartment degenerative changes with joint space narrowing and sclerosis greatest within the medial compartments.  No erosive change identified.  No loose intra-articular osteochondral bodies or osteochondral defect is evident.  Vascular calcifications are noted.  Benign bone infarct or enchondroma within the right femoral diaphysis.  Impression:   No acute findings    Finalized on: 6/5/2024 2:11 PM By:  Koby Alston MD  [facility]# [id number]      2024-06-05 14:13:[id number]    [facility]      Assessment:     1. Primary osteoarthritis of right knee    2. Sjogren's syndrome with keratoconjunctivitis sicca    3. High risk medication use    4. Medication monitoring encounter    5. Immunocompromised    6. Fibromyalgia    7. Osteopenia of multiple sites    8. Chronic pain of right knee        Plan:     Latonia was seen today for sjogans syndrome.    Diagnoses and all orders for this " visit:    Primary osteoarthritis of right knee  -     X-ray Knee Ortho Bilateral with Flexion; Future  -     Large Joint Aspiration/Injection: R knee    Sjogren's syndrome with keratoconjunctivitis sicca    High risk medication use    Medication monitoring encounter    Immunocompromised    Fibromyalgia    Osteopenia of multiple sites    Chronic pain of right knee  -     X-ray Knee Ortho Bilateral with Flexion; Future  -     Large Joint Aspiration/Injection: R knee      Sjogren's Syndrome   Labs reviewed and stable as above  Min elevation ESR (corrected for age is wnl),   Prior SPEP/SANDIP w monoclonal gamapathy  Pt has been referred to heme - had appt last mos  Will follow w heme every 3 mos  Discussed conservative treatment  Xerostomia  Biotene products and Xylimelt Lozenges  Avoid sleeping w fan on  Limit alcohol, coffee, nicotene and cola intake  Keratoconjunctivitis Sicca  OTC Refresh or Systane drops.    Limit alcohol/nicotene intake  Consider humidifier  Avoid using fans  Discussed lymphoma risk - no night sweats, bone pain, unexplained fevers, cryoglobulins previously absent  Ok to continue Plaquenil 400 mg bid   Discussed risks and benefits  yearly eye exams  Saw Dr. Beckett 8/2023  I have personally reivewed external OV notes today  No evidence of maculopathy  Worsening   Keratoconjunctivitis sicca   Discussed utilizing Systane gel drops before she goes to sleep   Follow-up Dr. Beckett for consideration of prescription eyedrops versus punctal plugs  Dysphagia   Scleroderma w/u negative  GI referral ordered - pt hasn't seen them  Tends to be w dry foods - related to ss is a possibility  H/o osteopenia low FRAX   Recent dexa reviewed  Osteopenia low frax  BMD declining  DEXA due 2025  Recommend appropriate calcium and vit d   MGUS  Follows w hematology q 6 mos  Rt Knee pain  Update knee xray today  CSI today   Discussed risks and benefits   Patient agreeable   Advise close glucose monitoring   Orthopedic referral  for definitive management and consideration of viscosupplementation in the future.  Drug therapy requiring intensive monitoring for toxicity  High Risk Medication Monitoring encounter  No current medication related issues, no evidence of toxicity  I ordered labs for toxicity monitoring, have personally reviewed the findings, and discussed them with the patient.  Pending labs will be sent via the portal  Compromised immune system secondary to autoimmune disease and/or use of immunosuppressive drugs.  Monitor carefully for infections.  Advised patient to get immediate medical care if any infection arises.  Also advised strict adherence age-appropriate vaccinations and cancer screenings with PCP.  Patient advised to hold DMARD and/or biologic therapy for signs of infection or for surgery. If you are unsure what to do please call our office for instruction.[facility] [phone]  Return to clinic: 4-6 mos reg 4 prior    Follow up in about 6 months (around 12/5/2024).    The patient understands, chooses and consents to this plan and accepts all the risks which include but are not limited to the risks mentioned above.     Disclaimer: This note was prepared using a voice recognition system and is likely to have sound alike errors within the text.

## 2024-06-06 ENCOUNTER — PATIENT MESSAGE (OUTPATIENT)
Dept: OTHER | Facility: OTHER | Age: 74
End: 2024-06-06
Payer: MEDICARE

## 2024-07-03 DIAGNOSIS — Z71.89 COMPLEX CARE COORDINATION: ICD-10-CM

## 2024-07-08 ENCOUNTER — OFFICE VISIT (OUTPATIENT)
Dept: PULMONOLOGY | Facility: CLINIC | Age: 74
End: 2024-07-08
Payer: MEDICARE

## 2024-07-08 VITALS
RESPIRATION RATE: 18 BRPM | WEIGHT: 175.69 LBS | HEIGHT: 62 IN | OXYGEN SATURATION: 95 % | BODY MASS INDEX: 32.33 KG/M2 | DIASTOLIC BLOOD PRESSURE: 78 MMHG | SYSTOLIC BLOOD PRESSURE: 112 MMHG | HEART RATE: 74 BPM

## 2024-07-08 DIAGNOSIS — J34.2 NASAL SEPTAL DEVIATION: ICD-10-CM

## 2024-07-08 DIAGNOSIS — G47.33 OSA (OBSTRUCTIVE SLEEP APNEA): Primary | ICD-10-CM

## 2024-07-08 DIAGNOSIS — I10 PRIMARY HYPERTENSION: ICD-10-CM

## 2024-07-08 PROCEDURE — 99999 PR PBB SHADOW E&M-EST. PATIENT-LVL IV: CPT | Mod: PBBFAC,,, | Performed by: PHYSICIAN ASSISTANT

## 2024-07-08 PROCEDURE — 99214 OFFICE O/P EST MOD 30 MIN: CPT | Mod: PBBFAC | Performed by: PHYSICIAN ASSISTANT

## 2024-07-08 PROCEDURE — 99213 OFFICE O/P EST LOW 20 MIN: CPT | Mod: S$PBB,,, | Performed by: PHYSICIAN ASSISTANT

## 2024-07-08 NOTE — PROGRESS NOTES
Subjective:       Patient ID: Latonia Oliver is a 73 y.o. female.    Chief Complaint: Sleep Apnea    7/8/24  Established ROX patient here for annual CPAP download  Compliance download reviewed, days with usage > 4 hours is 93%, average AHI is 1.0  Patient states improved symptoms with use of CPAP. Sleeping more soundly. Waking up feeling more refreshed. Improved daytime sleepiness.  She feels Full Face CPAP Mask pushes on her nose causing worsening nasal blockage  Would like to try hybrid Full Face CPAP Mask    7/6/23  73yo female here for annual CPAP follow up  Compliance download reviewed, days with usage > 4 hours is 98%, average AHI is 1.1  Patient states improved symptoms with use of CPAP. Sleeping more soundly. Waking up feeling more refreshed.  Requesting order for hybrid cpap mask  Has chronic daytime fatigue, she is unsure if this is related to her srojens and fibromyalgia  Takes ambian for insomnia which helps  She denies shortness of breath, cough, chest pain or fever  Recently started ozempic and complains of side effects of nausea and vomiting, she has appointment with PCP soon    BP Readings from Last 3 Encounters:   07/08/24 112/78   06/05/24 130/77   04/26/24 100/60     [place] Sleepiness Scale TOTAL =   5  (validated sleepiness questionnaire with a higher score indicating greater sleepiness; range 0-24)  EPWORTH SLEEPINESS SCALE 7/6/2023   Sitting and reading 1   Watching TV 0   Sitting, inactive in a public place (e.g. a theatre or a meeting) 0   As a passenger in a car for an hour without a break 1   Lying down to rest in the afternoon when circumstances permit 2   Sitting and talking to someone 0   Sitting quietly after a lunch without alcohol 1   In a car, while stopped for a few minutes in traffic 0   Total score 5         Immunization History   Administered Date(s) Administered    Influenza (FLUAD) - Quadrivalent - Adjuvanted - PF *Preferred* (65+) 10/25/2023    Influenza - High Dose - PF (65  years and older) 11/04/2015, 10/07/2016, 09/05/2017, 10/23/2018, 10/22/2019    Influenza - Quadrivalent - High Dose - PF (65 years and older) 09/24/2020, 10/07/2021, 10/18/2022    Influenza - Trivalent - PF (ADULT) 10/27/2014    Influenza A (H1N1) 2009 Monovalent - IM 04/23/2010    Pneumococcal Conjugate - 13 Valent 11/04/2015    Pneumococcal Polysaccharide - 23 Valent 12/08/2008, 04/25/2017    Tdap 04/25/2017    Zoster 02/16/2009    Zoster Recombinant 03/05/2020, 10/18/2022, 03/01/2023      Tobacco Use: Low Risk  (7/8/2024)    Patient History     Smoking Tobacco Use: Never     Smokeless Tobacco Use: Never     Passive Exposure: Past      Past Medical History:   Diagnosis Date    Anxiety     Arthritis     Back pain     Basal cell carcinoma (BCC)     Breast cancer     Breast cancer     Depression     Diabetes mellitus type 2, controlled     Enthesopathy of hip 12/11/2014    Fibromyalgia     GERD (gastroesophageal reflux disease)     HTN (hypertension)     Irregular heart beat     Mixed hyperlipidemia     Obesity     Osteoporosis     Rash and nonspecific skin eruption 6/15/2015    Sjogren - Flo's syndrome     Sleep apnea     Trouble in sleeping     Type 2 diabetes mellitus       Current Outpatient Medications on File Prior to Visit   Medication Sig Dispense Refill    amLODIPine (NORVASC) 10 MG tablet Take 1 tablet (10 mg total) by mouth once daily. 90 tablet 1    atorvastatin (LIPITOR) 40 MG tablet Take 1 tablet by mouth once daily 90 tablet 1    benazepriL (LOTENSIN) 40 MG tablet Take 1 tablet (40 mg total) by mouth once daily. 90 tablet 1    carvediloL (COREG) 6.25 MG tablet TAKE 1 TABLET BY MOUTH TWICE DAILY WITH MEALS 180 tablet 1    clonazePAM (KLONOPIN) 0.5 MG tablet TAKE 2 TABLETS BY MOUTH NIGHTLY AS NEEDED FOR ANXIETY FOR INSOMNIA 60 tablet 0    DULoxetine (CYMBALTA) 60 MG capsule Take 1 capsule (60 mg total) by mouth once daily. 90 capsule 1    fluticasone propionate (FLONASE) 50 mcg/actuation nasal spray  "2 sprays (100 mcg total) by Each Nostril route once daily. 15.8 mL 5    hydroxychloroquine (PLAQUENIL) 200 mg tablet Take 2 tablets (400 mg total) by mouth once daily. 180 tablet 3    hydrOXYzine pamoate (VISTARIL) 25 MG Cap Take 1 capsule (25 mg total) by mouth nightly as needed (insomnia). 30 capsule 5    linaCLOtide (LINZESS) 145 mcg Cap capsule Take 1 capsule (145 mcg total) by mouth before breakfast. 90 capsule 1    semaglutide (OZEMPIC) 2 mg/dose (8 mg/3 mL) PnIj Inject 2 mg into the skin once a week. 9 mL 1    traZODone (DESYREL) 50 MG tablet Take 1 tablet (50 mg total) by mouth every evening. 90 tablet 1    zolpidem (AMBIEN) 5 MG Tab Take 1 tablet (5 mg total) by mouth nightly as needed (insomnia). 30 tablet 0    [DISCONTINUED] benazepril-hydrochlorthiazide (LOTENSIN HCT) 20-25 mg Tab Take 1 tablet by mouth once daily. 90 tablet 1     No current facility-administered medications on file prior to visit.        Review of Systems   Constitutional:  Positive for fatigue. Negative for fever, weight loss, appetite change and weakness.   HENT:  Negative for postnasal drip, rhinorrhea, sinus pressure, trouble swallowing and congestion.    Respiratory:  Positive for somnolence. Negative for cough, sputum production, choking, chest tightness, shortness of breath, wheezing and dyspnea on extertion.    Cardiovascular:  Negative for chest pain and leg swelling.   Musculoskeletal:  Negative for gait problem and joint swelling.   Gastrointestinal:  Negative for nausea, vomiting and abdominal pain.   Neurological:  Negative for dizziness, weakness and headaches.   All other systems reviewed and are negative.      Objective:       Vitals:    07/08/24 0957   BP: 112/78   Pulse: 74   Resp: 18   SpO2: 95%   Weight: 79.7 kg (175 lb 11.3 oz)   Height: 5' 2" (1.575 m)       Physical Exam   Constitutional: She is oriented to person, place, and time. She appears well-developed and well-nourished. No distress.   HENT:   Head: " Normocephalic.   Nose: Nose normal.   Mouth/Throat: Oropharynx is clear and moist.   Cardiovascular: Normal rate and regular rhythm.   Pulmonary/Chest: Effort normal. No respiratory distress. She has no wheezes. She has no rhonchi. She has no rales.   Musculoskeletal:         General: No edema.      Cervical back: Normal range of motion and neck supple.   Neurological: She is alert and oriented to person, place, and time.   Skin: Skin is warm and dry.   Psychiatric: She has a normal mood and affect.   Vitals reviewed.    Personal Diagnostic Review    Compliance Report  Compliance  Payor Standard  Usage 06/03/2024 - 07/02/2024  Usage days 29/30 days (97%)  >= 4 hours 28 days (93%)  < 4 hours 1 days (3%)  Usage hours 196 hours 31 minutes  Average usage (total days) 6 hours 33 minutes  Average usage (days used) 6 hours 47 minutes  Median usage (days used) 6 hours 59 minutes  Total used hours (value since last reset - 07/02/2024) 9,503 hours  AirSense 10 AutoSet  Serial number [id number]  Mode CPAP  Set pressure 15 cmH2O  EPR Fulltime  EPR level 3  Therapy  Leaks - L/min Median: 0.0 95th percentile: 2.1 Maximum: 81.0  Events per hour AI: 0.9 HI: 0.1 AHI: 1.0  Apnea Index Central: 0.7 Obstructive: 0.0 Unknown: 0.1  RERA Index 0.1  Cheyne-Figueroa respiration (average duration per night) 0 minutes (0%)  Usage - hours  Printed on    Assessment/Plan:       Problem List Items Addressed This Visit          ENT    Nasal septal deviation     She feels Full Face CPAP Mask pushes on her nose causing worsening nasal blockage  Would like to try hybrid Full Face CPAP Mask; [place] [facility] [place]            Cardiac/Vascular    HTN (hypertension)     Stable, continue current medication management             Other    ROX (obstructive sleep apnea) - Primary     Using cpap nightly Benefits from use  Discussed therapeutic goals for CPAP: Ideal usage 100% of nights for 6-8 hours per night. Minimum usage is 70% of  night for at least 4 hours per night.  Number to [facility] given for mask fit visit         Relevant Orders    CPAP/BIPAP SUPPLIES         Follow up in about 1 year (around 7/8/2025) for ROX follow up.    Discussed diagnosis, its evaluation, treatment and usual course. All questions answered.    Patient verbalized understanding of plan and left in no acute distress    Thank you for the courtesy of participating in the care of this patient    Elizabeth G Blough, PA-C [facility]

## 2024-07-08 NOTE — ASSESSMENT & PLAN NOTE
Date: 3/19/2019    Time of Call: 2:49 PM     Diagnosis:  VT     [ TORB ] Ordering provider: LIANA Reyes  Order: In light of the recent shocks, please only have him hold his Sotalol for the day prior and morning of the procedure.      Order received by: Oscar Clancy RN     Follow-up/additional notes: Called Temo to discuss recommendations.  Informed him that his last dose of Sotalol should be on Wednesday March 27th and to hold the dose on March 28th and the morning of March 29th. Temo states that he understands information provided and will call back with further questions or concerns.          Using cpap nightly Benefits from use  Discussed therapeutic goals for CPAP: Ideal usage 100% of nights for 6-8 hours per night. Minimum usage is 70% of night for at least 4 hours per night.  Number to [facility] given for mask fit visit

## 2024-07-08 NOTE — ASSESSMENT & PLAN NOTE
She feels Full Face CPAP Mask pushes on her nose causing worsening nasal blockage  Would like to try hybrid Full Face CPAP Mask; number given to [facility] for mask fit visit

## 2024-08-09 ENCOUNTER — LAB VISIT (OUTPATIENT)
Dept: LAB | Facility: HOSPITAL | Age: 74
End: 2024-08-09
Attending: NURSE PRACTITIONER
Payer: MEDICARE

## 2024-08-09 DIAGNOSIS — D47.2 MONOCLONAL PARAPROTEINEMIA: ICD-10-CM

## 2024-08-09 LAB
ALBUMIN SERPL BCP-MCNC: 3.9 G/DL (ref 3.5–5.2)
ALP SERPL-CCNC: 76 U/L (ref 55–135)
ALT SERPL W/O P-5'-P-CCNC: 30 U/L (ref 10–44)
ANION GAP SERPL CALC-SCNC: 9 MMOL/L (ref 8–16)
AST SERPL-CCNC: 27 U/L (ref 10–40)
B2 MICROGLOB SERPL-MCNC: 1.9 UG/ML (ref 0–2.5)
BASOPHILS # BLD AUTO: 0.07 K/UL (ref 0–0.2)
BASOPHILS NFR BLD: 1.1 % (ref 0–1.9)
BILIRUB SERPL-MCNC: 0.5 MG/DL (ref 0.1–1)
BUN SERPL-MCNC: 14 MG/DL (ref 8–23)
CALCIUM SERPL-MCNC: 9.5 MG/DL (ref 8.7–10.5)
CHLORIDE SERPL-SCNC: 102 MMOL/L (ref 95–110)
CO2 SERPL-SCNC: 29 MMOL/L (ref 23–29)
CREAT SERPL-MCNC: 0.9 MG/DL (ref 0.5–1.4)
DIFFERENTIAL METHOD BLD: ABNORMAL
EOSINOPHIL # BLD AUTO: 0.1 K/UL (ref 0–0.5)
EOSINOPHIL NFR BLD: 1.7 % (ref 0–8)
ERYTHROCYTE [DISTWIDTH] IN BLOOD BY AUTOMATED COUNT: 12.9 % (ref 11.5–14.5)
EST. GFR  (NO RACE VARIABLE): >60 ML/MIN/1.73 M^2
GLUCOSE SERPL-MCNC: 131 MG/DL (ref 70–110)
HCT VFR BLD AUTO: 40.3 % (ref 37–48.5)
HGB BLD-MCNC: 13.6 G/DL (ref 12–16)
IGA SERPL-MCNC: 208 MG/DL (ref 40–350)
IGG SERPL-MCNC: 1025 MG/DL (ref 650–1600)
IGM SERPL-MCNC: 75 MG/DL (ref 50–300)
IMM GRANULOCYTES # BLD AUTO: 0.02 K/UL (ref 0–0.04)
IMM GRANULOCYTES NFR BLD AUTO: 0.3 % (ref 0–0.5)
LYMPHOCYTES # BLD AUTO: 1.3 K/UL (ref 1–4.8)
LYMPHOCYTES NFR BLD: 21.1 % (ref 18–48)
MCH RBC QN AUTO: 31.2 PG (ref 27–31)
MCHC RBC AUTO-ENTMCNC: 33.7 G/DL (ref 32–36)
MCV RBC AUTO: 92 FL (ref 82–98)
MONOCYTES # BLD AUTO: 0.5 K/UL (ref 0.3–1)
MONOCYTES NFR BLD: 8 % (ref 4–15)
NEUTROPHILS # BLD AUTO: 4.3 K/UL (ref 1.8–7.7)
NEUTROPHILS NFR BLD: 67.8 % (ref 38–73)
NRBC BLD-RTO: 0 /100 WBC
PLATELET # BLD AUTO: 193 K/UL (ref 150–450)
PMV BLD AUTO: 9.4 FL (ref 9.2–12.9)
POTASSIUM SERPL-SCNC: 3.7 MMOL/L (ref 3.5–5.1)
PROT SERPL-MCNC: 7.2 G/DL (ref 6–8.4)
RBC # BLD AUTO: 4.36 M/UL (ref 4–5.4)
SODIUM SERPL-SCNC: 140 MMOL/L (ref 136–145)
WBC # BLD AUTO: 6.36 K/UL (ref 3.9–12.7)

## 2024-08-09 PROCEDURE — 85025 COMPLETE CBC W/AUTO DIFF WBC: CPT | Performed by: NURSE PRACTITIONER

## 2024-08-09 PROCEDURE — 82232 ASSAY OF BETA-2 PROTEIN: CPT | Performed by: NURSE PRACTITIONER

## 2024-08-09 PROCEDURE — 84165 PROTEIN E-PHORESIS SERUM: CPT | Performed by: NURSE PRACTITIONER

## 2024-08-09 PROCEDURE — 86334 IMMUNOFIX E-PHORESIS SERUM: CPT | Performed by: NURSE PRACTITIONER

## 2024-08-09 PROCEDURE — 80053 COMPREHEN METABOLIC PANEL: CPT | Performed by: NURSE PRACTITIONER

## 2024-08-09 PROCEDURE — 86334 IMMUNOFIX E-PHORESIS SERUM: CPT | Mod: 26,,, | Performed by: PATHOLOGY

## 2024-08-09 PROCEDURE — 84165 PROTEIN E-PHORESIS SERUM: CPT | Mod: 26,,, | Performed by: PATHOLOGY

## 2024-08-09 PROCEDURE — 82784 ASSAY IGA/IGD/IGG/IGM EACH: CPT | Mod: 59 | Performed by: NURSE PRACTITIONER

## 2024-08-09 PROCEDURE — 83521 IG LIGHT CHAINS FREE EACH: CPT | Performed by: NURSE PRACTITIONER

## 2024-08-09 PROCEDURE — 36415 COLL VENOUS BLD VENIPUNCTURE: CPT | Performed by: NURSE PRACTITIONER

## 2024-08-12 LAB
ALBUMIN SERPL ELPH-MCNC: 4.02 G/DL (ref 3.35–5.55)
ALPHA1 GLOB SERPL ELPH-MCNC: 0.29 G/DL (ref 0.17–0.41)
ALPHA2 GLOB SERPL ELPH-MCNC: 0.78 G/DL (ref 0.43–0.99)
B-GLOBULIN SERPL ELPH-MCNC: 0.8 G/DL (ref 0.5–1.1)
GAMMA GLOB SERPL ELPH-MCNC: 0.91 G/DL (ref 0.67–1.58)
INTERPRETATION SERPL IFE-IMP: NORMAL
KAPPA LC SER QL IA: 2.19 MG/DL (ref 0.33–1.94)
KAPPA LC/LAMBDA SER IA: 1.45 (ref 0.26–1.65)
LAMBDA LC SER QL IA: 1.51 MG/DL (ref 0.57–2.63)
PROT SERPL-MCNC: 6.8 G/DL (ref 6–8.4)

## 2024-08-13 LAB
PATHOLOGIST INTERPRETATION IFE: NORMAL
PATHOLOGIST INTERPRETATION SPE: NORMAL

## 2024-08-16 ENCOUNTER — OFFICE VISIT (OUTPATIENT)
Dept: HEMATOLOGY/ONCOLOGY | Facility: CLINIC | Age: 74
End: 2024-08-16
Payer: MEDICARE

## 2024-08-16 VITALS
WEIGHT: 175.69 LBS | HEART RATE: 69 BPM | BODY MASS INDEX: 31.13 KG/M2 | OXYGEN SATURATION: 97 % | HEIGHT: 63 IN | SYSTOLIC BLOOD PRESSURE: 113 MMHG | TEMPERATURE: 98 F | RESPIRATION RATE: 18 BRPM | DIASTOLIC BLOOD PRESSURE: 71 MMHG

## 2024-08-16 DIAGNOSIS — D47.2 MONOCLONAL PARAPROTEINEMIA: Primary | ICD-10-CM

## 2024-08-16 DIAGNOSIS — E66.9 OBESITY (BMI 30-39.9): ICD-10-CM

## 2024-08-16 DIAGNOSIS — Z85.3 PERSONAL HISTORY OF BREAST CANCER: ICD-10-CM

## 2024-08-16 PROCEDURE — 99999 PR PBB SHADOW E&M-EST. PATIENT-LVL IV: CPT | Mod: PBBFAC,,, | Performed by: NURSE PRACTITIONER

## 2024-08-16 PROCEDURE — 99214 OFFICE O/P EST MOD 30 MIN: CPT | Mod: PBBFAC | Performed by: NURSE PRACTITIONER

## 2024-08-16 NOTE — PROGRESS NOTES
Subjective:       Patient ID: Latonia Oliver is a 73 y.o. female.    Chief Complaint: Review labs. Follow up    HPI: 73 y.o female with h/o right breast cancer 15 years ago, DM II, Sjogren's disease on hydroxychloroquine. Screening labs with Rheumatology including SPEP/SANDIP, free light chains with mild free light chain elevation, ratio 1.5 and faint juliann free light chain unquantified.  She was referred for Hematology for further evaluation of her abnormal lab. She does note following with outside Dermatologist and history of basal cell carcinoma skin.  Regarding her history of breast cancer diagnosed 15 years ago at outside institution received surgery lumpectomy right breast with positive margin re-resection sentinel lymph node positive with completion axillary dissection adjuvant chemotherapy and radiation and 1 year of anastrozole discontinued due to hair loss per patient report (no records available at time of visit).  She had not had subsequent follow-up with oncology or surveillance mammograms.    Today she presents for follow up of her lambda light chain lab. She notes feeling well and is without complaints. Intentional 50+lbs weight loss on Ozempic. Denies B symptoms. Denies interval clinical concerns.  Social History     Socioeconomic History    Marital status:     Number of children: 1   Occupational History     Employer: [employer]    Tobacco Use    Smoking status: Never     Passive exposure: Past    Smokeless tobacco: Never   Substance and Sexual Activity    Alcohol use: Yes     Comment: Socially    Drug use: No    Sexual activity: Not Currently     Partners: Male     Social Determinants of Health     Financial Resource Strain: Low Risk  (9/15/2022)    Overall Financial Resource Strain (CARDIA)     Difficulty of Paying Living Expenses: Not very hard   Food Insecurity: No Food Insecurity (9/15/2022)    Hunger Vital Sign     Worried About Running Out of Food in the Last Year: Never true      Ran Out of Food in the Last Year: Never true   Transportation Needs: Unknown (9/15/2022)    PRAPARE - Transportation     Lack of Transportation (Medical): Patient declined     Lack of Transportation (Non-Medical): Patient declined   Physical Activity: Inactive (9/15/2022)    Exercise Vital Sign     Days of Exercise per Week: 0 days     Minutes of Exercise per Session: 0 min   Stress: No Stress Concern Present (9/15/2022)    [employer] - Occupational Stress Questionnaire     Feeling of Stress : Not at all   Recent Concern: Stress - Stress Concern Present (2022)    [employer] - Occupational Stress Questionnaire     Feeling of Stress : Rather much   Housing Stability: Low Risk  (9/15/2022)    Housing Stability Vital Sign     Unable to Pay for Housing in the Last Year: No     Number of Places Lived in the Last Year: 1     Unstable Housing in the Last Year: No       Past Medical History:   Diagnosis Date    Anxiety     Arthritis     Back pain     Basal cell carcinoma (BCC)     Breast cancer     Breast cancer     Depression     Diabetes mellitus type 2, controlled     Enthesopathy of hip 2014    Fibromyalgia     GERD (gastroesophageal reflux disease)     HTN (hypertension)     Irregular heart beat     Mixed hyperlipidemia     Obesity     Osteoporosis     Rash and nonspecific skin eruption 6/15/2015    Sjogren - Flo's syndrome     Sleep apnea     Trouble in sleeping     Type 2 diabetes mellitus        Family History   Problem Relation Name Age of Onset    Heart disease Mother      Hypertension Mother      COPD Mother      Diabetes Father      Stroke Father      Kidney disease Father         Past Surgical History:   Procedure Laterality Date    APPENDECTOMY      BELT ABDOMINOPLASTY      BREAST LUMPECTOMY      BREAST SURGERY      breast augmentation     SECTION, CLASSIC      EPIDURAL STEROID INJECTION  N/A 4/27/2022    Procedure: Lumbar L5/S1 IL NEELA with RN IV sedation;  Surgeon: Giovanny Arroyo MD;  Location: [facility];  Service: Pain Management;  Laterality: N/A;    INJECTION OF JOINT Right 5/13/2022    Procedure: Right knee Synvisc injection [id number] with local;  Surgeon: Giovanny Arroyo MD;  Location: [facility];  Service: Pain Management;  Laterality: Right;    RECONSTRUCTION, NASAL VALVE Left 10/22/2021    Procedure: RECONSTRUCTION, NASAL VALVE ;  Surgeon: Faustino Cash MD;  Location: [facility] OR;  Service: ENT;  Laterality: Left;       Review of Systems   Constitutional:  Negative for appetite change, chills, fatigue, fever and unexpected weight change.   HENT:  Negative for congestion, mouth sores, nosebleeds, sore throat, trouble swallowing and voice change.    Respiratory:  Negative for cough, chest tightness, shortness of breath and wheezing.    Cardiovascular:  Negative for chest pain and leg swelling.   Gastrointestinal:  Negative for abdominal distention, abdominal pain, blood in stool, constipation, diarrhea, nausea and vomiting.   Genitourinary:  Negative for difficulty urinating, dysuria and hematuria.   Musculoskeletal:  Negative for arthralgias, back pain and myalgias.   Skin:  Negative for pallor, rash and wound.   Neurological:  Negative for dizziness, syncope, weakness and headaches.   Hematological:  Negative for adenopathy. Does not bruise/bleed easily.   Psychiatric/Behavioral:  The patient is not nervous/anxious.          Medication List with Changes/Refills   Current Medications    AMLODIPINE (NORVASC) 10 MG TABLET    Take 1 tablet (10 mg total) by mouth once daily.    ATORVASTATIN (LIPITOR) 40 MG TABLET    Take 1 tablet by mouth once daily    BENAZEPRIL (LOTENSIN) 40 MG TABLET    Take 1 tablet (40 mg total) by mouth once daily.    CARVEDILOL (COREG) 6.25 MG TABLET    TAKE 1 TABLET BY MOUTH TWICE DAILY WITH MEALS    CLONAZEPAM (KLONOPIN) 0.5 MG TABLET    TAKE 2 TABLETS BY MOUTH NIGHTLY  AS NEEDED FOR ANXIETY FOR INSOMNIA    DULOXETINE (CYMBALTA) 60 MG CAPSULE    Take 1 capsule (60 mg total) by mouth once daily.    FLUTICASONE PROPIONATE (FLONASE) 50 MCG/ACTUATION NASAL SPRAY    2 sprays (100 mcg total) by Each Nostril route once daily.    HYDROXYCHLOROQUINE (PLAQUENIL) 200 MG TABLET    Take 2 tablets (400 mg total) by mouth once daily.    HYDROXYZINE PAMOATE (VISTARIL) 25 MG CAP    Take 1 capsule (25 mg total) by mouth nightly as needed (insomnia).    LINACLOTIDE (LINZESS) 145 MCG CAP CAPSULE    Take 1 capsule (145 mcg total) by mouth before breakfast.    SEMAGLUTIDE (OZEMPIC) 2 MG/DOSE (8 MG/3 ML) PNIJ    Inject 2 mg into the skin once a week.    TRAZODONE (DESYREL) 50 MG TABLET    Take 1 tablet (50 mg total) by mouth every evening.    ZOLPIDEM (AMBIEN) 5 MG TAB    Take 1 tablet (5 mg total) by mouth nightly as needed (insomnia).     Objective:     Vitals:    08/16/24 1327   BP: 113/71   Pulse: 69   Resp: 18   Temp: 98 °F (36.7 °C)     Lab Results   Component Value Date    WBC 6.36 08/09/2024    HGB 13.6 08/09/2024    HCT 40.3 08/09/2024    MCV 92 08/09/2024     08/09/2024       BMP  Lab Results   Component Value Date     08/09/2024    K 3.7 08/09/2024     08/09/2024    CO2 29 08/09/2024    BUN 14 08/09/2024    CREATININE 0.9 08/09/2024    CALCIUM 9.5 08/09/2024    ANIONGAP 9 08/09/2024    EGFRNORACEVR >60 08/09/2024     Lab Results   Component Value Date    ALT 30 08/09/2024    AST 27 08/09/2024    ALKPHOS 76 08/09/2024    BILITOT 0.5 08/09/2024         Physical Exam  Vitals reviewed.   Constitutional:       Appearance: She is well-developed.   HENT:      Head: Normocephalic.      Right Ear: Hearing and external ear normal. No drainage.      Left Ear: Hearing and external ear normal. No drainage.      Nose: Nose normal.   Eyes:      General: Lids are normal. No scleral icterus.        Right eye: No discharge.         Left eye: No discharge.      Conjunctiva/sclera: Conjunctivae  normal.   Neck:      Thyroid: No thyroid mass.   Cardiovascular:      Rate and Rhythm: Normal rate and regular rhythm.      Heart sounds: Normal heart sounds.   Pulmonary:      Effort: Pulmonary effort is normal. No respiratory distress.      Breath sounds: Normal breath sounds. No wheezing or rales.   Abdominal:      General: There is no distension.   Genitourinary:     Comments: deferred  Musculoskeletal:         General: Normal range of motion.      Cervical back: Normal range of motion.   Skin:     General: Skin is warm and dry.   Neurological:      Mental Status: She is alert and oriented to person, place, and time.   Psychiatric:         Speech: Speech normal.         Behavior: Behavior normal. Behavior is cooperative.         Thought Content: Thought content normal.        Assessment:     Problem List Items Addressed This Visit          Oncology    Personal history of breast cancer     Mammogram per PCP         Monoclonal paraproteinemia - Primary     Faint free lambda light chain specific monoclonal band noted on SANDIP. No other concerning findings. No CRAB criteria. Possibly secondary to her autoimmune disorder. Recommend continued Rheumatology follow up.    Most recent labs without findings concerning for progression.  No clinical concerns noted. Repeat labs only 6 months. F/u 1 year with repeat labs 1 week prior         Relevant Orders    CBC Auto Differential    Comprehensive Metabolic Panel    Beta 2 Microglobulin, Serum    Immunoglobulin Free LT Chains Blood    Immunoglobulins (IgG, IgA, IgM) Quantitative    Lactate Dehydrogenase    Protein Electrophoresis, Serum    Immunofixation Electrophoresis    CBC Auto Differential    Comprehensive Metabolic Panel    Beta 2 Microglobulin, Serum    Immunoglobulin Free LT Chains Blood    Immunoglobulins (IgG, IgA, IgM) Quantitative    Immunofixation Electrophoresis    Protein Electrophoresis, Serum       Endocrine    Obesity (BMI 30-39.9)     Encourage healthy  nutrition along with portion control. Encourage daily exercise    Reports 50+lbs weight loss since starting Ozempic              Plan:     Monoclonal paraproteinemia  -     CBC Auto Differential; Future; Expected date: 08/16/2024  -     Comprehensive Metabolic Panel; Future; Expected date: 08/16/2024  -     Beta 2 Microglobulin, Serum; Future; Expected date: 08/16/2024  -     Immunoglobulin Free LT Chains Blood; Future; Expected date: 08/16/2024  -     Immunoglobulins (IgG, IgA, IgM) Quantitative; Future; Expected date: 08/16/2024  -     Lactate Dehydrogenase; Future; Expected date: 08/16/2024  -     Protein Electrophoresis, Serum; Future; Expected date: 08/16/2024  -     Immunofixation Electrophoresis; Future; Expected date: 08/16/2024  -     CBC Auto Differential; Future; Expected date: 08/16/2024  -     Comprehensive Metabolic Panel; Future; Expected date: 08/16/2024  -     Beta 2 Microglobulin, Serum; Future; Expected date: 08/16/2024  -     Immunoglobulin Free LT Chains Blood; Future; Expected date: 08/16/2024  -     Immunoglobulins (IgG, IgA, IgM) Quantitative; Future; Expected date: 08/16/2024  -     Immunofixation Electrophoresis; Future; Expected date: 08/16/2024  -     Protein Electrophoresis, Serum; Future; Expected date: 08/16/2024    Obesity (BMI 30-39.9)    Personal history of breast cancer            Med Onc Chart Routing      Follow up with physician    Follow up with JASPAL 1 year.   Infusion scheduling note    Injection scheduling note    Labs CMP, free light chains, CBC, beta 2 microglobulin, SPEP and LDH   Scheduling:  Preferred lab:  Lab interval:  cbc cmp spep FLC beta 2, ldh labs only in 6 months. same labs 1 week prior to 1 year follow up   Imaging None      Pharmacy appointment No pharmacy appointment needed      Other referrals       No additional referrals needed       TOR Blevins

## 2024-08-16 NOTE — ASSESSMENT & PLAN NOTE
Faint free lambda light chain specific monoclonal band noted on SANDIP. No other concerning findings. No CRAB criteria. Possibly secondary to her autoimmune disorder. Recommend continued Rheumatology follow up.    Most recent labs without findings concerning for progression.  No clinical concerns noted. Repeat labs only 6 months. F/u 1 year with repeat labs 1 week prior

## 2024-10-13 RX ORDER — SEMAGLUTIDE 2.68 MG/ML
2 INJECTION, SOLUTION SUBCUTANEOUS WEEKLY
Qty: 9 ML | Refills: 0 | Status: SHIPPED | OUTPATIENT
Start: 2024-10-13

## 2024-10-13 NOTE — TELEPHONE ENCOUNTER
Care Due:                  Date            Visit Type   Department     Provider  --------------------------------------------------------------------------------                                EP -                              PRIMARY      [facility] INTER[facility]  Last Visit: 04-      CARE ([facility])   WILBERTO Combs                              EP -                              PRIMARY      [facility] INTER[facility]  Next Visit: 10-      CARE ([facility])   WILBERTO Combs                                                            Last  Test          Frequency    Reason                     Performed    Due Date  --------------------------------------------------------------------------------    HBA1C.......  6 months...  semaglutide..............  04-   10-    [facility] Embedded Care Due Messages. Reference number: [id number].   10/13/2024 11:14:31 AM CDT

## 2024-10-14 NOTE — TELEPHONE ENCOUNTER
Provider Staff:  Action required for this patient    Requires labs      Please see care gap opportunities below in Care Due Message.    Thanks!  [facility]     Appointments      Date Provider   Last Visit   4/26/2024 Elias Combs MD   Next Visit   10/25/2024 Elias Combs MD     Refill Decision Note   Latonia Oliver  is requesting a refill authorization.  Brief Assessment and Rationale for Refill:  Approve     Medication Therapy Plan:         Comments:     Note composed:7:37 PM 10/13/2024

## 2024-10-25 ENCOUNTER — LAB VISIT (OUTPATIENT)
Dept: LAB | Facility: HOSPITAL | Age: 74
End: 2024-10-25
Attending: FAMILY MEDICINE
Payer: MEDICARE

## 2024-10-25 ENCOUNTER — OFFICE VISIT (OUTPATIENT)
Dept: INTERNAL MEDICINE | Facility: CLINIC | Age: 74
End: 2024-10-25
Payer: MEDICARE

## 2024-10-25 VITALS
BODY MASS INDEX: 32.07 KG/M2 | HEIGHT: 63 IN | TEMPERATURE: 98 F | WEIGHT: 181 LBS | OXYGEN SATURATION: 97 % | SYSTOLIC BLOOD PRESSURE: 120 MMHG | DIASTOLIC BLOOD PRESSURE: 70 MMHG | HEART RATE: 75 BPM

## 2024-10-25 DIAGNOSIS — M54.50 CHRONIC LOW BACK PAIN, UNSPECIFIED BACK PAIN LATERALITY, UNSPECIFIED WHETHER SCIATICA PRESENT: ICD-10-CM

## 2024-10-25 DIAGNOSIS — F32.A ANXIETY AND DEPRESSION: ICD-10-CM

## 2024-10-25 DIAGNOSIS — M35.01 SJOGREN'S SYNDROME WITH KERATOCONJUNCTIVITIS SICCA: ICD-10-CM

## 2024-10-25 DIAGNOSIS — I10 ESSENTIAL HYPERTENSION: ICD-10-CM

## 2024-10-25 DIAGNOSIS — E78.2 MIXED HYPERLIPIDEMIA: ICD-10-CM

## 2024-10-25 DIAGNOSIS — E11.9 TYPE 2 DIABETES MELLITUS WITHOUT COMPLICATION, WITHOUT LONG-TERM CURRENT USE OF INSULIN: ICD-10-CM

## 2024-10-25 DIAGNOSIS — G89.29 CHRONIC LOW BACK PAIN, UNSPECIFIED BACK PAIN LATERALITY, UNSPECIFIED WHETHER SCIATICA PRESENT: ICD-10-CM

## 2024-10-25 DIAGNOSIS — R13.10 DYSPHAGIA, UNSPECIFIED TYPE: Primary | ICD-10-CM

## 2024-10-25 DIAGNOSIS — G47.00 INSOMNIA, UNSPECIFIED TYPE: ICD-10-CM

## 2024-10-25 DIAGNOSIS — Z85.3 PERSONAL HISTORY OF BREAST CANCER: ICD-10-CM

## 2024-10-25 DIAGNOSIS — E11.69 TYPE 2 DIABETES MELLITUS WITH OTHER SPECIFIED COMPLICATION, WITHOUT LONG-TERM CURRENT USE OF INSULIN: ICD-10-CM

## 2024-10-25 DIAGNOSIS — K21.9 GASTROESOPHAGEAL REFLUX DISEASE WITHOUT ESOPHAGITIS: ICD-10-CM

## 2024-10-25 DIAGNOSIS — G47.33 OSA (OBSTRUCTIVE SLEEP APNEA): ICD-10-CM

## 2024-10-25 DIAGNOSIS — E66.9 OBESITY (BMI 30-39.9): ICD-10-CM

## 2024-10-25 DIAGNOSIS — M85.89 OSTEOPENIA OF MULTIPLE SITES: ICD-10-CM

## 2024-10-25 DIAGNOSIS — D47.2 MONOCLONAL PARAPROTEINEMIA: ICD-10-CM

## 2024-10-25 DIAGNOSIS — F32.4 MAJOR DEPRESSIVE DISORDER IN PARTIAL REMISSION, UNSPECIFIED WHETHER RECURRENT: ICD-10-CM

## 2024-10-25 DIAGNOSIS — F41.9 ANXIETY AND DEPRESSION: ICD-10-CM

## 2024-10-25 LAB
CHOLEST SERPL-MCNC: 228 MG/DL (ref 120–199)
CHOLEST/HDLC SERPL: 3.7 {RATIO} (ref 2–5)
ESTIMATED AVG GLUCOSE: 108 MG/DL (ref 68–131)
HBA1C MFR BLD: 5.4 % (ref 4–5.6)
HDLC SERPL-MCNC: 62 MG/DL (ref 40–75)
HDLC SERPL: 27.2 % (ref 20–50)
LDLC SERPL CALC-MCNC: 146.6 MG/DL (ref 63–159)
NONHDLC SERPL-MCNC: 166 MG/DL
TRIGL SERPL-MCNC: 97 MG/DL (ref 30–150)

## 2024-10-25 PROCEDURE — 99214 OFFICE O/P EST MOD 30 MIN: CPT | Mod: S$PBB,,, | Performed by: FAMILY MEDICINE

## 2024-10-25 PROCEDURE — 99214 OFFICE O/P EST MOD 30 MIN: CPT | Mod: PBBFAC | Performed by: FAMILY MEDICINE

## 2024-10-25 PROCEDURE — 99999 PR PBB SHADOW E&M-EST. PATIENT-LVL IV: CPT | Mod: PBBFAC,,, | Performed by: FAMILY MEDICINE

## 2024-10-25 PROCEDURE — 83036 HEMOGLOBIN GLYCOSYLATED A1C: CPT | Performed by: FAMILY MEDICINE

## 2024-10-25 PROCEDURE — 80061 LIPID PANEL: CPT | Performed by: FAMILY MEDICINE

## 2024-10-25 PROCEDURE — 36415 COLL VENOUS BLD VENIPUNCTURE: CPT | Performed by: FAMILY MEDICINE

## 2024-10-25 PROCEDURE — G2211 COMPLEX E/M VISIT ADD ON: HCPCS | Mod: S$PBB,,, | Performed by: FAMILY MEDICINE

## 2024-10-25 RX ORDER — TRAZODONE HYDROCHLORIDE 100 MG/1
100 TABLET ORAL NIGHTLY
Qty: 90 TABLET | Refills: 1 | Status: SHIPPED | OUTPATIENT
Start: 2024-10-25 | End: 2025-10-25

## 2024-10-25 RX ORDER — OMEPRAZOLE 40 MG/1
40 CAPSULE, DELAYED RELEASE ORAL DAILY
Qty: 90 CAPSULE | Refills: 0 | Status: SHIPPED | OUTPATIENT
Start: 2024-10-25 | End: 2025-10-25

## 2024-12-04 ENCOUNTER — LAB VISIT (OUTPATIENT)
Dept: LAB | Facility: HOSPITAL | Age: 74
End: 2024-12-04
Attending: STUDENT IN AN ORGANIZED HEALTH CARE EDUCATION/TRAINING PROGRAM
Payer: MEDICARE

## 2024-12-04 DIAGNOSIS — M35.01 SJOGREN'S SYNDROME WITH KERATOCONJUNCTIVITIS SICCA: ICD-10-CM

## 2024-12-04 DIAGNOSIS — D47.2 MONOCLONAL PARAPROTEINEMIA: ICD-10-CM

## 2024-12-04 LAB
ALBUMIN SERPL BCP-MCNC: 3.8 G/DL (ref 3.5–5.2)
ALP SERPL-CCNC: 80 U/L (ref 40–150)
ALT SERPL W/O P-5'-P-CCNC: 27 U/L (ref 10–44)
ANION GAP SERPL CALC-SCNC: 9 MMOL/L (ref 8–16)
AST SERPL-CCNC: 25 U/L (ref 10–40)
BASOPHILS # BLD AUTO: 0.08 K/UL (ref 0–0.2)
BASOPHILS NFR BLD: 1.4 % (ref 0–1.9)
BILIRUB SERPL-MCNC: 0.4 MG/DL (ref 0.1–1)
BUN SERPL-MCNC: 7 MG/DL (ref 8–23)
CALCIUM SERPL-MCNC: 9.2 MG/DL (ref 8.7–10.5)
CHLORIDE SERPL-SCNC: 108 MMOL/L (ref 95–110)
CO2 SERPL-SCNC: 28 MMOL/L (ref 23–29)
CREAT SERPL-MCNC: 0.9 MG/DL (ref 0.5–1.4)
CRP SERPL-MCNC: 1.1 MG/L (ref 0–8.2)
DIFFERENTIAL METHOD BLD: NORMAL
EOSINOPHIL # BLD AUTO: 0.1 K/UL (ref 0–0.5)
EOSINOPHIL NFR BLD: 2.1 % (ref 0–8)
ERYTHROCYTE [DISTWIDTH] IN BLOOD BY AUTOMATED COUNT: 13 % (ref 11.5–14.5)
ERYTHROCYTE [SEDIMENTATION RATE] IN BLOOD BY WESTERGREN METHOD: 35 MM/HR (ref 0–20)
EST. GFR  (NO RACE VARIABLE): >60 ML/MIN/1.73 M^2
GLUCOSE SERPL-MCNC: 103 MG/DL (ref 70–110)
HCT VFR BLD AUTO: 40.3 % (ref 37–48.5)
HGB BLD-MCNC: 13.1 G/DL (ref 12–16)
IMM GRANULOCYTES # BLD AUTO: 0.01 K/UL (ref 0–0.04)
IMM GRANULOCYTES NFR BLD AUTO: 0.2 % (ref 0–0.5)
LDH SERPL L TO P-CCNC: 175 U/L (ref 110–260)
LYMPHOCYTES # BLD AUTO: 1.4 K/UL (ref 1–4.8)
LYMPHOCYTES NFR BLD: 23.6 % (ref 18–48)
MCH RBC QN AUTO: 30.3 PG (ref 27–31)
MCHC RBC AUTO-ENTMCNC: 32.5 G/DL (ref 32–36)
MCV RBC AUTO: 93 FL (ref 82–98)
MONOCYTES # BLD AUTO: 0.4 K/UL (ref 0.3–1)
MONOCYTES NFR BLD: 7.7 % (ref 4–15)
NEUTROPHILS # BLD AUTO: 3.7 K/UL (ref 1.8–7.7)
NEUTROPHILS NFR BLD: 65 % (ref 38–73)
NRBC BLD-RTO: 0 /100 WBC
PLATELET # BLD AUTO: 209 K/UL (ref 150–450)
PMV BLD AUTO: 9.4 FL (ref 9.2–12.9)
POTASSIUM SERPL-SCNC: 4 MMOL/L (ref 3.5–5.1)
PROT SERPL-MCNC: 7.2 G/DL (ref 6–8.4)
RBC # BLD AUTO: 4.32 M/UL (ref 4–5.4)
SODIUM SERPL-SCNC: 145 MMOL/L (ref 136–145)
WBC # BLD AUTO: 5.71 K/UL (ref 3.9–12.7)

## 2024-12-04 PROCEDURE — 86140 C-REACTIVE PROTEIN: CPT | Performed by: STUDENT IN AN ORGANIZED HEALTH CARE EDUCATION/TRAINING PROGRAM

## 2024-12-04 PROCEDURE — 85651 RBC SED RATE NONAUTOMATED: CPT | Performed by: STUDENT IN AN ORGANIZED HEALTH CARE EDUCATION/TRAINING PROGRAM

## 2024-12-04 PROCEDURE — 83615 LACTATE (LD) (LDH) ENZYME: CPT | Performed by: NURSE PRACTITIONER

## 2024-12-04 PROCEDURE — 36415 COLL VENOUS BLD VENIPUNCTURE: CPT | Performed by: NURSE PRACTITIONER

## 2024-12-04 PROCEDURE — 80053 COMPREHEN METABOLIC PANEL: CPT | Performed by: STUDENT IN AN ORGANIZED HEALTH CARE EDUCATION/TRAINING PROGRAM

## 2024-12-04 PROCEDURE — 85025 COMPLETE CBC W/AUTO DIFF WBC: CPT | Performed by: STUDENT IN AN ORGANIZED HEALTH CARE EDUCATION/TRAINING PROGRAM

## 2024-12-10 ENCOUNTER — OFFICE VISIT (OUTPATIENT)
Dept: RHEUMATOLOGY | Facility: CLINIC | Age: 74
End: 2024-12-10
Payer: MEDICARE

## 2024-12-10 VITALS
WEIGHT: 181.69 LBS | DIASTOLIC BLOOD PRESSURE: 66 MMHG | HEIGHT: 63 IN | SYSTOLIC BLOOD PRESSURE: 119 MMHG | BODY MASS INDEX: 32.19 KG/M2 | HEART RATE: 79 BPM

## 2024-12-10 DIAGNOSIS — D84.821 IMMUNODEFICIENCY DUE TO DRUG THERAPY: ICD-10-CM

## 2024-12-10 DIAGNOSIS — M35.01 SJOGREN'S SYNDROME WITH KERATOCONJUNCTIVITIS SICCA: Primary | ICD-10-CM

## 2024-12-10 DIAGNOSIS — D47.2 MGUS (MONOCLONAL GAMMOPATHY OF UNKNOWN SIGNIFICANCE): ICD-10-CM

## 2024-12-10 DIAGNOSIS — Z79.899 IMMUNODEFICIENCY DUE TO DRUG THERAPY: ICD-10-CM

## 2024-12-10 DIAGNOSIS — Z79.899 HIGH RISK MEDICATION USE: ICD-10-CM

## 2024-12-10 DIAGNOSIS — Z79.60 ENCOUNTER FOR MONITORING IMMUNOSUPPRESSIVE MEDICATION THERAPY CAUSING IMMUNODEFICIENCY: ICD-10-CM

## 2024-12-10 DIAGNOSIS — D84.821 ENCOUNTER FOR MONITORING IMMUNOSUPPRESSIVE MEDICATION THERAPY CAUSING IMMUNODEFICIENCY: ICD-10-CM

## 2024-12-10 DIAGNOSIS — Z51.81 ENCOUNTER FOR MONITORING IMMUNOSUPPRESSIVE MEDICATION THERAPY CAUSING IMMUNODEFICIENCY: ICD-10-CM

## 2024-12-10 PROCEDURE — 99213 OFFICE O/P EST LOW 20 MIN: CPT | Mod: PBBFAC | Performed by: STUDENT IN AN ORGANIZED HEALTH CARE EDUCATION/TRAINING PROGRAM

## 2024-12-10 PROCEDURE — 99214 OFFICE O/P EST MOD 30 MIN: CPT | Mod: S$PBB,,, | Performed by: STUDENT IN AN ORGANIZED HEALTH CARE EDUCATION/TRAINING PROGRAM

## 2024-12-10 PROCEDURE — 99999 PR PBB SHADOW E&M-EST. PATIENT-LVL III: CPT | Mod: PBBFAC,,, | Performed by: STUDENT IN AN ORGANIZED HEALTH CARE EDUCATION/TRAINING PROGRAM

## 2024-12-10 RX ORDER — HYDROXYCHLOROQUINE SULFATE 200 MG/1
400 TABLET, FILM COATED ORAL DAILY
Qty: 180 TABLET | Refills: 0 | Status: SHIPPED | OUTPATIENT
Start: 2024-12-10

## 2024-12-10 RX ORDER — HYDROXYCHLOROQUINE SULFATE 200 MG/1
400 TABLET, FILM COATED ORAL DAILY
Qty: 180 TABLET | Refills: 3 | Status: SHIPPED | OUTPATIENT
Start: 2024-12-10 | End: 2024-12-10

## 2024-12-10 RX ORDER — CYCLOSPORINE 0.5 MG/ML
1 EMULSION OPHTHALMIC 2 TIMES DAILY
Qty: 30 EACH | Refills: 0 | Status: SHIPPED | OUTPATIENT
Start: 2024-12-10 | End: 2024-12-12 | Stop reason: SDUPTHER

## 2024-12-10 RX ORDER — CYCLOSPORINE 0.5 MG/ML
1 EMULSION OPHTHALMIC 2 TIMES DAILY
Qty: 30 EACH | Refills: 0 | Status: SHIPPED | OUTPATIENT
Start: 2024-12-10 | End: 2024-12-10

## 2024-12-10 NOTE — PROGRESS NOTES
"Subjective:      Patient ID: Latonia Oliver is a 74 y.o. female.    Chief Complaint: Sjogren's Disease    HPI:   Patient presents for Rheumatology follow up for Sjogren's Disease. High titer PAUL+, SSA+, SSB+. Also has a faint IgA lambda spike on SANDIP and follows with Hematology. She takes Plaquenil 400 mg daily. She reports dry eyes are worsening. She has taken Restasis before and would like to take it again. She also wants to try a different kind of eye drop which has a discount card but can not remember the name of it. She last saw Optometry a year ago and was recommended OTC eye drops but they don't help. She did not like the Optometrist and does not want to return to him. She declines referral to Ophthalmology. She wants a prescription for Plaquenil for 1 year which she gets from [facility] since it is much less expensive than here.        Objective:   /66 (BP Location: Right arm, Patient Position: Sitting)   Pulse 79   Ht 5' 3" (1.6 m)   Wt 82.4 kg (181 lb 10.5 oz)   BMI 32.18 kg/m²   Physical Exam        Assessment and Plan:     Problem List Items Addressed This Visit          Unprioritized    Sjogren's syndrome with keratoconjunctivitis sicca - Primary    Relevant Orders    C3 Complement    CBC Auto Differential    C4 Complement    Comprehensive Metabolic Panel    C-Reactive Protein    Cryoglobulin    Immunoglobulins (IgG, IgA, IgM) Quantitative    Protein/Creatinine Ratio, Urine    Sedimentation rate    Urinalysis     Other Visit Diagnoses       High risk medication use        Relevant Orders    C3 Complement    CBC Auto Differential    C4 Complement    Comprehensive Metabolic Panel    C-Reactive Protein    Cryoglobulin    Immunoglobulins (IgG, IgA, IgM) Quantitative    Protein/Creatinine Ratio, Urine    Sedimentation rate    Urinalysis    Encounter for monitoring immunosuppressive medication therapy causing immunodeficiency        Relevant Orders    C3 Complement    CBC Auto Differential    C4 Complement " "   Comprehensive Metabolic Panel    C-Reactive Protein    Cryoglobulin    Immunoglobulins (IgG, IgA, IgM) Quantitative    Protein/Creatinine Ratio, Urine    Sedimentation rate    Urinalysis    Immunodeficiency due to drug therapy        Relevant Orders    C3 Complement    CBC Auto Differential    C4 Complement    Comprehensive Metabolic Panel    C-Reactive Protein    Cryoglobulin    Immunoglobulins (IgG, IgA, IgM) Quantitative    Protein/Creatinine Ratio, Urine    Sedimentation rate    Urinalysis    MGUS (monoclonal gammopathy of unknown significance)                Patient presents for Rheumatology follow up for Sjogren's Disease. High titer PAUL+, SSA+, SSB+. Also has a faint IgA lambda spike on SANDIP and follows with Hematology. Patient requests Restasis eye drops as well as wants to try another specialty eye drop, does not remember the name. I informed her that I can prescribe Restasis but for further dry eye treatment, I prefer that she re-establish with a new Ophthalmologist whom she is happy with since she did not like her previous Ophthalmologist so that she can do her yearly eye exams and treat the dry eyes as appropriate. Patient became upset that I was "preachy" and she became rude and disrespectful towards me. She raised her voice and demanded I give her a year of Plaquenil but made it clear she would not follow up with me. I informed her that I can not prescribe a year of Plaquenil with no follow up. I feel she would be better suited to establish care with a different Rheumatologist. I am giving her 3 months of Plaquenil in a printed script and sent 30 days of Restasis to [facility] until she can establish with a new Rheumatologist and Ophthalmologist.        "

## 2024-12-12 ENCOUNTER — OFFICE VISIT (OUTPATIENT)
Dept: OPHTHALMOLOGY | Facility: CLINIC | Age: 74
End: 2024-12-12
Payer: MEDICARE

## 2024-12-12 DIAGNOSIS — Z79.899 LONG-TERM USE OF PLAQUENIL: ICD-10-CM

## 2024-12-12 DIAGNOSIS — E11.9 DIABETES MELLITUS TYPE 2 WITHOUT RETINOPATHY: Primary | ICD-10-CM

## 2024-12-12 DIAGNOSIS — M35.01 SJOGREN'S SYNDROME WITH KERATOCONJUNCTIVITIS SICCA: ICD-10-CM

## 2024-12-12 DIAGNOSIS — H25.13 NUCLEAR SCLEROSIS OF BOTH EYES: ICD-10-CM

## 2024-12-12 PROCEDURE — 99999 PR PBB SHADOW E&M-EST. PATIENT-LVL I: CPT | Mod: PBBFAC,,, | Performed by: OPHTHALMOLOGY

## 2024-12-12 PROCEDURE — 99214 OFFICE O/P EST MOD 30 MIN: CPT | Mod: S$PBB,,, | Performed by: OPHTHALMOLOGY

## 2024-12-12 PROCEDURE — 99211 OFF/OP EST MAY X REQ PHY/QHP: CPT | Mod: PBBFAC | Performed by: OPHTHALMOLOGY

## 2024-12-12 RX ORDER — CYCLOSPORINE 0.5 MG/ML
1 EMULSION OPHTHALMIC 2 TIMES DAILY
Qty: 5 EACH | Refills: 10 | Status: SHIPPED | OUTPATIENT
Start: 2024-12-12 | End: 2024-12-12

## 2024-12-12 RX ORDER — IBUPROFEN 100 MG/1
TABLET, CHEWABLE ORAL
COMMUNITY

## 2024-12-12 RX ORDER — CYCLOSPORINE 0.5 MG/ML
1 EMULSION OPHTHALMIC 2 TIMES DAILY
Qty: 30 EACH | Refills: 0 | Status: SHIPPED | OUTPATIENT
Start: 2024-12-12 | End: 2025-12-12

## 2024-12-12 RX ORDER — CYCLOSPORINE 0.5 MG/ML
1 EMULSION OPHTHALMIC 2 TIMES DAILY
Qty: 30 EACH | Refills: 0 | Status: SHIPPED | OUTPATIENT
Start: 2024-12-12 | End: 2024-12-12

## 2024-12-12 NOTE — PROGRESS NOTES
HPI     Diabetic Eye Exam     Additional comments: Diabetic eye exam.  Plaquinil pt.    Lab Results       Component                Value               Date                       HGBA1C                   5.4                 10/25/2024                      Comments    Here for annual exam.  C/o sticky discharge OU.  Tears seem to be thick.    Some crusting.  She has used a few OTC drops that were not effective.  Was on Restasis   about 18 years ago and that helped eyes a great deal, but was expensive.    She questions if there might be a newer med that she might try. She is   using drops, [facility] brand, not sure of name.          Last edited by Shannan Moreno MA on 12/12/2024  2:02 PM.            Assessment /Plan     For exam results, see Encounter Report.    Diabetes mellitus type 2 without retinopathy  Last A1c 5.4 . Diabetes controlled with no diabetic retinopathy on dilated exam. Reviewed diabetic eye precautions including excellent blood sugar control, and importance of regular follow up.     Nuclear sclerotic cataract of both eyes  Cataracts are present but not visually significant. Will continue to monitor. Mrx provided.     Sjogren's syndrome with keratoconjunctivitis sicca  Patient with symptoms and exam consistent with dry eye. Start artificial tears 4-6 times daily. Patient had a good response to Restasis in the past. Will prescribed Restasis BID OU. If no improvement after 4 weeks of treatment, return to clinic to discuss further interventions.     Long-term use of Plaquenil  On Plaquenil for Sjogren's. No evidence of maculopathy on DFE. HVF 10-2 and MOCT both WNL. Follow with annual testing.      RTC 1 year sooner if needed with HVF 10-2, mOCT

## 2024-12-23 ENCOUNTER — PATIENT MESSAGE (OUTPATIENT)
Dept: ADMINISTRATIVE | Facility: OTHER | Age: 74
End: 2024-12-23
Payer: MEDICARE

## 2025-01-04 NOTE — TELEPHONE ENCOUNTER
No care due was identified.  [facility] Embedded Care Due Messages. Reference number: [id number].   1/04/2025 9:18:28 AM CST

## 2025-01-05 RX ORDER — SEMAGLUTIDE 2.68 MG/ML
INJECTION, SOLUTION SUBCUTANEOUS
Qty: 9 ML | Refills: 1 | Status: SHIPPED | OUTPATIENT
Start: 2025-01-05

## 2025-01-05 NOTE — TELEPHONE ENCOUNTER
Refill Decision Note   Latonia Oliver  is requesting a refill authorization.  Brief Assessment and Rationale for Refill:  Approve     Medication Therapy Plan:         Comments:     Note composed:5:38 PM 01/05/2025

## 2025-01-10 RX ORDER — CYCLOSPORINE 0.5 MG/ML
1 EMULSION OPHTHALMIC 2 TIMES DAILY
Qty: 180 EACH | Refills: 6 | Status: SHIPPED | OUTPATIENT
Start: 2025-01-10 | End: 2026-01-10

## 2025-02-22 DIAGNOSIS — Z00.00 ENCOUNTER FOR MEDICARE ANNUAL WELLNESS EXAM: ICD-10-CM

## 2025-03-01 DIAGNOSIS — I10 ESSENTIAL HYPERTENSION: ICD-10-CM

## 2025-03-01 RX ORDER — BENAZEPRIL HYDROCHLORIDE 40 MG/1
40 TABLET ORAL
Qty: 90 TABLET | Refills: 0 | OUTPATIENT
Start: 2025-03-01

## 2025-03-01 RX ORDER — AMLODIPINE BESYLATE 10 MG/1
10 TABLET ORAL
Qty: 90 TABLET | Refills: 2 | Status: SHIPPED | OUTPATIENT
Start: 2025-03-01

## 2025-03-01 NOTE — TELEPHONE ENCOUNTER
Provider Staff:  Action required for this patient    Requires labs      Please see care gap opportunities below in Care Due Message.    Thanks!  [facility]     Appointments      Date Provider   Last Visit   10/25/2024 Elias Combs MD   Next Visit   4/28/2025 Elias Combs MD     Refill Decision Note   Latonia Oliver  is requesting a refill authorization.  Brief Assessment and Rationale for Refill:  Approve  Quick Discontinue     Medication Therapy Plan:  s discontinued on 12/30/2024 by Mayelin Buchanan,      Comments:     Note composed:9:46 AM 03/01/2025

## 2025-03-01 NOTE — TELEPHONE ENCOUNTER
Care Due:                  Date            Visit Type   Department     Provider  --------------------------------------------------------------------------------                                EP -                              PRIMARY      [facility] INTERNAL  Last Visit: 10-      CARE ([facility])   WILBERTO Combs                              EP -                              PRIMARY      [facility] INTERNAL  Next Visit: 04-      CARE ([facility])   WILBERTO Combs                                                            Last  Test          Frequency    Reason                     Performed    Due Date  --------------------------------------------------------------------------------    HBA1C.......  6 months...  OZEMPIC..................  10-   04-    [facility] [employer] Embedded Care Due Messages. Reference number: [id number].   3/01/2025 6:02:18 AM CST

## 2025-03-13 ENCOUNTER — PATIENT MESSAGE (OUTPATIENT)
Dept: RHEUMATOLOGY | Facility: CLINIC | Age: 75
End: 2025-03-13
Payer: MEDICARE

## 2025-06-09 NOTE — TELEPHONE ENCOUNTER
Refill Routing Note   Medication(s) are not appropriate for processing by [facility] for the following reason(s):        Required labs outdated    [facility] action(s):  Defer   Requires labs : Yes             Appointments  past 12m or future 3m with PCP    Date Provider   Last Visit   10/25/2024 Elias Combs MD   Next Visit   7/24/2025 Elias Combs MD   ED visits in past 90 days: 0        Note composed:3:53 PM 06/09/2025

## 2025-06-09 NOTE — TELEPHONE ENCOUNTER
Care Due:                  Date            Visit Type   Department     Provider  --------------------------------------------------------------------------------                                EP -                              PRIMARY      [facility] INTERNAL  Last Visit: 10-      CARE ([facility])   WILBERTO Combs                              EP -                              PRIMARY      [facility] INTERNAL  Next Visit: 07-      CARE ([facility])   WILBERTO Combs                                                            Last  Test          Frequency    Reason                     Performed    Due Date  --------------------------------------------------------------------------------    HBA1C.......  6 months...  OZEMPIC..................  10-   04-    [employer] Embedded Care Due Messages. Reference number: [id number].   6/09/2025 9:18:44 AM CDT

## 2025-06-10 RX ORDER — SEMAGLUTIDE 2.68 MG/ML
INJECTION, SOLUTION SUBCUTANEOUS
Qty: 9 ML | Refills: 0 | Status: SHIPPED | OUTPATIENT
Start: 2025-06-10

## 2025-06-10 RX ORDER — ATORVASTATIN CALCIUM 40 MG/1
40 TABLET, FILM COATED ORAL
Qty: 90 TABLET | Refills: 1 | Status: SHIPPED | OUTPATIENT
Start: 2025-06-10

## 2025-06-10 NOTE — TELEPHONE ENCOUNTER
No care due was identified.  [facility] Embedded Care Due Messages. Reference number: [id number].   6/10/2025 6:01:24 AM CDT

## 2025-06-10 NOTE — TELEPHONE ENCOUNTER
Refill Decision Note   Latonia Oliver  is requesting a refill authorization.  Brief Assessment and Rationale for Refill:  Approve     Medication Therapy Plan:         Comments:     Note composed:9:57 AM 06/10/2025             Appointments     Last Visit   10/25/2024 Elias Combs MD   Next Visit   7/24/2025 Elias Combs MD

## 2025-06-21 ENCOUNTER — PATIENT MESSAGE (OUTPATIENT)
Dept: ADMINISTRATIVE | Facility: OTHER | Age: 75
End: 2025-06-21
Payer: MEDICARE

## 2025-06-25 DIAGNOSIS — Z78.0 MENOPAUSE: ICD-10-CM

## 2025-07-10 ENCOUNTER — OFFICE VISIT (OUTPATIENT)
Dept: PULMONOLOGY | Facility: CLINIC | Age: 75
End: 2025-07-10
Payer: MEDICARE

## 2025-07-10 VITALS
RESPIRATION RATE: 18 BRPM | WEIGHT: 190.25 LBS | OXYGEN SATURATION: 94 % | HEIGHT: 63 IN | DIASTOLIC BLOOD PRESSURE: 72 MMHG | HEART RATE: 76 BPM | BODY MASS INDEX: 33.71 KG/M2 | SYSTOLIC BLOOD PRESSURE: 120 MMHG

## 2025-07-10 DIAGNOSIS — G25.81 RESTLESS LEG SYNDROME: Chronic | ICD-10-CM

## 2025-07-10 DIAGNOSIS — M79.7 FIBROMYALGIA: ICD-10-CM

## 2025-07-10 DIAGNOSIS — G47.33 OSA (OBSTRUCTIVE SLEEP APNEA): Primary | ICD-10-CM

## 2025-07-10 DIAGNOSIS — E11.9 TYPE 2 DIABETES MELLITUS WITHOUT COMPLICATION, WITHOUT LONG-TERM CURRENT USE OF INSULIN: ICD-10-CM

## 2025-07-10 PROCEDURE — 99214 OFFICE O/P EST MOD 30 MIN: CPT | Mod: PBBFAC | Performed by: PHYSICIAN ASSISTANT

## 2025-07-10 PROCEDURE — 99999 PR PBB SHADOW E&M-EST. PATIENT-LVL IV: CPT | Mod: PBBFAC,,, | Performed by: PHYSICIAN ASSISTANT

## 2025-07-10 RX ORDER — CLONAZEPAM 1 MG/1
1 TABLET ORAL DAILY
Qty: 30 TABLET | Refills: 2 | Status: SHIPPED | OUTPATIENT
Start: 2025-07-10 | End: 2025-10-08

## 2025-07-10 NOTE — PROGRESS NOTES
Subjective:       Patient ID: Latonia Oliver is a 74 y.o. female.    Chief Complaint: Sleep Apnea    7/10/25  Here for ROX f/u  Wears CPAP nightly but does not feel rested  History of periodic limb movement which she says has been bothersome to her, use to take klonopin prescribed to her by Dr. Rahman for restless legs which she says was beneficial  History of fibromyalgia, no current treatment for this  History of diabetes currently on ozempic  Compliance download reviewed, days with usage > 4 hours is 100%, average AHI is <5        7/10/2025   EPWORTH SLEEPINESS SCALE   Sitting and reading 1   Watching TV 0   Sitting, inactive in a public place (e.g. a theatre or a meeting) 0   As a passenger in a car for an hour without a break 0   Lying down to rest in the afternoon when circumstances permit 1   Sitting and talking to someone 0   Sitting quietly after a lunch without alcohol 0   In a car, while stopped for a few minutes in traffic 0   Total score 2         7/8/24  Established ROX patient here for annual CPAP download  Compliance download reviewed, days with usage > 4 hours is 93%, average AHI is 1.0  Patient states improved symptoms with use of CPAP. Sleeping more soundly. Waking up feeling more refreshed. Improved daytime sleepiness.  She feels Full Face CPAP Mask pushes on her nose causing worsening nasal blockage  Would like to try hybrid Full Face CPAP Mask    7/6/23  73yo female here for annual CPAP follow up  Compliance download reviewed, days with usage > 4 hours is 98%, average AHI is 1.1  Patient states improved symptoms with use of CPAP. Sleeping more soundly. Waking up feeling more refreshed.  Requesting order for hybrid cpap mask  Has chronic daytime fatigue, she is unsure if this is related to her srojens and fibromyalgia  Takes ambian for insomnia which helps  She denies shortness of breath, cough, chest pain or fever  Recently started ozempic and complains of side effects of nausea and vomiting, she  has appointment with PCP soon    BP Readings from Last 3 Encounters:   07/10/25 120/72   12/10/24 119/66   10/25/24 120/70     [place] Sleepiness Scale TOTAL =   5  (validated sleepiness questionnaire with a higher score indicating greater sleepiness; range 0-24)  EPWORTH SLEEPINESS SCALE 7/6/2023   Sitting and reading 1   Watching TV 0   Sitting, inactive in a public place (e.g. a theatre or a meeting) 0   As a passenger in a car for an hour without a break 1   Lying down to rest in the afternoon when circumstances permit 2   Sitting and talking to someone 0   Sitting quietly after a lunch without alcohol 1   In a car, while stopped for a few minutes in traffic 0   Total score 5         Immunization History   Administered Date(s) Administered    Influenza (FLUAD) - Quadrivalent - Adjuvanted - PF *Preferred* (65+) 10/25/2023    Influenza - Quadrivalent - High Dose - PF (65 years and older) 09/24/2020, 10/07/2021, 10/18/2022    Influenza - Trivalent - Fluarix, Flulaval, Fluzone, Afluria - PF 10/27/2014    Influenza - Trivalent - Fluzone High Dose - PF (65 years and older) 11/04/2015, 10/07/2016, 09/05/2017, 10/23/2018, 10/22/2019    Influenza A (H1N1) 2009 Monovalent - IM 04/23/2010    Pneumococcal Conjugate - 13 Valent 11/04/2015    Pneumococcal Polysaccharide - 23 Valent 12/08/2008, 04/25/2017    Tdap 04/25/2017    Zoster 02/16/2009    Zoster Recombinant 03/05/2020, 10/18/2022, 03/01/2023      Tobacco Use: Low Risk  (7/10/2025)    Patient History     Smoking Tobacco Use: Never     Smokeless Tobacco Use: Never     Passive Exposure: Past      Past Medical History:   Diagnosis Date    Anxiety     Arthritis     Back pain     Basal cell carcinoma (BCC)     Breast cancer     Breast cancer     Depression     Diabetes mellitus type 2, controlled     Enthesopathy of hip 12/11/2014    Fibromyalgia     GERD (gastroesophageal reflux disease)     HTN (hypertension)     Irregular heart beat     Mixed hyperlipidemia     Obesity      Osteoporosis     Rash and nonspecific skin eruption 6/15/2015    Sjogren - Flo's syndrome     Sleep apnea     Trouble in sleeping     Type 2 diabetes mellitus       Current Outpatient Medications on File Prior to Visit   Medication Sig Dispense Refill    amLODIPine (NORVASC) 10 MG tablet Take 1 tablet by mouth once daily 90 tablet 2    atorvastatin (LIPITOR) 40 MG tablet Take 1 tablet by mouth once daily 90 tablet 1    cycloSPORINE (RESTASIS) 0.05 % ophthalmic emulsion Place 1 drop into both eyes 2 (two) times daily. 180 each 6    olmesartan (BENICAR) 40 MG tablet TAKE 1 TABLET BY MOUTH ONCE DAILY STOP  BENAZEPRIL 90 tablet 1    OZEMPIC 2 mg/dose (8 mg/3 mL) PnIj INJECT 2 MG SUBCUTANEOUSLY ONCE A WEEK 9 mL 0    DULoxetine (CYMBALTA) 60 MG capsule Take 1 capsule (60 mg total) by mouth once daily. 90 capsule 1    fluticasone propionate (FLONASE) 50 mcg/actuation nasal spray 2 sprays (100 mcg total) by Each Nostril route once daily. (Patient not taking: Reported on 12/12/2024) 15.8 mL 5    hydroxychloroquine (PLAQUENIL) 200 mg tablet Take 2 tablets (400 mg total) by mouth once daily. 180 tablet 0    ibuprofen (IBUPROFEN JR STRENGTH) 100 mg Chew Take by mouth.      omeprazole (PRILOSEC) 40 MG capsule Take 1 capsule (40 mg total) by mouth once daily. 90 capsule 0    traZODone (DESYREL) 100 MG tablet Take 1 tablet (100 mg total) by mouth every evening. (Patient not taking: Reported on 12/12/2024) 90 tablet 1    [DISCONTINUED] benazepril-hydrochlorthiazide (LOTENSIN HCT) 20-25 mg Tab Take 1 tablet by mouth once daily. 90 tablet 1     No current facility-administered medications on file prior to visit.        Review of Systems   Constitutional:  Positive for fatigue. Negative for fever, weight loss, appetite change and weakness.   HENT:  Negative for postnasal drip, rhinorrhea, sinus pressure, trouble swallowing and congestion.    Respiratory:  Positive for somnolence. Negative for cough, sputum production, choking,  "chest tightness, shortness of breath, wheezing and dyspnea on extertion.    Cardiovascular:  Negative for chest pain and leg swelling.   Musculoskeletal:  Negative for gait problem and joint swelling.   Gastrointestinal:  Negative for nausea, vomiting and abdominal pain.   Neurological:  Negative for dizziness, weakness and headaches.   All other systems reviewed and are negative.      Objective:       Vitals:    07/10/25 1357   BP: 120/72   Patient Position: Sitting   Pulse: 76   Resp: 18   SpO2: (!) 94%   Weight: 86.3 kg (190 lb 4.1 oz)   Height: 5' 3" (1.6 m)       Physical Exam   Constitutional: She is oriented to person, place, and time. She appears well-developed and well-nourished. No distress.   HENT:   Head: Normocephalic.   Nose: Nose normal.   Mouth/Throat: Oropharynx is clear and moist.   Cardiovascular: Normal rate and regular rhythm.   Pulmonary/Chest: Effort normal. No respiratory distress. She has no wheezes. She has no rhonchi. She has no rales.   Musculoskeletal:         General: No edema.      Cervical back: Normal range of motion and neck supple.   Neurological: She is alert and oriented to person, place, and time.   Skin: Skin is warm and dry.   Psychiatric: She has a normal mood and affect.   Vitals reviewed.    Personal Diagnostic Review   Sleep study in media    Assessment/Plan:       Problem List Items Addressed This Visit          Neuro    Restless leg syndrome (Chronic)    Check ferritin  Advised daily exercise  Previously taking klonopin 1mg nightly per Dr. Rahman recommendations which she says was beneficial, requesting refills  Discussed risks of taking klonopin, will need to be seen every 3 months for refill if continuing to take nightly          Relevant Medications    clonazePAM (KLONOPIN) 1 MG tablet    Other Relevant Orders    Ferritin       Endocrine    Type 2 diabetes mellitus without complication, without long-term current use of insulin    Relevant Orders    Ferritin       " Orthopedic    Fibromyalgia    Relevant Orders    Ferritin       Other    ROX (obstructive sleep apnea) - Primary    Using nightly and benefits          Relevant Orders    CPAP/BIPAP SUPPLIES         Follow up in about 3 months (around 10/10/2025) for follow up Dr. Rahman for restless legs/sleep apnea.    Discussed diagnosis, its evaluation, treatment and usual course. All questions answered.    Patient verbalized understanding of plan and left in no acute distress    Thank you for the courtesy of participating in the care of this patient    AISHA NunezB[facility]

## 2025-07-10 NOTE — ASSESSMENT & PLAN NOTE
Check ferritin  Advised daily exercise  Previously taking klonopin 1mg nightly per Dr. Rahman recommendations which she says was beneficial, requesting refills  Discussed risks of taking klonopin, will need to be seen every 3 months for refill if continuing to take nightly

## 2025-07-24 ENCOUNTER — LAB VISIT (OUTPATIENT)
Dept: LAB | Facility: HOSPITAL | Age: 75
End: 2025-07-24
Attending: FAMILY MEDICINE
Payer: MEDICARE

## 2025-07-24 ENCOUNTER — OFFICE VISIT (OUTPATIENT)
Dept: INTERNAL MEDICINE | Facility: CLINIC | Age: 75
End: 2025-07-24
Payer: MEDICARE

## 2025-07-24 VITALS
SYSTOLIC BLOOD PRESSURE: 112 MMHG | HEART RATE: 76 BPM | BODY MASS INDEX: 33.83 KG/M2 | DIASTOLIC BLOOD PRESSURE: 62 MMHG | TEMPERATURE: 98 F | OXYGEN SATURATION: 99 % | RESPIRATION RATE: 18 BRPM | WEIGHT: 191 LBS

## 2025-07-24 DIAGNOSIS — F32.A ANXIETY AND DEPRESSION: ICD-10-CM

## 2025-07-24 DIAGNOSIS — M35.01 SJOGREN'S SYNDROME WITH KERATOCONJUNCTIVITIS SICCA: ICD-10-CM

## 2025-07-24 DIAGNOSIS — Z79.899 HIGH RISK MEDICATION USE: ICD-10-CM

## 2025-07-24 DIAGNOSIS — Z79.899 ENCOUNTER FOR LONG-TERM (CURRENT) USE OF MEDICATIONS: ICD-10-CM

## 2025-07-24 DIAGNOSIS — G25.81 RESTLESS LEG SYNDROME: Chronic | ICD-10-CM

## 2025-07-24 DIAGNOSIS — M85.80 OSTEOPENIA, UNSPECIFIED LOCATION: ICD-10-CM

## 2025-07-24 DIAGNOSIS — E11.69 TYPE 2 DIABETES MELLITUS WITH OTHER SPECIFIED COMPLICATION, WITHOUT LONG-TERM CURRENT USE OF INSULIN: ICD-10-CM

## 2025-07-24 DIAGNOSIS — I10 ESSENTIAL HYPERTENSION: ICD-10-CM

## 2025-07-24 DIAGNOSIS — E66.9 OBESITY, UNSPECIFIED CLASS, UNSPECIFIED OBESITY TYPE, UNSPECIFIED WHETHER SERIOUS COMORBIDITY PRESENT: ICD-10-CM

## 2025-07-24 DIAGNOSIS — G47.33 OSA (OBSTRUCTIVE SLEEP APNEA): Primary | ICD-10-CM

## 2025-07-24 DIAGNOSIS — F41.9 ANXIETY AND DEPRESSION: ICD-10-CM

## 2025-07-24 LAB
ABSOLUTE EOSINOPHIL (OHS): 0.15 K/UL
ABSOLUTE MONOCYTE (OHS): 0.67 K/UL (ref 0.3–1)
ABSOLUTE NEUTROPHIL COUNT (OHS): 4.67 K/UL (ref 1.8–7.7)
ALBUMIN SERPL BCP-MCNC: 4.3 G/DL (ref 3.5–5.2)
ALP SERPL-CCNC: 70 UNIT/L (ref 40–150)
ALT SERPL W/O P-5'-P-CCNC: 22 UNIT/L (ref 0–55)
ANION GAP (OHS): 8 MMOL/L (ref 8–16)
AST SERPL-CCNC: 69 UNIT/L (ref 0–50)
BASOPHILS # BLD AUTO: 0.04 K/UL
BASOPHILS NFR BLD AUTO: 0.6 %
BILIRUB SERPL-MCNC: 0.3 MG/DL (ref 0.1–1)
BUN SERPL-MCNC: 14 MG/DL (ref 8–23)
CALCIUM SERPL-MCNC: 9.8 MG/DL (ref 8.7–10.5)
CHLORIDE SERPL-SCNC: 106 MMOL/L (ref 95–110)
CHOLEST SERPL-MCNC: 197 MG/DL (ref 120–199)
CHOLEST/HDLC SERPL: 3.9 {RATIO} (ref 2–5)
CO2 SERPL-SCNC: 28 MMOL/L (ref 23–29)
CREAT SERPL-MCNC: 1 MG/DL (ref 0.5–1.4)
EAG (OHS): 114 MG/DL (ref 68–131)
ERYTHROCYTE [DISTWIDTH] IN BLOOD BY AUTOMATED COUNT: 14.2 % (ref 11.5–14.5)
GFR SERPLBLD CREATININE-BSD FMLA CKD-EPI: 59 ML/MIN/1.73/M2
GLUCOSE SERPL-MCNC: 103 MG/DL (ref 70–110)
HBA1C MFR BLD: 5.6 % (ref 4–5.6)
HCT VFR BLD AUTO: 38.5 % (ref 37–48.5)
HDLC SERPL-MCNC: 50 MG/DL (ref 40–75)
HDLC SERPL: 25.4 % (ref 20–50)
HGB BLD-MCNC: 12.2 GM/DL (ref 12–16)
IMM GRANULOCYTES # BLD AUTO: 0.01 K/UL (ref 0–0.04)
IMM GRANULOCYTES NFR BLD AUTO: 0.1 % (ref 0–0.5)
LDLC SERPL CALC-MCNC: 95.6 MG/DL (ref 63–159)
LYMPHOCYTES # BLD AUTO: 1.42 K/UL (ref 1–4.8)
MCH RBC QN AUTO: 29.7 PG (ref 27–31)
MCHC RBC AUTO-ENTMCNC: 31.7 G/DL (ref 32–36)
MCV RBC AUTO: 94 FL (ref 82–98)
NONHDLC SERPL-MCNC: 147 MG/DL
NUCLEATED RBC (/100WBC) (OHS): 0 /100 WBC
PLATELET # BLD AUTO: 205 K/UL (ref 150–450)
PMV BLD AUTO: 10.2 FL (ref 9.2–12.9)
POTASSIUM SERPL-SCNC: 4.1 MMOL/L (ref 3.5–5.1)
PROT SERPL-MCNC: 7.7 GM/DL (ref 6–8.4)
RBC # BLD AUTO: 4.11 M/UL (ref 4–5.4)
RELATIVE EOSINOPHIL (OHS): 2.2 %
RELATIVE LYMPHOCYTE (OHS): 20.4 % (ref 18–48)
RELATIVE MONOCYTE (OHS): 9.6 % (ref 4–15)
RELATIVE NEUTROPHIL (OHS): 67.1 % (ref 38–73)
SODIUM SERPL-SCNC: 142 MMOL/L (ref 136–145)
TRIGL SERPL-MCNC: 257 MG/DL (ref 30–150)
TSH SERPL-ACNC: 2.31 UIU/ML (ref 0.4–4)
WBC # BLD AUTO: 6.96 K/UL (ref 3.9–12.7)

## 2025-07-24 PROCEDURE — 99999 PR PBB SHADOW E&M-EST. PATIENT-LVL III: CPT | Mod: PBBFAC,,, | Performed by: FAMILY MEDICINE

## 2025-07-24 PROCEDURE — 83036 HEMOGLOBIN GLYCOSYLATED A1C: CPT

## 2025-07-24 PROCEDURE — 99213 OFFICE O/P EST LOW 20 MIN: CPT | Mod: PBBFAC | Performed by: FAMILY MEDICINE

## 2025-07-24 PROCEDURE — 80053 COMPREHEN METABOLIC PANEL: CPT

## 2025-07-24 PROCEDURE — 80061 LIPID PANEL: CPT

## 2025-07-24 PROCEDURE — 84443 ASSAY THYROID STIM HORMONE: CPT

## 2025-07-24 PROCEDURE — 36415 COLL VENOUS BLD VENIPUNCTURE: CPT

## 2025-07-24 PROCEDURE — 85025 COMPLETE CBC W/AUTO DIFF WBC: CPT

## 2025-07-24 RX ORDER — TIRZEPATIDE 15 MG/.5ML
15 INJECTION, SOLUTION SUBCUTANEOUS
Qty: 4 PEN | Refills: 5 | Status: SHIPPED | OUTPATIENT
Start: 2025-07-24

## 2025-07-24 RX ORDER — TIRZEPATIDE 12.5 MG/.5ML
12.5 INJECTION, SOLUTION SUBCUTANEOUS
Qty: 4 PEN | Refills: 0 | Status: SHIPPED | OUTPATIENT
Start: 2025-07-24

## 2025-07-24 NOTE — PROGRESS NOTES
Subjective:       Patient ID: Latonia Oliver is a 74 y.o. female.    Chief Complaint: chronic issues    HPI    Problem List[1]    Past Medical History:   Diagnosis Date    Anxiety     Arthritis     Back pain     Basal cell carcinoma (BCC)     Breast cancer     Breast cancer     Depression     Diabetes mellitus type 2, controlled     Enthesopathy of hip 2014    Fibromyalgia     GERD (gastroesophageal reflux disease)     HTN (hypertension)     Irregular heart beat     Mixed hyperlipidemia     Obesity     Osteoporosis     Rash and nonspecific skin eruption 6/15/2015    Sjogren - Flo's syndrome     Sleep apnea     Trouble in sleeping     Type 2 diabetes mellitus        Past Surgical History:   Procedure Laterality Date    APPENDECTOMY      BELT ABDOMINOPLASTY      BREAST LUMPECTOMY      BREAST SURGERY      breast augmentation     SECTION, CLASSIC      EPIDURAL STEROID INJECTION N/A 2022    Procedure: Lumbar L5/S1 IL NEELA with RN IV sedation;  Surgeon: Giovanny Arroyo MD;  Location: [facility]IN [facility]GT;  Service: Pain Management;  Laterality: N/A;    INJECTION OF JOINT Right 2022    Procedure: Right knee Synvisc injection J 7325 with local;  Surgeon: Giovanny Arroyo MD;  Location: [facility] PAIN [facility];  Service: Pain Management;  Laterality: Right;    RECONSTRUCTION, NASAL VALVE Left 10/22/2021    Procedure: RECONSTRUCTION, NASAL VALVE ;  Surgeon: Faustino Cash MD;  Location: [facility] OR;  Service: ENT;  Laterality: Left;       Family History   Problem Relation Name Age of Onset    Heart disease Mother      Hypertension Mother      COPD Mother      Diabetes Father      Stroke Father      Kidney disease Father         Tobacco Use History[2]    Wt Readings from Last 5 Encounters:   25 86.6 kg (191 lb)   07/10/25 86.3 kg (190 lb 4.1 oz)   12/10/24 82.4 kg (181 lb 10.5 oz)   10/25/24 82.1 kg (181 lb)   24 79.7 kg (175 lb 11.3 oz)       For further HPI details, see assessment and plan.    Review of Systems   overall doing well. No active complaints  Objective:      Vitals:    07/24/25 1432   BP: 112/62   Pulse: 76   Resp: 18   Temp: 98.4 °F (36.9 °C)       Physical Exam  Constitutional:       General: She is not in acute distress.     Appearance: She is not ill-appearing.   Pulmonary:      Effort: Pulmonary effort is normal. No respiratory distress.   Neurological:      General: No focal deficit present.      Mental Status: She is alert.   Psychiatric:         Mood and Affect: Mood normal.         Behavior: Behavior normal.         Assessment:       1. ROX (obstructive sleep apnea)    2. Restless leg syndrome    3. High risk medication use    4. Osteopenia, unspecified location    5. Essential hypertension    6. Anxiety and depression    7. Sjogren's syndrome with keratoconjunctivitis sicca    8. Encounter for long-term (current) use of medications    9. Type 2 diabetes mellitus with other specified complication, without long-term current use of insulin    10. Obesity, unspecified class, unspecified obesity type, unspecified whether serious comorbidity present        Plan:       Follow up on chronic conditions .  No acute pressing issues    Obstructive sleep apnea     Working with sleep disorders clinic   Continue utilization of CPAP       Restless legs syndrome   High-risk medication    She was prescribed clonazepam.  Discussed the risk of that medication and to only use as needed and eventually I would recommend full discontinuation     Osteopenia     Some degree of bone density loss   At present time patient does not want to do a DEXA scan.  Continue healthy lifestyle with continued exercise and resistance training to maintain muscle mass especially with the utilization of Ozempic (soon to be Mounjaro)     Hypertension     Blood pressure is quite well controlled with the utilization of amlodipine 10 and her olmesartan 40.  She tracks her pressures.  I appreciate that and I want her to continue to do so    Anxiety and  depression     Patient is no longer on Cymbalta.  She is feeling better off the medication.  We will continue to monitor     Sjogren's syndrome     Patient was not having any benefit with her Plaquenil she discontinued it she can not tell the difference     Long-term use of medication     Updating routine labs     Type 2 diabetes     Updating urine microalbumin along with the A1c     Obesity and diabetes     Patient is well controlled from a diabetic perspective unfortunately her weight loss seems to have stagnated.  She inquires about alternative medications and we will transition her to Mounjaro given its greater weight loss benefit potential.  I want her to do 4 doses of the 12.5 mg and if that goes well she will increase to the max dose of 15 mg.  As mentioned above heavy emphasis on resistance training to maintain muscle and bone health.      Immunization due  Patient is interested in doing the RSV vaccine and I encouraged she do so.  She plan to do it at [facility]     Patient is not interested in doing any form of colon cancer screening at present time     Plan on doing blood and urine today and seeing each other in 3 months or as needed.  Plan to do foot exam at next encounter  This note was verbally dictated, please excuse any type errors.         [1]   Patient Active Problem List  Diagnosis    Sjogren-Flo syndrome    Pain in joint, multiple sites    Sjogren's syndrome with keratoconjunctivitis sicca    Fibromyalgia    Type 2 diabetes mellitus without complication, without long-term current use of insulin    HTN (hypertension)    Mixed hyperlipidemia    GERD (gastroesophageal reflux disease)    Insomnia    Primary osteoarthritis of right knee    Lumbar back pain    Lumbar spondylosis    ROX (obstructive sleep apnea)    Osteopenia    Depression    Fatigue    Medication monitoring encounter    Lumbar paraspinal muscle spasm    Visit for screening mammogram    Restless leg syndrome    Acute right ankle pain     Personal history of breast cancer    Major depressive disorder in partial remission    Weakness    Balance problem    Nasal septal deviation    Obesity (BMI 30-39.9)    Monoclonal paraproteinemia   [2]   Social History  Tobacco Use   Smoking Status Never    Passive exposure: Past   Smokeless Tobacco Never

## 2025-07-25 ENCOUNTER — LAB VISIT (OUTPATIENT)
Dept: LAB | Facility: HOSPITAL | Age: 75
End: 2025-07-25
Attending: FAMILY MEDICINE
Payer: MEDICARE

## 2025-07-25 DIAGNOSIS — E11.69 TYPE 2 DIABETES MELLITUS WITH OTHER SPECIFIED COMPLICATION, WITHOUT LONG-TERM CURRENT USE OF INSULIN: ICD-10-CM

## 2025-07-25 LAB
ALBUMIN/CREAT UR: 5 UG/MG
CREAT UR-MCNC: 120 MG/DL (ref 15–325)
MICROALBUMIN UR-MCNC: 6 UG/ML (ref ?–5000)

## 2025-07-25 PROCEDURE — 82043 UR ALBUMIN QUANTITATIVE: CPT

## 2025-08-01 ENCOUNTER — LAB VISIT (OUTPATIENT)
Dept: LAB | Facility: HOSPITAL | Age: 75
End: 2025-08-01
Attending: FAMILY MEDICINE
Payer: MEDICARE

## 2025-08-01 DIAGNOSIS — R79.89 ELEVATED LIVER FUNCTION TESTS: ICD-10-CM

## 2025-08-01 LAB
ALBUMIN SERPL BCP-MCNC: 4.2 G/DL (ref 3.5–5.2)
ALP SERPL-CCNC: 68 UNIT/L (ref 40–150)
ALT SERPL W/O P-5'-P-CCNC: 20 UNIT/L (ref 0–55)
ANION GAP (OHS): 8 MMOL/L (ref 8–16)
AST SERPL-CCNC: 31 UNIT/L (ref 0–50)
BILIRUB SERPL-MCNC: 0.4 MG/DL (ref 0.1–1)
BUN SERPL-MCNC: 14 MG/DL (ref 8–23)
CALCIUM SERPL-MCNC: 9.4 MG/DL (ref 8.7–10.5)
CHLORIDE SERPL-SCNC: 108 MMOL/L (ref 95–110)
CO2 SERPL-SCNC: 26 MMOL/L (ref 23–29)
CREAT SERPL-MCNC: 1 MG/DL (ref 0.5–1.4)
GFR SERPLBLD CREATININE-BSD FMLA CKD-EPI: 59 ML/MIN/1.73/M2
GLUCOSE SERPL-MCNC: 98 MG/DL (ref 70–110)
POTASSIUM SERPL-SCNC: 4.1 MMOL/L (ref 3.5–5.1)
PROT SERPL-MCNC: 7.4 GM/DL (ref 6–8.4)
SODIUM SERPL-SCNC: 142 MMOL/L (ref 136–145)

## 2025-08-01 PROCEDURE — 36415 COLL VENOUS BLD VENIPUNCTURE: CPT

## 2025-08-01 PROCEDURE — 80053 COMPREHEN METABOLIC PANEL: CPT

## 2025-08-11 DIAGNOSIS — M25.562 PAIN IN BOTH KNEES, UNSPECIFIED CHRONICITY: Primary | ICD-10-CM

## 2025-08-11 DIAGNOSIS — M25.561 PAIN IN BOTH KNEES, UNSPECIFIED CHRONICITY: Primary | ICD-10-CM

## 2025-08-12 ENCOUNTER — HOSPITAL ENCOUNTER (OUTPATIENT)
Dept: RADIOLOGY | Facility: HOSPITAL | Age: 75
Discharge: HOME | End: 2025-08-12
Attending: PHYSICIAN ASSISTANT
Payer: MEDICARE

## 2025-08-12 ENCOUNTER — OFFICE VISIT (OUTPATIENT)
Dept: ORTHOPEDICS | Facility: CLINIC | Age: 75
End: 2025-08-12
Payer: MEDICARE

## 2025-08-12 VITALS
HEIGHT: 63 IN | DIASTOLIC BLOOD PRESSURE: 73 MMHG | BODY MASS INDEX: 33.83 KG/M2 | SYSTOLIC BLOOD PRESSURE: 110 MMHG | WEIGHT: 190.94 LBS

## 2025-08-12 DIAGNOSIS — M25.562 PAIN IN BOTH KNEES, UNSPECIFIED CHRONICITY: ICD-10-CM

## 2025-08-12 DIAGNOSIS — M17.11 PRIMARY OSTEOARTHRITIS OF RIGHT KNEE: Primary | ICD-10-CM

## 2025-08-12 DIAGNOSIS — M25.50 PAIN IN JOINT, MULTIPLE SITES: ICD-10-CM

## 2025-08-12 DIAGNOSIS — M25.561 PAIN IN BOTH KNEES, UNSPECIFIED CHRONICITY: ICD-10-CM

## 2025-08-12 DIAGNOSIS — M17.12 PRIMARY OSTEOARTHRITIS OF LEFT KNEE: ICD-10-CM

## 2025-08-12 PROCEDURE — 99999 PR PBB SHADOW E&M-EST. PATIENT-LVL III: CPT | Mod: PBBFAC,,, | Performed by: PHYSICIAN ASSISTANT

## 2025-08-12 PROCEDURE — 99213 OFFICE O/P EST LOW 20 MIN: CPT | Mod: PBBFAC,25 | Performed by: PHYSICIAN ASSISTANT

## 2025-08-12 PROCEDURE — 20610 DRAIN/INJ JOINT/BURSA W/O US: CPT | Mod: PBBFAC,RT | Performed by: PHYSICIAN ASSISTANT

## 2025-08-12 PROCEDURE — 99999PBSHW PR PBB SHADOW TECHNICAL ONLY FILED TO HB: Mod: PBBFAC,,,

## 2025-08-12 PROCEDURE — 73564 X-RAY EXAM KNEE 4 OR MORE: CPT | Mod: TC,50

## 2025-08-12 RX ORDER — MELOXICAM 15 MG/1
15 TABLET ORAL DAILY
Qty: 30 TABLET | Refills: 11 | Status: SHIPPED | OUTPATIENT
Start: 2025-08-12

## 2025-08-12 RX ORDER — DICLOFENAC SODIUM 10 MG/G
2 GEL TOPICAL 4 TIMES DAILY
Qty: 200 G | Refills: 11 | Status: SHIPPED | OUTPATIENT
Start: 2025-08-12

## 2025-08-12 RX ADMIN — LIDOCAINE HYDROCHLORIDE 5 ML: 10 INJECTION, SOLUTION INFILTRATION; PERINEURAL at 09:08

## 2025-08-12 RX ADMIN — METHYLPREDNISOLONE ACETATE 80 MG: 80 INJECTION, SUSPENSION INTRALESIONAL; INTRAMUSCULAR; INTRASYNOVIAL; SOFT TISSUE at 09:08

## 2025-08-14 RX ORDER — METHYLPREDNISOLONE ACETATE 80 MG/ML
80 INJECTION, SUSPENSION INTRA-ARTICULAR; INTRALESIONAL; INTRAMUSCULAR; SOFT TISSUE
Status: DISCONTINUED | OUTPATIENT
Start: 2025-08-12 | End: 2025-08-14 | Stop reason: HOSPADM

## 2025-08-14 RX ORDER — LIDOCAINE HYDROCHLORIDE 10 MG/ML
5 INJECTION, SOLUTION INFILTRATION; PERINEURAL
Status: DISCONTINUED | OUTPATIENT
Start: 2025-08-12 | End: 2025-08-14 | Stop reason: HOSPADM

## (undated) DEVICE — SUT 2/0 30IN SILK BLK BRAI

## (undated) DEVICE — SUT 4-0 CHROMIC GUT / RB1

## (undated) DEVICE — SUT PDSII 4-0 PS-2 CLEAR MO

## (undated) DEVICE — Device

## (undated) DEVICE — KIT ANTIFOG

## (undated) DEVICE — HANDSWITCH SUCTION COAG

## (undated) DEVICE — COVER LIGHT HANDLE 80/CA

## (undated) DEVICE — NDL SAFETY 25G X 1.5 ECLIPSE

## (undated) DEVICE — SYR B-D DISP CONTROL 10CC100/C

## (undated) DEVICE — SEE MEDLINE ITEM 157166

## (undated) DEVICE — GLOVE BIOGEL 7.5

## (undated) DEVICE — SPLINT NASAL AIRWAY SEPTAL SIL

## (undated) DEVICE — KIT ANTIFOG W/SPONG & FLUID

## (undated) DEVICE — SPONGE PATTY SURGICAL .5X3IN

## (undated) DEVICE — SEE MEDLINE ITEM 152739

## (undated) DEVICE — MANIFOLD 4 PORT

## (undated) DEVICE — ELECTRODE REM PLYHSV RETURN 9

## (undated) DEVICE — SEE MEDLINE ITEM 157027

## (undated) DEVICE — SEE MEDLINE ITEM 152622

## (undated) DEVICE — UNDERGLOVES BIOGEL PI SIZE 8

## (undated) DEVICE — BLADE INFERIOR TURBINATE 2MM

## (undated) DEVICE — GLOVE SURG ULTRA TOUCH 8

## (undated) DEVICE — CORD BIPOLAR ELECTROSURGICAL

## (undated) DEVICE — SUT VICRYL 4-0 ANTIBACT

## (undated) DEVICE — SUT MONOCRYL 4-0 PS-1 UND

## (undated) DEVICE — SUT 4-0 CHROMIC GUT / SH

## (undated) DEVICE — UNDERGLOVES BIOGEL PI SZ 7 LF